# Patient Record
Sex: FEMALE | Race: WHITE | NOT HISPANIC OR LATINO | Employment: OTHER | URBAN - NONMETROPOLITAN AREA
[De-identification: names, ages, dates, MRNs, and addresses within clinical notes are randomized per-mention and may not be internally consistent; named-entity substitution may affect disease eponyms.]

---

## 2017-06-06 ENCOUNTER — HISTORY (OUTPATIENT)
Dept: FAMILY MEDICINE | Facility: OTHER | Age: 82
End: 2017-06-06

## 2017-06-06 ENCOUNTER — OFFICE VISIT - GICH (OUTPATIENT)
Dept: FAMILY MEDICINE | Facility: OTHER | Age: 82
End: 2017-06-06

## 2017-06-06 DIAGNOSIS — G56.03 BILATERAL CARPAL TUNNEL SYNDROME: ICD-10-CM

## 2017-06-26 ENCOUNTER — OFFICE VISIT - GICH (OUTPATIENT)
Dept: FAMILY MEDICINE | Facility: OTHER | Age: 82
End: 2017-06-26

## 2017-06-26 ENCOUNTER — HISTORY (OUTPATIENT)
Dept: FAMILY MEDICINE | Facility: OTHER | Age: 82
End: 2017-06-26

## 2017-06-26 DIAGNOSIS — G56.03 BILATERAL CARPAL TUNNEL SYNDROME: ICD-10-CM

## 2017-07-24 ENCOUNTER — AMBULATORY - GICH (OUTPATIENT)
Dept: FAMILY MEDICINE | Facility: OTHER | Age: 82
End: 2017-07-24

## 2017-07-24 DIAGNOSIS — G56.03 BILATERAL CARPAL TUNNEL SYNDROME: ICD-10-CM

## 2017-08-29 ENCOUNTER — HISTORY (OUTPATIENT)
Dept: FAMILY MEDICINE | Facility: OTHER | Age: 82
End: 2017-08-29

## 2017-08-29 ENCOUNTER — OFFICE VISIT - GICH (OUTPATIENT)
Dept: FAMILY MEDICINE | Facility: OTHER | Age: 82
End: 2017-08-29

## 2017-08-29 DIAGNOSIS — G56.03 BILATERAL CARPAL TUNNEL SYNDROME: ICD-10-CM

## 2017-08-30 ENCOUNTER — AMBULATORY - GICH (OUTPATIENT)
Dept: ORTHOPEDICS | Facility: OTHER | Age: 82
End: 2017-08-30

## 2017-08-30 DIAGNOSIS — M25.532 PAIN IN LEFT WRIST: ICD-10-CM

## 2017-08-30 DIAGNOSIS — M25.531 PAIN IN RIGHT WRIST: ICD-10-CM

## 2017-08-31 ENCOUNTER — AMBULATORY - GICH (OUTPATIENT)
Dept: ORTHOPEDICS | Facility: OTHER | Age: 82
End: 2017-08-31

## 2017-08-31 ENCOUNTER — HISTORY (OUTPATIENT)
Dept: ORTHOPEDICS | Facility: OTHER | Age: 82
End: 2017-08-31

## 2017-08-31 ENCOUNTER — OFFICE VISIT - GICH (OUTPATIENT)
Dept: ORTHOPEDICS | Facility: OTHER | Age: 82
End: 2017-08-31

## 2017-08-31 ENCOUNTER — HOSPITAL ENCOUNTER (OUTPATIENT)
Dept: RADIOLOGY | Facility: OTHER | Age: 82
End: 2017-08-31
Attending: ORTHOPAEDIC SURGERY

## 2017-08-31 DIAGNOSIS — M25.531 PAIN IN RIGHT WRIST: ICD-10-CM

## 2017-08-31 DIAGNOSIS — M25.532 PAIN IN LEFT WRIST: ICD-10-CM

## 2017-08-31 DIAGNOSIS — G56.03 BILATERAL CARPAL TUNNEL SYNDROME: ICD-10-CM

## 2017-09-21 ENCOUNTER — OFFICE VISIT - GICH (OUTPATIENT)
Dept: FAMILY MEDICINE | Facility: OTHER | Age: 82
End: 2017-09-21

## 2017-09-21 ENCOUNTER — HISTORY (OUTPATIENT)
Dept: FAMILY MEDICINE | Facility: OTHER | Age: 82
End: 2017-09-21

## 2017-09-21 DIAGNOSIS — Z23 ENCOUNTER FOR IMMUNIZATION: ICD-10-CM

## 2017-09-21 DIAGNOSIS — Z01.818 ENCOUNTER FOR OTHER PREPROCEDURAL EXAMINATION: ICD-10-CM

## 2017-09-21 LAB
ANION GAP - HISTORICAL: 12 (ref 5–18)
BUN SERPL-MCNC: 20 MG/DL (ref 7–25)
BUN/CREAT RATIO - HISTORICAL: 34
CALCIUM SERPL-MCNC: 9.4 MG/DL (ref 8.6–10.3)
CHLORIDE SERPLBLD-SCNC: 104 MMOL/L (ref 98–107)
CO2 SERPL-SCNC: 26 MMOL/L (ref 21–31)
CREAT SERPL-MCNC: 0.58 MG/DL (ref 0.7–1.3)
GFR IF NOT AFRICAN AMERICAN - HISTORICAL: >60 ML/MIN/1.73M2
GLUCOSE SERPL-MCNC: 103 MG/DL (ref 70–105)
HEMOGLOBIN: 12.7 G/DL (ref 12–16)
MCV RBC AUTO: 93 FL (ref 80–100)
POTASSIUM SERPL-SCNC: 3.9 MMOL/L (ref 3.5–5.1)
SODIUM SERPL-SCNC: 142 MMOL/L (ref 133–143)

## 2017-10-04 ENCOUNTER — HISTORY (OUTPATIENT)
Dept: SURGERY | Facility: OTHER | Age: 82
End: 2017-10-04

## 2017-10-04 ENCOUNTER — HOSPITAL ENCOUNTER (OUTPATIENT)
Dept: SURGERY | Facility: OTHER | Age: 82
Discharge: HOME | End: 2017-10-04
Attending: ORTHOPAEDIC SURGERY | Admitting: ORTHOPAEDIC SURGERY

## 2017-10-04 ENCOUNTER — SURGERY (OUTPATIENT)
Dept: SURGERY | Facility: OTHER | Age: 82
End: 2017-10-04

## 2017-10-04 DIAGNOSIS — Z98.890 OTHER SPECIFIED POSTPROCEDURAL STATES: ICD-10-CM

## 2017-10-16 ENCOUNTER — OFFICE VISIT - GICH (OUTPATIENT)
Dept: ORTHOPEDICS | Facility: OTHER | Age: 82
End: 2017-10-16

## 2017-10-16 ENCOUNTER — HISTORY (OUTPATIENT)
Dept: ORTHOPEDICS | Facility: OTHER | Age: 82
End: 2017-10-16

## 2017-10-16 DIAGNOSIS — Z98.890 OTHER SPECIFIED POSTPROCEDURAL STATES: ICD-10-CM

## 2017-10-16 DIAGNOSIS — G56.03 BILATERAL CARPAL TUNNEL SYNDROME: ICD-10-CM

## 2017-12-27 NOTE — PROGRESS NOTES
Patient Information     Patient Name MRN Sex Merlyn Florez [id number] Female 1933      Progress Notes by Edison Kelley DO at 2017  7:45 AM     Author:  Edison Kelley DO Service:  (none) Author Type:  Physician     Filed:  2017  8:22 AM Encounter Date:  2017 Status:  Signed     :  Edison Kelley DO (Physician)            Merlyn Escamilla was seen in consultation for Sharath Dinero MD for a chief complaint of numbness and tingling into both hands.      CHIEF COMPLAINT: Merlyn Escamilla is a 84 y.o.  female  Chief Complaint     Patient presents with       Consult      bilateral cts       HISTORY OF PRESENTING INJURY   History of presenting injury, patient comes in today with an ongoing complaint of numbness and tingling into both her hands. She states this started roughly around  doesn't recall any trauma but was doing more to care for her  who recently passed. She does gardening and utilizes her wrists and hands there. Numbness is into the median nerve distribution. Right seems to be worse than left for pain but the tingling is worse on the left.  Description of pain:  moderate aching, discomfort and numbness   Radiation of pain: yes  Pain course: gradually worsening  Worse with: bending or flexing affected area and extending affected area  Improved by: rest  History of injection: No  Any PT: No    PAST MEDICAL HISTORY:  Past Medical History:     Diagnosis  Date     DVT complicating pregnancy (HC)      Osteoarthritis     Knee        PAST SURGICAL HISTORY:  Past Surgical History:      Procedure  Laterality Date     APPENDECTOMY       COLONOSCOPY  2014    Arteriovenous malformations of right, transverse and splenic flexure       HYSTERECTOMY  1987     with squamous metaplasia and leiomoma       VEIN STRIPPING         ORTHOPEDIC FRACTURES AND BROKEN BONES:  As above.    ALLERGIES:  Allergies     Allergen  Reactions     Butazolidin  "[Phenylbutazone] *Unknown - Pt Doesn't Remember     Morphine Muscle Weakness       CURRENT MEDICATIONS:  Current Outpatient Prescriptions       Medication  Sig Dispense Refill     aspirin (ECOTRIN) 81 mg enteric coated tablet Take 1 tablet by mouth once daily with a meal.  0     multivitamin (MVI) tablet Take 1 tablet by mouth once daily.  0     naproxen (ALEVE) 220 mg tablet Take 220 mg by mouth once daily as needed.       No current facility-administered medications for this visit.      Medications have been reviewed by me and are current to the best of my knowledge and ability.      SOCIAL HISTORY:  Marital Status:   Children: Yes  Occupation: Homemaker.  Alcohol use:  Yes  Tobacco use: Smoker: no  Are you or have you used illicit drugs:  no    FAMILY HISTORY:  Family History       Problem   Relation Age of Onset     Heart Disease  Mother      Heart Disease  Father      Genetic  Other      Parents  of heart disease, father at age 63, mother 73~Sister- brain tumor       Cancer  Sister      brain tumor         REVIEW OF SYSTEMS:  The review of systems as documented in the HPI and on the intake questionnaire, completed by the patient on 2017 have been reviewed by myself and the pertinent positives and negatives addressed.  The remainder of the complete review of systems was non-contributory.    PHYSICAL EXAM:   /72  Pulse 76  Ht 1.575 m (5' 2\")  Wt 59.9 kg (132 lb)  BMI 24.14 kg/m2 Body mass index is 24.14 kg/(m^2).    General Appearance: Pleasant female in good appearance, mood and affect.  Alert and orientated times three ( time, date and location).    Skin:Normal    Sensation is Abnormal in the median nerve distribution bilaterally.    Shoulder:  Motion: full    Elbow:  Flexion: Normal  Extension: Normal    Wrist:  Flexion: Normal  Extension: Normal  Radial/ulnar motion: Normal  Tenderness at the scaphoid: negative    Hand:  Thenar wasting: no  Hypothenar wasting: no  Sensation: " Abnormal  Radial and ulnar blood flow:  Normal  Tinel's test positive  Phalen's test positive  Compression test positive.  Patient does have obvious deformity about the first CMC joints. There is no tenderness with palpation but there is a positive grind test bilaterally.    Eyes: Pupils are round and she wears glasses.    Ears: Hearing: Impaired    Heart: Good cap refill into her hands, radial pulses regular.    Lungs: Distant breath sounds.     Radiographic images from 8/31 where independently reviewed and discussed with the patient.      Xray:     X-rays do demonstrate significant first CMC arthritis. No fractures noted.    EMG:     EMG results reviewed with the patient independently she does have severe carpal tunnel syndrome. It is nonreactive on the right left is 3.0 ms and motor delay is 5.7 ms on the right 5.1 ms on the left.    IMPRESSION:  Significant bilateral carpal tunnel syndrome right greater than left.  Significant CMC arthritis bilaterally.    PLAN:  Risks, benefits, conservative, surgical and alternatives to treatment where discussed and the patient would like to proceed with operative intervention.  She will continue with her wrist splints.  She is not having pain about the first CMC joints so we'll continue to monitor.  I utilized a handout and poster board to help her understand carpal tunnel syndrome and its options for treatment she verbalized understanding.  She will follow-up after surgery.  Questions and concerns answered.    I have discussed options with Merlyn Escamilla the treatment for carpal tunnel, which have included observation, physical therapy, corticosteroid injection versus surgical release. I discussed pros and cons of each approach, and at this point, Merlyn Escamilla would like to proceed with carpal tunnel surgery. We discussed that surgery would be an outpatient surgery, you would be able to go home following the surgery. We will plan on open release of the transverse carpal  ligament with anything else that needs to be done.  Surgical anesthesia would be general anesthesia versus block and anesthesia will discuss options.  I discussed following surgery Merlyn Escamilla would be in a splint until seen in the office and they can work on gentle motion of the elbow and fingers after surgery.  At four weeks following surgery occupational therapy may be needed.   Full recovery from carpal tunnel surgery may take a year. The goal will be pain relief. Complications were discussed including continued pain, stiffness in the wrist, rare chance of neurovascular damage, potential chance of infection. If deep infection were to occur, further surgery may be needed with repeat washout in the operating room with possible need for treatment with antibiotics.    Risks, benefits, conservative, surgical, and alternatives of treatment were thoroughly outlined. No guarantees were given. Risks which do include, but are not limited to:  Scar, infection, decreased motion, damage to blood vessels, nerves and tendons, failure or need for further treatment, reaction to medications and anesthesia, blood clots, and the possibility of death where discussed.  She did verbalize an understanding. All questions and concerns were addressed.    Patient is set up for open right carpal tunnel surgery    Merlyn Escamilla will need pre op clearance for management of pulmonary function for surgery and use of etoh daily.    Follow up after surgery will be 12-14 days    All questions where answered to the patients satisfaction.    Edison Kelley D.O.  Orthopaedic Surgeon    [address] [address]  Phone [phone] (KNEE)  Fax [phone]    This document was created using computer generated templates and voice activated software.    8:17 AM 8/31/2017

## 2017-12-27 NOTE — PROGRESS NOTES
Patient Information     Patient Name MRN Sex Merlyn Rodriguez [id number] Female 1933      Progress Notes by Sharath Dinero MD at 2017  2:00 PM     Author:  Sharath Dinero MD Service:  (none) Author Type:  Physician     Filed:  2017  2:44 PM Encounter Date:  2017 Status:  Signed     :  Sharath Dinero MD (Physician)            SUBJECTIVE:  Merlyn Escamilla is a 83 y.o. female here for follow-up. Patient was diagnosed with bilateral carpal tunnel syndrome previously. Since her last visit she has been using wrist braces and anti-inflammatories. She states overall her symptoms are significantly improved. She continues however have symptoms, right worse than left. She is worried about taking care of her  who will be discharged back to home with the next couple of days.    ROS:    As above otherwise ROS is unremarkable.    OBJECTIVE:  /60  Pulse 72  Wt 62.1 kg (137 lb)  Breastfeeding? No  BMI 25.06 kg/m2    EXAM:  General Appearance: Pleasant, alert, appropriate appearance for age. No acute distress  Musculoskeletal: Bilateral wrist show normal range of motion. She has no palpable tenderness to palpation.  Neurologic Exam: Positive Phalen and Tinel's on the right, negative on the left.    ASSESSEMENT AND PLAN:    Merlyn was seen today for follow up.    Diagnoses and all orders for this visit:    Bilateral carpal tunnel syndrome  -     EMG; Future    We discussed her options and she is inquiring about possibility of surgery. We discussed therapy, continued immobilization, anti-inflammatories and EMG to rule out ultimately she wanted to get an EMG to evaluate the severity of her carpal tunnel. In the meantime will continue with bracing and anti-inflammatories. I'll contact her with results afterwards.      Cliff Dinero MD    This document was prepared using voice generated software.  While every attempt was made for accuracy, grammatical errors may exist.

## 2017-12-28 NOTE — H&P
Patient Information     Patient Name MRMerlyn Chen [id number] Female 1933      H&P (View-Only) by Sharath Dinero MD at 2017  9:30 AM     Author:  Sharath Dinero MD Service:  (none) Author Type:  Physician     Filed:  2017 12:35 PM Date of Service:  2017  9:30 AM Status:  Signed     :  Sharath Dinero MD (Physician)            ----------------- PREOPERATIVE EXAM ------------------  2017    SUBJECTIVE:  Merlyn Escamilla is a 84 y.o. female here for preoperative optimization.    I was asked to see Merlyn Escamilla by Dr. Kelley for a preoperative optimization.    Date of Surgery:   Type of Surgery: Right carpal tunnel release  Surgeon: Dr. Kelley  Hospital:  [facility]    HPI:  Patient has a history of bilateral carpal tunnel syndrome. She is planning on having her right side released on . She has had previous surgeries in the past without any bleeding or anesthesia problems. She did have a blood clot, DVT, approximate 50 years ago with the birth of her children but no blood clots since. There is no family history of clotting disorders.  She normally uses an aspirin a day but she has not taken anything over-the-counter or prescribed since .      Fever/Chills or other infectious symptoms in past month:  (NO)   >10lb weight loss in past two months:  (NO)     Preoperative Evaluation: Obstructive Sleep Apnea screening    S: Snore -  Do you snore loudly? (louder than talking or loud enough to be heard through closed doors)(NO)  T: Tired - Do you often feel tired, fatigued, or sleepy during the daytime?(NO)  O: Observed - Has anyone ever observed you stop breathing during your sleep?(NO)  P: Pressure - Do you have or are you being treated for high blood pressure?(NO)  B: BMI - BMI greater than 35kg/m2?(NO)  A: Age - Age over 50 years old?(YES)  N: Neck - Neck circumference greater than 40 cm?(NO)  G: Gender - Gender: Male?(NO)    Total number  "of \"YES\" responses:  1    Scoring: Low risk of ARTEMIO 0-2  At Risk of ARTEMIO: >3 High Risk of ARTEMIO: 5-8        Patient Active Problem List       Diagnosis  Date Noted     S/P total knee arthroplasty  10/03/2014     AVM (arteriovenous malformation) of colon  10/01/2014     Osteoarthritis       Knee        HEMORRHOIDS       COLONIC POLYPS, HYPERPLASTIC, HX OF         Past Medical History:     Diagnosis  Date     DVT complicating pregnancy (HC)      Osteoarthritis     Knee        Past Surgical History:      Procedure  Laterality Date     APPENDECTOMY       COLONOSCOPY  2014    Arteriovenous malformations of right, transverse and splenic flexure       HYSTERECTOMY  1987     with squamous metaplasia and leiomoma       right knee replacement Right      VEIN STRIPPING         Current Outpatient Prescriptions       Medication  Sig Dispense Refill     aspirin (ECOTRIN) 81 mg enteric coated tablet Take 1 tablet by mouth once daily with a meal.  0     multivitamin (MVI) tablet Take 1 tablet by mouth once daily.  0     naproxen (ALEVE) 220 mg tablet Take 220 mg by mouth once daily as needed.       No current facility-administered medications for this visit.      Medications have been reviewed by me and are current to the best of my knowledge and ability.    Recent use of: no recent use of aspirin (ASA), NSAIDS or steroids    Allergies:  Allergies     Allergen  Reactions     Butazolidin [Phenylbutazone] *Unknown - Pt Doesn't Remember     Morphine Muscle Weakness   Latex allergy  no    Family History       Problem   Relation Age of Onset     Heart Disease  Mother      Heart Disease  Father      Genetic  Other      Parents  of heart disease, father at age 63, mother 73~Sister- brain tumor       Cancer  Sister      brain tumor         Denies family hx of bleeding tendencies, anesthesia complications, or other problems with surgery.    Social History      Substance Use Topics        Smoking status:  Never Smoker     Smokeless " "tobacco:  Never Used     Alcohol use  3.0 oz/week     6 Standard drinks or equivalent per week        ROS:    Surgical:  patient denies previous complications from prior surgeries including but not limited to prolonged bleeding, anesthesia complications, dysrhythmias, surgical wound infections, or prolonged hospital stay.       -------------------------------------------------------------    PHYSICAL EXAM:  /83  Pulse 89  Temp 97.1  F (36.2  C) (Tympanic)  Ht 1.575 m (5' 2\")  Wt 59.4 kg (131 lb)  BMI 23.96 kg/m2    EXAM:  General Appearance: Pleasant, alert, appropriate appearance for age. No acute distress  Head Exam: Normal. Normocephalic, atraumatic.  Eyes: PERRL, EOMI  Ears: Normal TM's bilaterally. Normal auditory canals and external ears.   OroPharynx: Normal buccal mucosa. Normal pharynx.  Neck: Supple, no masses or nodes, no lymphadenopathy.  No thyromegaly.  Lungs: Normal chest wall and respirations. Clear to auscultation, no wheezes or crackles.  Cardiovascular: Regular rate and rhythm. S1, S2, no murmurs.  Gastrointestinal: Soft, nontender, no abnormal masses or organomegaly. BS normal   Musculoskeletal: No edema.  Skin: no concerning or new rashes.  Neurologic Exam: CN 2-12 grossly intact.  Normal gait.  Symmetric DTRs, normal gross motor movement, tone, and coordination. No tremor.  Psychiatric Exam: Alert and oriented, appropriate affect.      EKG:  appears normal, NSR, Normal sinus rhythm and normal axis, normal intervals, no acute ST/T changes c/w ischemia  ---------------------------------------------------------------  Results for orders placed or performed in visit on 09/21/17      HEMOGLOBIN      Result  Value Ref Range    HEMOGLOBIN                12.7 12.0 - 16.0 g/dL    MCV                       93 80 - 100 fL   BASIC METABOLIC PANEL      Result  Value Ref Range    SODIUM 142 133 - 143 mmol/L    POTASSIUM 3.9 3.5 - 5.1 mmol/L    CHLORIDE 104 98 - 107 mmol/L    CO2,TOTAL 26 21 - 31 " mmol/L    ANION GAP 12 5 - 18                    GLUCOSE 103 70 - 105 mg/dL    CALCIUM 9.4 8.6 - 10.3 mg/dL    BUN 20 7 - 25 mg/dL    CREATININE 0.58 (L) 0.70 - 1.30 mg/dL    BUN/CREAT RATIO           34                    GFR if African American >60 >60 ml/min/1.73m2    GFR if not African American >60 >60 ml/min/1.73m2         ASSESSEMENT AND PLAN:    Merlyn was seen today for pre-op exam.    Diagnoses and all orders for this visit:    Preop examination  -     HEMOGLOBIN; Future  -     BASIC METABOLIC PANEL; Future  -     EKG 12 LEAD UNIT PERFORMED (PERFORMED TODAY)     patient is currently optimized for her surgery. Labs can be seen above. She will be nothing by mouth after midnight. She will restart her aspirin. Feel free to contact me if there are any concerns.    PRE OP RECOMMENDATIONS:  Patient is on chronic pain medications (NO);   Patient is on antiplatlet/anticoagulation (NO)  Other medications that need adjustment perioperatively (NO)    Other:  Patient was advised to call our office and the surgical services with any change in condition or new symptoms if they were to develop between today and their surgical date.  Especially any cardiopulmonary symptoms or symptoms concerning for an infection.    Cliff Dinero MD    This document was prepared using voice generated softwear.  While every attempt was made for accuracy, grammatical errors may exist.

## 2017-12-28 NOTE — OR ANESTHESIA
Patient Information     Patient Name MRN Sex     Merlyn Escamilla [id number] Female 1933      OR Anesthesia by Clint Doll DO at 10/4/2017 10:55 AM     Author:  Clint Doll DO Service:  (none) Author Type:  PHYS- Anesthesiologist     Filed:  10/4/2017 10:55 AM Date of Service:  10/4/2017 10:55 AM Status:  Signed     :  Clint Doll DO (PHYS- Anesthesiologist)            Anesthesia Post Operative Care Note    Name: Merlyn Escamilla  MRN:   [id number]  :    1933       Procedure Done:  See Surgeon Note        Anesthesia Technique    Anesthetic Type:  General     Airway Management:  LMA     Oral Trauma:  No    Intraoperative Course   Hemodynamics:  Stable    Ventilation Normal:  Yes Lung Sounds:  Normal      PACU Course    Airway Status:  Extubated     Nondepolarizer Used:       Reversed: N/A   Hemodynamics:  Stable      Hydration: Euvolemic   Temperature:  36.1 - 38.3      Mental Status:  Awake, alert, follows commands   Pain Management:  Adequate   Regional Block:  No   Anesthesia Complications:  None      Vital Signs:  Temp: 97.9  F (36.6  C)  Pulse: 69  BP: 117/59  Resp: 18  SpO2: 95 %    O2 Device: Room Air         Level of Nausea: None        Active Lines:  Patient Lines/Drains/Airways Status    Active Line     Name: Placement date: Placement time: Site: Days:    PERIPHERAL VAD Left Hand 20 10/04/17   0810   Hand   less than 1                Intake & Output:       Labs:  No results for input(s): EZ4UVNQRVGE, NNV6KOVCPPZD, PHARTERIAL, [id number], T6OQXQXUCLRI in the last 24 hours.    No results for input(s): MAGNESIUM in the last 24 hours.    No results for input(s): GLUCOSEMETER in the last 720 hours.        Clint Doll DO ....................  10/4/2017   10:55 AM

## 2017-12-28 NOTE — OR POSTOP
Patient Information     Patient Name MRN Sex     Merlyn Escamilla [id number] Female 1933      OR PostOp by Valerie Stiles RN at 10/4/2017 11:27 AM     Author:  Valerie Stiles RN Service:  (none) Author Type:  NURS- Registered Nurse     Filed:  10/4/2017 11:27 AM Date of Service:  10/4/2017 11:27 AM Status:  Signed     :  Valerie Stiles RN (NURS- Registered Nurse)            Discharge Note    Data:  Merlyn Escamilla has been discharged home at 1100 via ambulatory accompanied by Registered Nurse and Family.      Action:  Written discharge/follow-up instructions were provided to patient and Daughter(s). Prescriptions were written and sent with patient.  Belongings sent with patient. Medications from home sent with patient/family: Not Applicable  Equipment none .     Response:  Patient and Daughter(s) verbalized understanding of discharge instructions, reason for discharge, and necessary follow-up appointments.

## 2017-12-28 NOTE — OR POSTOP
Patient Information     Patient Name MRN Sex     Merlyn Escamilla [id number] Female 1933      OR PostOp by Leyla Post RN at 10/4/2017 10:08 AM     Author:  Leyla Post RN Service:  (none) Author Type:  NURS- Registered Nurse     Filed:  10/4/2017 10:09 AM Date of Service:  10/4/2017 10:08 AM Status:  Signed     :  Leyla Post RN (NURS- Registered Nurse)            PACU Respiratory Event Documentation     1) Episodes of Apnea greater than or equal to 10 seconds: no    2) Bradypnea - less than 8 breaths per minute: 16    3) Pain score on 0 to 10 scale: 0    4) Pain-sedation mismatch (yes or no): no    5) Repeated 02 desaturation less than 90% (yes or no): no    Anesthesia notified? (yes or no): yes    Any of the above events occuring repeatedly in separate 30 minute intervals may be considered recurrent PACU respiratory events.    PACU Transfer Note    Merlyn Escamilla transferred to DSU via cart.  Equipment used for transport:  None.  Accompanied by:  Registered Nurse    Patient stable and meets phase 1 discharge criteria for transport from PACU.  LEYLA POST RN ....................  10/4/2017   10:09 AM

## 2017-12-28 NOTE — PROCEDURES
Patient Information     Patient Name MRN Sex Merlyn Florez [id number] Female 1933      Procedures signed by Edison Kelley DO at 10/4/2017 11:06 AM      Author:  Edison Kelley DO Service:  (none) Author Type:  Physician     Filed:  10/4/2017 11:06 AM Creation Time:  10/4/2017 10:07 AM Status:  Signed     :  Edison Kelley DO (Physician)            DATE OF SERVICE:  10/04/2017    SURGEON:  Edison Kelley DO.    ASSISTANT:  None.    PREOPERATIVE DIAGNOSIS:  Bilateral carpal tunnel, right greater than left.    POSTOPERATIVE DIAGNOSIS:  Bilateral carpal tunnel, right greater than left.    PROCEDURES:  1.  Release of the right transverse carpal ligament (DOS 10/04/2017).  2.  Left carpal tunnel syndrome.    IMPLANTS:  None.    ANESTHESIA:  General via LMA.    ESTIMATED BLOOD LOSS:  Less than 5.    FLUIDS:  See chart.    DRAINS:  None.    COMPLICATIONS:  None.    DISPOSITION:  Stable to postop.    INDICATIONS FOR PROCEDURE:  The patient is an 84-year-old female who comes in today with ongoing complaints of numbness and tingling into both hands.  She had been seen in my office and noted to have significant carpal tunnel syndrome bilaterally as noted on EMG.  She had tried conservative measures with very limited relief.  She elected to go ahead with operative intervention.    PROCEDURE NOTE:  After informed consent was received from the patient, having listed all the risks and benefits as noted on the consent form, but not limited to the consent form, and having discussed all conservative surgical and alternatives to treatment, the patient signed the consent form with a witness present.  The patient understood there were numerous risks, all of them are not written down but were discussed at length.  No guarantees were given.  All questions were answered prior to signing consent form.    The patient was given antibiotics one half hour prior to skin incision.  She was taken  back to the operating room supine on a gurney and transferred to the operating room table, secured, and all bony prominences were padded.  She was then given general anesthesia via LMA.  Once proper anesthesia was obtained, tourniquet was placed but not inflated, and the patient's right upper extremity was sterilely prepped and draped.    A timeout was performed according to institutional guidelines.  With this done, I then marked my incision site, elevated the arm and inflated the tourniquet.  Skin incision only was made with a #15 scalpel.  Blunt dissection was performed until I identified the transverse carpal ligament.  I made a new small nick incision in the transverse carpal ligament, passed the [place] proximally and distally, and released the proximal and distal components of the transverse carpal ligament under direct visualization.  The nerve itself when visualized was noted to have an hourglass deformity.  When I released the tourniquet, there was significant hyperemic effect into the nerve.  I irrigated the area copiously and maintained hemostasis, irrigated again.    I closed the superficial subQ tissue with #4-0 Stratafix and the skin was closed with #4-0 nylon in horizontal mattress fashion.  The area was infiltrated with local.  Sterile dressings included Xeroform, 4 x 4, ABD, and a well-padded cock-up wrist splint were applied.  The patient was extubated, transferred back to the gurney, and taken to the recovery room in satisfactory condition.  She will be discharged home per protocol.  She was given prescription for Norco 5/325, #5, no refills, and she will follow up in my office as already arranged.      DO Orville MCELROY  D:10/04/2017 09:45:44  T:10/04/2017 10:02:16  JUN: [id number]  TJID: [id number]    cc:

## 2017-12-28 NOTE — PROGRESS NOTES
"Patient Information     Patient Name MRN Sex Merlyn Rodriguez [id number] Female 1933      Progress Notes by Edison Kelley DO at 10/16/2017  9:30 AM     Author:  Edison Kelley DO Service:  (none) Author Type:  Physician     Filed:  10/16/2017  9:54 AM Encounter Date:  10/16/2017 Status:  Signed     :  Edison Kelley DO (Physician)            PROGRESS NOTE    SUBJECTIVE:  Merlyn Escamilla is here for evaluation in regards to right carpal tunnel release and also her left carpal tunnel syndrome. Patient states that there is been some improvement on her right side. She still does feel some numbness especially into the thumb but it has improved in the other digits. The left hand now has flared up and it seems to be getting worse. This is probably related to her having to use it more since her right carpal tunnel release. She is using her splint on the left side. I previous note does discuss her bilateral carpal tunnel syndrome.     OBJECTIVE:  /60  Pulse 68  Ht 1.575 m (5' 2\")  Wt 59.4 kg (131 lb)  BMI 23.96 kg/m2 Body mass index is 23.96 kg/(m^2).    General Appearance: Pleasant female in good appearance, mood and affect.  Alert and orientated times three ( time, date and location).    Incision:  Clean and dry, closed, no infection    Sensation is Abnormal in the median nerve distribution bilaterally, slight improvement on the right side.    Shoulder:  Motion: full    Elbow:  Flexion: Normal  Extension: Normal    Wrist:  Flexion: Normal  Extension: Normal  Radial/ulnar motion: Normal  Tenderness at the scaphoid: negative    Hand:  Thenar wasting: no  Hypothenar wasting: no  Sensation: Abnormal  Radial and ulnar blood flow:  Normal  Tinel's test positive  Phalen's test positive  Compression test positive.  Patient does have obvious deformity about the first CMC joints. There is no tenderness with palpation but there is a positive grind test bilaterally.    Eyes: Pupils " are round and she wears glasses.    Ears: Hearing: Impaired    Heart: Good cap refill into her hands, radial pulses regular.    Lungs: Distant breath sounds.     Radiographic images from 8/31 where independently reviewed and discussed with the patient.      Xray:     X-rays do demonstrate significant first CMC arthritis. No fractures noted.    PROCEDURE: XR WRIST W NAVICULAR MINIMUM 3 VIEWS LEFT  HISTORY: Bilateral wrist pain.  COMPARISON: None.  TECHNIQUE: 5 views of the left wrist were obtained.  FINDINGS: There are advanced degenerative changes of the first carpometacarpal joint. There are also moderate degenerative changes in the remainder of the wrist. There is diffuse osteopenia. No acute fracture or dislocation.  IMPRESSION: No acute fracture. Advanced degenerative disease.  Electronically Signed By: Saumya Rodriguez M.D. on 8/31/2017 8:54 AM    EMG:     EMG results reviewed with the patient independently she does have severe carpal tunnel syndrome. It is nonreactive on the right left is 3.0 ms and motor delay is 5.7 ms on the right 5.1 ms on the left.    IMPRESSION:     POSTOPERATIVE DIAGNOSIS:  Bilateral carpal tunnel, right greater than left.     PROCEDURES:  1.  Release of the right transverse carpal ligament (DOS 10/04/2017).  2.  Left carpal tunnel syndrome.    Significant CMC arthritis bilaterally.    PLAN:  Risks, benefits, conservative, surgical and alternatives to treatment where discussed and the patient would like to proceed with operative intervention for the left wrist sometime after Muna.  She will have her sutures removed for the right wrist and utilize her splint for the next couple weeks she has a 2 pound weight restriction. She was instructed on wound care and scar remodeling.  She will continue with her wrist splints.  I utilized a handout and poster board to help her understand carpal tunnel syndrome and its options for treatment she verbalized understanding.  She will follow-up  after surgery.  Questions and concerns answered.    I have discussed options with Merlyn Escamilla the treatment for carpal tunnel, which have included observation, physical therapy, corticosteroid injection versus surgical release. I discussed pros and cons of each approach, and at this point, Merlyn Escamilla would like to proceed with carpal tunnel surgery. We discussed that surgery would be an outpatient surgery, you would be able to go home following the surgery. We will plan on open release of the transverse carpal ligament with anything else that needs to be done.  Surgical anesthesia would be general anesthesia versus block and anesthesia will discuss options.  I discussed following surgery Merlyn Escamilla would be in a splint until seen in the office and they can work on gentle motion of the elbow and fingers after surgery.  At four weeks following surgery occupational therapy may be needed.   Full recovery from carpal tunnel surgery may take a year. The goal will be pain relief. Complications were discussed including continued pain, stiffness in the wrist, rare chance of neurovascular damage, potential chance of infection. If deep infection were to occur, further surgery may be needed with repeat washout in the operating room with possible need for treatment with antibiotics.    Risks, benefits, conservative, surgical, and alternatives of treatment were thoroughly outlined. No guarantees were given. Risks which do include, but are not limited to:  Scar, infection, decreased motion, damage to blood vessels, nerves and tendons, failure or need for further treatment, reaction to medications and anesthesia, blood clots, and the possibility of death where discussed.  She did verbalize an understanding. All questions and concerns were addressed.    Patient is set up for open left carpal tunnel surgery    Merlyn Escamilla will need pre op clearance for management of medical clearance and pulmonary function as well as  cardiac function for surgery.    Follow up after surgery will be 12-14 days    All questions where answered to the patients satisfaction.    Edison Kelley D.O.  Orthopaedic Surgeon    [address] [address]  Phone [phone] (KNEE)  Fax [phone]    This document was created using computer generated templates and voice activated software.    9:49 AM 10/16/2017

## 2017-12-28 NOTE — INTERVAL H&P NOTE
Patient Information     Patient Name MRMerlyn Chen [id number] Female 1933      Interval H&P Note by Edison Kelley DO at 10/4/2017  8:10 AM     Author:  Edison Kelley DO Service:  (none) Author Type:  Physician     Filed:  10/4/2017  8:10 AM Date of Service:  10/4/2017  8:10 AM Status:  Signed     :  Edison Kelley DO (Physician)            History and Physical Update    The history and physical has been reviewed and the patient's right wrist has been examined.  There are no interim changes to the patient's history or physical condition.  She is ready to proceed with planned procedure.  She understands the risks and benefits and once again these where outlined.    Edison Kelley DO  Orthopedic Surgeon        Source Note     Author:  Sharath Dinero MD Service:  (none) Author Type:  Physician    Filed:  2017 12:35 PM Date of Service:  2017  9:30 AM Status:  Signed    :  Sharath Dinero MD (Physician)              ----------------- PREOPERATIVE EXAM ------------------  2017    SUBJECTIVE:  Merlyn Escamilla is a 84 y.o. female here for preoperative optimization.    I was asked to see Merlyn Escamilla by Dr. Kelley for a preoperative optimization.    Date of Surgery:   Type of Surgery: Right carpal tunnel release  Surgeon: Dr. Kelley  Hospital:  [facility]    HPI:  Patient has a history of bilateral carpal tunnel syndrome. She is planning on having her right side released on . She has had previous surgeries in the past without any bleeding or anesthesia problems. She did have a blood clot, DVT, approximate 50 years ago with the birth of her children but no blood clots since. There is no family history of clotting disorders.  She normally uses an aspirin a day but she has not taken anything over-the-counter or prescribed since .      Fever/Chills or other infectious symptoms in past month:  (NO)   >10lb weight  "loss in past two months:  (NO)     Preoperative Evaluation: Obstructive Sleep Apnea screening    S: Snore -  Do you snore loudly? (louder than talking or loud enough to be heard through closed doors)(NO)  T: Tired - Do you often feel tired, fatigued, or sleepy during the daytime?(NO)  O: Observed - Has anyone ever observed you stop breathing during your sleep?(NO)  P: Pressure - Do you have or are you being treated for high blood pressure?(NO)  B: BMI - BMI greater than 35kg/m2?(NO)  A: Age - Age over 50 years old?(YES)  N: Neck - Neck circumference greater than 40 cm?(NO)  G: Gender - Gender: Male?(NO)    Total number of \"YES\" responses:  1    Scoring: Low risk of ARTEMIO 0-2  At Risk of ARTEMIO: >3 High Risk of ARTEMIO: 5-8        Patient Active Problem List       Diagnosis  Date Noted     S/P total knee arthroplasty  10/03/2014     AVM (arteriovenous malformation) of colon  10/01/2014     Osteoarthritis       Knee        HEMORRHOIDS       COLONIC POLYPS, HYPERPLASTIC, HX OF         Past Medical History:     Diagnosis  Date     DVT complicating pregnancy (HC)      Osteoarthritis     Knee        Past Surgical History:      Procedure  Laterality Date     APPENDECTOMY       COLONOSCOPY  9/2014    Arteriovenous malformations of right, transverse and splenic flexure       HYSTERECTOMY  06/1987     with squamous metaplasia and leiomoma       right knee replacement Right      VEIN STRIPPING         Current Outpatient Prescriptions       Medication  Sig Dispense Refill     aspirin (ECOTRIN) 81 mg enteric coated tablet Take 1 tablet by mouth once daily with a meal.  0     multivitamin (MVI) tablet Take 1 tablet by mouth once daily.  0     naproxen (ALEVE) 220 mg tablet Take 220 mg by mouth once daily as needed.       No current facility-administered medications for this visit.      Medications have been reviewed by me and are current to the best of my knowledge and ability.    Recent use of: no recent use of aspirin (ASA), NSAIDS or " "steroids    Allergies:  Allergies     Allergen  Reactions     Butazolidin [Phenylbutazone] *Unknown - Pt Doesn't Remember     Morphine Muscle Weakness   Latex allergy  no    Family History       Problem   Relation Age of Onset     Heart Disease  Mother      Heart Disease  Father      Genetic  Other      Parents  of heart disease, father at age 63, mother 73~Sister- brain tumor       Cancer  Sister      brain tumor         Denies family hx of bleeding tendencies, anesthesia complications, or other problems with surgery.    Social History      Substance Use Topics        Smoking status:  Never Smoker     Smokeless tobacco:  Never Used     Alcohol use  3.0 oz/week     6 Standard drinks or equivalent per week        ROS:    Surgical:  patient denies previous complications from prior surgeries including but not limited to prolonged bleeding, anesthesia complications, dysrhythmias, surgical wound infections, or prolonged hospital stay.       -------------------------------------------------------------    PHYSICAL EXAM:  /83  Pulse 89  Temp 97.1  F (36.2  C) (Tympanic)  Ht 1.575 m (5' 2\")  Wt 59.4 kg (131 lb)  BMI 23.96 kg/m2    EXAM:  General Appearance: Pleasant, alert, appropriate appearance for age. No acute distress  Head Exam: Normal. Normocephalic, atraumatic.  Eyes: PERRL, EOMI  Ears: Normal TM's bilaterally. Normal auditory canals and external ears.   OroPharynx: Normal buccal mucosa. Normal pharynx.  Neck: Supple, no masses or nodes, no lymphadenopathy.  No thyromegaly.  Lungs: Normal chest wall and respirations. Clear to auscultation, no wheezes or crackles.  Cardiovascular: Regular rate and rhythm. S1, S2, no murmurs.  Gastrointestinal: Soft, nontender, no abnormal masses or organomegaly. BS normal   Musculoskeletal: No edema.  Skin: no concerning or new rashes.  Neurologic Exam: CN 2-12 grossly intact.  Normal gait.  Symmetric DTRs, normal gross motor movement, tone, and coordination. No " tremor.  Psychiatric Exam: Alert and oriented, appropriate affect.      EKG:  appears normal, NSR, Normal sinus rhythm and normal axis, normal intervals, no acute ST/T changes c/w ischemia  ---------------------------------------------------------------  Results for orders placed or performed in visit on 09/21/17      HEMOGLOBIN      Result  Value Ref Range    HEMOGLOBIN                12.7 12.0 - 16.0 g/dL    MCV                       93 80 - 100 fL   BASIC METABOLIC PANEL      Result  Value Ref Range    SODIUM 142 133 - 143 mmol/L    POTASSIUM 3.9 3.5 - 5.1 mmol/L    CHLORIDE 104 98 - 107 mmol/L    CO2,TOTAL 26 21 - 31 mmol/L    ANION GAP 12 5 - 18                    GLUCOSE 103 70 - 105 mg/dL    CALCIUM 9.4 8.6 - 10.3 mg/dL    BUN 20 7 - 25 mg/dL    CREATININE 0.58 (L) 0.70 - 1.30 mg/dL    BUN/CREAT RATIO           34                    GFR if African American >60 >60 ml/min/1.73m2    GFR if not African American >60 >60 ml/min/1.73m2         ASSESSEMENT AND PLAN:    Merlyn was seen today for pre-op exam.    Diagnoses and all orders for this visit:    Preop examination  -     HEMOGLOBIN; Future  -     BASIC METABOLIC PANEL; Future  -     EKG 12 LEAD UNIT PERFORMED (PERFORMED TODAY)     patient is currently optimized for her surgery. Labs can be seen above. She will be nothing by mouth after midnight. She will restart her aspirin. Feel free to contact me if there are any concerns.    PRE OP RECOMMENDATIONS:  Patient is on chronic pain medications (NO);   Patient is on antiplatlet/anticoagulation (NO)  Other medications that need adjustment perioperatively (NO)    Other:  Patient was advised to call our office and the surgical services with any change in condition or new symptoms if they were to develop between today and their surgical date.  Especially any cardiopulmonary symptoms or symptoms concerning for an infection.    Cliff Dinero MD    This document was prepared using voice generated softwear.  While every  attempt was made for accuracy, grammatical errors may exist.

## 2017-12-28 NOTE — PROGRESS NOTES
"Patient Information     Patient Name MRN Sex Merlyn Florez [id number] Female 1933      Progress Notes by Sharath Dinero MD at 2017  8:30 AM     Author:  Sharath Dinero MD Service:  (none) Author Type:  Physician     Filed:  2017  8:39 AM Encounter Date:  2017 Status:  Signed     :  Sharath Dinero MD (Physician)            SUBJECTIVE:  Merlyn Escamilla is a 83 y.o. female here for bilateral hand pain. Patient has had numbness and some mild pain in her hands bilaterally for last 2 weeks. She thinks that this started after she had a prolonged trip while she was driving. Her right hand seems be slightly worse than her left. She is right-hand dominant. She has had no previous symptoms like this. It seems to be worse at night when she is sleeping. She has not done anything for this at home. No other repetitive activities recently.    ROS:    As above otherwise ROS is unremarkable.    OBJECTIVE:  /68  Pulse 78  Ht 1.575 m (5' 2\")  Wt 62.7 kg (138 lb 3.2 oz)  BMI 25.28 kg/m2    EXAM:  General Appearance: Pleasant, alert, appropriate appearance for age. No acute distress  Musculoskeletal: Both wrists show normal range of motion without any swelling. No palpable tenderness along her carpals or scaphoid.  Skin: No erythema or rash.  Neurologic Exam: Subjective symptoms in her median nerve distribution. Negative Phalen and Tinel's bilaterally.    ASSESSEMENT AND PLAN:    Merlyn was seen today for hand pain/problem.    Diagnoses and all orders for this visit:    Carpal tunnel syndrome on both sides  -     WRIST BRACE    Subjectively she describes carpal tunnel syndrome, her exam however is unremarkable. We discussed various options and she will try Aleve twice daily with meals for the next couple of weeks as well as bilateral wrist braces as often as possible. If she's having no improvement we could consider EMG or therapy. She'll follow-up if her symptoms are changing in any " way.      Cliff Dinero MD    This document was prepared using voice generated software.  While every attempt was made for accuracy, grammatical errors may exist.

## 2017-12-28 NOTE — PROGRESS NOTES
Patient Information     Patient Name MRN Sex Merlyn Florez [id number] Female 1933      Progress Notes by Sharath Dinero MD at 2017  9:30 AM     Author:  Sharath Dinero MD Service:  (none) Author Type:  Physician     Filed:  2017 12:35 PM Encounter Date:  2017 Status:  Signed     :  Sharath Dinero MD (Physician)            See H&P

## 2017-12-28 NOTE — PROGRESS NOTES
Patient Information     Patient Name MRN Sex     Merlyn Escamilla [id number] Female 1933      Progress Notes by Sharath Dinero MD at 2017  1:30 PM     Author:  Sharath Dinero MD Service:  (none) Author Type:  Physician     Filed:  2017  2:02 PM Encounter Date:  2017 Status:  Signed     :  Sharath Dinero MD (Physician)            SUBJECTIVE:  Merlyn Escamilla is a 84 y.o. female here for follow-up. Patient has known bilateral carpal tunnel syndrome. She had an EMG performed previously which showed severe findings bilaterally. She wanted to postpone any additional treatment as her  recently passed away. She's not a point time where she would like to consider surgical treatment options. She continues to wear braces especially at night with some relief.    ROS:    As above otherwise ROS is unremarkable.    OBJECTIVE:  /64  Pulse 68  Wt 60 kg (132 lb 3.2 oz)  BMI 24.18 kg/m2    EXAM:  General Appearance: Pleasant, alert, appropriate appearance for age. No acute distress    exam was not repeated today.    ASSESSEMENT AND PLAN:    Merlyn was seen today for follow up.    Diagnoses and all orders for this visit:    Bilateral carpal tunnel syndrome  -     AMB CONSULT TO ORTHOPEDICS - AFFILIATE ONLY; Future    We once again reviewed her EMG findings and given the severe nature of her neuropathy will refer her to orthopedics to discuss carpal tunnel release. The meantime she'll continue with bracing at night as that does provide some relief.      Cliff Dinero MD    This document was prepared using voice generated software.  While every attempt was made for accuracy, grammatical errors may exist.

## 2017-12-28 NOTE — OR ANESTHESIA
"Patient Information     Patient Name MRN Sex Merlyn Florez [id number] Female 1933      OR Anesthesia by Clint Doll DO at 10/4/2017  8:34 AM     Author:  Clint Doll DO Service:  (none) Author Type:  PHYS- Anesthesiologist     Filed:  10/4/2017  8:34 AM Date of Service:  10/4/2017  8:34 AM Status:  Signed     :  Clint Doll DO (PHYS- Anesthesiologist)            ANESTHESIAPREOP    PREANESTHETIC EXAM    Merlyn Escamilla is a 84 y.o. female    /75  Pulse 62  Temp 97.2  F (36.2  C)  Resp 20  Ht 1.575 m (5' 2\")  Wt 59.4 kg (131 lb)  SpO2 97%  BMI 23.96 kg/m2  Body mass index is 23.96 kg/(m^2).    ALLERGIES    Butazolidin [phenylbutazone] and Morphine    PAST MEDICAL HISTORY    Past Medical History:     Diagnosis  Date     DVT complicating pregnancy (HC)      Left carpal tunnel syndrome 10/4/2017     Osteoarthritis     Knee      Right carpal tunnel syndrome 10/4/2017     S/P right carpal tunnel release 10/4/2017       Patient Active Problem List     Diagnosis  Code     HEMORRHOIDS K64.9     COLONIC POLYPS, HYPERPLASTIC, HX OF Z86.010     AVM (arteriovenous malformation) of colon Q27.33     Osteoarthritis M19.90     S/P total knee arthroplasty Z96.659     Right carpal tunnel syndrome G56.01     S/P right carpal tunnel release Z98.890     Left carpal tunnel syndrome G56.02       Family History       Problem   Relation Age of Onset     Heart Disease  Mother      Heart Disease  Father      Genetic  Other      Parents  of heart disease, father at age 63, mother 73~Sister- brain tumor       Cancer  Sister      brain tumor         Past Surgical History:      Procedure  Laterality Date     APPENDECTOMY       COLONOSCOPY  2014    Arteriovenous malformations of right, transverse and splenic flexure       HYSTERECTOMY  1987     with squamous metaplasia and leiomoma       right knee replacement Right      S/P RIGHT CARPAL TUNNEL RELEASE Right 10/04/2017     VEIN STRIPPING   "       Major Anesthetic Reactions: none    PMH/PSH Reviewed    History    Smoking Status      Never Smoker   Smokeless Tobacco      Never Used     History    Alcohol Use      3.0 oz/week     6 Standard drinks or equivalent per week       Medications have been reviewed in coordination with proposed intra-procedure medications.    Prescriptions Prior to Admission       Medication  Sig Dispense Refill     aspirin (ECOTRIN) 81 mg enteric coated tablet Take 1 tablet by mouth once daily with a meal.  0     multivitamin (MVI) tablet Take 1 tablet by mouth once daily.  0     naproxen (ALEVE) 220 mg tablet Take 220 mg by mouth once daily as needed.         Recent Labs  No results found for this visit on 10/04/17.    NPO Status Noted:  Yes    Airway Class:  2    ASA Physical Status: 2    Anesthetic Plan: GA/ LMA    The risks, benefits, and alternatives of the procedure were discussed.    PHYSICIAN ELECTRONIC SIGNATURE  Arnie Doll DO

## 2017-12-28 NOTE — PROCEDURES
Patient Information     Patient Name MRN Sex Merlyn Florez [id number] Female 1933      Procedures by Edison Kelley DO at 10/4/2017  9:42 AM     Author:  Edison Kelley DO Service:  (none) Author Type:  Physician     Filed:  10/4/2017  9:42 AM Date of Service:  10/4/2017  9:42 AM Status:  Signed     :  Edison Kelley DO (Physician)            POSTOPERATIVE / POSTPROCEDURE NOTE - IMMEDIATE :    Surgeon(s)/Proceduralist(s) and Assistants (if any):  Surgeon(s):  Edison Kelley DO    Procedure(s):  Right CTR    Procedure(s) findings:   See op note    Specimen(s) removed: no    (EBL) Estimated blood loss (ml): 5    Postoperative/Postprocedure Diagnosis:   See op note    Edison Kelley D.O.  Orthopaedic Surgeon    [facility]  [address] [place] [address]  Phone [phone] (KNEE)  Fax [phone]    9:42 AM 10/4/2017

## 2017-12-29 NOTE — H&P
"Patient Information     Patient Name MRMerlyn Chen [id number] Female 1933      H&P by Sharath Dinero MD at 2017  9:30 AM     Author:  Sharath Dinero MD Service:  (none) Author Type:  Physician     Filed:  2017 12:35 PM Encounter Date:  2017 Status:  Signed     :  Sharath Dinero MD (Physician)            ----------------- PREOPERATIVE EXAM ------------------  2017    SUBJECTIVE:  Merlyn Escamilla is a 84 y.o. female here for preoperative optimization.    I was asked to see Merlyn Escamilla by Dr. Kelley for a preoperative optimization.    Date of Surgery:   Type of Surgery: Right carpal tunnel release  Surgeon: Dr. Kelley  Hospital:  [facility]    HPI:  Patient has a history of bilateral carpal tunnel syndrome. She is planning on having her right side released on . She has had previous surgeries in the past without any bleeding or anesthesia problems. She did have a blood clot, DVT, approximate 50 years ago with the birth of her children but no blood clots since. There is no family history of clotting disorders.  She normally uses an aspirin a day but she has not taken anything over-the-counter or prescribed since .      Fever/Chills or other infectious symptoms in past month:  (NO)   >10lb weight loss in past two months:  (NO)     Preoperative Evaluation: Obstructive Sleep Apnea screening    S: Snore -  Do you snore loudly? (louder than talking or loud enough to be heard through closed doors)(NO)  T: Tired - Do you often feel tired, fatigued, or sleepy during the daytime?(NO)  O: Observed - Has anyone ever observed you stop breathing during your sleep?(NO)  P: Pressure - Do you have or are you being treated for high blood pressure?(NO)  B: BMI - BMI greater than 35kg/m2?(NO)  A: Age - Age over 50 years old?(YES)  N: Neck - Neck circumference greater than 40 cm?(NO)  G: Gender - Gender: Male?(NO)    Total number of \"YES\" responses:  " 1    Scoring: Low risk of ARTEMIO 0-2  At Risk of ARTEMIO: >3 High Risk of ARTEMIO: 5-8        Patient Active Problem List       Diagnosis  Date Noted     S/P total knee arthroplasty  10/03/2014     AVM (arteriovenous malformation) of colon  10/01/2014     Osteoarthritis       Knee        HEMORRHOIDS       COLONIC POLYPS, HYPERPLASTIC, HX OF         Past Medical History:     Diagnosis  Date     DVT complicating pregnancy (HC)      Osteoarthritis     Knee        Past Surgical History:      Procedure  Laterality Date     APPENDECTOMY       COLONOSCOPY  2014    Arteriovenous malformations of right, transverse and splenic flexure       HYSTERECTOMY  1987     with squamous metaplasia and leiomoma       right knee replacement Right      VEIN STRIPPING         Current Outpatient Prescriptions       Medication  Sig Dispense Refill     aspirin (ECOTRIN) 81 mg enteric coated tablet Take 1 tablet by mouth once daily with a meal.  0     multivitamin (MVI) tablet Take 1 tablet by mouth once daily.  0     naproxen (ALEVE) 220 mg tablet Take 220 mg by mouth once daily as needed.       No current facility-administered medications for this visit.      Medications have been reviewed by me and are current to the best of my knowledge and ability.    Recent use of: no recent use of aspirin (ASA), NSAIDS or steroids    Allergies:  Allergies     Allergen  Reactions     Butazolidin [Phenylbutazone] *Unknown - Pt Doesn't Remember     Morphine Muscle Weakness   Latex allergy  no    Family History       Problem   Relation Age of Onset     Heart Disease  Mother      Heart Disease  Father      Genetic  Other      Parents  of heart disease, father at age 63, mother 73~Sister- brain tumor       Cancer  Sister      brain tumor         Denies family hx of bleeding tendencies, anesthesia complications, or other problems with surgery.    Social History      Substance Use Topics        Smoking status:  Never Smoker     Smokeless tobacco:  Never Used      "Alcohol use  3.0 oz/week     6 Standard drinks or equivalent per week        ROS:    Surgical:  patient denies previous complications from prior surgeries including but not limited to prolonged bleeding, anesthesia complications, dysrhythmias, surgical wound infections, or prolonged hospital stay.       -------------------------------------------------------------    PHYSICAL EXAM:  /83  Pulse 89  Temp 97.1  F (36.2  C) (Tympanic)  Ht 1.575 m (5' 2\")  Wt 59.4 kg (131 lb)  BMI 23.96 kg/m2    EXAM:  General Appearance: Pleasant, alert, appropriate appearance for age. No acute distress  Head Exam: Normal. Normocephalic, atraumatic.  Eyes: PERRL, EOMI  Ears: Normal TM's bilaterally. Normal auditory canals and external ears.   OroPharynx: Normal buccal mucosa. Normal pharynx.  Neck: Supple, no masses or nodes, no lymphadenopathy.  No thyromegaly.  Lungs: Normal chest wall and respirations. Clear to auscultation, no wheezes or crackles.  Cardiovascular: Regular rate and rhythm. S1, S2, no murmurs.  Gastrointestinal: Soft, nontender, no abnormal masses or organomegaly. BS normal   Musculoskeletal: No edema.  Skin: no concerning or new rashes.  Neurologic Exam: CN 2-12 grossly intact.  Normal gait.  Symmetric DTRs, normal gross motor movement, tone, and coordination. No tremor.  Psychiatric Exam: Alert and oriented, appropriate affect.      EKG:  appears normal, NSR, Normal sinus rhythm and normal axis, normal intervals, no acute ST/T changes c/w ischemia  ---------------------------------------------------------------  Results for orders placed or performed in visit on 09/21/17      HEMOGLOBIN      Result  Value Ref Range    HEMOGLOBIN                12.7 12.0 - 16.0 g/dL    MCV                       93 80 - 100 fL   BASIC METABOLIC PANEL      Result  Value Ref Range    SODIUM 142 133 - 143 mmol/L    POTASSIUM 3.9 3.5 - 5.1 mmol/L    CHLORIDE 104 98 - 107 mmol/L    CO2,TOTAL 26 21 - 31 mmol/L    ANION GAP 12 5 - " 18                    GLUCOSE 103 70 - 105 mg/dL    CALCIUM 9.4 8.6 - 10.3 mg/dL    BUN 20 7 - 25 mg/dL    CREATININE 0.58 (L) 0.70 - 1.30 mg/dL    BUN/CREAT RATIO           34                    GFR if African American >60 >60 ml/min/1.73m2    GFR if not African American >60 >60 ml/min/1.73m2         ASSESSEMENT AND PLAN:    Merlyn was seen today for pre-op exam.    Diagnoses and all orders for this visit:    Preop examination  -     HEMOGLOBIN; Future  -     BASIC METABOLIC PANEL; Future  -     EKG 12 LEAD UNIT PERFORMED (PERFORMED TODAY)     patient is currently optimized for her surgery. Labs can be seen above. She will be nothing by mouth after midnight. She will restart her aspirin. Feel free to contact me if there are any concerns.    PRE OP RECOMMENDATIONS:  Patient is on chronic pain medications (NO);   Patient is on antiplatlet/anticoagulation (NO)  Other medications that need adjustment perioperatively (NO)    Other:  Patient was advised to call our office and the surgical services with any change in condition or new symptoms if they were to develop between today and their surgical date.  Especially any cardiopulmonary symptoms or symptoms concerning for an infection.    Cliff Dinero MD    This document was prepared using voice generated softwear.  While every attempt was made for accuracy, grammatical errors may exist.

## 2017-12-30 NOTE — NURSING NOTE
Patient Information     Patient Name MRMerlyn Chen [id number] Female 1933      Nursing Note by Blanca Aguila at 2017  1:30 PM     Author:  Blanca Aguila Service:  (none) Author Type:  (none)     Filed:  2017  1:58 PM Encounter Date:  2017 Status:  Signed     :  Blanca Aguila            Patient presents today to follow up on her bilateral wrist carpal tunnel. Patient states that the aleve and braces helped, but she is still experiencing tingling in her fingers bilaterally.  Blanca Aguila LPN .............2017  1:30 PM

## 2017-12-30 NOTE — NURSING NOTE
Patient Information     Patient Name Merlyn Swann [id number] Female 1933      Nursing Note by Blanca Aguila at 2017  8:30 AM     Author:  Blanca Aguila Service:  (none) Author Type:  (none)     Filed:  2017  8:29 AM Encounter Date:  2017 Status:  Signed     :  Blanca Aguila            Patient presents today with complaints of bilateral hand numbness that occurs at random times during the day for the past 2 weeks.  Blanca Aguila LPN .............2017  8:22 AM

## 2017-12-30 NOTE — NURSING NOTE
Patient Information     Patient Name MRN Sex Merlyn Florez [id number] Female 1933      Nursing Note by Trina Delcid at 2017  7:45 AM     Author:  Trina Delcid Service:  (none) Author Type:  (none)     Filed:  2017  7:41 AM Encounter Date:  2017 Status:  Signed     :  Trina Delcid            Patient was referred by Dr. Dinero for her bilateral carpal tunnel syndrome.  Trina Delcid LPN .......2017 7:40 AM

## 2017-12-30 NOTE — NURSING NOTE
Patient Information     Patient Name MRN Merlyn Gallegos [id number] Female 1933      Nursing Note by Gosselin, Norma J at 10/16/2017  9:30 AM     Author:  Gosselin, Norma J Service:  (none) Author Type:  (none)     Filed:  10/16/2017  9:14 AM Encounter Date:  10/16/2017 Status:  Signed     :  Gosselin, Norma J            Post-op Right CTR. DOS:10/4/17  Norma J Gosselin LPN....................  10/16/2017   9:13 AM

## 2017-12-30 NOTE — NURSING NOTE
"Patient Information     Patient Name Merlyn Swann [id number] Female 1933      Nursing Note by Blanca Aguila at 2017  9:30 AM     Author:  Blanca Aguila Service:  (none) Author Type:  (none)     Filed:  2017 10:21 AM Encounter Date:  2017 Status:  Signed     :  Blanca Aguila            Date of Surgery: 10/4/17  Type of Surgery: Right Carpal Tunnel Release  [facility]  Fax:     Fever/Chills or other infectious symptoms in past month: No  >10lb weight loss in past two months: No  O2 SAT: 98    Health Care Directive/Code status:  Full  Hx of blood transfusions:   (NO)   Td up to date:  No  History of VRE/MRSA:  (NO) Date:    Preoperative Evaluation: Obstructive Sleep Apnea screening    S: Snore -  Do you snore loudly? (louder than talking or loud enough to be heard through closed doors)(NO)  T: Tired - Do you often feel tired, fatigued, or sleepy during the daytime?(NO)  O: Observed - Has anyone ever observed you stop breathing during your sleep?(NO)  P: Pressure - Do you have or are you being treated for high blood pressure?(NO)  B: BMI - BMI greater than 35kg/m2?(NO)  A: Age - Age over 50 years old?(YES)  N: Neck - Neck circumference greater than 40 cm?(NO)  G: Gender - Gender: Male?(NO)    Total number of \"YES\" responses:  0    Scoring: Low risk of ARTEMIO 0-2  At Risk of ARTEMIO: >3 High Risk of ARTEMIO: 5-8    Blanca Aguila LPN...................  2017   9:36 AM            "

## 2017-12-30 NOTE — NURSING NOTE
Patient Information     Patient Name MRN Merlyn Gallegos [id number] Female 1933      Nursing Note by Tere Bartlett at 2017  2:00 PM     Author:  Tere Bartlett Service:  (none) Author Type:  (none)     Filed:  2017  2:25 PM Encounter Date:  2017 Status:  Signed     :  Tere Bartlett            Merlyn Escamilla is a 83 y.o. female presenting for follow up on her carpal tunnel.  Tere Bartlett LPN 2017 2:04 PM

## 2018-01-15 ENCOUNTER — HISTORY (OUTPATIENT)
Dept: ORTHOPEDICS | Facility: OTHER | Age: 83
End: 2018-01-15

## 2018-01-15 ENCOUNTER — OFFICE VISIT - GICH (OUTPATIENT)
Dept: ORTHOPEDICS | Facility: OTHER | Age: 83
End: 2018-01-15

## 2018-01-15 DIAGNOSIS — G56.02 CARPAL TUNNEL SYNDROME OF LEFT WRIST: ICD-10-CM

## 2018-01-25 ENCOUNTER — DOCUMENTATION ONLY (OUTPATIENT)
Dept: FAMILY MEDICINE | Facility: OTHER | Age: 83
End: 2018-01-25

## 2018-01-25 PROBLEM — K64.9 HEMORRHOIDS: Status: ACTIVE | Noted: 2018-01-25

## 2018-01-25 PROBLEM — G56.02 LEFT CARPAL TUNNEL SYNDROME: Status: ACTIVE | Noted: 2017-10-04

## 2018-01-25 PROBLEM — G56.01 RIGHT CARPAL TUNNEL SYNDROME: Status: ACTIVE | Noted: 2017-10-04

## 2018-01-25 PROBLEM — Z98.890 HISTORY OF CARPAL TUNNEL SURGERY OF RIGHT WRIST: Status: ACTIVE | Noted: 2017-10-04

## 2018-01-25 PROBLEM — M19.90 OSTEOARTHROSIS: Status: ACTIVE | Noted: 2018-01-25

## 2018-01-25 PROBLEM — Z86.0100 HISTORY OF COLONIC POLYPS: Status: ACTIVE | Noted: 2018-01-25

## 2018-01-25 RX ORDER — ASPIRIN 81 MG/1
81 TABLET ORAL
COMMUNITY
Start: 2014-07-28 | End: 2019-05-29 | Stop reason: ALTCHOICE

## 2018-01-25 RX ORDER — DIPHENOXYLATE HYDROCHLORIDE AND ATROPINE SULFATE 2.5; .025 MG/1; MG/1
1 TABLET ORAL DAILY
COMMUNITY
Start: 2014-07-28 | End: 2023-12-06

## 2018-01-25 RX ORDER — NAPROXEN SODIUM 220 MG
220 TABLET ORAL DAILY PRN
Status: ON HOLD | COMMUNITY
End: 2018-07-30

## 2018-01-25 RX ORDER — HYDROCODONE BITARTRATE AND ACETAMINOPHEN 5; 325 MG/1; MG/1
TABLET ORAL
COMMUNITY
Start: 2017-10-04 | End: 2018-05-22

## 2018-01-26 ENCOUNTER — HISTORY (OUTPATIENT)
Dept: FAMILY MEDICINE | Facility: OTHER | Age: 83
End: 2018-01-26

## 2018-01-26 ENCOUNTER — OFFICE VISIT - GICH (OUTPATIENT)
Dept: FAMILY MEDICINE | Facility: OTHER | Age: 83
End: 2018-01-26

## 2018-01-26 DIAGNOSIS — G56.02 CARPAL TUNNEL SYNDROME OF LEFT WRIST: ICD-10-CM

## 2018-01-26 DIAGNOSIS — Z01.818 ENCOUNTER FOR OTHER PREPROCEDURAL EXAMINATION: ICD-10-CM

## 2018-01-26 LAB
ANION GAP - HISTORICAL: 10 (ref 5–18)
BUN SERPL-MCNC: 17 MG/DL (ref 7–25)
BUN/CREAT RATIO - HISTORICAL: 29
CALCIUM SERPL-MCNC: 9.1 MG/DL (ref 8.6–10.3)
CHLORIDE SERPLBLD-SCNC: 105 MMOL/L (ref 98–107)
CO2 SERPL-SCNC: 25 MMOL/L (ref 21–31)
CREAT SERPL-MCNC: 0.59 MG/DL (ref 0.7–1.3)
GFR IF NOT AFRICAN AMERICAN - HISTORICAL: >60 ML/MIN/1.73M2
GLUCOSE SERPL-MCNC: 106 MG/DL (ref 70–105)
HEMOGLOBIN: 12.9 G/DL (ref 12–16)
MCV RBC AUTO: 91 FL (ref 80–100)
POTASSIUM SERPL-SCNC: 4.4 MMOL/L (ref 3.5–5.1)
SODIUM SERPL-SCNC: 140 MMOL/L (ref 133–143)

## 2018-01-27 VITALS
DIASTOLIC BLOOD PRESSURE: 68 MMHG | BODY MASS INDEX: 24.11 KG/M2 | SYSTOLIC BLOOD PRESSURE: 102 MMHG | HEIGHT: 62 IN | SYSTOLIC BLOOD PRESSURE: 118 MMHG | HEART RATE: 89 BPM | SYSTOLIC BLOOD PRESSURE: 126 MMHG | HEIGHT: 62 IN | HEART RATE: 68 BPM | HEIGHT: 62 IN | DIASTOLIC BLOOD PRESSURE: 60 MMHG | WEIGHT: 138.2 LBS | DIASTOLIC BLOOD PRESSURE: 64 MMHG | WEIGHT: 131 LBS | DIASTOLIC BLOOD PRESSURE: 83 MMHG | TEMPERATURE: 97.1 F | BODY MASS INDEX: 24.11 KG/M2 | SYSTOLIC BLOOD PRESSURE: 144 MMHG | HEART RATE: 68 BPM | WEIGHT: 131 LBS | HEART RATE: 78 BPM | WEIGHT: 132.2 LBS | BODY MASS INDEX: 25.43 KG/M2

## 2018-01-27 VITALS
HEIGHT: 62 IN | WEIGHT: 132 LBS | SYSTOLIC BLOOD PRESSURE: 128 MMHG | DIASTOLIC BLOOD PRESSURE: 72 MMHG | HEART RATE: 76 BPM | BODY MASS INDEX: 24.29 KG/M2

## 2018-01-27 VITALS — DIASTOLIC BLOOD PRESSURE: 60 MMHG | WEIGHT: 137 LBS | HEART RATE: 72 BPM | SYSTOLIC BLOOD PRESSURE: 120 MMHG

## 2018-01-31 ENCOUNTER — HOSPITAL ENCOUNTER (OUTPATIENT)
Dept: SURGERY | Facility: OTHER | Age: 83
Discharge: HOME | End: 2018-01-31
Attending: ORTHOPAEDIC SURGERY | Admitting: ORTHOPAEDIC SURGERY

## 2018-01-31 ENCOUNTER — HISTORY (OUTPATIENT)
Dept: SURGERY | Facility: OTHER | Age: 83
End: 2018-01-31

## 2018-01-31 ENCOUNTER — SURGERY (OUTPATIENT)
Dept: SURGERY | Facility: OTHER | Age: 83
End: 2018-01-31

## 2018-01-31 DIAGNOSIS — Z98.890 OTHER SPECIFIED POSTPROCEDURAL STATES: ICD-10-CM

## 2018-02-02 ENCOUNTER — COMMUNICATION - GICH (OUTPATIENT)
Dept: ORTHOPEDICS | Facility: OTHER | Age: 83
End: 2018-02-02

## 2018-02-02 ENCOUNTER — OFFICE VISIT - GICH (OUTPATIENT)
Dept: ORTHOPEDICS | Facility: OTHER | Age: 83
End: 2018-02-02

## 2018-02-02 DIAGNOSIS — Z98.890 OTHER SPECIFIED POSTPROCEDURAL STATES: ICD-10-CM

## 2018-02-07 ENCOUNTER — DOCUMENTATION ONLY (OUTPATIENT)
Dept: FAMILY MEDICINE | Facility: OTHER | Age: 83
End: 2018-02-07

## 2018-02-09 VITALS
BODY MASS INDEX: 24.11 KG/M2 | WEIGHT: 131 LBS | HEART RATE: 76 BPM | SYSTOLIC BLOOD PRESSURE: 128 MMHG | TEMPERATURE: 97.9 F | HEIGHT: 62 IN | DIASTOLIC BLOOD PRESSURE: 70 MMHG

## 2018-02-09 VITALS
HEIGHT: 61 IN | WEIGHT: 132 LBS | HEART RATE: 74 BPM | DIASTOLIC BLOOD PRESSURE: 68 MMHG | BODY MASS INDEX: 24.92 KG/M2 | SYSTOLIC BLOOD PRESSURE: 140 MMHG

## 2018-02-12 ENCOUNTER — OFFICE VISIT (OUTPATIENT)
Dept: ORTHOPEDICS | Facility: OTHER | Age: 83
End: 2018-02-12
Attending: ORTHOPAEDIC SURGERY
Payer: MEDICARE

## 2018-02-12 VITALS
DIASTOLIC BLOOD PRESSURE: 70 MMHG | HEART RATE: 90 BPM | WEIGHT: 130 LBS | SYSTOLIC BLOOD PRESSURE: 130 MMHG | BODY MASS INDEX: 24.77 KG/M2

## 2018-02-12 DIAGNOSIS — Z98.890 S/P BILATERAL CARPAL TUNNEL RELEASE: Primary | ICD-10-CM

## 2018-02-12 PROCEDURE — G0463 HOSPITAL OUTPT CLINIC VISIT: HCPCS

## 2018-02-12 PROCEDURE — 99024 POSTOP FOLLOW-UP VISIT: CPT | Performed by: ORTHOPAEDIC SURGERY

## 2018-02-12 RX ORDER — NEOMYCIN SULFATE, POLYMYXIN B SULFATE AND DEXAMETHASONE 3.5; 10000; 1 MG/ML; [USP'U]/ML; MG/ML
SUSPENSION/ DROPS OPHTHALMIC
Refills: 1 | Status: ON HOLD | COMMUNITY
Start: 2018-01-09 | End: 2018-07-30

## 2018-02-12 NOTE — MR AVS SNAPSHOT
"              After Visit Summary   2018    Merlyn Escamilla    MRN: [id number]           Patient Information     Date Of Birth          1933        Visit Information        Provider Department      2018 9:15 AM Edison Kelley MD [facility]        Today's Diagnoses     S/p bilateral carpal tunnel release    -  1       Follow-ups after your visit        Follow-up notes from your care team     Return if symptoms worsen or fail to improve.      Who to contact     If you have questions or need follow up information about today's clinic visit or your schedule please contact [facility] directly at [phone].  Normal or non-critical lab and imaging results will be communicated to you by [facility], letter or phone within 4 business days after the clinic has received the results. If you do not hear from us within 7 days, please contact the clinic through [facility] or phone. If you have a critical or abnormal lab result, we will notify you by phone as soon as possible.  Submit refill requests through [facility] or call your pharmacy and they will forward the refill request to us. Please allow 3 business days for your refill to be completed.          Additional Information About Your Visit        MyChart Information     [facility] lets you send messages to your doctor, view your test results, renew your prescriptions, schedule appointments and more. To sign up, go to [email or web address] . Click on \"Log in\" on the left side of the screen, which will take you to the Welcome page. Then click on \"Sign up Now\" on the right side of the page.     You will be asked to enter the access code listed below, as well as some personal information. Please follow the directions to create your username and password.     Your access code is: [email or web address]  Expires: 2018 12:16 PM     Your access code will  in 90 days. If you need help or a new code, please call your [place] [facility] or [phone].        Care EveryWhere ID     This is your Care EveryWhere ID. This could be used by other organizations to access your [place] medical records  [id number]        Your Vitals Were     Pulse BMI (Body Mass Index)                90 24.77 kg/m2           Blood Pressure from Last 3 Encounters:   02/12/18 130/70   01/26/18 140/68   01/15/18 128/70    Weight from Last 3 Encounters:   02/12/18 59 kg (130 lb)   01/26/18 59.9 kg (132 lb)   01/15/18 59.4 kg (131 lb)              Today, you had the following     No orders found for display       Primary Care Provider Office Phone # Fax #    Sharath Dinero [phone] [address] [facility] [address]        Equal Access to Services     [facility] : Hadii boris krishnao Sodanii, waaxda luqadaha, qaybta kaalmada leila, suki salmeron . So [facility] [phone].    ATENCIÓN: Si habla español, tiene a harp disposición servicios gratuitos de asistencia lingüística. Rosalva al [phone].    We comply with applicable federal civil rights laws and [place] laws. We do not discriminate on the basis of race, color, national origin, age, disability, sex, sexual orientation, or gender identity.            Thank you!     Thank you for choosing [facility]  for your care. Our goal is always to provide you with excellent care. Hearing back from our patients is one way we can continue to improve our services. Please take a few minutes to complete the written survey that you may receive in the mail after your visit with us. Thank you!             Your Updated Medication List - Protect others around you: Learn how to safely use, store and throw away your medicines at www.disposemymeds.org.          This list is accurate as of 2/12/18 12:16 PM.  Always use your most recent med list.                   Brand Name Dispense Instructions for use Diagnosis    aspirin EC 81 MG EC tablet       Take 81 mg by mouth daily with food        HYDROcodone-acetaminophen 5-325 MG per tablet    NORCO     Take 1 tablet by mouth every 4 hours if needed  for Pain Earliest Fill Date: 10/4/17 Max acetaminophen dose: 4000 mg in 24 hrs.        MULTI-VITAMINS Tabs      Take 1 tablet by mouth daily        naproxen sodium 220 MG tablet    ANAPROX     Take 220 mg by mouth daily as needed        neomycin-polymyxin-dexamethasone [id number] Susp ophthalmic susp    MAXITROL     1 drop in left eye four times a day.

## 2018-02-12 NOTE — NURSING NOTE
Patient Information     Patient Name MRN Sex Merlyn Florez [id number] Female 1933      Nursing Note by Gosselin, Norma J at 1/15/2018 10:30 AM     Author:  Gosselin, Norma J  Service:  (none) Author Type:  (none)     Filed:  1/15/2018 10:26 AM  Encounter Date:  1/15/2018 Status:  Addendum     :  Gosselin, Norma J        Related Notes: Original Note by Gosselin, Norma J filed at 1/15/2018 10:21 AM            Follow up Left CTS.  Wants to schedule surgery.  Norma J Gosselin LPN....................  1/15/2018   10:20 AM

## 2018-02-12 NOTE — MR AVS SNAPSHOT
"              After Visit Summary   2018    Merlyn Escamilla    MRN: [id number]           Patient Information     Date Of Birth          1933        Visit Information        Provider Department      2018 9:15 AM Edison Kelley MD [facility]        Today's Diagnoses     S/p bilateral carpal tunnel release    -  1       Follow-ups after your visit        Follow-up notes from your care team     Return if symptoms worsen or fail to improve.      Who to contact     If you have questions or need follow up information about today's clinic visit or your schedule please contact [facility] directly at [phone].  Normal or non-critical lab and imaging results will be communicated to you by PolicyBazaarhart, letter or phone within 4 business days after the clinic has received the results. If you do not hear from us within 7 days, please contact the clinic through PolicyBazaarhart or phone. If you have a critical or abnormal lab result, we will notify you by phone as soon as possible.  Submit refill requests through inEarth or call your pharmacy and they will forward the refill request to us. Please allow 3 business days for your refill to be completed.          Additional Information About Your Visit        MyChart Information     inEarth lets you send messages to your doctor, view your test results, renew your prescriptions, schedule appointments and more. To sign up, go to [email or web address] . Click on \"Log in\" on the left side of the screen, which will take you to the Welcome page. Then click on \"Sign up Now\" on the right side of the page.     You will be asked to enter the access code listed below, as well as some personal information. Please follow the directions to create your username and password.     Your access code is: [email or web address]  Expires: 2018 12:16 PM     Your access code will  in 90 days. If you need help or a new code, please call your [place] [facility] or [phone].        Care EveryWhere ID     This is your Care EveryWhere ID. This could be used by other organizations to access your [place] medical records  [id number]        Your Vitals Were     Pulse BMI (Body Mass Index)                90 24.77 kg/m2           Blood Pressure from Last 3 Encounters:   02/12/18 130/70   01/26/18 140/68   01/15/18 128/70    Weight from Last 3 Encounters:   02/12/18 59 kg (130 lb)   01/26/18 59.9 kg (132 lb)   01/15/18 59.4 kg (131 lb)              Today, you had the following     No orders found for display       Primary Care Provider Office Phone # Fax #    Sharath Dinero [phone] [address] [facility] [address]        Equal Access to Services     [facility] : Hadii boris krishnao Sodanii, waaxda luqadaha, qaybta kaalmada leila, suki salmeron . So [facility] [phone].    ATENCIÓN: Si habla español, tiene a harp disposición servicios gratuitos de asistencia lingüística. Rosalva al [phone].    We comply with applicable federal civil rights laws and [place] laws. We do not discriminate on the basis of race, color, national origin, age, disability, sex, sexual orientation, or gender identity.            Thank you!     Thank you for choosing [facility]  for your care. Our goal is always to provide you with excellent care. Hearing back from our patients is one way we can continue to improve our services. Please take a few minutes to complete the written survey that you may receive in the mail after your visit with us. Thank you!             Your Updated Medication List - Protect others around you: Learn how to safely use, store and throw away your medicines at www.disposemymeds.org.          This list is accurate as of 2/12/18 12:16 PM.  Always use your most recent med list.                   Brand Name Dispense Instructions for use Diagnosis    aspirin EC 81 MG EC tablet       Take 81 mg by mouth daily with food        HYDROcodone-acetaminophen 5-325 MG per tablet    NORCO     Take 1 tablet by mouth every 4 hours if needed  for Pain Earliest Fill Date: 10/4/17 Max acetaminophen dose: 4000 mg in 24 hrs.        MULTI-VITAMINS Tabs      Take 1 tablet by mouth daily        naproxen sodium 220 MG tablet    ANAPROX     Take 220 mg by mouth daily as needed        neomycin-polymyxin-dexamethasone 3.5-92054-7.1 Susp ophthalmic susp    MAXITROL     1 drop in left eye four times a day.

## 2018-02-12 NOTE — PROGRESS NOTES
"Patient Information     Patient Name MRN Sex Merlyn Rodriguez [id number] Female 1933      Progress Notes by Edison Kelley DO at 1/15/2018 10:30 AM     Author:  Edison Kelley DO Service:  (none) Author Type:  Physician     Filed:  1/15/2018 10:43 AM Encounter Date:  1/15/2018 Status:  Signed     :  Edison Kelley DO (Physician)            PROGRESS NOTE    SUBJECTIVE:  Merlyn Escamilla is here for evaluation in regards to left carpal tunnel symptoms. She previously had her right carpal tunnel release on 10/4. She has felt like that has improved significantly only a little bit and numbness into the thumb. The left side though seems to be worsening. She comes in today for repeat exam and possibly to set up surgery. She notices more numbness at night and she does in the daytime but even in the daytime if she is driving it does bother her more. It goes numb.    OBJECTIVE:  /70 (Cuff Site: Right Arm, Position: Sitting, Cuff Size: Adult Regular)  Pulse 76  Temp 97.9  F (36.6  C) (Tympanic)   Ht 1.575 m (5' 2\")  Wt 59.4 kg (131 lb)  BMI 23.96 kg/m2 Body mass index is 23.96 kg/(m^2).    General Appearance: Pleasant female in good appearance, mood and affect.  Alert and orientated times three ( time, date and location).    Skin: Well-healed surgical incision site or the right wrist    Sensation is Abnormal in the median nerve distribution bilaterally, improvement on the right side.    Shoulder:  Motion: full    Elbow:  Flexion: Normal  Extension: Normal    Wrist:  Flexion: Normal  Extension: Normal  Radial/ulnar motion: Normal  Tenderness at the scaphoid: negative    Hand:  Thenar wasting: no  Hypothenar wasting: no  Sensation: Abnormal  Radial and ulnar blood flow:  Normal  Tinel's test positive  Phalen's test positive  Compression test positive.  Patient does have obvious deformity about the first CMC joints. There is no tenderness with palpation but there is a positive " grind test bilaterally.    Eyes: Pupils are round and she wears glasses.    Ears: Hearing: Impaired    Heart: Good cap refill into her hands, radial pulses regular.    Lungs: Distant breath sounds.     Radiographic images from 8/31 where independently reviewed and discussed with the patient.      Xray:     X-rays do demonstrate significant first CMC arthritis. No fractures noted.    PROCEDURE: XR WRIST W NAVICULAR MINIMUM 3 VIEWS LEFT  HISTORY: Bilateral wrist pain.  COMPARISON: None.  TECHNIQUE: 5 views of the left wrist were obtained.  FINDINGS: There are advanced degenerative changes of the first carpometacarpal joint. There are also moderate degenerative changes in the remainder of the wrist. There is diffuse osteopenia. No acute fracture or dislocation.  IMPRESSION: No acute fracture. Advanced degenerative disease.  Electronically Signed By: Saumya Rodriguez M.D. on 8/31/2017 8:54 AM    EMG:     EMG results reviewed with the patient independently she does have severe carpal tunnel syndrome. It is nonreactive on the right left is 3.0 ms and motor delay is 5.7 ms on the right 5.1 ms on the left.    IMPRESSION:     Bilateral CMC arthritis.    POSTOPERATIVE DIAGNOSIS:  Bilateral carpal tunnel, right greater than left.     PROCEDURES:  1.  Release of the right transverse carpal ligament (DOS 10/04/2017).  2.  Left carpal tunnel syndrome.    PLAN:  Risks, benefits, conservative, surgical and alternatives to treatment where discussed and the patient would like to proceed with operative intervention for the left wrist sometime after [place].  She will have her sutures removed for the right wrist and utilize her splint for the next couple weeks she has a 2 pound weight restriction. She was instructed on wound care and scar remodeling.  She will continue with her wrist splints.  I utilized a handout and poster board to help her understand carpal tunnel syndrome and its options for treatment she verbalized  understanding.  She will follow-up after surgery.  Questions and concerns answered.    I have discussed options with Merlyn Escamilla the treatment for carpal tunnel, which have included observation, physical therapy, corticosteroid injection versus surgical release. I discussed pros and cons of each approach, and at this point, Merlyn Escamilla would like to proceed with carpal tunnel surgery. We discussed that surgery would be an outpatient surgery, you would be able to go home following the surgery. We will plan on open release of the transverse carpal ligament with anything else that needs to be done.  Surgical anesthesia would be general anesthesia versus block and anesthesia will discuss options.  I discussed following surgery Merlyn Escamilla would be in a splint until seen in the office and they can work on gentle motion of the elbow and fingers after surgery.  At four weeks following surgery occupational therapy may be needed.   Full recovery from carpal tunnel surgery may take a year. The goal will be pain relief. Complications were discussed including continued pain, stiffness in the wrist, rare chance of neurovascular damage, potential chance of infection. If deep infection were to occur, further surgery may be needed with repeat washout in the operating room with possible need for treatment with antibiotics.    Risks, benefits, conservative, surgical, and alternatives of treatment were thoroughly outlined. No guarantees were given. Risks which do include, but are not limited to:  Scar, infection, decreased motion, damage to blood vessels, nerves and tendons, failure or need for further treatment, reaction to medications and anesthesia, blood clots, and the possibility of death where discussed.  She did verbalize an understanding. All questions and concerns were addressed.    Patient is set up for open left carpal tunnel surgery    Merlyn Escamilla will need pre op clearance for management of medical clearance  and pulmonary function as well as cardiac function for surgery.    Follow up after surgery will be 12-14 days    All questions where answered to the patients satisfaction.    Edison Kelley D.O.  Orthopaedic Surgeon    [address] [address]  Phone [phone] (KNEE)  Fax [phone]    This document was created using computer generated templates and voice activated software.    10:41 AM 1/15/2018

## 2018-02-12 NOTE — PROGRESS NOTES
PROGRESS NOTE    SUBJECTIVE:  Merlyn Escamilla is here for evaluation in regards to her left carpal tunnel release.  She is doing well with improved sensation.    OBJECTIVE:  /70 (BP Location: Right arm, Patient Position: Sitting, Cuff Size: Adult Regular)  Pulse 90  Wt 59 kg (130 lb)  BMI 24.77 kg/m2 Body mass index is 24.77 kg/(m^2).    General Appearance: Pleasant 84 year old female in good appearance, mood and affect.  Alert and orientated times three ( time, date and location).    Incision:  Clean, dry, and intact.    Wrist:  Motion: Good motion.    Hand:  Thenar wasting: None.  Hypothenar wasting: None.  Sensation: Improvement noted in the median nerve distribution both hands still abnormal but improvement noted.  Radial and ulnar blood flow: Good capillary refill all digits.    Elbow:  Motion: Full motion.    Shoulder:  Motion: Full motion.    Eyes: Pupils are round.    Ears: Hearing: Impaired.    Heart: Good capillary refill and her hands pulses are regular.    Lungs: Coarse breath sounds.    IMPRESSION:    POSTOPERATIVE DIAGNOSES:  1. Left carpal tunnel syndrome.  2. Status post right carpal tunnel release (DOS: 10/04/2017).     PROCEDURE:  1. Open release of the left transverse carpal ligament (DOS: 01/31/2018).   2. Status post right carpal tunnel release (DOS: 10/04/2017).    PLAN:    Suture removal and steri strip.  Will get a wrist splint to wear for next 2 weeks and can remove to shower, then ok to leave off for normal activities.  Patient was instructed on scar remodeling.  If she has any other problems she will let me know and follow-up then.    Edison Kelley D.O.  Orthopaedic Surgeon    [facility] [place] [address] [place] [address]  Phone [phone] (KNEE)  Fax [phone]    Disclaimer:  This note consists of words and symbols derived from keyboarding, dictation, or using voice recognition software. As a result, there may be errors in the script  that have gone undetected. Please consider this when interpreting information found in this note.    [unfilled] @[facility]@

## 2018-02-12 NOTE — NURSING NOTE
Pt is here today for post op follow up  Left carpal tunnel release   DOS 1/31/18  Darby Martinez

## 2018-02-13 NOTE — TELEPHONE ENCOUNTER
Patient Information     Patient Name MRN Merlyn Gallegos [id number] Female 1933      Telephone Encounter by Esme Tellez at 2018  7:57 AM     Author:  Esme Tellez Service:  (none) Author Type:  (none)     Filed:  2018  7:58 AM Encounter Date:  2018 Status:  Signed     :  Esme Tellez            Pt had carpal tunnel surgery by TDE on 2018 -  bandage is coming loose.  Patient would like a phone call to see if she can get in to have someone check it.

## 2018-02-13 NOTE — PROCEDURES
Patient Information     Patient Name MRN Merlyn Gallegos [id number] Female 1933      Procedures signed by Edison Kelley DO at 2018 12:37 PM      Author:  Edison Kleley DO Service:  (none) Author Type:  Physician     Filed:  2018 12:37 PM Creation Time:  2018 10:44 AM Status:  Signed     :  Edison Kelley DO (Physician)            DATE OF SERVICE:  2018    SURGEON:  Edison Kelley DO.    PREOPERATIVE DIAGNOSES:  1.  Left carpal tunnel syndrome.  2.  Status post right carpal tunnel release.    POSTOPERATIVE DIAGNOSES:  1.  Left carpal tunnel syndrome.  2.  Status post right carpal tunnel release (DOS: 10/04/2017).     PROCEDURE:  1.  Open release of the left transverse carpal ligament (DOS: 2018).   2.  Status post right carpal tunnel release (DOS: 10/04/2017).    IMPLANTS:  None.    ANESTHESIA:  General via LMA.    ESTIMATED BLOOD LOSS:  Less than 5.    FLUIDS:  See chart.    DRAINS:  None.    COMPLICATIONS:  None.    DISPOSITION:  Stable to postop.    INDICATIONS FOR PROCEDURE:   The patient is an 84-year-old female who had a diagnosis of bilateral carpal tunnel syndrome.  She had had her right carpal tunnel release in 10/2017.  She had underwent EMG findings which had shown the carpal tunnel release.  After discussion, she had tried conservative measures. The left was still bothering her.  She elected for surgical intervention.    PROCEDURE NOTE:  After informed consent was received from the patient, having listed all the risks and benefits as noted on the consent form,  but not limited to consent form, and having discussed all conservative surgical and alternatives to treatment,  the patient signed a consent form with a witness present.  The patient understood there were numerous risks. All of them were not written down, but were discussed at length.  No guarantees were given.  All questions were answered prior to signing the consent  form.  The patient was given antibiotics one hour prior to skin incision.  She was taken back to the operating room, supine on a gurney, and transferred to the operating room table, secured, and all bony prominences were padded.  She was then given general anesthesia via LMA.  Once proper anesthesia was obtained, the tourniquet was placed, but not inflated.  The patient's left upper extremity was sterilely prepped and draped.    Timeout was performed per institutional guidelines.  With this done, I then marked my incision site, elevated the arm and inflated the tourniquet.  A skin incision only was made with a #15 scalpel.  Blunt dissection was performed, which showed the transverse carpal ligament was identified. I made a small nick incision in the transverse carpal ligament, passed superior proximally and distally and released the proximal and distal components of the transverse carpal ligament  under direct visualization.  The nerve itself on exam, it was noted to be very wide.  She had an hourglass deformity.  When I released the tourniquet, she had a hemorrhagic effect into the nerve itself, showing the compression.  I irrigated the area copiously, maintained hemostasis, and then irrigated again.  I closed the superficial tissues with 3-0 Monocryl interrupted, and the skin was closed with 3-0 nylon in horizontal mattress fashion, and the area was infiltrated with local.    Sterile dressings of Xeroform, 4 x 4's, ABDs, and a well-padded cock-up wrist splint was applied.  The patient was extubated and transferred back to the gurAddison and taken to the recovery room in satisfactory condition.  She will be discharged home per protocol.  She was given a prescription for Norco 5/325, #5, no refills, and she will follow up in my office, and this is already arranged.      DO Dick CARABALLO  D:01/31/2018 10:10:09  T:01/31/2018 10:43:45  VJID: [id number]  TJID: [id number]    cc:

## 2018-02-13 NOTE — OR ANESTHESIA
Patient Information     Patient Name MRN Sex     Merlyn Escamilla [id number] Female 1933      OR Anesthesia by Viki Delatorre MD at 2018  1:15 PM     Author:  Viki Delatorre MD Service:  (none) Author Type:  PHYS- Anesthesiologist     Filed:  2018  1:15 PM Date of Service:  2018  1:15 PM Status:  Signed     :  Viki Delatorre MD (PHYS- Anesthesiologist)            Anesthesia Post Operative Care Note    Name: Merlyn Escamilla  MRN:   [id number]  :    1933       Procedure Done:  See Surgeon Note   Case Cancelled for Anesthetic Reason:  No      Anesthesia Technique    Anesthetic Type:  General     Airway Management:  LMA     Oral Trauma:  No    Intraoperative Course   Hemodynamics:  Stable    Ventilation Normal:  Yes Lung Sounds:  Normal      PACU Course      Nondepolarizer Used: No    Reintubation:  No   Hemodynamics:  Stable      Hydration: Euvolemic   Temperature:  36.1 - 38.3      Mental Status:  Awake, alert, follows commands   Pain Management:  Adequate   Regional Block:  No   Anesthesia Complications:  None      Vital Signs:  Temp: 98.3  F (36.8  C)  Pulse: 76  BP: 125/61  Resp: 14  SpO2: 95 %    O2 Device: Room Air         Level of Nausea: None        Active Lines:  Patient Lines/Drains/Airways Status    Active Line     None                Intake & Output:  Date  18 07 - 18 0659(Not Admitted)    18 07 - 18 0659      Shift  0733-8099 4145-4035 1425-7309 24 Hour Total 0263-6019 3701-8152 9804-0793 24 Hour Total   I  N  T  A  K  E   Intravenous     700   700       +I/O+  Maint IV (lactated Ringers infusion)     700   700    Shift Total     700   700   O  U  T  P  U  T   Shift Total           NET      700   700   Weight (kg)                  Labs:  No results for input(s): JI6WBGECQRE, LAO6QTHUXDAJ, PHARTERIAL, AIX3RVNRKJJC, K8TYNNSAONOB in the last 24 hours.    No results for input(s): MAGNESIUM in the last 24 hours.    No results for  input(s): GLUCOSEMETER in the last 720 hours.        ANTIONETTE HERNANDEZ MD ....................  1/31/2018   1:15 PM

## 2018-02-13 NOTE — H&P
Patient Information     Patient Name MRN Sex Merlyn Florez [id number] Female 1933      H&P (View-Only) by Sharath Dinero MD at 2018  1:00 PM     Author:  Sharath Dinero MD Service:  (none) Author Type:  Physician     Filed:  2018  1:59 PM Date of Service:  2018  1:00 PM Status:  Signed     :  Sharath Dinero MD (Physician)            ----------------- PREOPERATIVE EXAM ------------------  2018    SUBJECTIVE:  Merlyn Escamilla is a 84 y.o. female here for preoperative optimization.    I was asked to see Merlyn Escamilla by Dr. Kelley for a preoperative optimization.    HPI:  She is scheduled undergo left carpal tunnel release on . She had her right side done several months ago with good results. She has been doing well recently and has had no chest pain, short of breath or upper respiratory infections. She has no new concerns today.        Patient Active Problem List       Diagnosis  Date Noted     S/P right carpal tunnel release  10/04/2017     Left carpal tunnel syndrome  10/04/2017     AVM (arteriovenous malformation) of colon  10/01/2014     Osteoarthritis       Knee        HEMORRHOIDS       COLONIC POLYPS, HYPERPLASTIC, HX OF         Past Medical History:     Diagnosis  Date     DVT complicating pregnancy (HC)      Left carpal tunnel syndrome 10/4/2017     Osteoarthritis     Knee      Right carpal tunnel syndrome 10/4/2017     S/P right carpal tunnel release 10/4/2017     S/P total knee arthroplasty 10/3/2014       Past Surgical History:      Procedure  Laterality Date     APPENDECTOMY       COLONOSCOPY  2014    Arteriovenous malformations of right, transverse and splenic flexure       HYSTERECTOMY  1987     with squamous metaplasia and leiomoma       right knee replacement Right      S/P RIGHT CARPAL TUNNEL RELEASE Right 10/04/2017     VEIN STRIPPING         Current Outpatient Prescriptions       Medication  Sig Dispense Refill     aspirin (ECOTRIN) 81 mg  "enteric coated tablet Take 1 tablet by mouth once daily with a meal.  0     multivitamin (MVI) tablet Take 1 tablet by mouth once daily.  0     naproxen (ALEVE) 220 mg tablet Take 220 mg by mouth once daily as needed.       neomycin-polymyxin-dexamethasone (MAXITROL) 3.5mg/mL-10,000 unit/mL-0.1 % ophthalmic suspension Place 1 Drop into left eye 4 times daily.       No current facility-administered medications for this visit.      Medications have been reviewed by me and are current to the best of my knowledge and ability.    Recent use of: no recent use of aspirin (ASA), NSAIDS or steroids    Allergies:  Allergies     Allergen  Reactions     Butazolidin [Phenylbutazone] *Unknown - Pt Doesn't Remember     Morphine Muscle Weakness   Latex allergy  no    Family History       Problem   Relation Age of Onset     Heart Disease  Mother      Heart Disease  Father      Genetic  Other      Parents  of heart disease, father at age 63, mother 73~Sister- brain tumor       Cancer  Sister      brain tumor         Denies family hx of bleeding tendencies, anesthesia complications, or other problems with surgery.    Social History      Substance Use Topics        Smoking status:  Never Smoker     Smokeless tobacco:  Never Used     Alcohol use  3.0 oz/week     6 Standard drinks or equivalent per week        ROS:    Surgical:  patient denies previous complications from prior surgeries including but not limited to prolonged bleeding, anesthesia complications, dysrhythmias, surgical wound infections, or prolonged hospital stay.       -------------------------------------------------------------    PHYSICAL EXAM:  /68 (Cuff Site: Right Arm, Position: Sitting, Cuff Size: Adult Regular)  Pulse 74  Ht 1.543 m (5' 0.75\")  Wt 59.9 kg (132 lb)  BMI 25.15 kg/m2    EXAM:  General Appearance: Pleasant, alert, appropriate appearance for age. No acute distress  Head Exam: Normal. Normocephalic, atraumatic.  Eyes: PERRL, EOMI  Ears: " Normal TM's bilaterally. Normal auditory canals and external ears.   OroPharynx: Normal buccal mucosa. Normal pharynx.  Neck: Supple, no masses or nodes, no lymphadenopathy.  No thyromegaly.  Lungs: Normal chest wall and respirations. Clear to auscultation, no wheezes or crackles.  Cardiovascular: Regular rate and rhythm. S1, S2, no murmurs.  Gastrointestinal: Soft, nontender, no abnormal masses or organomegaly. BS normal   Musculoskeletal: No edema.  Skin: no concerning or new rashes.  Neurologic Exam: CN 2-12 grossly intact.  Normal gait.  Symmetric DTRs, normal gross motor movement, tone, and coordination. No tremor.  Psychiatric Exam: Alert and oriented, appropriate affect.      EKG:  From September 2017 was reviewed and shows sinus rhythm with possible new nonspecific inferior T-wave abnormalities.  ---------------------------------------------------------------    ASSESSEMENT AND PLAN:    Merlyn was seen today for pre-op exam.    Diagnoses and all orders for this visit:    Preop examination  -     BASIC METABOLIC PANEL; Future  -     HEMOGLOBIN; Future    Left carpal tunnel syndrome     she is optimized for her upcoming procedure. Labs are pending at this time. EKG from September was reviewed and is not need to be repeated. She'll be nothing by mouth after midnight. She is not using any anti-inflammatories at this time. She does not take any other medications.    PRE OP RECOMMENDATIONS:  Patient is on chronic pain medications (NO);   Patient is on antiplatlet/anticoagulation (NO)  Other medications that need adjustment perioperatively (NO)    Other:  Patient was advised to call our office and the surgical services with any change in condition or new symptoms if they were to develop between today and their surgical date.  Especially any cardiopulmonary symptoms or symptoms concerning for an infection.    Cliff Dinero MD    This document was prepared using voice generated softwear.  While every attempt was made  for accuracy, grammatical errors may exist.

## 2018-02-13 NOTE — TELEPHONE ENCOUNTER
Patient Information     Patient Name MRMerlyn Chen [id number] Female 1933      Telephone Encounter by Trina Delcid at 2018 11:16 AM     Author:  Trina Delcid Service:  (none) Author Type:  (none)     Filed:  2018 11:19 AM Encounter Date:  2018 Status:  Signed     :  Trina Delcid            Called Flor back and let her know that she could bring Merlyn in today to get the dressings fixed.  They stated that they would be right in.  Will set up nurse only appt. For her.   Trina Delcid LPN .......2018 11:17 AM

## 2018-02-13 NOTE — OR POSTOP
Patient Information     Patient Name MRN Sex     Merlyn Escamilla [id number] Female 1933      OR PostOp by Yarelis Mendes RN at 2018 10:35 AM     Author:  Yarelis Mendes RN Service:  (none) Author Type:  NURS- Registered Nurse     Filed:  2018 10:44 AM Date of Service:  2018 10:35 AM Status:  Signed     :  Yarelis Mendes RN (NURS- Registered Nurse)            PACU Respiratory Event Documentation     1) Episodes of Apnea greater than or equal to 10 seconds: no    2) Bradypnea - less than 8 breaths per minute: no    3) Pain score on 0 to 10 scale: denies    4) Pain-sedation mismatch (yes or no): no    5) Repeated 02 desaturation less than 90% (yes or no): no    Anesthesia notified? (yes or no): no    Any of the above events occuring repeatedly in separate 30 minute intervals may be considered recurrent PACU respiratory events.    PACU Transfer Note    Merlyn Escamilla transferred to U 733 via cart.  Equipment used for transport:  None.  Accompanied by:  Registered Nurse    Patient stable and meets phase 1 discharge criteria for transport from PACU.    Handoff report given to Kim GAXIOLA.    Yarelis Mendes RN

## 2018-02-13 NOTE — H&P
Patient Information     Patient Name MRN Sex Merlyn Florez [id number] Female 1933      H&P by Sharath Dinero MD at 2018  1:00 PM     Author:  Sharath Dinero MD Service:  (none) Author Type:  Physician     Filed:  2018  1:59 PM Encounter Date:  2018 Status:  Signed     :  Sharath Dinero MD (Physician)            ----------------- PREOPERATIVE EXAM ------------------  2018    SUBJECTIVE:  Merlyn Escamilla is a 84 y.o. female here for preoperative optimization.    I was asked to see Merlyn Escamilla by Dr. Kelley for a preoperative optimization.    HPI:  She is scheduled undergo left carpal tunnel release on . She had her right side done several months ago with good results. She has been doing well recently and has had no chest pain, short of breath or upper respiratory infections. She has no new concerns today.        Patient Active Problem List       Diagnosis  Date Noted     S/P right carpal tunnel release  10/04/2017     Left carpal tunnel syndrome  10/04/2017     AVM (arteriovenous malformation) of colon  10/01/2014     Osteoarthritis       Knee        HEMORRHOIDS       COLONIC POLYPS, HYPERPLASTIC, HX OF         Past Medical History:     Diagnosis  Date     DVT complicating pregnancy (HC)      Left carpal tunnel syndrome 10/4/2017     Osteoarthritis     Knee      Right carpal tunnel syndrome 10/4/2017     S/P right carpal tunnel release 10/4/2017     S/P total knee arthroplasty 10/3/2014       Past Surgical History:      Procedure  Laterality Date     APPENDECTOMY       COLONOSCOPY  2014    Arteriovenous malformations of right, transverse and splenic flexure       HYSTERECTOMY  1987     with squamous metaplasia and leiomoma       right knee replacement Right      S/P RIGHT CARPAL TUNNEL RELEASE Right 10/04/2017     VEIN STRIPPING         Current Outpatient Prescriptions       Medication  Sig Dispense Refill     aspirin (ECOTRIN) 81 mg enteric coated tablet  "Take 1 tablet by mouth once daily with a meal.  0     multivitamin (MVI) tablet Take 1 tablet by mouth once daily.  0     naproxen (ALEVE) 220 mg tablet Take 220 mg by mouth once daily as needed.       neomycin-polymyxin-dexamethasone (MAXITROL) 3.5mg/mL-10,000 unit/mL-0.1 % ophthalmic suspension Place 1 Drop into left eye 4 times daily.       No current facility-administered medications for this visit.      Medications have been reviewed by me and are current to the best of my knowledge and ability.    Recent use of: no recent use of aspirin (ASA), NSAIDS or steroids    Allergies:  Allergies     Allergen  Reactions     Butazolidin [Phenylbutazone] *Unknown - Pt Doesn't Remember     Morphine Muscle Weakness   Latex allergy  no    Family History       Problem   Relation Age of Onset     Heart Disease  Mother      Heart Disease  Father      Genetic  Other      Parents  of heart disease, father at age 63, mother 73~Sister- brain tumor       Cancer  Sister      brain tumor         Denies family hx of bleeding tendencies, anesthesia complications, or other problems with surgery.    Social History      Substance Use Topics        Smoking status:  Never Smoker     Smokeless tobacco:  Never Used     Alcohol use  3.0 oz/week     6 Standard drinks or equivalent per week        ROS:    Surgical:  patient denies previous complications from prior surgeries including but not limited to prolonged bleeding, anesthesia complications, dysrhythmias, surgical wound infections, or prolonged hospital stay.       -------------------------------------------------------------    PHYSICAL EXAM:  /68 (Cuff Site: Right Arm, Position: Sitting, Cuff Size: Adult Regular)  Pulse 74  Ht 1.543 m (5' 0.75\")  Wt 59.9 kg (132 lb)  BMI 25.15 kg/m2    EXAM:  General Appearance: Pleasant, alert, appropriate appearance for age. No acute distress  Head Exam: Normal. Normocephalic, atraumatic.  Eyes: PERRL, EOMI  Ears: Normal TM's bilaterally. " Normal auditory canals and external ears.   OroPharynx: Normal buccal mucosa. Normal pharynx.  Neck: Supple, no masses or nodes, no lymphadenopathy.  No thyromegaly.  Lungs: Normal chest wall and respirations. Clear to auscultation, no wheezes or crackles.  Cardiovascular: Regular rate and rhythm. S1, S2, no murmurs.  Gastrointestinal: Soft, nontender, no abnormal masses or organomegaly. BS normal   Musculoskeletal: No edema.  Skin: no concerning or new rashes.  Neurologic Exam: CN 2-12 grossly intact.  Normal gait.  Symmetric DTRs, normal gross motor movement, tone, and coordination. No tremor.  Psychiatric Exam: Alert and oriented, appropriate affect.      EKG:  From September 2017 was reviewed and shows sinus rhythm with possible new nonspecific inferior T-wave abnormalities.  ---------------------------------------------------------------    ASSESSEMENT AND PLAN:    Merlyn was seen today for pre-op exam.    Diagnoses and all orders for this visit:    Preop examination  -     BASIC METABOLIC PANEL; Future  -     HEMOGLOBIN; Future    Left carpal tunnel syndrome     she is optimized for her upcoming procedure. Labs are pending at this time. EKG from September was reviewed and is not need to be repeated. She'll be nothing by mouth after midnight. She is not using any anti-inflammatories at this time. She does not take any other medications.    PRE OP RECOMMENDATIONS:  Patient is on chronic pain medications (NO);   Patient is on antiplatlet/anticoagulation (NO)  Other medications that need adjustment perioperatively (NO)    Other:  Patient was advised to call our office and the surgical services with any change in condition or new symptoms if they were to develop between today and their surgical date.  Especially any cardiopulmonary symptoms or symptoms concerning for an infection.    Cliff Dinero MD    This document was prepared using voice generated softwear.  While every attempt was made for accuracy, grammatical  errors may exist.

## 2018-02-13 NOTE — OR ANESTHESIA
Patient Information     Patient Name MRN Sex     Merlyn Escamilla [id number] Female 1933      OR Anesthesia by Viki Delatorre MD at 2018  8:57 AM     Author:  Viki Delatorre MD  Service:  (none) Author Type:  PHYS- Anesthesiologist     Filed:  2018  9:07 AM  Date of Service:  2018  8:57 AM Status:  Addendum     :  Viki Delatorre MD (PHYS- Anesthesiologist)        Related Notes: Original Note by Viki Delatorre MD (PHYS- Anesthesiologist) filed at 2018  8:57 AM                                                           ANESTHESIA ASSESSMENT    Date: 18 Time: 8:57 AM      Patient:  Merlyn Escamilla    Procedure(s) (LRB):  RELEASE CARPAL TUNNEL (Left)    Past Medical History:     Diagnosis  Date     DVT complicating pregnancy (HC)      Left carpal tunnel syndrome 10/4/2017     Osteoarthritis     Knee      Right carpal tunnel syndrome 10/4/2017     S/P left carpal tunnel release 2018     S/P right carpal tunnel release 10/4/2017     S/P total knee arthroplasty 10/3/2014       Past Surgical History:      Procedure  Laterality Date     APPENDECTOMY       COLONOSCOPY  2014    Arteriovenous malformations of right, transverse and splenic flexure       HYSTERECTOMY  1987     with squamous metaplasia and leiomoma       right knee replacement Right      S/P LEFT CARPAL TUNNEL RELEASE Left 2018     S/P RIGHT CARPAL TUNNEL RELEASE Right 10/04/2017     VEIN STRIPPING         Family History       Problem   Relation Age of Onset     Heart Disease  Mother      Heart Disease  Father      Genetic  Other      Parents  of heart disease, father at age 63, mother 73~Sister- brain tumor       Cancer  Sister      brain tumor         Patient Active Problem List     Diagnosis  Code     HEMORRHOIDS K64.9     COLONIC POLYPS, HYPERPLASTIC, HX OF Z86.010     AVM (arteriovenous malformation) of colon Q27.33     Osteoarthritis M19.90     S/P right carpal tunnel release Z98.890      Left carpal tunnel syndrome G56.02     S/P left carpal tunnel release Z98.890       Prescriptions Prior to Admission       Medication  Sig Dispense Refill     aspirin (ECOTRIN) 81 mg enteric coated tablet Take 1 tablet by mouth once daily with a meal.  0     multivitamin (MVI) tablet Take 1 tablet by mouth once daily.  0     naproxen (ALEVE) 220 mg tablet Take 220 mg by mouth once daily as needed.       neomycin-polymyxin-dexamethasone (MAXITROL) 3.5mg/mL-10,000 unit/mL-0.1 % ophthalmic suspension Place 1 Drop into left eye 4 times daily.         Allergies:  Allergies     Allergen  Reactions     Butazolidin [Phenylbutazone] *Unknown - Pt Doesn't Remember     Morphine Muscle Weakness       Review of Systems:  GERD: No  Chest pain: No (Denies chest pains, SOB or palpitations; minimal non-specific inferior T wave changes and poor R wave progression new since 8/22/14.)  Shortness of breath: No  Recent fever: No  Poor exercise tolerance: No  Bleeding tendency: No  Pregnant: No  Anesthesia Complications: None      History    Smoking Status      Never Smoker   Smokeless Tobacco      Never Used     Social History     Social History        Marital status:       Spouse name: N/A     Number of children:  N/A     Years of education:  N/A     Social History Main Topics        Smoking status:  Never Smoker     Smokeless tobacco:  Never Used     Alcohol use  3.0 oz/week     6 Standard drinks or equivalent per week      Drug use:  No     Sexual activity:  Not on file     Other Topics  Concern      Service No     Blood Transfusions No     Caffeine Concern No     Occupational Exposure No     Hobby Hazards No     Sleep Concern No     Stress Concern No     Weight Concern No     Special Diet No     Back Care No     Exercise Yes     Seat Belt Yes     Self-Exams No     Social History Narrative     Lives at home.     Daughter lives in [place].       Physical Examination:  /73 (Cuff Size: Adult Regular)  Pulse 65   Temp 97.5  F (36.4  C)  Resp 18  SpO2 96% There is no height or weight on file to calculate BMI. There is no height or weight on file to calculate BSA.  Dental Condition: Fair (Worn & discolored; vertical cracks in central incisors.)     Mallampati Score (Airway): II  Cardiovascular: Normal  Pulmonary: Normal  Other: Abnormal  (Healed scar on right wrist from CTR 10/04/17.)    [facility]    No results for input(s): SODIUM, POTASSIUM, CHLORIDE, BT6OVFRM, ANIONGAP, BUN, CREATININE, BUNCREARATIO, CALCIUM, GLUCOSE, GLUCOSEMETER, KETONES, MAGNESIUM, WBC, HGB, HCT, PLT, ABORH, RHTYPE, PREGURINE, BHCGQL, HCGBETAQUANT, INR in the last 72 hours.          Assessment/Plan:  ASA Class: III  Risk of dental injury discussed: Yes  NPO status confirmed: Yes  Anesthetic Plan:  General (LMA okay.)  Risk/Benefit/Alt discussed: Yes  Questions answered: Yes  Emergency Case?: No  Labs/ECG/Radiology Reviewed?: Yes      H&P Reviewed.  Patient Examined.      Provider Electronic Signature:  ANTIONETTE HERNANDEZ MD

## 2018-02-13 NOTE — PROCEDURES
Patient Information     Patient Name MRN Sex Merlyn Florez [id number] Female 1933      Procedures by Edison Kelley DO at 2018 10:05 AM     Author:  Edison Kelley DO Service:  (none) Author Type:  Physician     Filed:  2018 10:06 AM Date of Service:  2018 10:05 AM Status:  Signed     :  Edison Kelley DO (Physician)            POSTOPERATIVE / POSTPROCEDURE NOTE - IMMEDIATE :    Surgeon(s)/Proceduralist(s) and Assistants (if any):  Surgeon(s):  Edison Kelley DO    Procedure(s):  Left CTR    Procedure(s) findings:   See op note    Specimen(s) removed: no    (EBL) Estimated blood loss (ml): 5    Postoperative/Postprocedure Diagnosis:   See op note    Edison Kelley D.O.  Orthopaedic Surgeon    [facility]  [address] [place] [address]  Phone [phone] (KNEE)  Fax [phone]    10:05 AM 2018

## 2018-02-13 NOTE — PROGRESS NOTES
Patient Information     Patient Name MRN Sex     Merlyn Escamilla [id number] Female 1933      Progress Notes by Sharath Dinero MD at 2018  1:00 PM     Author:  Sharath Dinero MD Service:  (none) Author Type:  Physician     Filed:  2018  1:10 PM Encounter Date:  2018 Status:  Signed     :  Sharath Dinero MD (Physician)            ----------------- PREOPERATIVE EXAM ------------------  2018    SUBJECTIVE:  Merlyn Escamilla is a 84 y.o. female here for preoperative optimization.    I was asked to see Merlyn Escamilla by Dr. Kelley for a preoperative optimization.    HPI:  She is scheduled undergo left carpal tunnel release on . She had her right side done several months ago with good results. She has been doing well recently and has had no chest pain, short of breath or upper respiratory infections. She has no new concerns today.        Patient Active Problem List       Diagnosis  Date Noted     S/P right carpal tunnel release  10/04/2017     Left carpal tunnel syndrome  10/04/2017     AVM (arteriovenous malformation) of colon  10/01/2014     Osteoarthritis       Knee        HEMORRHOIDS       COLONIC POLYPS, HYPERPLASTIC, HX OF         Past Medical History:     Diagnosis  Date     DVT complicating pregnancy (HC)      Left carpal tunnel syndrome 10/4/2017     Osteoarthritis     Knee      Right carpal tunnel syndrome 10/4/2017     S/P right carpal tunnel release 10/4/2017     S/P total knee arthroplasty 10/3/2014       Past Surgical History:      Procedure  Laterality Date     APPENDECTOMY       COLONOSCOPY  2014    Arteriovenous malformations of right, transverse and splenic flexure       HYSTERECTOMY  1987     with squamous metaplasia and leiomoma       right knee replacement Right      S/P RIGHT CARPAL TUNNEL RELEASE Right 10/04/2017     VEIN STRIPPING         Current Outpatient Prescriptions       Medication  Sig Dispense Refill     aspirin (ECOTRIN) 81 mg enteric  "coated tablet Take 1 tablet by mouth once daily with a meal.  0     multivitamin (MVI) tablet Take 1 tablet by mouth once daily.  0     naproxen (ALEVE) 220 mg tablet Take 220 mg by mouth once daily as needed.       neomycin-polymyxin-dexamethasone (MAXITROL) 3.5mg/mL-10,000 unit/mL-0.1 % ophthalmic suspension Place 1 Drop into left eye 4 times daily.       No current facility-administered medications for this visit.      Medications have been reviewed by me and are current to the best of my knowledge and ability.    Recent use of: no recent use of aspirin (ASA), NSAIDS or steroids    Allergies:  Allergies     Allergen  Reactions     Butazolidin [Phenylbutazone] *Unknown - Pt Doesn't Remember     Morphine Muscle Weakness   Latex allergy  no    Family History       Problem   Relation Age of Onset     Heart Disease  Mother      Heart Disease  Father      Genetic  Other      Parents  of heart disease, father at age 63, mother 73~Sister- brain tumor       Cancer  Sister      brain tumor         Denies family hx of bleeding tendencies, anesthesia complications, or other problems with surgery.    Social History      Substance Use Topics        Smoking status:  Never Smoker     Smokeless tobacco:  Never Used     Alcohol use  3.0 oz/week     6 Standard drinks or equivalent per week        ROS:    Surgical:  patient denies previous complications from prior surgeries including but not limited to prolonged bleeding, anesthesia complications, dysrhythmias, surgical wound infections, or prolonged hospital stay.       -------------------------------------------------------------    PHYSICAL EXAM:  /68 (Cuff Site: Right Arm, Position: Sitting, Cuff Size: Adult Regular)  Pulse 74  Ht 1.543 m (5' 0.75\")  Wt 59.9 kg (132 lb)  BMI 25.15 kg/m2    EXAM:  General Appearance: Pleasant, alert, appropriate appearance for age. No acute distress  Head Exam: Normal. Normocephalic, atraumatic.  Eyes: PERRL, EOMI  Ears: Normal TM's " bilaterally. Normal auditory canals and external ears.   OroPharynx: Normal buccal mucosa. Normal pharynx.  Neck: Supple, no masses or nodes, no lymphadenopathy.  No thyromegaly.  Lungs: Normal chest wall and respirations. Clear to auscultation, no wheezes or crackles.  Cardiovascular: Regular rate and rhythm. S1, S2, no murmurs.  Gastrointestinal: Soft, nontender, no abnormal masses or organomegaly. BS normal   Musculoskeletal: No edema.  Skin: no concerning or new rashes.  Neurologic Exam: CN 2-12 grossly intact.  Normal gait.  Symmetric DTRs, normal gross motor movement, tone, and coordination. No tremor.  Psychiatric Exam: Alert and oriented, appropriate affect.      EKG:  From September 2017 was reviewed and shows sinus rhythm with possible new nonspecific inferior T-wave abnormalities.  ---------------------------------------------------------------    ASSESSEMENT AND PLAN:    Merlyn was seen today for pre-op exam.    Diagnoses and all orders for this visit:    Preop examination  -     BASIC METABOLIC PANEL; Future  -     HEMOGLOBIN; Future    Left carpal tunnel syndrome     she is optimized for her upcoming procedure. Labs are pending at this time. EKG from September was reviewed and is not need to be repeated. She'll be nothing by mouth after midnight. She is not using any anti-inflammatories at this time. She does not take any other medications.    PRE OP RECOMMENDATIONS:  Patient is on chronic pain medications (NO);   Patient is on antiplatlet/anticoagulation (NO)  Other medications that need adjustment perioperatively (NO)    Other:  Patient was advised to call our office and the surgical services with any change in condition or new symptoms if they were to develop between today and their surgical date.  Especially any cardiopulmonary symptoms or symptoms concerning for an infection.    Cliff Dinero MD    This document was prepared using voice generated softwear.  While every attempt was made for accuracy,  grammatical errors may exist.

## 2018-02-13 NOTE — INTERVAL H&P NOTE
Patient Information     Patient Name MRN Merlyn Gallegos [id number] Female 1933      Interval H&P Note by Edison Kelley DO at 2018  8:35 AM     Author:  Edison Kelley DO Service:  (none) Author Type:  Physician     Filed:  2018  8:36 AM Date of Service:  2018  8:35 AM Status:  Signed     :  Edison Kelley DO (Physician)            History and Physical Update    The history and physical has been reviewed and the patient's left wrist has been examined.  There are no interim changes to the patient's history or physical condition.  She is ready to proceed with planned procedure.  She understands the risks and benefits and once again these where outlined.    Edison Kelley DO  Orthopedic Surgeon        Source Note     Author:  Sharath Dinero MD Service:  (none) Author Type:  Physician    Filed:  2018  1:59 PM Date of Service:  2018  1:00 PM Status:  Signed    :  Sharath Dinero MD (Physician)              ----------------- PREOPERATIVE EXAM ------------------  2018    SUBJECTIVE:  Merlyn Escamilla is a 84 y.o. female here for preoperative optimization.    I was asked to see eMrlyn Escamilla by Dr. Kelley for a preoperative optimization.    HPI:  She is scheduled undergo left carpal tunnel release on . She had her right side done several months ago with good results. She has been doing well recently and has had no chest pain, short of breath or upper respiratory infections. She has no new concerns today.        Patient Active Problem List       Diagnosis  Date Noted     S/P right carpal tunnel release  10/04/2017     Left carpal tunnel syndrome  10/04/2017     AVM (arteriovenous malformation) of colon  10/01/2014     Osteoarthritis       Knee        HEMORRHOIDS       COLONIC POLYPS, HYPERPLASTIC, HX OF         Past Medical History:     Diagnosis  Date     DVT complicating pregnancy (HC)      Left carpal tunnel syndrome  10/4/2017     Osteoarthritis     Knee      Right carpal tunnel syndrome 10/4/2017     S/P right carpal tunnel release 10/4/2017     S/P total knee arthroplasty 10/3/2014       Past Surgical History:      Procedure  Laterality Date     APPENDECTOMY       COLONOSCOPY  2014    Arteriovenous malformations of right, transverse and splenic flexure       HYSTERECTOMY  1987     with squamous metaplasia and leiomoma       right knee replacement Right      S/P RIGHT CARPAL TUNNEL RELEASE Right 10/04/2017     VEIN STRIPPING         Current Outpatient Prescriptions       Medication  Sig Dispense Refill     aspirin (ECOTRIN) 81 mg enteric coated tablet Take 1 tablet by mouth once daily with a meal.  0     multivitamin (MVI) tablet Take 1 tablet by mouth once daily.  0     naproxen (ALEVE) 220 mg tablet Take 220 mg by mouth once daily as needed.       neomycin-polymyxin-dexamethasone (MAXITROL) 3.5mg/mL-10,000 unit/mL-0.1 % ophthalmic suspension Place 1 Drop into left eye 4 times daily.       No current facility-administered medications for this visit.      Medications have been reviewed by me and are current to the best of my knowledge and ability.    Recent use of: no recent use of aspirin (ASA), NSAIDS or steroids    Allergies:  Allergies     Allergen  Reactions     Butazolidin [Phenylbutazone] *Unknown - Pt Doesn't Remember     Morphine Muscle Weakness   Latex allergy  no    Family History       Problem   Relation Age of Onset     Heart Disease  Mother      Heart Disease  Father      Genetic  Other      Parents  of heart disease, father at age 63, mother 73~Sister- brain tumor       Cancer  Sister      brain tumor         Denies family hx of bleeding tendencies, anesthesia complications, or other problems with surgery.    Social History      Substance Use Topics        Smoking status:  Never Smoker     Smokeless tobacco:  Never Used     Alcohol use  3.0 oz/week     6 Standard drinks or equivalent per week        ROS:  "   Surgical:  patient denies previous complications from prior surgeries including but not limited to prolonged bleeding, anesthesia complications, dysrhythmias, surgical wound infections, or prolonged hospital stay.       -------------------------------------------------------------    PHYSICAL EXAM:  /68 (Cuff Site: Right Arm, Position: Sitting, Cuff Size: Adult Regular)  Pulse 74  Ht 1.543 m (5' 0.75\")  Wt 59.9 kg (132 lb)  BMI 25.15 kg/m2    EXAM:  General Appearance: Pleasant, alert, appropriate appearance for age. No acute distress  Head Exam: Normal. Normocephalic, atraumatic.  Eyes: PERRL, EOMI  Ears: Normal TM's bilaterally. Normal auditory canals and external ears.   OroPharynx: Normal buccal mucosa. Normal pharynx.  Neck: Supple, no masses or nodes, no lymphadenopathy.  No thyromegaly.  Lungs: Normal chest wall and respirations. Clear to auscultation, no wheezes or crackles.  Cardiovascular: Regular rate and rhythm. S1, S2, no murmurs.  Gastrointestinal: Soft, nontender, no abnormal masses or organomegaly. BS normal   Musculoskeletal: No edema.  Skin: no concerning or new rashes.  Neurologic Exam: CN 2-12 grossly intact.  Normal gait.  Symmetric DTRs, normal gross motor movement, tone, and coordination. No tremor.  Psychiatric Exam: Alert and oriented, appropriate affect.      EKG:  From September 2017 was reviewed and shows sinus rhythm with possible new nonspecific inferior T-wave abnormalities.  ---------------------------------------------------------------    ASSESSEMENT AND PLAN:    Merlyn was seen today for pre-op exam.    Diagnoses and all orders for this visit:    Preop examination  -     BASIC METABOLIC PANEL; Future  -     HEMOGLOBIN; Future    Left carpal tunnel syndrome     she is optimized for her upcoming procedure. Labs are pending at this time. EKG from September was reviewed and is not need to be repeated. She'll be nothing by mouth after midnight. She is not using any " anti-inflammatories at this time. She does not take any other medications.    PRE OP RECOMMENDATIONS:  Patient is on chronic pain medications (NO);   Patient is on antiplatlet/anticoagulation (NO)  Other medications that need adjustment perioperatively (NO)    Other:  Patient was advised to call our office and the surgical services with any change in condition or new symptoms if they were to develop between today and their surgical date.  Especially any cardiopulmonary symptoms or symptoms concerning for an infection.    Cliff Dinero MD    This document was prepared using voice generated softwear.  While every attempt was made for accuracy, grammatical errors may exist.

## 2018-02-13 NOTE — PROGRESS NOTES
Patient Information     Patient Name MRN Sex Merlyn Florez [id number] Female 1933      Progress Notes by Trina Delcid at 2018 11:45 AM     Author:  Trina Delcid Service:  (none) Author Type:  (none)     Filed:  2018 12:21 PM Encounter Date:  2018 Status:  Signed     :  Trina Delcid            Patient came in to have her splint re wrapped.  The bandage was very tight around her thumb.  The splint was also poking into her thumb.  Patient had filed the sharp ends a little, but it was still bothersome.  Splint and dressings removed.  Surgery site clean, dry, and intact.  New dressings placed with new splint.  Patient stated that she was much more comfortable with this.  She will follow up as scheduled.  Trina Delcid LPN .......2018 12:20 PM

## 2018-02-13 NOTE — OR POSTOP
Patient Information     Patient Name MRN Sex     Merlyn Escamilla [id number] Female 1933      OR PostOp by Concetta Duenas RN at 2018 12:01 PM     Author:  Concetta Duenas RN Service:  (none) Author Type:  NURS- Registered Nurse     Filed:  2018 12:02 PM Date of Service:  2018 12:01 PM Status:  Signed     :  Concetta Duenas RN (NURS- Registered Nurse)            Discharge Note    Data:  Merlyn Escamilla has been discharged home at 1200 via wheelchair accompanied by Registered Nurse.      Action:  Written discharge/follow-up instructions were provided to patient and Daughter(s). Prescriptions were filled and sent with patient.  Belongings sent with patient and Daughter(s). Medications from home sent with patient/family: Not Applicable  Equipment none .     Response:  Patient and Daughter(s) verbalized understanding of discharge instructions, reason for discharge, and necessary follow-up appointments.   No further questions or concerns   CONCETTA DUENAS RN ....................  2018   12:02 PM

## 2018-02-13 NOTE — NURSING NOTE
"Patient Information     Patient Name MRMerlyn Chen [id number] Female 1933      Nursing Note by Daria Espino at 2018  1:00 PM     Author:  Daria Espino Service:  (none) Author Type:  (none)     Filed:  2018 12:50 PM Encounter Date:  2018 Status:  Signed     :  Daria Espino            Date of Surgery: 18  Type of Surgery: left carpal tunnel  Surgeon: Dr Kelley  [facility]ospital[facility]  [facility]      Fever/Chills or other infectious symptoms in past month: no  >10lb weight loss in past two months: no    Health Care Directive/Code status:  Full code  Hx of blood transfusions:   (YES)   Td up to date:  Last Td   History of VRE/MRSA:  (NO) Date: n/a    Preoperative Evaluation: Obstructive Sleep Apnea screening    S: Snore -  Do you snore loudly? (louder than talking or loud enough to be heard through closed doors)(NO)  T: Tired - Do you often feel tired, fatigued, or sleepy during the daytime?(NO)  O: Observed - Has anyone ever observed you stop breathing during your sleep?(NO)  P: Pressure - Do you have or are you being treated for high blood pressure?(NO)  B: BMI - BMI greater than 35kg/m2?(NO)  A: Age - Age over 50 years old?(YES)  N: Neck - Neck circumference greater than 40 cm?(NO)  G: Gender - Gender: Male?(NO)    Total number of \"YES\" responses:  1    Scoring: Low risk of ARTEMIO 0-2  At Risk of ARTEMIO: >3 High Risk of ARTEMIO: 5-8    Daria Espino CMA(AAMA)....................  2018   12:39 PM            "

## 2018-05-21 DIAGNOSIS — M79.641 PAIN OF RIGHT HAND: Primary | ICD-10-CM

## 2018-05-22 ENCOUNTER — HOSPITAL ENCOUNTER (OUTPATIENT)
Dept: GENERAL RADIOLOGY | Facility: OTHER | Age: 83
Discharge: HOME | End: 2018-05-22
Attending: ORTHOPAEDIC SURGERY | Admitting: ORTHOPAEDIC SURGERY
Payer: MEDICARE

## 2018-05-22 ENCOUNTER — OFFICE VISIT (OUTPATIENT)
Dept: ORTHOPEDICS | Facility: OTHER | Age: 83
End: 2018-05-22
Attending: ORTHOPAEDIC SURGERY
Payer: MEDICARE

## 2018-05-22 VITALS
HEART RATE: 80 BPM | DIASTOLIC BLOOD PRESSURE: 70 MMHG | BODY MASS INDEX: 24.55 KG/M2 | HEIGHT: 61 IN | SYSTOLIC BLOOD PRESSURE: 128 MMHG | WEIGHT: 130 LBS

## 2018-05-22 DIAGNOSIS — M18.0 PRIMARY OSTEOARTHRITIS OF BOTH FIRST CARPOMETACARPAL JOINTS: ICD-10-CM

## 2018-05-22 DIAGNOSIS — M79.641 PAIN OF RIGHT HAND: ICD-10-CM

## 2018-05-22 DIAGNOSIS — M19.042 PRIMARY OSTEOARTHRITIS OF BOTH HANDS: Primary | ICD-10-CM

## 2018-05-22 DIAGNOSIS — M19.041 PRIMARY OSTEOARTHRITIS OF BOTH HANDS: Primary | ICD-10-CM

## 2018-05-22 PROCEDURE — G0463 HOSPITAL OUTPT CLINIC VISIT: HCPCS | Mod: 25

## 2018-05-22 PROCEDURE — 99213 OFFICE O/P EST LOW 20 MIN: CPT | Performed by: ORTHOPAEDIC SURGERY

## 2018-05-22 PROCEDURE — G0463 HOSPITAL OUTPT CLINIC VISIT: HCPCS

## 2018-05-22 PROCEDURE — 73130 X-RAY EXAM OF HAND: CPT | Mod: RT

## 2018-05-22 ASSESSMENT — PAIN SCALES - GENERAL: PAINLEVEL: MILD PAIN (2)

## 2018-05-22 NOTE — MR AVS SNAPSHOT
After Visit Summary   5/22/2018    Merlyn Escamilla    MRN: [id number]           Patient Information     Date Of Birth          8/22/1933        Visit Information        Provider Department      5/22/2018 11:15 AM Edison Kelley DO [facility]        Today's Diagnoses     Primary osteoarthritis of both hands    -  1    Primary osteoarthritis of both first carpometacarpal joints           Follow-ups after your visit        Follow-up notes from your care team     Return if symptoms worsen or fail to improve.      Your next 10 appointments already scheduled     Jul 02, 2018 10:00 AM CDT   Pre-Op physical with Tootie Garcia MD   [facility] ([facility])    [address]  [place] [address] [phone]              Future tests that were ordered for you today     Open Future Orders        Priority Expected Expires Ordered    XR Hand Right G/E 3 Views Routine 5/22/2018 5/21/2019 5/21/2018            Who to contact     If you have questions or need follow up information about today's clinic visit or your schedule please contact [facility] directly at [phone].  Normal or non-critical lab and imaging results will be communicated to you by [employer], letter or phone within 4 business days after the clinic has received the results. If you do not hear from us within 7 days, please contact the clinic through [employer] or phone. If you have a critical or abnormal lab result, we will notify you by phone as soon as possible.  Submit refill requests through [employer] or call your pharmacy and they will forward the refill request to us. Please allow 3 business days for your refill to be completed.          Additional Information About Your Visit        MyChart Information     [employer] lets you send messages to your doctor, view your test results, renew your prescriptions, schedule appointments and more. To sign up,  "go to [email or web address] . Click on \"Log in\" on the left side of the screen, which will take you to the Welcome page. Then click on \"Sign up Now\" on the right side of the page.     You will be asked to enter the access code listed below, as well as some personal information. Please follow the directions to create your username and password.     Your access code is: [email or web address]  Expires: 2018 11:50 AM     Your access code will  in 90 days. If you need help or a new code, please call your [facility] clinic or [phone].        Care EveryWhere ID     This is your Care EveryWhere ID. This could be used by other organizations to access your [facility] medical records  [id number]        Your Vitals Were     Pulse Height BMI (Body Mass Index)             80 1.549 m (5' 1\") 24.56 kg/m2          Blood Pressure from Last 3 Encounters:   18 128/70   18 130/70   18 140/68    Weight from Last 3 Encounters:   18 59 kg (130 lb)   18 59 kg (130 lb)   18 59.9 kg (132 lb)              Today, you had the following     No orders found for display       Primary Care Provider Office Phone # Fax #    Sharath Dinero [phone]       [address] [facility] [address]        Equal Access to Services     [facility]: Hadii boris krishnao Sodanii, waaxda luqadaha, qaybta kaalmada leila, suki salmeron . So [facility] [phone].    ATENCIÓN: Si saturninola osman, tiene a harp disposición servicios gratuitos de asistencia lingüística. Llame al [phone].    We comply with applicable federal civil rights laws and [place] laws. We do not discriminate on the basis of race, color, national origin, age, disability, sex, sexual orientation, or gender identity.            Thank you!     Thank you for choosing [facility] AND [facility]  for your care. Our goal is always to provide you with excellent care. Hearing back from our patients " is one way we can continue to improve our services. Please take a few minutes to complete the written survey that you may receive in the mail after your visit with us. Thank you!             Your Updated Medication List - Protect others around you: Learn how to safely use, store and throw away your medicines at www.disposemymeds.org.          This list is accurate as of 5/22/18 11:51 AM.  Always use your most recent med list.                   Brand Name Dispense Instructions for use Diagnosis    aspirin 81 MG EC tablet      Take 81 mg by mouth daily with food        MULTI-VITAMINS Tabs      Take 1 tablet by mouth daily        naproxen sodium 220 MG tablet    ANAPROX     Take 220 mg by mouth daily as needed        neomycin-polymyxin-dexamethasone [id number] Susp ophthalmic susp    MAXITROL     1 drop in left eye four times a day.

## 2018-05-22 NOTE — NURSING NOTE
Patient is here for a follow up on her right hand.  She is having right hand swelling.  DOS: 10/4/17 - CTR  Trina Delcid LPN .......5/22/2018 11:01 AM

## 2018-05-22 NOTE — PROGRESS NOTES
"PROGRESS NOTE    SUBJECTIVE:  Merlyn Escamilla is here for evaluation in regards to pain into her right hand.  Patient had had carpal tunnel surgery both wrists last year.  She comes in today because she is having some increased swelling and pain about the PIP joint of her right middle finger.  He did not strike it on anything just noticed some increased swelling and more discomfort and pain with increased use.  She has been utilizing Aleve which does help.  Otherwise no other treatment    OBJECTIVE:  /70 (BP Location: Right arm, Patient Position: Sitting, Cuff Size: Adult Regular)  Pulse 80  Ht 1.549 m (5' 1\")  Wt 59 kg (130 lb)  BMI 24.56 kg/m2 Body mass index is 24.56 kg/(m^2).    General Appearance: Pleasant 84 year old female in good appearance, mood and affect.  Alert and orientated times three ( time, date and location).    Skin: Intact.    Wrist:  Motion: Intact.    Hand:  Sensation: Intact with improvement since her carpal tunnel releases.  Radial and ulnar blood flow: Normal.  She has obvious deformities about the DIP and PIP joints of both hands as well as arthritic changes at the first CMC joints both thumbs with subluxation of the first CMC joint.  There is tenderness with palpation about the PIP joint right middle finger.    Elbow:  Motion: Full motion.    Shoulder:  Motion: Full motion.    Eyes: Pupils are round and she wears glasses.    Ears: Hearing: Impaired.    Heart: Good capillary refill and her hands pulses are regular.    Lungs: Coarse breath sounds.    Radiographic images from 5/22/2018 where independently reviewed and discussed with the patient.      Xray:    X-rays demonstrate significant first CMC arthritis with subluxation of the joint there is also degenerative changes osteoarthritic in nature of the DIP and PIP joints of the fingers.    PROCEDURE: XR HAND RT G/E 3 VW 5/22/2018 11:07 AM    HISTORY: ; Pain of right hand    COMPARISONS: None.    TECHNIQUE: 3 views.    FINDINGS: " There is generalized osteopenia. There are severe  degenerative changes in the first carpal metacarpal joint with mild to  moderate degenerative change in multiple interphalangeal joints. There  is degenerative change in the STT joint as well.    No acute fracture or dislocation is seen.    IMPRESSION: Scattered degenerative change, most severe in the first  carpal metacarpal joint.    JOLIE GAY MD    IMPRESSION:    Osteoarthritis of the small joints of her right hand as well as the left hand with symptomatic pain into the right middle finger PIP joint.  Bilateral first CMC arthritis with subluxations.    PLAN:  Risks, benefits, conservative, surgical and alternatives to treatment where discussed and the patient would like to proceed with conservative measures.  We did talk at length about osteoarthritis reviewed the x-rays with the patient so she can better understand how the joint is wearing out.  She can utilize over-the-counter medications namely anti-inflammatories if tolerated.  I encouraged her to soak her hands in warm water and do exercises for approximately 5 minutes in the morning and then at night to ice some.  Her questions and concerns were answered.  Follow-up as needed.    Edison Kelley D.O.  Orthopaedic Surgeon    [facility] and [address] [place] [address]  Phone [phone] (KNEE)  Fax [phone]    Disclaimer:  This note consists of words and symbols derived from keyboarding, dictation, or using voice recognition software. As a result, there may be errors in the script that have gone undetected. Please consider this when interpreting information found in this note.    11:46 AM 5/22/2018

## 2018-07-02 ENCOUNTER — OFFICE VISIT (OUTPATIENT)
Dept: FAMILY MEDICINE | Facility: OTHER | Age: 83
End: 2018-07-02
Attending: FAMILY MEDICINE
Payer: COMMERCIAL

## 2018-07-02 VITALS
BODY MASS INDEX: 24.96 KG/M2 | TEMPERATURE: 97.6 F | WEIGHT: 132.2 LBS | DIASTOLIC BLOOD PRESSURE: 62 MMHG | SYSTOLIC BLOOD PRESSURE: 142 MMHG | HEIGHT: 61 IN | HEART RATE: 62 BPM

## 2018-07-02 DIAGNOSIS — Z01.818 PREOP GENERAL PHYSICAL EXAM: Primary | ICD-10-CM

## 2018-07-02 DIAGNOSIS — H25.9 AGE-RELATED CATARACT OF BOTH EYES, UNSPECIFIED AGE-RELATED CATARACT TYPE: ICD-10-CM

## 2018-07-02 PROBLEM — G56.01 RIGHT CARPAL TUNNEL SYNDROME: Status: RESOLVED | Noted: 2017-10-04 | Resolved: 2018-07-02

## 2018-07-02 PROBLEM — K64.9 HEMORRHOIDS: Status: RESOLVED | Noted: 2018-01-25 | Resolved: 2018-07-02

## 2018-07-02 PROBLEM — G56.02 LEFT CARPAL TUNNEL SYNDROME: Status: RESOLVED | Noted: 2017-10-04 | Resolved: 2018-07-02

## 2018-07-02 PROCEDURE — 99214 OFFICE O/P EST MOD 30 MIN: CPT | Performed by: FAMILY MEDICINE

## 2018-07-02 PROCEDURE — G0463 HOSPITAL OUTPT CLINIC VISIT: HCPCS

## 2018-07-02 NOTE — PROGRESS NOTES
[facility]  [address]  [place] [address]  [phone]    PRE-OP EVALUATION:  Today's date: 2018    Merlyn Escamilla (: 1933) presents for pre-operative evaluation assessment as requested by Dr. Solano.  She requires evaluation and anesthesia risk assessment prior to undergoing surgery/procedure for treatment of-no health risks identified.     Nursing Notes:   Leeann Ivey LPN  2018  9:56 AM  Addendum  Patient presents to clinic for pre op.  Date of Surgery: Left eye on 2018 & Right Eye on 2018   Type of Surgery: Cataract   Surgeon: Dr. Solano  Hospital:  [facility]    Leeann Richard ....................  2018   9:50 AM        HPI:     HPI related to upcoming procedure: Progressively symptomatic cataracts and is proceeding to elective surgery.      See problem list for active medical problems.  Problems all longstanding and stable, except as noted/documented.  See ROS for pertinent symptoms related to these conditions.                                                                                                                                                          .    MEDICAL HISTORY:     Patient Active Problem List    Diagnosis Date Noted     Primary osteoarthritis of both first carpometacarpal joints 2018     Priority: Medium     Primary osteoarthritis of both hands 2018     Priority: Medium     History of colonic polyps 2018     Priority: Medium     Osteoarthrosis 2018     Priority: Medium     Overview:   Knee       History of carpal tunnel surgery of left wrist 10/04/2017     Priority: Medium     S/P total knee arthroplasty 10/03/2014     Priority: Medium     AVM (arteriovenous malformation) of colon 10/01/2014     Priority: Medium      Past Medical History:   Diagnosis Date     Carpal tunnel syndrome of left wrist     10/4/2017     Carpal tunnel syndrome of right wrist     10/4/2017      Deep phlebothrombosis during pregnancy     No Comments Provided     Osteoarthritis     Knee     Other specified postprocedural states     10/4/2017     Other specified postprocedural states     1/31/2018     Presence of artificial knee joint     10/3/2014     Past Surgical History:   Procedure Laterality Date     APPENDECTOMY OPEN      No Comments Provided     C TOTAL KNEE ARTHROPLASTY Right 10/01/2014    Dr Domingo     CARPAL TUNNEL RELEASE RT/LT Left 01/31/2018 01/31/2018,[id number],OTHER,Left     CARPAL TUNNEL RELEASE RT/LT Right 10/04/2017     COLONOSCOPY  09/2014    Arteriovenous malformations of right, transverse and splenic flexure     ENDOSCOPIC STRIPPING VEIN(S)      No Comments Provided     HYSTERECTOMY TOTAL ABDOMINAL  06/1987    with squamous metaplasia and leiomoma     Current Outpatient Prescriptions   Medication Sig Dispense Refill     aspirin EC 81 MG EC tablet Take 81 mg by mouth daily with food       Multiple Vitamin (MULTI-VITAMINS) TABS Take 1 tablet by mouth daily       naproxen sodium (ANAPROX) 220 MG tablet Take 220 mg by mouth daily as needed       neomycin-polymyxin-dexamethasone (MAXITROL) 3.5-21676-3.1 SUSP ophthalmic susp 1 drop in left eye four times a day.  1     OTC products: None, except as noted above    Allergies   Allergen Reactions     Morphine      Other reaction(s): Muscle Weakness     Phenylbutazone Unknown      Latex Allergy: NO    Social History   Substance Use Topics     Smoking status: Never Smoker     Smokeless tobacco: Never Used     Alcohol use 3.0 oz/week     History   Drug Use Not on file     Comment: Drug use: No     Social History     Social History     Marital status:      Spouse name: N/A     Number of children: N/A     Years of education: N/A     Occupational History     Not on file.     Social History Main Topics     Smoking status: Never Smoker     Smokeless tobacco: Never Used     Alcohol use 3.0 oz/week     Drug use: Not on file      Comment: Drug  "use: No     Sexual activity: Not on file     Other Topics Concern     Not on file     Social History Narrative    Lives alone.      2017.     3 daughters, 1 in [facility], 1 in [place] and 1 near [place]. Lost 1 daughter at age 2 years.    Has 1 living sister, fraternal twin. All other siblings are .        REVIEW OF SYSTEMS:   CONSTITUTIONAL: NEGATIVE for fever, chills, change in weight  ENT/MOUTH: NEGATIVE for ear, mouth and throat problems  RESP: NEGATIVE for significant cough or SOB  CV: NEGATIVE for chest pain, palpitations or peripheral edema    EXAM:   /62  Pulse 62  Temp 97.6  F (36.4  C)  Ht 5' 1\" (1.549 m)  Wt 132 lb 3.2 oz (60 kg)  BMI 24.98 kg/m2  GENERAL APPEARANCE: healthy, alert and no distress  HENT: ear canals and TM's normal and nose and mouth without ulcers or lesions  RESP: lungs clear to auscultation - no rales, rhonchi or wheezes  CV: regular rate and rhythm, normal S1 S2, no S3 or S4 and no murmur, click or rub   ABDOMEN: soft, nontender, no HSM or masses and bowel sounds normal  NEURO: Normal strength and tone, sensory exam grossly normal, mentation intact and speech normal    DIAGNOSTICS:   No labs or EKG required for low risk surgery (cataract, skin procedure, breast biopsy, etc)  EKG 2017 normal.    Recent Labs   Lab Test  18   1647  18   1642  17   1210  17   1208  10/04/14   0554  10/03/14   0630   HGB   --   12.9   --   12.7  8.2*  8.6*   PLT   --    --    --    --   210  202   NA  140   --   142   --    --    --    POTASSIUM  4.4   --   3.9   --    --    --    CR  0.59*   --   0.58*   --    --    --         IMPRESSION:         ICD-10-CM    1. Preop general physical exam Z01.818    2. Age-related cataract of both eyes, unspecified age-related cataract type H25.9        RECOMMENDATIONS:     APPROVAL GIVEN to proceed with proposed procedure, without further diagnostic evaluation       Signed Electronically by: Tootie Garcia, " MD    Copy of this evaluation report is provided to requesting physician.    Portions of this dictation were created using the Dragon Nuance voice recognition system. Proofreading was completed but there may be errors in text.

## 2018-07-02 NOTE — NURSING NOTE
Patient presents to clinic for pre op.  Date of Surgery: Left eye on 7/11/2018 & Right Eye on 8/1/2018   Type of Surgery: Cataract   Surgeon: Dr. Solano  [facility]:  [facility]    Leeann Richard ....................  7/2/2018   9:50 AM

## 2018-07-02 NOTE — PATIENT INSTRUCTIONS
Cataract Treatment: Removing the Cloudy Lens  A cataract is a clouding of your eye's lens. Surgery can be done to remove the cataract and replace the lens. This can help you see better. The two most common ways to remove a cataract are phacoemulsification and extracapsular extraction.     Phacoemulsification       Extracapsular extraction      Phacoemulsification  A small cut (incision) is made in the cornea. This is the clear cover on the front of your eye. Or a cut may be made in the white part of your eye (sclera). Then a hole is made in the anterior capsule. This is the space between your cornea and the colored part of your eye (iris). A tiny ultrasound probe is inserted into the lens. Sound vibrations from the probe break the cloudy lens into tiny pieces. The pieces are then suctioned out. Stitches may be needed after this is done.  Extracapsular extraction  This method makes a larger cut in the cornea. The entire lens can then be removed at once through the cut. Stitches are used to close the cut after the procedure.  Implanting an IOL  After the cloudy lens is removed, it is replaced with a plastic lens called an IOL (intraocular lens).    Date Last Reviewed: 11/1/2017 2000-2017 [employer]. [address] [place] [address], [place] [address]. All rights reserved. This information is not intended as a substitute for professional medical care. Always follow your healthcare professional's instructions.

## 2018-07-02 NOTE — MR AVS SNAPSHOT
After Visit Summary   7/2/2018    Merlyn Escamilla    MRN: [id number]           Patient Information     Date Of Birth          8/22/1933        Visit Information        Provider Department      7/2/2018 10:00 AM Tootie Garcia MD [facility]        Today's Diagnoses     Preop general physical exam    -  1    Age-related cataract of both eyes, unspecified age-related cataract type          Care Instructions      Cataract Treatment: Removing the Cloudy Lens  A cataract is a clouding of your eye's lens. Surgery can be done to remove the cataract and replace the lens. This can help you see better. The two most common ways to remove a cataract are phacoemulsification and extracapsular extraction.     Phacoemulsification       Extracapsular extraction      Phacoemulsification  A small cut (incision) is made in the cornea. This is the clear cover on the front of your eye. Or a cut may be made in the white part of your eye (sclera). Then a hole is made in the anterior capsule. This is the space between your cornea and the colored part of your eye (iris). A tiny ultrasound probe is inserted into the lens. Sound vibrations from the probe break the cloudy lens into tiny pieces. The pieces are then suctioned out. Stitches may be needed after this is done.  Extracapsular extraction  This method makes a larger cut in the cornea. The entire lens can then be removed at once through the cut. Stitches are used to close the cut after the procedure.  Implanting an IOL  After the cloudy lens is removed, it is replaced with a plastic lens called an IOL (intraocular lens).    Date Last Reviewed: 11/1/2017 2000-2017 [employer]. [address] [place] [address]. All rights reserved. This information is not intended as a substitute for professional medical care. Always follow your healthcare professional's instructions.                Follow-ups after your visit        Who to  "contact     If you have questions or need follow up information about today's clinic visit or your schedule please contact [facility] directly at [phone].  Normal or non-critical lab and imaging results will be communicated to you by MyChart, letter or phone within 4 business days after the clinic has received the results. If you do not hear from us within 7 days, please contact the clinic through [employer] or phone. If you have a critical or abnormal lab result, we will notify you by phone as soon as possible.  Submit refill requests through [employer] or call your pharmacy and they will forward the refill request to us. Please allow 3 business days for your refill to be completed.          Additional Information About Your Visit        [employer] Information     [employer] lets you send messages to your doctor, view your test results, renew your prescriptions, schedule appointments and more. To sign up, go to [email or web address] . Click on \"Log in\" on the left side of the screen, which will take you to the Welcome page. Then click on \"Sign up Now\" on the right side of the page.     You will be asked to enter the access code listed below, as well as some personal information. Please follow the directions to create your username and password.     Your access code is: [email or web address]  Expires: 2018 11:50 AM     Your access code will  in 90 days. If you need help or a new code, please call your [place] clinic or [phone].        Care EveryWhere ID     This is your Care EveryWhere ID. This could be used by other organizations to access your [place] medical records  [id number]        Your Vitals Were     Pulse Temperature Height BMI (Body Mass Index)          62 97.6  F (36.4  C) 5' 1\" (1.549 m) 24.98 kg/m2         Blood Pressure from Last 3 Encounters:   18 142/62   18 128/70   18 130/70    Weight from Last 3 Encounters:   18 132 lb 3.2 oz (60 kg)   18 " 130 lb (59 kg)   02/12/18 130 lb (59 kg)              Today, you had the following     No orders found for display       Primary Care Provider Office Phone # Fax #    Sharath Dinero [phone] [address]  [place] [address]        Equal Access to Services     [facility] : Hadii aad ku hadasho Soomaali, waaxda luqadaha, qaybta kaalmada adeegyada, waxzoe hanson hayaan adenaina johnstonteresashima salmeron . So [facility] [phone].    ATENCIÓN: Si habla español, tiene a harp disposición servicios gratuitos de asistencia lingüística. Llame al [phone].    We comply with applicable federal civil rights laws and [place] laws. We do not discriminate on the basis of race, color, national origin, age, disability, sex, sexual orientation, or gender identity.            Thank you!     Thank you for choosing [facility]  for your care. Our goal is always to provide you with excellent care. Hearing back from our patients is one way we can continue to improve our services. Please take a few minutes to complete the written survey that you may receive in the mail after your visit with us. Thank you!             Your Updated Medication List - Protect others around you: Learn how to safely use, store and throw away your medicines at www.disposemymeds.org.          This list is accurate as of 7/2/18 10:24 AM.  Always use your most recent med list.                   Brand Name Dispense Instructions for use Diagnosis    aspirin 81 MG EC tablet      Take 81 mg by mouth daily with food        MULTI-VITAMINS Tabs      Take 1 tablet by mouth daily        naproxen sodium 220 MG tablet    ANAPROX     Take 220 mg by mouth daily as needed        neomycin-polymyxin-dexamethasone 3.5-43592-6.1 Susp ophthalmic susp    MAXITROL     1 drop in left eye four times a day.

## 2018-07-14 ENCOUNTER — OFFICE VISIT (OUTPATIENT)
Dept: FAMILY MEDICINE | Facility: OTHER | Age: 83
End: 2018-07-14
Attending: NURSE PRACTITIONER
Payer: MEDICARE

## 2018-07-14 VITALS
WEIGHT: 131.2 LBS | SYSTOLIC BLOOD PRESSURE: 128 MMHG | HEART RATE: 80 BPM | DIASTOLIC BLOOD PRESSURE: 68 MMHG | TEMPERATURE: 98.5 F | BODY MASS INDEX: 24.79 KG/M2

## 2018-07-14 DIAGNOSIS — R82.71 BACTERIA IN URINE: ICD-10-CM

## 2018-07-14 DIAGNOSIS — R61 NIGHT SWEATS: Primary | ICD-10-CM

## 2018-07-14 DIAGNOSIS — R53.83 FATIGUE, UNSPECIFIED TYPE: ICD-10-CM

## 2018-07-14 LAB
ALBUMIN UR-MCNC: NEGATIVE MG/DL
APPEARANCE UR: CLEAR
BACTERIA #/AREA URNS HPF: ABNORMAL /HPF
BASOPHILS # BLD AUTO: 0 10E9/L (ref 0–0.2)
BASOPHILS NFR BLD AUTO: 0.8 %
BILIRUB UR QL STRIP: NEGATIVE
COLOR UR AUTO: YELLOW
DIFFERENTIAL METHOD BLD: ABNORMAL
EOSINOPHIL # BLD AUTO: 0 10E9/L (ref 0–0.7)
EOSINOPHIL NFR BLD AUTO: 0.4 %
ERYTHROCYTE [DISTWIDTH] IN BLOOD BY AUTOMATED COUNT: 13.6 % (ref 10–15)
GLUCOSE UR STRIP-MCNC: NEGATIVE MG/DL
HCT VFR BLD AUTO: 40.8 % (ref 35–47)
HGB BLD-MCNC: 13 G/DL (ref 11.7–15.7)
HGB UR QL STRIP: ABNORMAL
IMM GRANULOCYTES # BLD: 0 10E9/L (ref 0–0.4)
IMM GRANULOCYTES NFR BLD: 0.2 %
KETONES UR STRIP-MCNC: NEGATIVE MG/DL
LEUKOCYTE ESTERASE UR QL STRIP: ABNORMAL
LYMPHOCYTES # BLD AUTO: 0.7 10E9/L (ref 0.8–5.3)
LYMPHOCYTES NFR BLD AUTO: 13.5 %
MCH RBC QN AUTO: 29.3 PG (ref 26.5–33)
MCHC RBC AUTO-ENTMCNC: 31.9 G/DL (ref 31.5–36.5)
MCV RBC AUTO: 92 FL (ref 78–100)
MONOCYTES # BLD AUTO: 0.4 10E9/L (ref 0–1.3)
MONOCYTES NFR BLD AUTO: 8 %
NEUTROPHILS # BLD AUTO: 3.8 10E9/L (ref 1.6–8.3)
NEUTROPHILS NFR BLD AUTO: 77.1 %
NITRATE UR QL: NEGATIVE
NON-SQ EPI CELLS #/AREA URNS LPF: ABNORMAL /LPF
PH UR STRIP: 6 PH (ref 5–9)
PLATELET # BLD AUTO: 162 10E9/L (ref 150–450)
RBC # BLD AUTO: 4.44 10E12/L (ref 3.8–5.2)
RBC #/AREA URNS AUTO: ABNORMAL /HPF
SOURCE: ABNORMAL
SP GR UR STRIP: <1.005 (ref 1–1.03)
UROBILINOGEN UR STRIP-ACNC: 0.2 EU/DL (ref 0.2–1)
WBC # BLD AUTO: 5 10E9/L (ref 4–11)
WBC #/AREA URNS AUTO: ABNORMAL /HPF

## 2018-07-14 PROCEDURE — G0463 HOSPITAL OUTPT CLINIC VISIT: HCPCS

## 2018-07-14 PROCEDURE — 85025 COMPLETE CBC W/AUTO DIFF WBC: CPT | Performed by: NURSE PRACTITIONER

## 2018-07-14 PROCEDURE — 87086 URINE CULTURE/COLONY COUNT: CPT | Performed by: NURSE PRACTITIONER

## 2018-07-14 PROCEDURE — 86618 LYME DISEASE ANTIBODY: CPT | Performed by: NURSE PRACTITIONER

## 2018-07-14 PROCEDURE — 99213 OFFICE O/P EST LOW 20 MIN: CPT | Performed by: NURSE PRACTITIONER

## 2018-07-14 PROCEDURE — 36415 COLL VENOUS BLD VENIPUNCTURE: CPT | Performed by: NURSE PRACTITIONER

## 2018-07-14 PROCEDURE — 81001 URINALYSIS AUTO W/SCOPE: CPT | Performed by: NURSE PRACTITIONER

## 2018-07-14 ASSESSMENT — PAIN SCALES - GENERAL: PAINLEVEL: NO PAIN (0)

## 2018-07-14 NOTE — NURSING NOTE
Merlyn presents to the clinic today for concerns of excessive sweating at night and tiredness. These symptoms have been going on for 3 days. States that she had a cataract removed from her left eye on July 11th, 2018 and ever since she has had these symptoms, otherwise states that she is feeling fine.         Otis Hager LPN 07/14/18 10:35 AM

## 2018-07-14 NOTE — PROGRESS NOTES
HPI:    Merlyn Escamilla is a 84 year old female  who presents to clinic today for sweats and fatigue.    States night sweats and fatigue for the past 3 days.  States she had left cataract surgery on 7/11/18 and symptoms started then.  States the house is hot but states she has never had sweats like this in the past.  Full body night sweats requiring her to have to change her night gown during the night.  Feeling generally tired, states she felt fine prior.    No fevers.  Chills initially followed by the sweats which continue.  No cough or shortness of breath.  No chest pain or palpitations.  No lower extremity edema.  No numbness or tingling in extremities.  No pain in calves or pain with walking. No dysuria or change in frequency.  No nausea, vomiting, or diarrhea.  No change in appetite, drinking fluids well.  No abdominal pain.  No known tick bites.  No rashes.  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  No new medications other than eye drops and just takes low dose aspirin and multivitamin.        Past Medical History:   Diagnosis Date     Carpal tunnel syndrome of left wrist     10/4/2017     Carpal tunnel syndrome of right wrist     10/4/2017     Deep phlebothrombosis during pregnancy     No Comments Provided     Osteoarthritis     Knee     Other specified postprocedural states     10/4/2017     Other specified postprocedural states     1/31/2018     Presence of artificial knee joint     10/3/2014     Past Surgical History:   Procedure Laterality Date     APPENDECTOMY OPEN      No Comments Provided     C TOTAL KNEE ARTHROPLASTY Right 10/01/2014    Dr Domingo     CARPAL TUNNEL RELEASE RT/LT Left 01/31/2018 01/31/2018,[id number],OTHER,Left     CARPAL TUNNEL RELEASE RT/LT Right 10/04/2017     COLONOSCOPY  09/2014    Arteriovenous malformations of right, transverse and splenic flexure     ENDOSCOPIC STRIPPING VEIN(S)      No Comments Provided     HYSTERECTOMY TOTAL ABDOMINAL  06/1987    with squamous  metaplasia and leiomoma     Social History   Substance Use Topics     Smoking status: Never Smoker     Smokeless tobacco: Never Used     Alcohol use 3.0 oz/week     Current Outpatient Prescriptions   Medication Sig Dispense Refill     aspirin EC 81 MG EC tablet Take 81 mg by mouth daily with food       Multiple Vitamin (MULTI-VITAMINS) TABS Take 1 tablet by mouth daily       naproxen sodium (ANAPROX) 220 MG tablet Take 220 mg by mouth daily as needed       neomycin-polymyxin-dexamethasone (MAXITROL) 3.5-32944-3.1 SUSP ophthalmic susp 1 drop in left eye four times a day.  1     Allergies   Allergen Reactions     Morphine      Other reaction(s): Muscle Weakness     Phenylbutazone Unknown         Past medical history, past surgical history, current medications and allergies reviewed and accurate to the best of my knowledge.        ROS:  Refer to HPI    /68 (BP Location: Left arm, Patient Position: Sitting, Cuff Size: Adult Regular)  Pulse 80  Temp 98.5  F (36.9  C) (Tympanic)  Wt 131 lb 3.2 oz (59.5 kg)  Breastfeeding? No  BMI 24.79 kg/m2    EXAM:  General Appearance: Well appearing elderly female, non ill appearance, appropriate appearance for age. No acute distress  Eyes: conjunctivae normal without erythema or irritation, no drainage or crusting, no eyelid swelling, pupils equal   Neck: supple without adenopathy  Respiratory: normal chest wall and respirations.  Normal effort.  Clear to auscultation bilaterally, no wheezing, crackles or rhonchi.  No increased work of breathing.  No cough appreciated.   Cardiac: RRR with no murmurs.  No lower extremity edema.  Musculoskeletal:  Normal gait.  Equal movement of bilateral upper extremities.  Equal movement of bilateral lower extremities.    Dermatological: no rashes noted of exposed skin  Psychological: normal affect, alert and pleasant      Labs:  Results for orders placed or performed in visit on 07/14/18   CBC and Differential   Result Value Ref Range     WBC 5.0 4.0 - 11.0 10e9/L    RBC Count 4.44 3.8 - 5.2 10e12/L    Hemoglobin 13.0 11.7 - 15.7 g/dL    Hematocrit 40.8 35.0 - 47.0 %    MCV 92 78 - 100 fl    MCH 29.3 26.5 - 33.0 pg    MCHC 31.9 31.5 - 36.5 g/dL    RDW 13.6 10.0 - 15.0 %    Platelet Count 162 150 - 450 10e9/L    Diff Method Automated Method     % Neutrophils 77.1 %    % Lymphocytes 13.5 %    % Monocytes 8.0 %    % Eosinophils 0.4 %    % Basophils 0.8 %    % Immature Granulocytes 0.2 %    Absolute Neutrophil 3.8 1.6 - 8.3 10e9/L    Absolute Lymphocytes 0.7 (L) 0.8 - 5.3 10e9/L    Absolute Monocytes 0.4 0.0 - 1.3 10e9/L    Absolute Eosinophils 0.0 0.0 - 0.7 10e9/L    Absolute Basophils 0.0 0.0 - 0.2 10e9/L    Abs Immature Granulocytes 0.0 0 - 0.4 10e9/L   *UA reflex to Microscopic   Result Value Ref Range    Color Urine Yellow     Appearance Urine Clear     Glucose Urine Negative NEG^Negative mg/dL    Bilirubin Urine Negative NEG^Negative    Ketones Urine Negative NEG^Negative mg/dL    Specific Gravity Urine <1.005 1.000 - 1.030    Blood Urine Trace (A) NEG^Negative    pH Urine 6.0 5.0 - 9.0 pH    Protein Albumin Urine Negative NEG^Negative mg/dL    Urobilinogen Urine 0.2 0.2 - 1.0 EU/dL    Nitrite Urine Negative NEG^Negative    Leukocyte Esterase Urine Small (A) NEG^Negative    Source Midstream Urine    Urine Microscopic   Result Value Ref Range    WBC Urine 0 - 5 OTO5^0 - 5 /HPF    RBC Urine O - 2 OTO2^O - 2 /HPF    Squamous Epithelial /LPF Urine Few FEW^Few /LPF    Bacteria Urine Few (A) NEG^Negative /HPF             ASSESSMENT/PLAN:    ICD-10-CM    1. Night sweats R61 CBC and Differential     *UA reflex to Microscopic     Lyme Disease Judy with reflex to WB Serum     Urine Microscopic   2. Fatigue, unspecified type R53.83 CBC and Differential     *UA reflex to Microscopic     Lyme Disease Judy with reflex to WB Serum   3. Bacteria in urine R82.71 Urine Culture Aerobic Bacterial         Discussed with patient that symptoms of night sweats and fatigue  is a broad complaint and evaluation is better done by PCP or family practice clinic.  I did discuss that we could check some basic labs today in [facility] but further evaluation and management would need to be completed in f/u with PCP.    Reviewed UpToDate - Approach to patient with night sweats.  Patient unlikely to have any of the listed causes.    Urinalysis - trace blood, small leukocyte, few bacteria   No urinary symptoms so will await urine culture results, no antibiotics started at this time.    Urine culture pending    CBC - normal     Lyme test - pending     Encouraged fluids    Discussed warning signs/symptoms indicative of need to f/u    Follow up with PCP if symptoms persist or worsen or concerns

## 2018-07-14 NOTE — MR AVS SNAPSHOT
"              After Visit Summary   7/14/2018    Merlyn Escamilla    MRN: [id number]           Patient Information     Date Of Birth          8/22/1933        Visit Information        Provider Department      7/14/2018 10:45 AM Jessica Hdz NP [facility]        Today's Diagnoses     Night sweats    -  1    Fatigue, unspecified type        Bacteria in urine          Care Instructions    Urine sample did show very minimal bacteria so will culture to see if it is a UTI.  If culture grows out infection then will start you on antibiotics.    Blood counts were normal today - no indication of infection, no anemia    Lyme testing - will call when results available    Follow up with your primary provider for further evaluation           Follow-ups after your visit        Who to contact     If you have questions or need follow up information about today's clinic visit or your schedule please contact [facility] directly at [phone].  Normal or non-critical lab and imaging results will be communicated to you by [employer], letter or phone within 4 business days after the clinic has received the results. If you do not hear from us within 7 days, please contact the clinic through [employer] or phone. If you have a critical or abnormal lab result, we will notify you by phone as soon as possible.  Submit refill requests through [employer]) or call your pharmacy and they will forward the refill request to us. Please allow 3 business days for your refill to be completed.          Additional Information About Your Visit        MyChart Information     [employer]) lets you send messages to your doctor, view your test results, renew your prescriptions, schedule appointments and more. To sign up, go to [email or web address] ([employer]) . Click on \"Log in\" on the left side of the screen, which will take you to the Welcome page. Then click on \"Sign up Now\" on the right side of the page.     You will be asked to " enter the access code listed below, as well as some personal information. Please follow the directions to create your username and password.     Your access code is: [email or web address]  Expires: 2018 11:50 AM     Your access code will  in 90 days. If you need help or a new code, please call your [facility] clinic or [phone].        Care EveryWhere ID     This is your Care EveryWhere ID. This could be used by other organizations to access your [facility] medical records  [id number]        Your Vitals Were     Pulse Temperature Breastfeeding? BMI (Body Mass Index)          80 98.5  F (36.9  C) (Tympanic) No 24.79 kg/m2         Blood Pressure from Last 3 Encounters:   18 128/68   18 142/62   18 128/70    Weight from Last 3 Encounters:   18 131 lb 3.2 oz (59.5 kg)   18 132 lb 3.2 oz (60 kg)   18 130 lb (59 kg)              We Performed the Following     *UA reflex to Microscopic     CBC and Differential     Lyme Disease Judy with reflex to WB Serum     Urine Culture Aerobic Bacterial     Urine Microscopic        Primary Care Provider Office Phone # Fax #    Sharath Dinero [phone] [address] [facility] [address]        Equal Access to Services     [facility]: Hadii aad ku hadasho Soomaali, waaxda luqadaha, qaybta kaalmada adeegyada, suki salmeron . So [facility] [phone].    ATENCIÓN: Si habla español, tiene a harp disposición servicios gratuitos de asistencia lingüística. Llame al [phone].    We comply with applicable federal civil rights laws and [place] laws. We do not discriminate on the basis of race, color, national origin, age, disability, sex, sexual orientation, or gender identity.            Thank you!     Thank you for choosing [facility]  for your care. Our goal is always to provide you with excellent care. Hearing back from our patients is one way we can continue to improve  our services. Please take a few minutes to complete the written survey that you may receive in the mail after your visit with us. Thank you!             Your Updated Medication List - Protect others around you: Learn how to safely use, store and throw away your medicines at www.disposemymeds.org.          This list is accurate as of 7/14/18 11:30 AM.  Always use your most recent med list.                   Brand Name Dispense Instructions for use Diagnosis    aspirin 81 MG EC tablet      Take 81 mg by mouth daily with food        MULTI-VITAMINS Tabs      Take 1 tablet by mouth daily        naproxen sodium 220 MG tablet    ANAPROX     Take 220 mg by mouth daily as needed        neomycin-polymyxin-dexamethasone 3.5-51425-6.1 Susp ophthalmic susp    MAXITROL     1 drop in left eye four times a day.

## 2018-07-16 LAB
BACTERIA SPEC CULT: NORMAL
SPECIMEN SOURCE: NORMAL

## 2018-07-17 LAB — B BURGDOR IGG+IGM SER QL: 0.13 (ref 0–0.89)

## 2018-07-23 NOTE — PROGRESS NOTES
Patient Information     Patient Name  Merlyn Escamilla MRN  [id number] Sex  Female   1933      Letter by Sharath Dinero MD at      Author:  Sharath Dinero MD Service:  (none) Author Type:  (none)    Filed:   Encounter Date:  2018 Status:  (Other)           Merlyn Escamilla  [address] [address] [address]          2018    Dear Ms. Escamilla:    Your recent lab values can be seen below.     Your recent blood tests including electrolytes, kidney function and hemoglobin all came back normal. This is all reassuring. Good luck with your upcoming procedure.    If you have any questions, do not hesitate to contact me.    Results for orders placed or performed in visit on 18      BASIC METABOLIC PANEL      Result  Value Ref Range    SODIUM 140 133 - 143 mmol/L    POTASSIUM 4.4 3.5 - 5.1 mmol/L    CHLORIDE 105 98 - 107 mmol/L    CO2,TOTAL 25 21 - 31 mmol/L    ANION GAP 10 5 - 18                    GLUCOSE 106 (H) 70 - 105 mg/dL    CALCIUM 9.1 8.6 - 10.3 mg/dL    BUN 17 7 - 25 mg/dL    CREATININE 0.59 (L) 0.70 - 1.30 mg/dL    BUN/CREAT RATIO           29                    GFR if African American >60 >60 ml/min/1.73m2    GFR if not African American >60 >60 ml/min/1.73m2   HEMOGLOBIN      Result  Value Ref Range    HEMOGLOBIN                12.9 12.0 - 16.0 g/dL    MCV                       91 80 - 100 fL                 Sincerely,        Cliff Dinero MD  Family Medicine

## 2018-07-29 ENCOUNTER — HOSPITAL ENCOUNTER (OUTPATIENT)
Dept: GENERAL RADIOLOGY | Facility: OTHER | Age: 83
Discharge: HOME | DRG: 310 | End: 2018-07-29
Attending: NURSE PRACTITIONER | Admitting: NURSE PRACTITIONER
Payer: MEDICARE

## 2018-07-29 ENCOUNTER — APPOINTMENT (OUTPATIENT)
Dept: CT IMAGING | Facility: OTHER | Age: 83
DRG: 310 | End: 2018-07-29
Attending: FAMILY MEDICINE
Payer: MEDICARE

## 2018-07-29 ENCOUNTER — HOSPITAL ENCOUNTER (INPATIENT)
Facility: OTHER | Age: 83
LOS: 4 days | Discharge: HOME | DRG: 310 | End: 2018-08-03
Attending: FAMILY MEDICINE | Admitting: FAMILY MEDICINE
Payer: MEDICARE

## 2018-07-29 ENCOUNTER — OFFICE VISIT (OUTPATIENT)
Dept: FAMILY MEDICINE | Facility: OTHER | Age: 83
DRG: 310 | End: 2018-07-29
Attending: NURSE PRACTITIONER
Payer: MEDICARE

## 2018-07-29 VITALS
OXYGEN SATURATION: 98 % | HEART RATE: 70 BPM | SYSTOLIC BLOOD PRESSURE: 98 MMHG | BODY MASS INDEX: 26.87 KG/M2 | DIASTOLIC BLOOD PRESSURE: 62 MMHG | WEIGHT: 142.2 LBS

## 2018-07-29 DIAGNOSIS — I48.91 ATRIAL FIBRILLATION, UNSPECIFIED TYPE (H): Primary | ICD-10-CM

## 2018-07-29 DIAGNOSIS — R07.81 RIB PAIN ON RIGHT SIDE: Primary | ICD-10-CM

## 2018-07-29 DIAGNOSIS — I48.91 ATRIAL FIBRILLATION (H): ICD-10-CM

## 2018-07-29 DIAGNOSIS — R07.81 RIB PAIN ON RIGHT SIDE: ICD-10-CM

## 2018-07-29 LAB
ALBUMIN SERPL-MCNC: 2.9 G/DL (ref 3.5–5.7)
ALBUMIN UR-MCNC: ABNORMAL MG/DL
ALBUMIN UR-MCNC: NEGATIVE MG/DL
ALP SERPL-CCNC: 107 U/L (ref 34–104)
ALT SERPL W P-5'-P-CCNC: 34 U/L (ref 7–52)
ANION GAP SERPL CALCULATED.3IONS-SCNC: 9 MMOL/L (ref 3–14)
APPEARANCE UR: ABNORMAL
APPEARANCE UR: CLEAR
AST SERPL W P-5'-P-CCNC: 29 U/L (ref 13–39)
BACTERIA #/AREA URNS HPF: ABNORMAL /HPF
BASOPHILS # BLD AUTO: 0.1 10E9/L (ref 0–0.2)
BASOPHILS NFR BLD AUTO: 0.8 %
BILIRUB SERPL-MCNC: 0.4 MG/DL (ref 0.3–1)
BILIRUB UR QL STRIP: ABNORMAL
BILIRUB UR QL STRIP: NEGATIVE
BUN SERPL-MCNC: 16 MG/DL (ref 7–25)
CALCIUM SERPL-MCNC: 8.7 MG/DL (ref 8.6–10.3)
CHLORIDE SERPL-SCNC: 104 MMOL/L (ref 98–107)
CO2 SERPL-SCNC: 24 MMOL/L (ref 21–31)
COLOR UR AUTO: ABNORMAL
COLOR UR AUTO: YELLOW
CREAT SERPL-MCNC: 0.62 MG/DL (ref 0.6–1.2)
DIFFERENTIAL METHOD BLD: ABNORMAL
EOSINOPHIL # BLD AUTO: 0.1 10E9/L (ref 0–0.7)
EOSINOPHIL NFR BLD AUTO: 1 %
ERYTHROCYTE [DISTWIDTH] IN BLOOD BY AUTOMATED COUNT: 14.5 % (ref 10–15)
GFR SERPL CREATININE-BSD FRML MDRD: >90 ML/MIN/1.7M2
GLUCOSE SERPL-MCNC: 136 MG/DL (ref 70–105)
GLUCOSE UR STRIP-MCNC: ABNORMAL MG/DL
GLUCOSE UR STRIP-MCNC: NEGATIVE MG/DL
HCT VFR BLD AUTO: 30.2 % (ref 35–47)
HGB BLD-MCNC: 9.7 G/DL (ref 11.7–15.7)
HGB UR QL STRIP: ABNORMAL
HGB UR QL STRIP: NEGATIVE
IMM GRANULOCYTES # BLD: 0 10E9/L (ref 0–0.4)
IMM GRANULOCYTES NFR BLD: 0.5 %
KETONES UR STRIP-MCNC: ABNORMAL MG/DL
KETONES UR STRIP-MCNC: NEGATIVE MG/DL
LEUKOCYTE ESTERASE UR QL STRIP: ABNORMAL
LEUKOCYTE ESTERASE UR QL STRIP: ABNORMAL
LIPASE SERPL-CCNC: 22 U/L (ref 11–82)
LYMPHOCYTES # BLD AUTO: 0.9 10E9/L (ref 0.8–5.3)
LYMPHOCYTES NFR BLD AUTO: 10.6 %
MCH RBC QN AUTO: 29 PG (ref 26.5–33)
MCHC RBC AUTO-ENTMCNC: 32.1 G/DL (ref 31.5–36.5)
MCV RBC AUTO: 90 FL (ref 78–100)
MONOCYTES # BLD AUTO: 0.3 10E9/L (ref 0–1.3)
MONOCYTES NFR BLD AUTO: 3.9 %
NEUTROPHILS # BLD AUTO: 7.2 10E9/L (ref 1.6–8.3)
NEUTROPHILS NFR BLD AUTO: 83.2 %
NITRATE UR QL: ABNORMAL
NITRATE UR QL: NEGATIVE
NON-SQ EPI CELLS #/AREA URNS LPF: ABNORMAL /LPF
PH UR STRIP: 5.5 PH (ref 5–9)
PH UR STRIP: ABNORMAL PH (ref 5–7)
PLATELET # BLD AUTO: 249 10E9/L (ref 150–450)
POTASSIUM SERPL-SCNC: 3.9 MMOL/L (ref 3.5–5.1)
PROT SERPL-MCNC: 5.7 G/DL (ref 6.4–8.9)
RBC # BLD AUTO: 3.34 10E12/L (ref 3.8–5.2)
RBC #/AREA URNS AUTO: ABNORMAL /HPF
SODIUM SERPL-SCNC: 137 MMOL/L (ref 134–144)
SOURCE: ABNORMAL
SOURCE: ABNORMAL
SP GR UR STRIP: 1.02 (ref 1–1.03)
SP GR UR STRIP: ABNORMAL (ref 1–1.03)
UROBILINOGEN UR STRIP-ACNC: 0.2 EU/DL (ref 0.2–1)
UROBILINOGEN UR STRIP-ACNC: ABNORMAL EU/DL (ref 0.2–1)
WBC # BLD AUTO: 8.7 10E9/L (ref 4–11)
WBC #/AREA URNS AUTO: ABNORMAL /HPF

## 2018-07-29 PROCEDURE — 25000125 ZZHC RX 250: Performed by: FAMILY MEDICINE

## 2018-07-29 PROCEDURE — 99291 CRITICAL CARE FIRST HOUR: CPT | Mod: Z6 | Performed by: FAMILY MEDICINE

## 2018-07-29 PROCEDURE — 80053 COMPREHEN METABOLIC PANEL: CPT | Performed by: FAMILY MEDICINE

## 2018-07-29 PROCEDURE — 96374 THER/PROPH/DIAG INJ IV PUSH: CPT | Performed by: FAMILY MEDICINE

## 2018-07-29 PROCEDURE — 36415 COLL VENOUS BLD VENIPUNCTURE: CPT | Performed by: FAMILY MEDICINE

## 2018-07-29 PROCEDURE — G0463 HOSPITAL OUTPT CLINIC VISIT: HCPCS

## 2018-07-29 PROCEDURE — 93005 ELECTROCARDIOGRAM TRACING: CPT | Performed by: FAMILY MEDICINE

## 2018-07-29 PROCEDURE — 25000128 H RX IP 250 OP 636: Performed by: FAMILY MEDICINE

## 2018-07-29 PROCEDURE — 71101 X-RAY EXAM UNILAT RIBS/CHEST: CPT | Mod: RT

## 2018-07-29 PROCEDURE — 96372 THER/PROPH/DIAG INJ SC/IM: CPT | Performed by: NURSE PRACTITIONER

## 2018-07-29 PROCEDURE — 25000128 H RX IP 250 OP 636: Performed by: NURSE PRACTITIONER

## 2018-07-29 PROCEDURE — 83690 ASSAY OF LIPASE: CPT | Performed by: FAMILY MEDICINE

## 2018-07-29 PROCEDURE — 99291 CRITICAL CARE FIRST HOUR: CPT | Mod: 25 | Performed by: FAMILY MEDICINE

## 2018-07-29 PROCEDURE — 99213 OFFICE O/P EST LOW 20 MIN: CPT | Performed by: NURSE PRACTITIONER

## 2018-07-29 PROCEDURE — 93010 ELECTROCARDIOGRAM REPORT: CPT | Performed by: INTERNAL MEDICINE

## 2018-07-29 PROCEDURE — 71275 CT ANGIOGRAPHY CHEST: CPT | Mod: TC

## 2018-07-29 PROCEDURE — 96376 TX/PRO/DX INJ SAME DRUG ADON: CPT | Performed by: FAMILY MEDICINE

## 2018-07-29 PROCEDURE — 81003 URINALYSIS AUTO W/O SCOPE: CPT | Performed by: FAMILY MEDICINE

## 2018-07-29 PROCEDURE — G0463 HOSPITAL OUTPT CLINIC VISIT: HCPCS | Mod: 25

## 2018-07-29 PROCEDURE — 96375 TX/PRO/DX INJ NEW DRUG ADDON: CPT | Performed by: FAMILY MEDICINE

## 2018-07-29 PROCEDURE — 81001 URINALYSIS AUTO W/SCOPE: CPT | Performed by: FAMILY MEDICINE

## 2018-07-29 PROCEDURE — 96361 HYDRATE IV INFUSION ADD-ON: CPT | Performed by: FAMILY MEDICINE

## 2018-07-29 PROCEDURE — 85025 COMPLETE CBC W/AUTO DIFF WBC: CPT | Performed by: FAMILY MEDICINE

## 2018-07-29 RX ORDER — KETOROLAC TROMETHAMINE 30 MG/ML
15 INJECTION, SOLUTION INTRAMUSCULAR; INTRAVENOUS ONCE
Status: DISCONTINUED | OUTPATIENT
Start: 2018-07-29 | End: 2018-07-29

## 2018-07-29 RX ORDER — KETOROLAC TROMETHAMINE 10 MG/1
10 TABLET, FILM COATED ORAL EVERY 6 HOURS PRN
Qty: 20 TABLET | Refills: 0 | Status: ON HOLD | OUTPATIENT
Start: 2018-07-29 | End: 2018-07-30

## 2018-07-29 RX ORDER — SODIUM CHLORIDE 9 MG/ML
1000 INJECTION, SOLUTION INTRAVENOUS CONTINUOUS
Status: DISCONTINUED | OUTPATIENT
Start: 2018-07-29 | End: 2018-07-30

## 2018-07-29 RX ORDER — FENTANYL CITRATE 50 UG/ML
25 INJECTION, SOLUTION INTRAMUSCULAR; INTRAVENOUS ONCE
Status: COMPLETED | OUTPATIENT
Start: 2018-07-29 | End: 2018-07-29

## 2018-07-29 RX ADMIN — KETOROLAC TROMETHAMINE 15 MG: 30 INJECTION, SOLUTION INTRAMUSCULAR; INTRAVENOUS at 11:02

## 2018-07-29 RX ADMIN — FENTANYL CITRATE 25 MCG: 50 INJECTION INTRAMUSCULAR; INTRAVENOUS at 22:01

## 2018-07-29 RX ADMIN — FENTANYL CITRATE 25 MCG: 50 INJECTION INTRAMUSCULAR; INTRAVENOUS at 23:04

## 2018-07-29 RX ADMIN — IOHEXOL 100 ML: 350 INJECTION, SOLUTION INTRAVENOUS at 22:49

## 2018-07-29 RX ADMIN — SODIUM CHLORIDE 1000 ML: 900 INJECTION, SOLUTION INTRAVENOUS at 23:31

## 2018-07-29 RX ADMIN — SODIUM CHLORIDE 1000 ML: 900 INJECTION, SOLUTION INTRAVENOUS at 22:02

## 2018-07-29 NOTE — NURSING NOTE
Patient presents to clinic today for rib pain. Right lower ribs. Patient has not been able to sleep for 3 nights. No Injury and no illness. No heavy lifting.   Elaine Torres CMA..............7/29/2018........10:06 AM

## 2018-07-29 NOTE — MR AVS SNAPSHOT
"              After Visit Summary   7/29/2018    Merlyn Escamilla    MRN: [id number]           Patient Information     Date Of Birth          8/22/1933        Visit Information        Provider Department      7/29/2018 10:00 AM Julia Zaldivar APRN CNP [facility]        Today's Diagnoses     Rib pain on right side    -  1      Care Instructions    Toradol 10 mgs every 6 hours as needed  Heat to ribs as needed  Worsening or not improving follow up with Dr. Dinero          Follow-ups after your visit        Future tests that were ordered for you today     Open Future Orders        Priority Expected Expires Ordered    XR Ribs & Chest Rt 3vw Routine 7/29/2018 7/29/2019 7/29/2018            Who to contact     If you have questions or need follow up information about today's clinic visit or your schedule please contact [facility] directly at [phone].  Normal or non-critical lab and imaging results will be communicated to you by [employer], letter or phone within 4 business days after the clinic has received the results. If you do not hear from us within 7 days, please contact the clinic through [employer] or phone. If you have a critical or abnormal lab result, we will notify you by phone as soon as possible.  Submit refill requests through [employer] or call your pharmacy and they will forward the refill request to us. Please allow 3 business days for your refill to be completed.          Additional Information About Your Visit        MyChart Information     [employer] lets you send messages to your doctor, view your test results, renew your prescriptions, schedule appointments and more. To sign up, go to [email or web address] . Click on \"Log in\" on the left side of the screen, which will take you to the Welcome page. Then click on \"Sign up Now\" on the right side of the page.     You will be asked to enter the access code listed below, as well as some personal information. " Please follow the directions to create your username and password.     Your access code is: [email or web address]  Expires: 2018 11:50 AM     Your access code will  in 90 days. If you need help or a new code, please call your [facility] clinic or [phone].        Care EveryWhere ID     This is your Care EveryWhere ID. This could be used by other organizations to access your [facility] medical records  [id number]        Your Vitals Were     Pulse Pulse Oximetry Breastfeeding? BMI (Body Mass Index)          70 98% No 26.87 kg/m2         Blood Pressure from Last 3 Encounters:   18 98/62   18 128/68   18 142/62    Weight from Last 3 Encounters:   18 142 lb 3.2 oz (64.5 kg)   18 131 lb 3.2 oz (59.5 kg)   18 132 lb 3.2 oz (60 kg)                 Today's Medication Changes          These changes are accurate as of 18 11:06 AM.  If you have any questions, ask your nurse or doctor.               Start taking these medicines.        Dose/Directions    ketorolac 10 MG tablet   Commonly known as:  TORADOL   Used for:  Rib pain on right side   Started by:  Julia Zaldivar APRN CNP        Dose:  10 mg   Take 1 tablet (10 mg) by mouth every 6 hours as needed for moderate pain   Quantity:  20 tablet   Refills:  0            Where to get your medicines      These medications were sent to [id number] - [place], [place] - [address], [facility] [address]     Phone:  [phone]     ketorolac 10 MG tablet                Primary Care Provider Office Phone # Fax #    Sharath Dinero [phone] [address] [facility] [address]        Equal Access to Services     [facility]: Pamela Mendoza, waestrellita luqadaha, qaybta kaalmasuki norris. So [facility] [phone].    ATENCIÓN: Si habla osman, tiene a harp disposición servicios gratuitos  de asistencia lingüística. Rosalva owen [phone].    We comply with applicable federal civil rights laws and [place] laws. We do not discriminate on the basis of race, color, national origin, age, disability, sex, sexual orientation, or gender identity.            Thank you!     Thank you for choosing [facility]  for your care. Our goal is always to provide you with excellent care. Hearing back from our patients is one way we can continue to improve our services. Please take a few minutes to complete the written survey that you may receive in the mail after your visit with us. Thank you!             Your Updated Medication List - Protect others around you: Learn how to safely use, store and throw away your medicines at www.disposemymeds.org.          This list is accurate as of 7/29/18 11:06 AM.  Always use your most recent med list.                   Brand Name Dispense Instructions for use Diagnosis    aspirin 81 MG EC tablet      Take 81 mg by mouth daily with food        ketorolac 10 MG tablet    TORADOL    20 tablet    Take 1 tablet (10 mg) by mouth every 6 hours as needed for moderate pain    Rib pain on right side       MULTI-VITAMINS Tabs      Take 1 tablet by mouth daily        naproxen sodium 220 MG tablet    ANAPROX     Take 220 mg by mouth daily as needed        neomycin-polymyxin-dexamethasone 3.5-80972-9.1 Susp ophthalmic susp    MAXITROL     1 drop in left eye four times a day.

## 2018-07-29 NOTE — PATIENT INSTRUCTIONS
Toradol 10 mgs every 6 hours as needed  Heat to ribs as needed  Worsening or not improving follow up with Dr. Dinero

## 2018-07-29 NOTE — PROGRESS NOTES
HPI Comments: Nursing Notes:   Elaine Torres CMA  7/29/2018 10:07 AM  Unsigned  Patient presents to clinic today for rib pain. Right lower ribs. Patient   has not been able to sleep for 3 nights. No Injury and no illness. No   heavy lifting.   Elaine Torres [facility]..............7/29/2018........10:06 AM      Pain in lower right ribs that started about a week ago and has worsened over the last three days. Nagging pain, that is worse at night. No cough congestion or fevers. No injury to this area. No heavy lifting or moving. No bruising, swelling to ribs. Treating pain with Aleve, Tylenol, Eliazar Roa, cold, nothing is improving the pain. Difficulty sleeping due to pain-has tried multiple beds and sleeping in a chair-nothing seems to make the pain better.         Review of Systems   Musculoskeletal:        Rib pain         Physical Exam   Constitutional: She is well-developed, well-nourished, and in no distress.   Musculoskeletal: She exhibits tenderness. She exhibits no deformity.   Tenderness to lower ribs on right side from mid axillary line to anterior chest   Neurological: She is alert.   Skin: Skin is warm and dry.   No bruising or swelling in lower right rib area   Psychiatric: Affect normal.       Assessment: well appearing female with tenderness to lower right ribs on right side from mid axillary line to anterior chest    Xray of ribs and chest -normal per radiologist    Treat with Toradol 15 mgs Im once in [facility]  Toradol 10 mgs PO every 6 hours prn pain  No Aleve or Ibuprofen while taking Toradol  Tylenol prn   Follow up with PCP if not improving in one week

## 2018-07-29 NOTE — IP AVS SNAPSHOT
[facility]    [address]    [place] [address]    Phone:  [phone]    Fax:  [phone]                                       After Visit Summary   7/29/2018    Merlyn Escamilla    MRN: [id number]           After Visit Summary Signature Page     I have received my discharge instructions, and my questions have been answered. I have discussed any challenges I see with this plan with the nurse or doctor.    ..........................................................................................................................................  Patient/Patient Representative Signature      ..........................................................................................................................................  Patient Representative Print Name and Relationship to Patient    ..................................................               ................................................  Date                                            Time    ..........................................................................................................................................  Reviewed by Signature/Title    ...................................................              ..............................................  Date                                                            Time

## 2018-07-29 NOTE — IP AVS SNAPSHOT
MRN:[id number]                      After Visit Summary   7/29/2018    Merlyn Escamilla    MRN: [id number]           Thank you!     Thank you for choosing [facility] for your care. Our goal is always to provide you with excellent care. Hearing back from our patients is one way we can continue to improve our services. Please take a few minutes to complete the written survey that you may receive in the mail after you visit with us. Thank you!        Patient Information     Date Of Birth          8/22/1933        Designated Caregiver       Most Recent Value    Caregiver    Will someone help with your care after discharge? yes    Name of designated caregiver Stephany/friend- hazel phipps-dtr    Phone number of caregiver see demographics    Caregiver address see demographics      About your hospital stay     You were admitted on:  July 30, 2018 You last received care in the:  [facility]    You were discharged on:  August 3, 2018        Reason for your hospital stay       Atrial fibrillation.                  Who to Call     For medical emergencies, please call 911.  For non-urgent questions about your medical care, please call your primary care provider or clinic, [phone]          Attending Provider     Provider Specialty    Silverio Carlos MD [facility]    Juanjo Andujar MD HOSPITALIST    Sharath Dinero MD Family Practice       Primary Care Provider Office Phone # Fax #    Sharath Dinero [phone]      After Care Instructions     Diet       Follow this diet upon discharge: Orders Placed This Encounter      Regular Diet Adult                  Follow-up Appointments     Follow-up and recommended labs and tests        Follow up with primary care provider, Sharath Dinero, within 7 days for hospital follow- up.  No follow up labs or test are needed.                  Your next 10 appointments already scheduled     Aug 09, 2018  3:30 PM LAIT   FREDERIC with Tootie Jesus  MD Jose   [facility] ([facility])    [address]  [place] [address] [phone]              Additional Services     Home care nursing referral       RN skilled nursing visit. RN to assess vital signs and weight and respiratory and cardiac status.    Your provider has ordered home care nursing services. If you have not been contacted within 2 days of your discharge please call the inpatient department phone number at [phone] .                  Pending Results     Date and Time Order Name Status Description    7/29/2018 2144 EKG 12-lead, tracing only In process             Statement of Approval     Ordered          08/03/18 0749  I have reviewed and agree with all the recommendations and orders detailed in this document.  EFFECTIVE NOW     Approved and electronically signed by:  Sharath Dinero MD             Admission Information     Date & Time Provider Department Dept. Phone    7/29/2018 Sharath Dinero MD [facility] [phone]      Your Vitals Were     Blood Pressure Pulse Temperature Respirations Weight Pulse Oximetry    130/74 (BP Location: Right arm) 82 97.7  F (36.5  C) (Tympanic) 16 67.6 kg (149 lb 0.5 oz) 96%    BMI (Body Mass Index)                   28.16 kg/m2           Care EveryWhere ID     This is your Care EveryWhere ID. This could be used by other organizations to access your [facility] medical records  [id number]        Equal Access to Services     STEVEN DUARTE : Hadii boris ku hadasho Soomaali, waaxda luqadaha, qaybta kaalmada adeegyada, suki garcia. So [facility] [phone].    ATENCIÓN: Si habla español, tiene a harp disposición servicios gratuitos de asistencia lingüística. Llame al [phone].    We comply with applicable federal civil rights laws and [place] laws. We do not discriminate on the basis of race, color, national origin, age, disability, sex, sexual  orientation, or gender identity.               Review of your medicines      START taking        Dose / Directions    diltiazem 240 MG 24 hr capsule   Used for:  Atrial fibrillation, unspecified type (H)        Dose:  240 mg   Take 1 capsule (240 mg) by mouth daily   Quantity:  30 capsule   Refills:  3       metoprolol tartrate 25 MG tablet   Commonly known as:  LOPRESSOR   Used for:  Atrial fibrillation, unspecified type (H)        Dose:  25 mg   Take 1 tablet (25 mg) by mouth 2 times daily   Quantity:  60 tablet   Refills:  3       rivaroxaban ANTICOAGULANT 20 MG Tabs tablet   Commonly known as:  XARELTO   Used for:  Atrial fibrillation, unspecified type (H)        Dose:  20 mg   Take 1 tablet (20 mg) by mouth daily (with dinner)   Quantity:  60 tablet   Refills:  3         CONTINUE these medicines which have NOT CHANGED        Dose / Directions    ALEVE 220 MG tablet   Generic drug:  naproxen sodium        Dose:  220 mg   Take 220 mg by mouth 2 times daily as needed (PAIN) TAKE WITH FOOD   Refills:  0       aspirin 81 MG EC tablet        Dose:  81 mg   Take 81 mg by mouth daily with food   Refills:  0       ketorolac 0.5 % ophthalmic solution   Commonly known as:  ACULAR   Indication:  Inflammation of the Eye Following Surgery        Dose:  1 drop   Place 1 drop Into the left eye 3 times daily POST-CATARACT SURGERY UNTIL 8/22/18 FOR LEFT EYE   Refills:  0       moxifloxacin 0.5 % ophthalmic solution   Commonly known as:  VIGAMOX        Dose:  1 drop   Place 1 drop into the right eye 4 times daily BEGIN THREE DAYS PRIOR TO SURGERY AND CONTINUE FOUR TIMES DAILY FOR 1 WEEK AFTER SURGERY   Refills:  0       MULTI-VITAMINS Tabs        Dose:  1 tablet   Take 1 tablet by mouth daily   Refills:  0       prednisoLONE acetate 1 % ophthalmic susp   Commonly known as:  PRED FORTE        Dose:  1 drop   Place 1 drop Into the left eye 3 times daily POST-CATARACT SURGERY UNTIL 8/22/18 FOR LEFT EYE   Refills:  0            Where  to get your medicines      These medications were sent to [facility], - [place], [place] - [address]  [address], [place] [address]     Phone:  [phone]     diltiazem 240 MG 24 hr capsule    metoprolol tartrate 25 MG tablet    rivaroxaban ANTICOAGULANT 20 MG Tabs tablet                Protect others around you: Learn how to safely use, store and throw away your medicines at www.disposemymeds.org.             Medication List: This is a list of all your medications and when to take them. Check marks below indicate your daily home schedule. Keep this list as a reference.      Medications           Morning Afternoon Evening Bedtime As Needed    ALEVE 220 MG tablet   Take 220 mg by mouth 2 times daily as needed (PAIN) TAKE WITH FOOD   Generic drug:  naproxen sodium                                aspirin 81 MG EC tablet   Take 81 mg by mouth daily with food   Last time this was given:  81 mg on 8/3/2018  9:16 AM                                diltiazem 240 MG 24 hr capsule   Take 1 capsule (240 mg) by mouth daily   Last time this was given:  240 mg on 8/3/2018  9:16 AM                                ketorolac 0.5 % ophthalmic solution   Commonly known as:  ACULAR   Place 1 drop Into the left eye 3 times daily POST-CATARACT SURGERY UNTIL 8/22/18 FOR LEFT EYE   Last time this was given:  1 drop on 8/3/2018  5:51 AM                                metoprolol tartrate 25 MG tablet   Commonly known as:  LOPRESSOR   Take 1 tablet (25 mg) by mouth 2 times daily   Last time this was given:  25 mg on 8/3/2018  9:16 AM                                moxifloxacin 0.5 % ophthalmic solution   Commonly known as:  VIGAMOX   Place 1 drop into the right eye 4 times daily BEGIN THREE DAYS PRIOR TO SURGERY AND CONTINUE FOUR TIMES DAILY FOR 1 WEEK AFTER SURGERY                                MULTI-VITAMINS Tabs   Take 1 tablet by mouth daily                                prednisoLONE acetate  1 % ophthalmic susp   Commonly known as:  PRED FORTE   Place 1 drop Into the left eye 3 times daily POST-CATARACT SURGERY UNTIL 8/22/18 FOR LEFT EYE   Last time this was given:  1 drop on 8/3/2018  5:56 AM                                rivaroxaban ANTICOAGULANT 20 MG Tabs tablet   Commonly known as:  XARELTO   Take 1 tablet (20 mg) by mouth daily (with dinner)

## 2018-07-30 PROBLEM — I48.91 ATRIAL FIBRILLATION (H): Status: ACTIVE | Noted: 2018-07-30

## 2018-07-30 PROCEDURE — 25000128 H RX IP 250 OP 636: Performed by: FAMILY MEDICINE

## 2018-07-30 PROCEDURE — A9270 NON-COVERED ITEM OR SERVICE: HCPCS | Mod: GY | Performed by: FAMILY MEDICINE

## 2018-07-30 PROCEDURE — 25000125 ZZHC RX 250: Performed by: FAMILY MEDICINE

## 2018-07-30 PROCEDURE — 12000000 ZZH R&B MED SURG/OB

## 2018-07-30 PROCEDURE — 25000132 ZZH RX MED GY IP 250 OP 250 PS 637: Mod: GY | Performed by: FAMILY MEDICINE

## 2018-07-30 PROCEDURE — 99223 1ST HOSP IP/OBS HIGH 75: CPT | Performed by: FAMILY MEDICINE

## 2018-07-30 RX ORDER — PREDNISOLONE ACETATE 10 MG/ML
1 SUSPENSION/ DROPS OPHTHALMIC ONCE
Status: DISCONTINUED | OUTPATIENT
Start: 2018-07-30 | End: 2018-07-30

## 2018-07-30 RX ORDER — DOCUSATE SODIUM 100 MG/1
100 CAPSULE, LIQUID FILLED ORAL 2 TIMES DAILY
Status: DISCONTINUED | OUTPATIENT
Start: 2018-07-30 | End: 2018-08-03 | Stop reason: HOSPADM

## 2018-07-30 RX ORDER — ACETAMINOPHEN 325 MG/1
650 TABLET ORAL EVERY 4 HOURS PRN
Status: DISCONTINUED | OUTPATIENT
Start: 2018-07-30 | End: 2018-08-03 | Stop reason: HOSPADM

## 2018-07-30 RX ORDER — DILTIAZEM HYDROCHLORIDE 100 MG/1
5-15 INJECTION, POWDER, LYOPHILIZED, FOR SOLUTION INTRAVENOUS CONTINUOUS
Status: DISCONTINUED | OUTPATIENT
Start: 2018-07-30 | End: 2018-07-30

## 2018-07-30 RX ORDER — NAPROXEN SODIUM 220 MG
220 TABLET ORAL 2 TIMES DAILY PRN
COMMUNITY
End: 2018-08-29

## 2018-07-30 RX ORDER — PREDNISOLONE ACETATE 10 MG/ML
1 SUSPENSION/ DROPS OPHTHALMIC 3 TIMES DAILY
COMMUNITY
Start: 2018-07-18 | End: 2018-08-22

## 2018-07-30 RX ORDER — KETOROLAC TROMETHAMINE 5 MG/ML
1 SOLUTION OPHTHALMIC 3 TIMES DAILY
Status: DISCONTINUED | OUTPATIENT
Start: 2018-07-30 | End: 2018-07-30

## 2018-07-30 RX ORDER — MOXIFLOXACIN 5 MG/ML
1 SOLUTION/ DROPS OPHTHALMIC 4 TIMES DAILY
COMMUNITY
End: 2018-08-29

## 2018-07-30 RX ORDER — NALOXONE HYDROCHLORIDE 0.4 MG/ML
.1-.4 INJECTION, SOLUTION INTRAMUSCULAR; INTRAVENOUS; SUBCUTANEOUS
Status: DISCONTINUED | OUTPATIENT
Start: 2018-07-30 | End: 2018-08-03 | Stop reason: HOSPADM

## 2018-07-30 RX ORDER — FENTANYL CITRATE 50 UG/ML
25 INJECTION, SOLUTION INTRAMUSCULAR; INTRAVENOUS
Status: DISCONTINUED | OUTPATIENT
Start: 2018-07-30 | End: 2018-07-31

## 2018-07-30 RX ORDER — LIDOCAINE 40 MG/G
CREAM TOPICAL
Status: DISCONTINUED | OUTPATIENT
Start: 2018-07-30 | End: 2018-07-30

## 2018-07-30 RX ORDER — DILTIAZEM HYDROCHLORIDE 120 MG/1
120 CAPSULE, COATED, EXTENDED RELEASE ORAL DAILY
Status: DISCONTINUED | OUTPATIENT
Start: 2018-07-30 | End: 2018-07-31

## 2018-07-30 RX ORDER — MOXIFLOXACIN 5 MG/ML
1 SOLUTION/ DROPS OPHTHALMIC 4 TIMES DAILY
Status: DISCONTINUED | OUTPATIENT
Start: 2018-07-30 | End: 2018-07-30 | Stop reason: CLARIF

## 2018-07-30 RX ORDER — SODIUM CHLORIDE 9 MG/ML
INJECTION, SOLUTION INTRAVENOUS CONTINUOUS
Status: DISCONTINUED | OUTPATIENT
Start: 2018-07-30 | End: 2018-07-30

## 2018-07-30 RX ORDER — PROCHLORPERAZINE 25 MG
12.5 SUPPOSITORY, RECTAL RECTAL EVERY 12 HOURS PRN
Status: DISCONTINUED | OUTPATIENT
Start: 2018-07-30 | End: 2018-08-03 | Stop reason: HOSPADM

## 2018-07-30 RX ORDER — DILTIAZEM HYDROCHLORIDE 5 MG/ML
10 INJECTION INTRAVENOUS ONCE
Status: COMPLETED | OUTPATIENT
Start: 2018-07-30 | End: 2018-07-30

## 2018-07-30 RX ORDER — ONDANSETRON 4 MG/1
4 TABLET, ORALLY DISINTEGRATING ORAL EVERY 6 HOURS PRN
Status: DISCONTINUED | OUTPATIENT
Start: 2018-07-30 | End: 2018-08-03 | Stop reason: HOSPADM

## 2018-07-30 RX ORDER — FENTANYL CITRATE 50 UG/ML
50 INJECTION, SOLUTION INTRAMUSCULAR; INTRAVENOUS ONCE
Status: COMPLETED | OUTPATIENT
Start: 2018-07-30 | End: 2018-07-30

## 2018-07-30 RX ORDER — PROCHLORPERAZINE MALEATE 5 MG
5 TABLET ORAL EVERY 6 HOURS PRN
Status: DISCONTINUED | OUTPATIENT
Start: 2018-07-30 | End: 2018-08-03 | Stop reason: HOSPADM

## 2018-07-30 RX ORDER — KETOROLAC TROMETHAMINE 5 MG/ML
1 SOLUTION OPHTHALMIC 3 TIMES DAILY
COMMUNITY
Start: 2018-07-18 | End: 2018-08-22

## 2018-07-30 RX ORDER — KETOROLAC TROMETHAMINE 5 MG/ML
1 SOLUTION OPHTHALMIC 3 TIMES DAILY
Status: DISCONTINUED | OUTPATIENT
Start: 2018-07-30 | End: 2018-08-03 | Stop reason: HOSPADM

## 2018-07-30 RX ORDER — PREDNISOLONE ACETATE 10 MG/ML
1 SUSPENSION/ DROPS OPHTHALMIC 3 TIMES DAILY
Status: DISCONTINUED | OUTPATIENT
Start: 2018-07-30 | End: 2018-08-03 | Stop reason: HOSPADM

## 2018-07-30 RX ORDER — ASPIRIN 81 MG/1
81 TABLET ORAL DAILY
Status: DISCONTINUED | OUTPATIENT
Start: 2018-07-30 | End: 2018-08-03 | Stop reason: HOSPADM

## 2018-07-30 RX ORDER — NEOMYCIN SULFATE, POLYMYXIN B SULFATE AND DEXAMETHASONE 3.5; 10000; 1 MG/ML; [USP'U]/ML; MG/ML
SUSPENSION/ DROPS OPHTHALMIC EVERY 8 HOURS SCHEDULED
Status: DISCONTINUED | OUTPATIENT
Start: 2018-07-30 | End: 2018-07-30

## 2018-07-30 RX ORDER — PREDNISOLONE ACETATE 10 MG/ML
1 SUSPENSION/ DROPS OPHTHALMIC 3 TIMES DAILY
Status: DISCONTINUED | OUTPATIENT
Start: 2018-07-30 | End: 2018-07-30

## 2018-07-30 RX ORDER — KETOROLAC TROMETHAMINE 5 MG/ML
1 SOLUTION OPHTHALMIC ONCE
Status: DISCONTINUED | OUTPATIENT
Start: 2018-07-30 | End: 2018-07-30

## 2018-07-30 RX ORDER — ONDANSETRON 2 MG/ML
4 INJECTION INTRAMUSCULAR; INTRAVENOUS EVERY 6 HOURS PRN
Status: DISCONTINUED | OUTPATIENT
Start: 2018-07-30 | End: 2018-08-03 | Stop reason: HOSPADM

## 2018-07-30 RX ORDER — OXYCODONE HYDROCHLORIDE 5 MG/1
5 TABLET ORAL EVERY 6 HOURS PRN
Status: DISCONTINUED | OUTPATIENT
Start: 2018-07-30 | End: 2018-08-01

## 2018-07-30 RX ADMIN — PREDNISOLONE ACETATE 1 DROP: 10 SUSPENSION/ DROPS OPHTHALMIC at 18:39

## 2018-07-30 RX ADMIN — DILTIAZEM HYDROCHLORIDE 120 MG: 120 CAPSULE, COATED, EXTENDED RELEASE ORAL at 09:06

## 2018-07-30 RX ADMIN — FENTANYL CITRATE 25 MCG: 50 INJECTION, SOLUTION INTRAMUSCULAR; INTRAVENOUS at 02:49

## 2018-07-30 RX ADMIN — DOCUSATE SODIUM 100 MG: 100 CAPSULE, LIQUID FILLED ORAL at 10:18

## 2018-07-30 RX ADMIN — PREDNISOLONE ACETATE 1 DROP: 10 SUSPENSION/ DROPS OPHTHALMIC at 13:42

## 2018-07-30 RX ADMIN — DILTIAZEM HYDROCHLORIDE 10 MG: 5 INJECTION INTRAVENOUS at 01:12

## 2018-07-30 RX ADMIN — KETOROLAC TROMETHAMINE 1 DROP: 5 SOLUTION OPHTHALMIC at 21:58

## 2018-07-30 RX ADMIN — KETOROLAC TROMETHAMINE 1 DROP: 5 SOLUTION OPHTHALMIC at 13:43

## 2018-07-30 RX ADMIN — FENTANYL CITRATE 25 MCG: 50 INJECTION, SOLUTION INTRAMUSCULAR; INTRAVENOUS at 05:05

## 2018-07-30 RX ADMIN — PREDNISOLONE ACETATE 1 DROP: 10 SUSPENSION/ DROPS OPHTHALMIC at 21:55

## 2018-07-30 RX ADMIN — OXYCODONE HYDROCHLORIDE 5 MG: 5 TABLET ORAL at 10:18

## 2018-07-30 RX ADMIN — FENTANYL CITRATE 50 MCG: 50 INJECTION INTRAMUSCULAR; INTRAVENOUS at 00:06

## 2018-07-30 RX ADMIN — ACETAMINOPHEN 650 MG: 325 TABLET, FILM COATED ORAL at 13:46

## 2018-07-30 RX ADMIN — DILTIAZEM HYDROCHLORIDE 5 MG/HR: 100 INJECTION, POWDER, LYOPHILIZED, FOR SOLUTION INTRAVENOUS at 02:42

## 2018-07-30 RX ADMIN — ASPIRIN 81 MG: 81 TABLET ORAL at 09:06

## 2018-07-30 RX ADMIN — SODIUM CHLORIDE: 900 INJECTION, SOLUTION INTRAVENOUS at 09:18

## 2018-07-30 RX ADMIN — ACETAMINOPHEN 650 MG: 325 TABLET, FILM COATED ORAL at 08:49

## 2018-07-30 RX ADMIN — DOCUSATE SODIUM 100 MG: 100 CAPSULE, LIQUID FILLED ORAL at 21:55

## 2018-07-30 RX ADMIN — KETOROLAC TROMETHAMINE 1 DROP: 5 SOLUTION OPHTHALMIC at 18:33

## 2018-07-30 ASSESSMENT — ACTIVITIES OF DAILY LIVING (ADL)
FALL_HISTORY_WITHIN_LAST_SIX_MONTHS: NO
ADLS_ACUITY_SCORE: 9
TOILETING: 0 - INDEPENDENT
BATHING: 0 - INDEPENDENT
RETIRED_COMMUNICATION: 0-->UNDERSTANDS/COMMUNICATES WITHOUT DIFFICULTY
CHANGE_IN_FUNCTIONAL_STATUS_SINCE_ONSET_OF_CURRENT_ILLNESS/INJURY: NO
ADLS_ACUITY_SCORE: 9
TRANSFERRING: 0-->INDEPENDENT
SWALLOWING: 0-->SWALLOWS FOODS/LIQUIDS WITHOUT DIFFICULTY
AMBULATION: 0 - INDEPENDENT
DRESS: 0-->INDEPENDENT
COGNITION: 0 - NO COGNITION ISSUES REPORTED
RETIRED_EATING: 0-->INDEPENDENT
BATHING: 0-->INDEPENDENT
COMMUNICATION: 0 - UNDERSTANDS/COMMUNICATES WITHOUT DIFFICULTY
EATING: 0 - INDEPENDENT
TOILETING: 0-->INDEPENDENT
TRANSFERRING: 0 - INDEPENDENT
ADLS_ACUITY_SCORE: 9
SWALLOWING: 0 - SWALLOWS FOODS/LIQUIDS WITHOUT DIFFICULTY
AMBULATION: 0-->INDEPENDENT
DRESS: 0 - INDEPENDENT

## 2018-07-30 ASSESSMENT — ENCOUNTER SYMPTOMS
DIFFICULTY URINATING: 0
SHORTNESS OF BREATH: 0
POLYDIPSIA: 0
COUGH: 0
CHEST TIGHTNESS: 0
LIGHT-HEADEDNESS: 0
FEVER: 0
PHOTOPHOBIA: 0
DYSURIA: 0
HEADACHES: 0
DIAPHORESIS: 0
CHILLS: 0
POLYPHAGIA: 0
FATIGUE: 0
ADENOPATHY: 0

## 2018-07-30 ASSESSMENT — PAIN DESCRIPTION - DESCRIPTORS: DESCRIPTORS: ACHING;CONSTANT

## 2018-07-30 NOTE — PHARMACY-ADMISSION MEDICATION HISTORY
Pharmacy -- Admission Medication Reconciliation    Prior to admission (PTA) medications were reviewed and the patient's PTA medication list was updated.    Sources Consulted: patient, [facility], ROSALINO Yu , sure scripts    The reliability of this Medication Reconciliation is: Reliability: Reliable    The following significant changes were made:  Maxitrol eye drops REMOVED  Prednisolone eye drops ADDED - patient taking these until 8/22/18 post-cataract surgery of LEFT eye  Ketorolac eye drops ADDED - patient taking these until 8/22/18 post-cataract surgery of LEFT eye  Moxifloxacin eye drops ADDED - patient will need to start these 3 days prior to cataract surgery of RIGHT eye once rescheduled  Naproxen CHANGED to BID PRN - patient states she has been taking this BID for the past week due to her RUQ pain    Note: Patient has not taken her aspirin since 7/4/18 (1 week prior to LEFT cataract surgery)  Note: Patient states she used only 1 ketorolac 10 mg tab last evening prior to admission - this medication was removed from her home medication list by MD  Note: Patient has tried multiple OTC pain medications/patches/ointments/roll-ons without adequate pain relief    In addition, the patient's allergies were reviewed with the patient and updated as follows:   Allergies: Morphine and Phenylbutazone    The pharmacist has reviewed with the patient that all personal medications should be removed from the building or locked in the belongings safe.  Patient shall only take medications ordered by the physician and administered by the nursing staff.       Medication barriers identified: none   Medication adherence concerns: has not taken aspirin for almost 4 weeks due to cataract surgery   Understanding of emergency medications: n/a    Daysi Mejia RPH, 7/30/2018,  10:10 AM

## 2018-07-30 NOTE — PROGRESS NOTES
Patient arrived in ICU at 0230.  Diltiazem drip initiated, fentanyl 25mcg given for right sided abdominal ache rated 6/10.  Patient grimacing and attempting to get comfortable in bed.  She states the pain is worse when laying down, which has deprived her of sleep x3 days.  Walking around during the day gives relief.  No nausea, regular bowel movements, no SOB, trace of edema in ankles, fine crackles in right lower lobe, otherwise clear lung sounds.      Patient orientated to room, dietary and encouraged to use call light d/t medication drip, IV pole, etc.  She verbalizes understanding.  Patient now resting comfortably in room.  Blood pressure stable, heart rates  bpm, afib/aflutter.

## 2018-07-30 NOTE — PLAN OF CARE
Problem: Arrhythmia/Dysrhythmia (Symptomatic) (Adult)  Goal: Signs and Symptoms of Listed Potential Problems Will be Absent, Minimized or Managed (Arrhythmia/Dysrhythmia)  Signs and symptoms of listed potential problems will be absent, minimized or managed by discharge/transition of care (reference Arrhythmia/Dysrhythmia (Symptomatic) (Adult) CPG).   Outcome: Improving  Pt has been weaned off the Cardizem gtt and was given a dose of PO Cardizem at 0900;  Continues to be in afib, HR 's; BP's stable.  Pain tolerable with oxycodone, tylenol and heat packs.  Pt rating pain 2-3/10.  Plan is to transfer to [facility] this afternoon.  Pt, Daughter updated on plan and agree.

## 2018-07-30 NOTE — H&P
[facility]    History and Physical  Hospitalist       Date of Admission:  7/29/2018    Assessment & Plan   Merlyn Escamilla is a 84 year old female who presents with right sided abd pain.    Active Problems:    Atrial fibrillation (H)    Assessment: rate controlled    Plan: Will stop diltiazem drip and switch to diltiazem.  Will art smaller dose of diltiazem to start    Right sided abdominal pain.  Will discuss with urology.  Pain control with fentanyl.  Monitor for constipation.    # Pain Assessment:  Current Pain Score 7/30/2018   Patient currently in pain? yes   Pain score (0-10) 2   Pain location Abdomen   Pain descriptors -   - Merlyn is experiencing pain due to abdominal pain. Pain management was discussed and the plan was created in a collaborative fashion.  Merlyn's response to the current recommendations: engaged  - Please see the plan for pain management as documented above        DVT Prophylaxis: Ambulate every shift  Code Status: No Order    Sharath Dinero    Primary Care Physician   Sharath Dinero    Chief Complaint   Abdominal pain    History of Present Illness   Merlyn Escamilla is a 84 year old female who presents with abdominal pain and leg swelling.    Started to develop right sided abdominal pain over the past week.  No nausea or vomiting.  Normal BMs.  Got bad enough where she was seen in the rapid clinic and ultimately in the ED.  CT scan showed 9-10cm right renal simple cyst and constipation.  Admitted for pain control.    She was also found to be in atrial fibrillation/flutter.  She was started on diltiazem drip and her HR has now improved to the 80s with flutter.    Finally, she has had a small amount of swelling in her legs over the past week as well.    History is obtained from the patient and chart review.    Past Medical History    I have reviewed this patient's medical history and updated it with pertinent information if needed.   Past Medical History:   Diagnosis Date      Carpal tunnel syndrome of left wrist     10/4/2017     Carpal tunnel syndrome of right wrist     10/4/2017     Deep phlebothrombosis during pregnancy     No Comments Provided     Osteoarthritis     Knee     Other specified postprocedural states     10/4/2017     Other specified postprocedural states     2018     Presence of artificial knee joint     10/3/2014       Past Surgical History   I have reviewed this patient's surgical history and updated it with pertinent information if needed.  Past Surgical History:   Procedure Laterality Date     APPENDECTOMY OPEN      No Comments Provided     C TOTAL KNEE ARTHROPLASTY Right 10/01/2014    Dr Domingo     CARPAL TUNNEL RELEASE RT/LT Left 2018,[id number],OTHER,Left     CARPAL TUNNEL RELEASE RT/LT Right 10/04/2017     COLONOSCOPY  2014    Arteriovenous malformations of right, transverse and splenic flexure     ENDOSCOPIC STRIPPING VEIN(S)      No Comments Provided     HYSTERECTOMY TOTAL ABDOMINAL  1987    with squamous metaplasia and leiomoma       Prior to Admission Medications   Prior to Admission Medications   Prescriptions Last Dose Informant Patient Reported? Taking?   Multiple Vitamin (MULTI-VITAMINS) TABS Past Week at Unknown time  Yes Yes   Sig: Take 1 tablet by mouth daily   aspirin EC 81 MG EC tablet Past Week at Unknown time  Yes Yes   Sig: Take 81 mg by mouth daily with food   naproxen sodium (ANAPROX) 220 MG tablet 2018 at Unknown time  Yes No   Sig: Take 220 mg by mouth daily as needed   neomycin-polymyxin-dexamethasone (MAXITROL) 3.5-53554-5.1 SUSP ophthalmic susp 2018 at 1600  Yes Yes   Si drop in left eye three times a day.      Facility-Administered Medications Last Administration Doses Remaining   ketorolac (TORADOL) injection 15 mg 2018 11:02 AM 0        Allergies   Allergies   Allergen Reactions     Morphine      Other reaction(s): Muscle Weakness     Phenylbutazone Unknown       Social History   I have  reviewed this patient's social history and updated it with pertinent information if needed. Merlyn Escamilla  reports that she has never smoked. She has never used smokeless tobacco. She reports that she drinks about 3.0 oz of alcohol per week  She reports that she does not use illicit drugs.    Family History   I have reviewed this patient's family history and updated it with pertinent information if needed.   Family History   Problem Relation Age of Onset     HEART DISEASE Mother      Heart Disease     HEART DISEASE Father      Heart Disease     HEART DISEASE Brother      HEART DISEASE Brother      HEART DISEASE Brother      HEART DISEASE Brother      Brain Cancer Sister      HEART DISEASE Sister        Review of Systems   The 10 point Review of Systems is negative other than noted in the HPI or here.     Physical Exam   Temp: 97.5  F (36.4  C) Temp src: Temporal BP: 113/67 Pulse: 110 Heart Rate: 95 Resp: 18 SpO2: 98 % O2 Device: None (Room air)    Vital Signs with Ranges  Temp:  [96.7  F (35.9  C)-98.7  F (37.1  C)] 97.5  F (36.4  C)  Pulse:  [] 110  Heart Rate:  [] 95  Resp:  [11-29] 18  BP: ()/(61-93) 113/67  SpO2:  [89 %-98 %] 98 %  145 lbs 1 oz    Constitutional: appears comfortable in bed  Eyes: PERRL, EOMI  HEENT: MMM  Respiratory: clear without wheezing  Cardiovascular: irregularly irregular  GI: soft, right upper quadrant tenderness.  No rebound or guarding.  Lymph/Hematologic: no bruising  Skin: no rash  Musculoskeletal: trace pedal edema to mid shins bilaterally  Neurologic: no focal motor or sensory deficits.    Data   Data reviewed today:  I personally reviewed the abdominal CT image(s) showing see below..    Recent Labs  Lab 07/29/18  2155   WBC 8.7   HGB 9.7*   MCV 90         POTASSIUM 3.9   CHLORIDE 104   CO2 24   BUN 16   CR 0.62   ANIONGAP 9   ALEXSANDER 8.7   *   ALBUMIN 2.9*   PROTTOTAL 5.7*   BILITOTAL 0.4   ALKPHOS 107*   ALT 34   AST 29   LIPASE 22       Recent  Results (from the past 24 hour(s))   XR Ribs & Chest Rt 3vw    Narrative    XR RIBS & CHEST RT 3VW, 7/29/2018 10:36 AM    History: Female, age 84 years; ; Rib pain on right side    Comparison: Chest x-ray 7/28/2014    Technique: Single view the chest and three views of the right ribs  were obtained.    FINDINGS: The cardiac silhouette is normal. The pulmonary vasculature  is normal. Evaluation of the ribs demonstrates no evidence of an acute  fracture. No pneumothorax.      Impression    IMPRESSION:   1.  No evidence of acute rib fracture nor evidence of pneumothorax.    ALBERT FAITH MD   CTA Chest Abdomen Pelvis w Contrast    Narrative    EXAM:    CT Angiography Abdomen and Pelvis With Intravenous Contrast    CLINICAL HISTORY:    84 years old, female; Pain; Abdominal pain; Localized; Right upper quadrant   (ruq); Additional info: Rt flank pain    TECHNIQUE:    Axial computed tomographic angiography images of the abdomen and pelvis with   intravenous contrast.  All CT scans at this facility use at least one of these   dose optimization techniques: automated exposure control; mA and/or kV   adjustment per patient size (includes targeted exams where dose is matched to   clinical indication); or iterative reconstruction.    MIP reconstructed images were created and reviewed.    Coronal and sagittal reformatted images were created and reviewed.    CONTRAST:    100 mL of omni 350 administered intravenously.      COMPARISON:    No relevant prior studies available.    FINDINGS:     VASCULATURE:    Aorta:  No acute findings.  No dissection.    Celiac trunk and mesenteric arteries:  No acute findings.  No occlusion or   significant stenosis.    Renal arteries:  No acute findings.    Iliac arteries:  No acute findings.      Lung bases:  Unremarkable.  No mass.  No consolidation.     ABDOMEN:    Liver:  Unremarkable.  No mass.    Gallbladder and bile ducts:  Unremarkable.  No calcified stones.  No ductal   dilation.     Pancreas:  Unremarkable.    Spleen:  Unremarkable.    Adrenals:  Unremarkable.    Kidneys and ureters:  Very large simple right renal cyst, measuring 9.5 cm in   maximal diameter.    Stomach and bowel:   Large volume of fecal material throughout the colon..     PELVIS:    Appendix:  No abnormal appendix.    Bladder:  Unremarkable.    Reproductive:  Unremarkable as visualized.     ABDOMEN and PELVIS:    Intraperitoneal space:  Unremarkable.  No significant fluid collection.  No   free air.    Bones/joints:  No acute fracture.  No dislocation.    Soft tissues:  Unremarkable as visualized.    Lymph nodes:  Unremarkable.      Impression    IMPRESSION:         Very large simple right renal cyst, measuring 9.5 cm in maximal diameter.    Otherwise, no dissection or other acute intra-abdominal pathology.    _______________________________________________    EXAM:    CT Angiography Chest With Intravenous Contrast    CLINICAL HISTORY:    84 years old, female; Pain; Abdominal pain; Localized; Right upper quadrant   (ruq); Additional info: Rt flank pain    TECHNIQUE:    Axial computed tomographic angiography images of the chest with intravenous   contrast using pulmonary embolism protocol.    MIP reconstructed images were created and reviewed.    CONTRAST:    100 mL of omni 350 administered intravenously.  100 mL of omni 350   administered intravenously.      COMPARISON:    No relevant prior studies available.    FINDINGS:    Pulmonary arteries:  Unremarkable.  No acute pulmonary embolism.    Aorta:  Aneurysmal dilatation of the ascending thoracic aorta, measuring 4.9   cm in maximal diameter.  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  Heterogeneity of the contrast   and blood within the thoracic aorta is probably due to slow flow and turbulence   due to ectasia of the lumen.  This finding does not represent a dissection.    Lungs:  Unremarkable.  No focal  consolidation.    Pleural space:  Unremarkable.  No significant effusion.  No pneumothorax.    Heart:  Unremarkable.    Bones/joints:  No acute fracture.  No dislocation.    Soft tissues:  Unremarkable as visualized.    Lymph nodes:  Unremarkable.    IMPRESSION:         Aneurysmal dilatation of the ascending thoracic aorta, measuring 4.9 cm in   maximal diameter.  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    Otherwise, no dissection, PE, or other acute intrathoracic pathology.    THIS DOCUMENT HAS BEEN ELECTRONICALLY SIGNED BY ALEXANDRE GARRETT MD

## 2018-07-30 NOTE — ED TRIAGE NOTES
Patient presents with c/o R sided abdominal pain that started about a week ago, pain worsens close to bedtime and patient is unable to sleep. Patient states she has also gained about 10 pounds in the last couple of weeks and swelling in her feet. Patient was in rapid clinic today and all tests came back negative. Patient was given prn toradol to take for pain, took it last at 2000. Denies nausea. Has been eating and drinking normally. Normal BM's.Christina Lei RN on 7/29/2018 at 9:20 PM

## 2018-07-30 NOTE — ED TRIAGE NOTES
[place]-SUICIDE SEVERITY RATING SCALE   Screen with Triage Points for Emergency Department      Ask questions that are bolded and underlined.   Past  month   Ask Questions 1 and 2 YES NO   1)  Have you wished you were dead or wished you could go to sleep and not wake up?   X   2)  Have you actually had any thoughts of killing yourself?   X   If YES to 2, ask questions 3, 4, 5, and 6.  If NO to 2, go directly to question 6.   3)  Have you been thinking about how you might do this?   E.g.  I thought about taking an overdose but I never made a specific plan as to when where or how I would actually do it .and I would never go through with it.       4)  Have you had these thoughts and had some intention of acting on them?   As opposed to  I have the thoughts but I definitely will not do anything about them.       5)  Have you started to work out or worked out the details of how to kill yourself? Do you intend to carry out this plan?      6)  Have you ever done anything, started to do anything, or prepared to do anything to end your life?  Examples: Collected pills, obtained a gun, gave away valuables, wrote a will or suicide note, took out pills but didn t swallow any, held a gun but changed your mind or it was grabbed from your hand, went to the roof but didn t jump; or actually took pills, tried to shoot yourself, cut yourself, tried to hang yourself, etc.    If YES, ask: Was this within the past three months?  Lifetime     X    Past 3 Months        Item 1:  Behavioral Health Referral at Discharge  Item 2:  Behavioral Health Referral at Discharge   Item 3:  Behavioral Health Consult (Psychiatric Nurse/) and consider Patient Safety Precautions  Item 4:  Immediate Notification of Physician and/or Behavioral Health and Patient Safety Precautions   Item 5:  Immediate Notification of Physician and/or Behavioral Health and Patient Safety Precautions  Item 6:  Over 3 months ago: Behavioral Health Consult  (Psychiatric Nurse/) and consider Patient Safety Precautions  OR  Item 6:  3 months ago or less: Immediate Notification of Physician and/or Behavioral Health and Patient Safety Precautions     '

## 2018-07-30 NOTE — PROGRESS NOTES
Nurse to nurse report given to MINOR Camacho.  Pt transferred to [facility] room 331, accompanied by RN.  Belongings sent with patient.

## 2018-07-30 NOTE — ED PROVIDER NOTES
History     Chief Complaint   Patient presents with     Abdominal Pain     HPI  Merlyn Escamilla is a 84 year old female who presents the emergency department with right-sided pain.  She was in rapid clinic earlier today and had some x-rays done which apparently were unremarkable.  The pain is been going on at least the last 7 days may be more.  Steadily getting worse.  Interfering with her sleep.  No fevers or chills, no nausea vomiting or diarrhea.  Patient points to an area just under her right ribs as the source of tenderness, occasionally wraps around to the back.  She has no history of A. fib.  No chest pain or shortness of breath today.  No urinary symptoms.  Patient still has her gallbladder but she is status post appendectomy.  She has also had a approximately 10 pound weight gain and increased swelling in the last couple of weeks.  Patient was prescribed Toradol for pain from the rapid clinic.  She did take a Toradol earlier for pain it did not really seem to help.  View nurse's notes below, similar history was related to me.      Patient presents with c/o R sided abdominal pain that started about a week ago, pain worsens close to bedtime and patient is unable to sleep. Patient states she has also gained about 10 pounds in the last couple of weeks and swelling in her feet. Patient was in rapid clinic today and all tests came back negative. Patient was given prn toradol to take for pain, took it last at 2000. Denies nausea. Has been eating and drinking normally. Normal BM's.Christina Lei RN on 7/29/2018 at 9:20 PM          Problem List:    Patient Active Problem List    Diagnosis Date Noted               Primary osteoarthritis of both first carpometacarpal joints 05/22/2018     Priority: Medium     Primary osteoarthritis of both hands 05/22/2018     Priority: Medium     History of colonic polyps 01/25/2018     Priority: Medium     Osteoarthrosis 01/25/2018     Priority: Medium     Overview:   Knee        History of carpal tunnel surgery of left wrist 10/04/2017     Priority: Medium     S/P total knee arthroplasty 10/03/2014     Priority: Medium     AVM (arteriovenous malformation) of colon 10/01/2014     Priority: Medium        Past Medical History:    Past Medical History:   Diagnosis Date     Carpal tunnel syndrome of left wrist      Carpal tunnel syndrome of right wrist      Deep phlebothrombosis during pregnancy      Osteoarthritis      Other specified postprocedural states      Other specified postprocedural states      Presence of artificial knee joint        Past Surgical History:    Past Surgical History:   Procedure Laterality Date     APPENDECTOMY OPEN      No Comments Provided     C TOTAL KNEE ARTHROPLASTY Right 10/01/2014    Dr Domingo     CARPAL TUNNEL RELEASE RT/LT Left 01/31/2018 01/31/2018,[id number],OTHER,Left     CARPAL TUNNEL RELEASE RT/LT Right 10/04/2017     COLONOSCOPY  09/2014    Arteriovenous malformations of right, transverse and splenic flexure     ENDOSCOPIC STRIPPING VEIN(S)      No Comments Provided     HYSTERECTOMY TOTAL ABDOMINAL  06/1987    with squamous metaplasia and leiomoma       Family History:    Family History   Problem Relation Age of Onset     HEART DISEASE Mother      Heart Disease     HEART DISEASE Father      Heart Disease     HEART DISEASE Brother      HEART DISEASE Brother      HEART DISEASE Brother      HEART DISEASE Brother      Brain Cancer Sister      HEART DISEASE Sister        Social History:  Marital Status:   [5]  Social History   Substance Use Topics     Smoking status: Never Smoker     Smokeless tobacco: Never Used     Alcohol use 3.0 oz/week        Medications:      No current outpatient prescriptions on file.      Review of Systems   Constitutional: Negative for chills, diaphoresis, fatigue and fever.   HENT: Negative for congestion.    Eyes: Negative for photophobia.   Respiratory: Negative for cough, chest tightness and shortness of breath.     Cardiovascular: Negative for chest pain.   Endocrine: Negative for polydipsia, polyphagia and polyuria.   Genitourinary: Negative for difficulty urinating, dysuria and pelvic pain.   Neurological: Negative for light-headedness and headaches.   Hematological: Negative for adenopathy.       Physical Exam   BP: 111/82  Pulse: 110  Heart Rate: 137  Temp: 98.7  F (37.1  C)  Resp: 16  Weight: 64.4 kg (142 lb)  SpO2: 98 %      Physical Exam   Constitutional: She is oriented to person, place, and time.   HENT:   Mouth/Throat: No oropharyngeal exudate.   Eyes: Conjunctivae and EOM are normal. Pupils are equal, round, and reactive to light.   Cardiovascular: Normal rate.    No murmur heard.  Pulmonary/Chest: Effort normal and breath sounds normal. No respiratory distress. She has no wheezes. She has no rales.   Abdominal: Soft. She exhibits no distension. There is no tenderness. There is CVA tenderness. There is no rigidity, no guarding, no tenderness at McBurney's point and negative Dixon's sign.   Musculoskeletal: She exhibits no edema.   Neurological: She is alert and oriented to person, place, and time. She has normal reflexes.   Skin: Skin is warm.   Psychiatric: She has a normal mood and affect.   Nursing note and vitals reviewed.      ED Course     ED Course     Procedures  EKG shows A. fib with rapid ventricular response, rate 135.         Critical Care time:  was 60 minutes for this patient excluding procedures.    Results for orders placed or performed during the hospital encounter of 07/29/18 (from the past 24 hour(s))   CBC with platelets differential   Result Value Ref Range    WBC 8.7 4.0 - 11.0 10e9/L    RBC Count 3.34 (L) 3.8 - 5.2 10e12/L    Hemoglobin 9.7 (L) 11.7 - 15.7 g/dL    Hematocrit 30.2 (L) 35.0 - 47.0 %    MCV 90 78 - 100 fl    MCH 29.0 26.5 - 33.0 pg    MCHC 32.1 31.5 - 36.5 g/dL    RDW 14.5 10.0 - 15.0 %    Platelet Count 249 150 - 450 10e9/L    Diff Method Automated Method     % Neutrophils  83.2 %    % Lymphocytes 10.6 %    % Monocytes 3.9 %    % Eosinophils 1.0 %    % Basophils 0.8 %    % Immature Granulocytes 0.5 %    Absolute Neutrophil 7.2 1.6 - 8.3 10e9/L    Absolute Lymphocytes 0.9 0.8 - 5.3 10e9/L    Absolute Monocytes 0.3 0.0 - 1.3 10e9/L    Absolute Eosinophils 0.1 0.0 - 0.7 10e9/L    Absolute Basophils 0.1 0.0 - 0.2 10e9/L    Abs Immature Granulocytes 0.0 0 - 0.4 10e9/L   Comprehensive metabolic panel   Result Value Ref Range    Sodium 137 134 - 144 mmol/L    Potassium 3.9 3.5 - 5.1 mmol/L    Chloride 104 98 - 107 mmol/L    Carbon Dioxide 24 21 - 31 mmol/L    Anion Gap 9 3 - 14 mmol/L    Glucose 136 (H) 70 - 105 mg/dL    Urea Nitrogen 16 7 - 25 mg/dL    Creatinine 0.62 0.60 - 1.20 mg/dL    GFR Estimate >90 >60 mL/min/1.7m2    GFR Estimate If Black >90 >60 mL/min/1.7m2    Calcium 8.7 8.6 - 10.3 mg/dL    Bilirubin Total 0.4 0.3 - 1.0 mg/dL    Albumin 2.9 (L) 3.5 - 5.7 g/dL    Protein Total 5.7 (L) 6.4 - 8.9 g/dL    Alkaline Phosphatase 107 (H) 34 - 104 U/L    ALT 34 7 - 52 U/L    AST 29 13 - 39 U/L   Lipase   Result Value Ref Range    Lipase 22 11 - 82 U/L   *UA reflex to Microscopic   Result Value Ref Range    Color Urine Canceled, Test credited     Appearance Urine Canceled, Test credited     Glucose Urine Canceled, Test credited (A) NEG^Negative mg/dL    Bilirubin Urine Canceled, Test credited (A) NEG^Negative    Ketones Urine Canceled, Test credited (A) NEG^Negative mg/dL    Specific Gravity Urine Canceled, Test credited 1.003 - 1.035    Blood Urine Canceled, Test credited (A) NEG^Negative    pH Urine Canceled, Test credited 5.0 - 7.0 pH    Protein Albumin Urine Canceled, Test credited (A) NEG^Negative mg/dL    Urobilinogen Urine Canceled, Test credited 0.2 - 1.0 EU/dL    Nitrite Urine Canceled, Test credited (A) NEG^Negative    Leukocyte Esterase Urine Canceled, Test credited (A) NEG^Negative    Source Canceled, Test credited    UA reflex to Microscopic and Culture   Result Value Ref Range     Color Urine Yellow     Appearance Urine Clear     Glucose Urine Negative NEG^Negative mg/dL    Bilirubin Urine Negative NEG^Negative    Ketones Urine Negative NEG^Negative mg/dL    Specific Gravity Urine 1.025 1.000 - 1.030    Blood Urine Negative NEG^Negative    pH Urine 5.5 5.0 - 9.0 pH    Protein Albumin Urine Negative NEG^Negative mg/dL    Urobilinogen Urine 0.2 0.2 - 1.0 EU/dL    Nitrite Urine Negative NEG^Negative    Leukocyte Esterase Urine Trace (A) NEG^Negative    Source Midstream Urine    Urine Microscopic   Result Value Ref Range    WBC Urine 0 - 5 OTO5^0 - 5 /HPF    RBC Urine O - 2 OTO2^O - 2 /HPF    Squamous Epithelial /LPF Urine Few FEW^Few /LPF    Bacteria Urine Few (A) NEG^Negative /HPF   CTA Chest Abdomen Pelvis w Contrast    Narrative    EXAM:    CT Angiography Abdomen and Pelvis With Intravenous Contrast    CLINICAL HISTORY:    84 years old, female; Pain; Abdominal pain; Localized; Right upper quadrant   (ruq); Additional info: Rt flank pain    TECHNIQUE:    Axial computed tomographic angiography images of the abdomen and pelvis with   intravenous contrast.  All CT scans at this facility use at least one of these   dose optimization techniques: automated exposure control; mA and/or kV   adjustment per patient size (includes targeted exams where dose is matched to   clinical indication); or iterative reconstruction.    MIP reconstructed images were created and reviewed.    Coronal and sagittal reformatted images were created and reviewed.    CONTRAST:    100 mL of omni 350 administered intravenously.      COMPARISON:    No relevant prior studies available.    FINDINGS:     VASCULATURE:    Aorta:  No acute findings.  No dissection.    Celiac trunk and mesenteric arteries:  No acute findings.  No occlusion or   significant stenosis.    Renal arteries:  No acute findings.    Iliac arteries:  No acute findings.      Lung bases:  Unremarkable.  No mass.  No consolidation.     ABDOMEN:    Liver:   Unremarkable.  No mass.    Gallbladder and bile ducts:  Unremarkable.  No calcified stones.  No ductal   dilation.    Pancreas:  Unremarkable.    Spleen:  Unremarkable.    Adrenals:  Unremarkable.    Kidneys and ureters:  Very large simple right renal cyst, measuring 9.5 cm in   maximal diameter.    Stomach and bowel:   Large volume of fecal material throughout the colon..     PELVIS:    Appendix:  No abnormal appendix.    Bladder:  Unremarkable.    Reproductive:  Unremarkable as visualized.     ABDOMEN and PELVIS:    Intraperitoneal space:  Unremarkable.  No significant fluid collection.  No   free air.    Bones/joints:  No acute fracture.  No dislocation.    Soft tissues:  Unremarkable as visualized.    Lymph nodes:  Unremarkable.      Impression    IMPRESSION:         Very large simple right renal cyst, measuring 9.5 cm in maximal diameter.    Otherwise, no dissection or other acute intra-abdominal pathology.    _______________________________________________    EXAM:    CT Angiography Chest With Intravenous Contrast    CLINICAL HISTORY:    84 years old, female; Pain; Abdominal pain; Localized; Right upper quadrant   (ruq); Additional info: Rt flank pain    TECHNIQUE:    Axial computed tomographic angiography images of the chest with intravenous   contrast using pulmonary embolism protocol.    MIP reconstructed images were created and reviewed.    CONTRAST:    100 mL of omni 350 administered intravenously.  100 mL of omni 350   administered intravenously.      COMPARISON:    No relevant prior studies available.    FINDINGS:    Pulmonary arteries:  Unremarkable.  No acute pulmonary embolism.    Aorta:  Aneurysmal dilatation of the ascending thoracic aorta, measuring 4.9   cm in maximal diameter.  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  Heterogeneity of the contrast   and blood within the thoracic aorta is probably due to slow flow and turbulence   due to  ectasia of the lumen.  This finding does not represent a dissection.    Lungs:  Unremarkable.  No focal consolidation.    Pleural space:  Unremarkable.  No significant effusion.  No pneumothorax.    Heart:  Unremarkable.    Bones/joints:  No acute fracture.  No dislocation.    Soft tissues:  Unremarkable as visualized.    Lymph nodes:  Unremarkable.    IMPRESSION:         Aneurysmal dilatation of the ascending thoracic aorta, measuring 4.9 cm in   maximal diameter.  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    Otherwise, no dissection, PE, or other acute intrathoracic pathology.    THIS DOCUMENT HAS BEEN ELECTRONICALLY SIGNED BY ALEXANDRE GARRETT MD       Medications   0.9% sodium chloride BOLUS (0 mLs Intravenous Stopped 7/29/18 2330)     Followed by   sodium chloride 0.9% infusion (1,000 mLs Intravenous New Bag 7/29/18 2331)   lidocaine 1 % 1 mL (not administered)   lidocaine (LMX4) cream (not administered)   sodium chloride (PF) 0.9% PF flush 3 mL (3 mLs Intracatheter Given 7/30/18 0506)   sodium chloride (PF) 0.9% PF flush 3 mL (3 mLs Intracatheter Given 7/30/18 0250)   sodium chloride 0.9% infusion ( Intravenous ED Infusing on Admission/transfer 7/30/18 0219)   naloxone (NARCAN) injection 0.1-0.4 mg (not administered)   acetaminophen (TYLENOL) tablet 650 mg (not administered)   ondansetron (ZOFRAN-ODT) ODT tab 4 mg (not administered)     Or   ondansetron (ZOFRAN) injection 4 mg (not administered)   prochlorperazine (COMPAZINE) injection 5 mg (not administered)     Or   prochlorperazine (COMPAZINE) tablet 5 mg (not administered)     Or   prochlorperazine (COMPAZINE) Suppository 12.5 mg (not administered)   fentaNYL (PF) (SUBLIMAZE) injection 25 mcg (25 mcg Intravenous Given 7/30/18 0505)   diltiazem (CARDIZEM) 100 MG attached to 100 mL 5% dextrose infusion (5 mg/hr Intravenous New Bag 7/30/18 0242)   fentaNYL (PF) (SUBLIMAZE) injection 25 mcg (25 mcg  Intravenous Given 7/29/18 2201)   iohexol (OMNIPAQUE) 350 mg/mL solution 100 mL (100 mLs Intravenous Given 7/29/18 2249)   fentaNYL (PF) (SUBLIMAZE) injection 25 mcg (25 mcg Intravenous Given 7/29/18 2304)   fentaNYL (PF) (SUBLIMAZE) injection 50 mcg (50 mcg Intravenous Given 7/30/18 0006)   diltiazem (CARDIZEM) injection 10 mg (10 mg Intravenous Given 7/30/18 0112)       Assessments & Plan (with Medical Decision Making)     Current Discharge Medication List        Should feeling much better over the course of her ER stay, the fentanyl took away most of her pain.  Clinically initially I thought she looked mostly like a kidney stone possibly gallbladder pathology.  CT scan shows only this 10 cm simple renal cyst.  Since it is so large I do feel this could be contributing to her pain.  She does have a slightly elevated alk phos as well, obtaining right upper quadrant ultrasound later during hospital stay may be reasonable as well.  In addition to the abdominal CT I did proceed with a CT angiogram chest because of the new A. fib and her history of DVT, I wanted to make sure she did not have PE.  No PE was present.  I thought initially that her rate would improve with pain control given her chief complaint of flank pain, however it did not really seem to respond the pain control very well as after fentanyl her pain was 0 and she was still in rapid A. fib.  At that point I gave her 10 mg of IV Cardizem, and control her rate very well brought her down to 70-80s.  Her blood pressure did dip to low 90s systolic from the mid 100s after that bolus Cardizem.  Thus a gentle Cardizem infusion was written for with strict parameters for the ICU.  I did discuss with Dr. Andujar and he is in agreement.  Patient verbalized understanding of plan as well.  She left the floor in stable condition.  Final diagnoses:   Atrial fibrillation (H)       7/29/2018   [facility]  07/30/18 2042

## 2018-07-30 NOTE — PROGRESS NOTES
Patient's supply of (3) eye gtts and Toradol prescription sent with patient's daughter Flor. Martnia Resendiz RN on 7/30/2018 at 2:01 PM

## 2018-07-30 NOTE — PROGRESS NOTES
Patient transferred from ICU to [facility] room 331. Patient alert and oriented. VSS.   Janice Childers RN on 7/30/2018 at 1530 PM

## 2018-07-30 NOTE — PHARMACY
Pharmacy - Transfer Medication Reconciliation     The patient's transfer medication orders have been compared to the medication administration record and to the Prior to Admissions Medications list - any noted discrepancies were resolved with the MD.     Thank you. Pharmacy will continue to monitor.     Daysi Mejia [facility] ....................  7/30/2018   5:00 PM

## 2018-07-30 NOTE — PROGRESS NOTES
1.  Has the patient had a previous reaction to IV contrast? n    2.  Does the patient have kidney disease? n    3.  Is the patient on dialysis? n    If YES to any of these questions, exam will be reviewed with a Radiologist before administering contrast.

## 2018-07-30 NOTE — ED NOTES
Pt having increased pain in RUQ and into back.  Waiting for creat before CT is done.  MD aware and will give pain med if ordered.

## 2018-07-31 ENCOUNTER — APPOINTMENT (OUTPATIENT)
Dept: CARDIOLOGY | Facility: OTHER | Age: 83
DRG: 310 | End: 2018-07-31
Attending: FAMILY MEDICINE
Payer: MEDICARE

## 2018-07-31 PROBLEM — R10.11 ABDOMINAL PAIN, RIGHT UPPER QUADRANT: Status: ACTIVE | Noted: 2018-07-31

## 2018-07-31 PROBLEM — K59.01 SLOW TRANSIT CONSTIPATION: Status: ACTIVE | Noted: 2018-07-31

## 2018-07-31 PROBLEM — N28.1 RENAL CYST, RIGHT: Status: ACTIVE | Noted: 2018-07-31

## 2018-07-31 LAB
ALBUMIN SERPL-MCNC: 2.9 G/DL (ref 3.5–5.7)
ALP SERPL-CCNC: 100 U/L (ref 34–104)
ALT SERPL W P-5'-P-CCNC: 28 U/L (ref 7–52)
ANION GAP SERPL CALCULATED.3IONS-SCNC: 6 MMOL/L (ref 3–14)
AST SERPL W P-5'-P-CCNC: 22 U/L (ref 13–39)
BILIRUB SERPL-MCNC: 0.3 MG/DL (ref 0.3–1)
BUN SERPL-MCNC: 7 MG/DL (ref 7–25)
CALCIUM SERPL-MCNC: 8.7 MG/DL (ref 8.6–10.3)
CHLORIDE SERPL-SCNC: 105 MMOL/L (ref 98–107)
CO2 SERPL-SCNC: 26 MMOL/L (ref 21–31)
CREAT SERPL-MCNC: 0.59 MG/DL (ref 0.6–1.2)
GFR SERPL CREATININE-BSD FRML MDRD: >90 ML/MIN/1.7M2
GLUCOSE SERPL-MCNC: 112 MG/DL (ref 70–105)
POTASSIUM SERPL-SCNC: 4.1 MMOL/L (ref 3.5–5.1)
PROT SERPL-MCNC: 5.6 G/DL (ref 6.4–8.9)
SODIUM SERPL-SCNC: 137 MMOL/L (ref 134–144)

## 2018-07-31 PROCEDURE — 93306 TTE W/DOPPLER COMPLETE: CPT | Mod: 26 | Performed by: INTERNAL MEDICINE

## 2018-07-31 PROCEDURE — 25000128 H RX IP 250 OP 636: Performed by: FAMILY MEDICINE

## 2018-07-31 PROCEDURE — 25000132 ZZH RX MED GY IP 250 OP 250 PS 637: Mod: GY | Performed by: FAMILY MEDICINE

## 2018-07-31 PROCEDURE — A9270 NON-COVERED ITEM OR SERVICE: HCPCS | Mod: GY | Performed by: FAMILY MEDICINE

## 2018-07-31 PROCEDURE — 12000000 ZZH R&B MED SURG/OB

## 2018-07-31 PROCEDURE — 36415 COLL VENOUS BLD VENIPUNCTURE: CPT | Performed by: FAMILY MEDICINE

## 2018-07-31 PROCEDURE — 93306 TTE W/DOPPLER COMPLETE: CPT

## 2018-07-31 PROCEDURE — 80053 COMPREHEN METABOLIC PANEL: CPT | Performed by: FAMILY MEDICINE

## 2018-07-31 PROCEDURE — 99232 SBSQ HOSP IP/OBS MODERATE 35: CPT | Performed by: FAMILY MEDICINE

## 2018-07-31 RX ORDER — POLYETHYLENE GLYCOL 3350 17 G/17G
17 POWDER, FOR SOLUTION ORAL 2 TIMES DAILY PRN
Status: DISCONTINUED | OUTPATIENT
Start: 2018-07-31 | End: 2018-08-03 | Stop reason: HOSPADM

## 2018-07-31 RX ORDER — DILTIAZEM HYDROCHLORIDE 180 MG/1
180 CAPSULE, COATED, EXTENDED RELEASE ORAL DAILY
Status: DISCONTINUED | OUTPATIENT
Start: 2018-07-31 | End: 2018-08-01

## 2018-07-31 RX ORDER — DILTIAZEM HYDROCHLORIDE 30 MG/1
30 TABLET, FILM COATED ORAL EVERY 6 HOURS PRN
Status: DISCONTINUED | OUTPATIENT
Start: 2018-07-31 | End: 2018-08-03 | Stop reason: HOSPADM

## 2018-07-31 RX ADMIN — ACETAMINOPHEN 650 MG: 325 TABLET, FILM COATED ORAL at 10:21

## 2018-07-31 RX ADMIN — KETOROLAC TROMETHAMINE 1 DROP: 5 SOLUTION OPHTHALMIC at 21:52

## 2018-07-31 RX ADMIN — OXYCODONE HYDROCHLORIDE 5 MG: 5 TABLET ORAL at 21:58

## 2018-07-31 RX ADMIN — OXYCODONE HYDROCHLORIDE 5 MG: 5 TABLET ORAL at 00:31

## 2018-07-31 RX ADMIN — PREDNISOLONE ACETATE 1 DROP: 10 SUSPENSION/ DROPS OPHTHALMIC at 21:53

## 2018-07-31 RX ADMIN — ENOXAPARIN SODIUM 40 MG: 100 INJECTION SUBCUTANEOUS at 10:21

## 2018-07-31 RX ADMIN — ACETAMINOPHEN 650 MG: 325 TABLET, FILM COATED ORAL at 04:52

## 2018-07-31 RX ADMIN — PREDNISOLONE ACETATE 1 DROP: 10 SUSPENSION/ DROPS OPHTHALMIC at 13:50

## 2018-07-31 RX ADMIN — KETOROLAC TROMETHAMINE 1 DROP: 5 SOLUTION OPHTHALMIC at 05:50

## 2018-07-31 RX ADMIN — POLYETHYLENE GLYCOL (3350) 17 G: 17 POWDER, FOR SOLUTION ORAL at 10:21

## 2018-07-31 RX ADMIN — ASPIRIN 81 MG: 81 TABLET ORAL at 10:21

## 2018-07-31 RX ADMIN — DOCUSATE SODIUM 100 MG: 100 CAPSULE, LIQUID FILLED ORAL at 21:50

## 2018-07-31 RX ADMIN — OXYCODONE HYDROCHLORIDE 5 MG: 5 TABLET ORAL at 06:49

## 2018-07-31 RX ADMIN — KETOROLAC TROMETHAMINE 1 DROP: 5 SOLUTION OPHTHALMIC at 13:46

## 2018-07-31 RX ADMIN — DILTIAZEM HYDROCHLORIDE 180 MG: 180 CAPSULE, COATED, EXTENDED RELEASE ORAL at 10:21

## 2018-07-31 RX ADMIN — PREDNISOLONE ACETATE 1 DROP: 10 SUSPENSION/ DROPS OPHTHALMIC at 05:51

## 2018-07-31 RX ADMIN — ACETAMINOPHEN 650 MG: 325 TABLET, FILM COATED ORAL at 17:30

## 2018-07-31 RX ADMIN — DOCUSATE SODIUM 100 MG: 100 CAPSULE, LIQUID FILLED ORAL at 10:21

## 2018-07-31 ASSESSMENT — ACTIVITIES OF DAILY LIVING (ADL)
ADLS_ACUITY_SCORE: 9

## 2018-07-31 NOTE — PLAN OF CARE
Problem: Arrhythmia/Dysrhythmia (Symptomatic) (Adult)  Goal: Signs and Symptoms of Listed Potential Problems Will be Absent, Minimized or Managed (Arrhythmia/Dysrhythmia)  Signs and symptoms of listed potential problems will be absent, minimized or managed by discharge/transition of care (reference Arrhythmia/Dysrhythmia (Symptomatic) (Adult) CPG).   Outcome: No Change  Patient denies chest pain. Heart rate at rest is 70-90's, while ambulating in hallway heart rate jumped to 140's.

## 2018-07-31 NOTE — PLAN OF CARE
Problem: Patient Care Overview  Goal: Plan of Care/Patient Progress Review  Outcome: No Change  VSS. Pt pain remains 1/10 on the abdomen on the right side. Was given Tylenol PRN, see MAR. Has not had BM yet and was given Miralax PRN, see MAR.

## 2018-07-31 NOTE — PROGRESS NOTES
VSS. Lungs remain clear and heart WDL. Pt pain remains from no pain to 1/10 on the right abdominal area. Was given PRN Tylenol, see MAR. Pt was unable to have a BM and was given prune juice. Was alyson to have medium BM soon after drinking the juice. Requested to have another glass of prune juice to help have another BM. Pt has had company in the room and remains in high spirits.

## 2018-07-31 NOTE — PROGRESS NOTES
"[facility]    Hospitalist Progress Note      Assessment & Plan   Merlyn Escamilla is a 84 year old female who was admitted on 7/29/2018.     Principal Problem:    Atrial fibrillation (H)    Assessment: new    Plan: continue tele, rate control.  Will increase diltiazem    Active Problems:    Abdominal pain, right upper quadrant    Assessment: improving, likely related to constipation.    Plan: continue stool softeners.  She is requesting miralax.      Slow transit constipation    Assessment: see above    Plan: see above    Large right renal cyst, simple.  Reviewed with urology yesterday.  He does not feel that this is concerning to her pain.  He recommends monitoring and no further workup.    # Pain Assessment:  Current Pain Score 7/31/2018   Patient currently in pain? yes   Pain score (0-10) 5   Pain location Abdomen   Pain descriptors Aching   Merlyn cerna pain level was assessed and she currently denies pain.      DVT Prophylaxis: Enoxaparin (Lovenox) SQ  Code Status: No Order    Sharath Dinero    Interval History   Did well most of the day yesterday.  Heart rate increased up in the low 100s with ambulation.  She had a little bit of pain overnight and is requesting to be \"blasted out.\"  She has had no chest pain, shortness of breath or urinary changes.    -Data reviewed today: I reviewed all new labs and imaging results over the last 24 hours. I personally reviewed no images or EKG's today.    Physical Exam   Temp: 97.6  F (36.4  C) Temp src: Tympanic BP: 140/62   Heart Rate: 131 Resp: 20 SpO2: 99 % O2 Device: None (Room air)    Vitals:    07/30/18 0234 07/30/18 1545 07/31/18 0600   Weight: 65.8 kg (145 lb 1 oz) 68.3 kg (150 lb 9.2 oz) 67.5 kg (148 lb 13 oz)     Vital Signs with Ranges  Temp:  [97.1  F (36.2  C)-98.6  F (37  C)] 97.6  F (36.4  C)  Heart Rate:  [] 131  Resp:  [13-30] 20  BP: ()/(56-79) 140/62  SpO2:  [92 %-99 %] 99 %  I/O last 3 completed shifts:  In: 540 [P.O.:540]  Out: " 1625 [Urine:1625]    Constitutional: Comfortable in bed.  Nontoxic.  Respiratory: Clear without wheezes.  No accessory muscle use.    Cardiovascular: Irregularly irregular without murmurs.  GI: Soft, nontender, nondistended.  Positive bowel sounds.  No CVA tenderness.  Skin/Integumen: No rash or swelling.    Medications       aspirin  81 mg Oral Daily     diltiazem  180 mg Oral Daily     docusate sodium  100 mg Oral BID     enoxaparin  40 mg Subcutaneous Q24H     ketorolac  1 drop Left Eye TID     prednisoLONE acetate  1 drop Left Eye TID       Data     Recent Labs  Lab 07/31/18  0615 07/29/18  2155   WBC  --  8.7   HGB  --  9.7*   MCV  --  90   PLT  --  249    137   POTASSIUM 4.1 3.9   CHLORIDE 105 104   CO2 26 24   BUN 7 16   CR 0.59* 0.62   ANIONGAP 6 9   ALEXSANDER 8.7 8.7   * 136*   ALBUMIN 2.9* 2.9*   PROTTOTAL 5.6* 5.7*   BILITOTAL 0.3 0.4   ALKPHOS 100 107*   ALT 28 34   AST 22 29   LIPASE  --  22       No results found for this or any previous visit (from the past 24 hour(s)).

## 2018-08-01 PROCEDURE — 25000132 ZZH RX MED GY IP 250 OP 250 PS 637: Mod: GY | Performed by: FAMILY MEDICINE

## 2018-08-01 PROCEDURE — 25000128 H RX IP 250 OP 636: Performed by: FAMILY MEDICINE

## 2018-08-01 PROCEDURE — 99232 SBSQ HOSP IP/OBS MODERATE 35: CPT | Performed by: FAMILY MEDICINE

## 2018-08-01 PROCEDURE — A9270 NON-COVERED ITEM OR SERVICE: HCPCS | Mod: GY | Performed by: FAMILY MEDICINE

## 2018-08-01 PROCEDURE — 12000000 ZZH R&B MED SURG/OB

## 2018-08-01 RX ORDER — DILTIAZEM HYDROCHLORIDE 120 MG/1
240 CAPSULE, COATED, EXTENDED RELEASE ORAL DAILY
Status: DISCONTINUED | OUTPATIENT
Start: 2018-08-02 | End: 2018-08-03 | Stop reason: HOSPADM

## 2018-08-01 RX ORDER — ZOLPIDEM TARTRATE 5 MG/1
5 TABLET ORAL
Status: DISCONTINUED | OUTPATIENT
Start: 2018-08-01 | End: 2018-08-03 | Stop reason: HOSPADM

## 2018-08-01 RX ADMIN — APIXABAN 5 MG: 5 TABLET, FILM COATED ORAL at 21:08

## 2018-08-01 RX ADMIN — DOCUSATE SODIUM 100 MG: 100 CAPSULE, LIQUID FILLED ORAL at 21:08

## 2018-08-01 RX ADMIN — KETOROLAC TROMETHAMINE 1 DROP: 5 SOLUTION OPHTHALMIC at 13:15

## 2018-08-01 RX ADMIN — APIXABAN 5 MG: 5 TABLET, FILM COATED ORAL at 10:14

## 2018-08-01 RX ADMIN — ZOLPIDEM TARTRATE 5 MG: 5 TABLET, FILM COATED ORAL at 22:34

## 2018-08-01 RX ADMIN — ACETAMINOPHEN 650 MG: 325 TABLET, FILM COATED ORAL at 12:44

## 2018-08-01 RX ADMIN — KETOROLAC TROMETHAMINE 1 DROP: 5 SOLUTION OPHTHALMIC at 21:32

## 2018-08-01 RX ADMIN — ENOXAPARIN SODIUM 40 MG: 100 INJECTION SUBCUTANEOUS at 09:15

## 2018-08-01 RX ADMIN — PREDNISOLONE ACETATE 1 DROP: 10 SUSPENSION/ DROPS OPHTHALMIC at 05:51

## 2018-08-01 RX ADMIN — ASPIRIN 81 MG: 81 TABLET ORAL at 09:14

## 2018-08-01 RX ADMIN — PREDNISOLONE ACETATE 1 DROP: 10 SUSPENSION/ DROPS OPHTHALMIC at 21:11

## 2018-08-01 RX ADMIN — ACETAMINOPHEN 650 MG: 325 TABLET, FILM COATED ORAL at 02:32

## 2018-08-01 RX ADMIN — KETOROLAC TROMETHAMINE 1 DROP: 5 SOLUTION OPHTHALMIC at 05:50

## 2018-08-01 RX ADMIN — DOCUSATE SODIUM 100 MG: 100 CAPSULE, LIQUID FILLED ORAL at 09:14

## 2018-08-01 RX ADMIN — ACETAMINOPHEN 650 MG: 325 TABLET, FILM COATED ORAL at 21:08

## 2018-08-01 RX ADMIN — OXYCODONE HYDROCHLORIDE 5 MG: 5 TABLET ORAL at 06:01

## 2018-08-01 RX ADMIN — POLYETHYLENE GLYCOL (3350) 17 G: 17 POWDER, FOR SOLUTION ORAL at 21:06

## 2018-08-01 RX ADMIN — PREDNISOLONE ACETATE 1 DROP: 10 SUSPENSION/ DROPS OPHTHALMIC at 13:23

## 2018-08-01 RX ADMIN — DILTIAZEM HYDROCHLORIDE 180 MG: 180 CAPSULE, COATED, EXTENDED RELEASE ORAL at 09:14

## 2018-08-01 ASSESSMENT — ACTIVITIES OF DAILY LIVING (ADL)
ADLS_ACUITY_SCORE: 9

## 2018-08-01 NOTE — PROGRESS NOTES
VSS. Alert and orientated X4. Pt lungs remain clear with no SOB. +1 trace edema in the legs with +2 pitting edema in the ankles and feet. Pt had slight discomfort in the right lower abdominal area. Given PRN Tylenol, see MAR. Was also ordered PRN Ambien since pt was unable to sleep last night. Was told to request tonight if needed.

## 2018-08-01 NOTE — PROGRESS NOTES
[facility]    Hospitalist Progress Note      Assessment & Plan   Mrelyn Escamilla is a 84 year old female who was admitted on 7/29/2018.     Principal Problem:    Atrial fibrillation (H)    Assessment: Continuous, heart rate ranging from 100-120.  No chest pain    Plan: We will increase diltiazem to 240 mg daily and continue monitoring.  We discussed that since she continues in this arrhythmia I would recommend that we start anticoagulation.  We will stop Lovenox and start Eliquis 5 mg twice daily.    Active Problems:    Abdominal pain, right upper quadrant    Assessment: Slow improvement    Plan: Suspect constipation is playing a role in this.      Renal cyst, right    Assessment: New    Plan: No further workup per urology.    Insomnia.  Chronic but worsened.  Will trial ambien    # Pain Assessment:  Current Pain Score 8/1/2018   Patient currently in pain? yes   Pain score (0-10) 1   Pain location Abdomen   Pain descriptors Dull   Merlyn cerna pain level was assessed and she currently denies pain.      DVT Prophylaxis: switch to eliquis  Code Status: No Order    Sharath Dinero    Interval History   Abdominal pain is improving overnight.  Had 2 bowel movements overnight.  She is having no chest pain or shortness of breath.  She did have difficulty sleeping last night.  She felt quite restless.  Overall she continues to show slow improvement.    -Data reviewed today: I reviewed all new labs and imaging results over the last 24 hours. I personally reviewed no images or EKG's today.    Physical Exam   Temp: 98  F (36.7  C) Temp src: Tympanic BP: 117/66   Heart Rate: 111 Resp: 15 SpO2: 97 % O2 Device: None (Room air)    Vitals:    07/30/18 1545 07/31/18 0600 08/01/18 0636   Weight: 68.3 kg (150 lb 9.2 oz) 67.5 kg (148 lb 13 oz) 68.7 kg (151 lb 7.3 oz)     Vital Signs with Ranges  Temp:  [97.5  F (36.4  C)-99.5  F (37.5  C)] 98  F (36.7  C)  Heart Rate:  [] 111  Resp:  [14-19] 15  BP:  (100-124)/(58-83) 117/66  SpO2:  [95 %-97 %] 97 %  I/O last 3 completed shifts:  In: 982 [P.O.:50; I.V.:932]  Out: 800 [Urine:800]    Constitutional: Comfortable sitting up in bed  Respiratory: Clear without wheezing  Cardiovascular: tachy, irregularly irregular.  No murmur but difficult due to rate.  GI: Soft, nontender, nondistended.  No masses.  Skin/Integumen: Trace pedal edema in her legs bilaterally up to mid shins.    Medications       apixaban ANTICOAGULANT  5 mg Oral BID     aspirin  81 mg Oral Daily     [START ON 8/2/2018] diltiazem  240 mg Oral Daily     docusate sodium  100 mg Oral BID     ketorolac  1 drop Left Eye TID     prednisoLONE acetate  1 drop Left Eye TID       Data     Recent Labs  Lab 07/31/18  0615 07/29/18  2155   WBC  --  8.7   HGB  --  9.7*   MCV  --  90   PLT  --  249    137   POTASSIUM 4.1 3.9   CHLORIDE 105 104   CO2 26 24   BUN 7 16   CR 0.59* 0.62   ANIONGAP 6 9   ALEXSANDER 8.7 8.7   * 136*   ALBUMIN 2.9* 2.9*   PROTTOTAL 5.6* 5.7*   BILITOTAL 0.3 0.4   ALKPHOS 100 107*   ALT 28 34   AST 22 29   LIPASE  --  22       No results found for this or any previous visit (from the past 24 hour(s)).

## 2018-08-01 NOTE — PLAN OF CARE
Problem: Patient Care Overview  Goal: Plan of Care/Patient Progress Review  Outcome: No Change  Client having difficultly staying asleep. Warm pack applied to rt abdomin and warm mild offered, but declined. No PRN orders available for sleep aid.

## 2018-08-01 NOTE — PLAN OF CARE
Problem: Patient Care Overview  Goal: Plan of Care/Patient Progress Review  Outcome: Therapy, progress toward functional goals is gradual  VSS. Lungs are clear with +2 pitting edema in the ankles and feet and +1 edema in the legs. Alert and orientated X4. Had a hard time sleeping last night due to sore back and restlessness. Pt hoping to talk to doctor about getting something to help her sleep. Complained of pain 1/10 on the right lower abdominal area. Denied pain medications. Hot pack placed.

## 2018-08-02 PROCEDURE — 25000132 ZZH RX MED GY IP 250 OP 250 PS 637: Mod: GY | Performed by: FAMILY MEDICINE

## 2018-08-02 PROCEDURE — 99232 SBSQ HOSP IP/OBS MODERATE 35: CPT | Performed by: FAMILY MEDICINE

## 2018-08-02 PROCEDURE — A9270 NON-COVERED ITEM OR SERVICE: HCPCS | Mod: GY | Performed by: FAMILY MEDICINE

## 2018-08-02 PROCEDURE — 12000000 ZZH R&B MED SURG/OB

## 2018-08-02 RX ORDER — METOPROLOL TARTRATE 25 MG/1
25 TABLET, FILM COATED ORAL 2 TIMES DAILY
Status: DISCONTINUED | OUTPATIENT
Start: 2018-08-02 | End: 2018-08-03 | Stop reason: HOSPADM

## 2018-08-02 RX ADMIN — PREDNISOLONE ACETATE 1 DROP: 10 SUSPENSION/ DROPS OPHTHALMIC at 14:15

## 2018-08-02 RX ADMIN — DOCUSATE SODIUM 100 MG: 100 CAPSULE, LIQUID FILLED ORAL at 21:07

## 2018-08-02 RX ADMIN — DOCUSATE SODIUM 100 MG: 100 CAPSULE, LIQUID FILLED ORAL at 09:43

## 2018-08-02 RX ADMIN — PREDNISOLONE ACETATE 1 DROP: 10 SUSPENSION/ DROPS OPHTHALMIC at 21:12

## 2018-08-02 RX ADMIN — APIXABAN 5 MG: 5 TABLET, FILM COATED ORAL at 21:07

## 2018-08-02 RX ADMIN — PREDNISOLONE ACETATE 1 DROP: 10 SUSPENSION/ DROPS OPHTHALMIC at 07:00

## 2018-08-02 RX ADMIN — KETOROLAC TROMETHAMINE 1 DROP: 5 SOLUTION OPHTHALMIC at 06:51

## 2018-08-02 RX ADMIN — ASPIRIN 81 MG: 81 TABLET ORAL at 09:42

## 2018-08-02 RX ADMIN — DILTIAZEM HYDROCHLORIDE 240 MG: 120 CAPSULE, COATED, EXTENDED RELEASE ORAL at 09:43

## 2018-08-02 RX ADMIN — ACETAMINOPHEN 650 MG: 325 TABLET, FILM COATED ORAL at 16:31

## 2018-08-02 RX ADMIN — METOPROLOL TARTRATE 25 MG: 25 TABLET ORAL at 21:08

## 2018-08-02 RX ADMIN — APIXABAN 5 MG: 5 TABLET, FILM COATED ORAL at 09:43

## 2018-08-02 RX ADMIN — KETOROLAC TROMETHAMINE 1 DROP: 5 SOLUTION OPHTHALMIC at 14:10

## 2018-08-02 RX ADMIN — KETOROLAC TROMETHAMINE 1 DROP: 5 SOLUTION OPHTHALMIC at 21:07

## 2018-08-02 RX ADMIN — ACETAMINOPHEN 650 MG: 325 TABLET, FILM COATED ORAL at 07:02

## 2018-08-02 RX ADMIN — ACETAMINOPHEN 650 MG: 325 TABLET, FILM COATED ORAL at 12:14

## 2018-08-02 RX ADMIN — METOPROLOL TARTRATE 25 MG: 25 TABLET ORAL at 09:43

## 2018-08-02 ASSESSMENT — ACTIVITIES OF DAILY LIVING (ADL)
ADLS_ACUITY_SCORE: 9

## 2018-08-02 NOTE — PROGRESS NOTES
Discussed with daughters.  Reviewed plan which they are comfortable with.  Also discussed CODE STATUS which they will be discussing.  Will have her full code for now.

## 2018-08-02 NOTE — PLAN OF CARE
Problem: Arrhythmia/Dysrhythmia (Symptomatic) (Adult)  Goal: Signs and Symptoms of Listed Potential Problems Will be Absent, Minimized or Managed (Arrhythmia/Dysrhythmia)  Signs and symptoms of listed potential problems will be absent, minimized or managed by discharge/transition of care (reference Arrhythmia/Dysrhythmia (Symptomatic) (Adult) CPG).   Heart rhythm and rate remain irregular. Refer to flowsheets and telemetry for values. Asymptomatic. Denies pain/discomfort. Gait steady.

## 2018-08-02 NOTE — PROGRESS NOTES
Pt VSS. Lungs remain clear and heart rhythm irregular. HR and BP both decreased to 81 and 97/51 after admin of medications. Continues to have pain 1/10 in her mid back. Is being given PRN Tylenol as needed and use of hot packs. Was able to ambulate in the halls with standby assist with no issues.

## 2018-08-02 NOTE — PROGRESS NOTES
[facility]    Hospitalist Progress Note      Assessment & Plan   Merlyn Escamilla is a 84 year old female who was admitted on 7/29/2018.     Principal Problem:    Atrial fibrillation (H)    Assessment: Continuous, heart rate ranging from .  No chest pain    Plan: She continues to have tachycardia with any type of ambulation.  Will add metoprolol 25 mg twice daily today.  Continue telemetry.  Encourage ambulation.    Active Problems:    Abdominal pain, right upper quadrant    Assessment: essentially resolved    Plan: Likely constipation related.      Renal cyst, right    Assessment: New    Plan: No further workup per urology.    Insomnia.  Chronic but worsened.  Will trial ambien    # Pain Assessment:  Current Pain Score 8/2/2018   Patient currently in pain? yes   Pain score (0-10) 5   Pain location Back   Pain descriptors Dull;Aching   Merlyn cerna pain level was assessed and she currently denies pain.      DVT Prophylaxis: switch to eliquis  Code Status: No Order    Sharath Dinero    Interval History   Her abdominal pain has essentially resolved.  She continues to have tachycardia with ambulation but her resting heart rate has improved down to the  range.  She has had no chest pain shortness of breath.  She is continuing to have difficulty sleeping due to her bed being uncomfortable despite using Ambien last night for the first time.  Will discuss further with her daughter later today.    -Data reviewed today: I reviewed all new labs and imaging results over the last 24 hours. I personally reviewed no images or EKG's today.    Physical Exam   Temp: 97.8  F (36.6  C) Temp src: Tympanic BP: 115/72 Pulse: 117 Heart Rate: 120 Resp: 16 SpO2: 95 % O2 Device: None (Room air)    Vitals:    07/31/18 0600 08/01/18 0636 08/02/18 0500   Weight: 67.5 kg (148 lb 13 oz) 68.7 kg (151 lb 7.3 oz) 67.6 kg (149 lb 0.5 oz)     Vital Signs with Ranges  Temp:  [96.9  F (36.1  C)-100  F (37.8  C)] 97.8  F (36.6   C)  Pulse:  [102-117] 117  Heart Rate:  [] 120  Resp:  [15-20] 16  BP: (111-121)/(60-72) 115/72  SpO2:  [94 %-98 %] 95 %  I/O last 3 completed shifts:  In: -   Out: 800 [Urine:800]    Constitutional: Comfortable sitting up in bed  Respiratory: Clear without wheezing  Cardiovascular: tachy, irregularly irregular.  No murmur but difficult due to rate.  GI: Soft, nontender, nondistended.  No masses.  Skin/Integumen: Pedal edema has essentially resolved.    Medications       apixaban ANTICOAGULANT  5 mg Oral BID     aspirin  81 mg Oral Daily     diltiazem  240 mg Oral Daily     docusate sodium  100 mg Oral BID     ketorolac  1 drop Left Eye TID     metoprolol tartrate  25 mg Oral BID     prednisoLONE acetate  1 drop Left Eye TID       Data     Recent Labs  Lab 07/31/18  0615 07/29/18  2155   WBC  --  8.7   HGB  --  9.7*   MCV  --  90   PLT  --  249    137   POTASSIUM 4.1 3.9   CHLORIDE 105 104   CO2 26 24   BUN 7 16   CR 0.59* 0.62   ANIONGAP 6 9   ALEXSANDER 8.7 8.7   * 136*   ALBUMIN 2.9* 2.9*   PROTTOTAL 5.6* 5.7*   BILITOTAL 0.3 0.4   ALKPHOS 100 107*   ALT 28 34   AST 22 29   LIPASE  --  22       No results found for this or any previous visit (from the past 24 hour(s)).

## 2018-08-02 NOTE — PLAN OF CARE
Problem: Patient Care Overview  Goal: Plan of Care/Patient Progress Review  Outcome: No Change  VSS. HR remains elevated and heart rhythm irregular. Alert and orientated X4. Lungs are clear and slight trace edema in the legs, ankles, and feet. Complains of pain as 5/10 in the middle of the back. Was given hot pack. Pt was unable to sleep last night even with use of Ambien due to sore back and restlessness. Pt is now sleeping. Being given Tylenol, as needed.

## 2018-08-03 VITALS
HEART RATE: 82 BPM | OXYGEN SATURATION: 96 % | DIASTOLIC BLOOD PRESSURE: 74 MMHG | BODY MASS INDEX: 28.16 KG/M2 | WEIGHT: 149.03 LBS | TEMPERATURE: 97.7 F | RESPIRATION RATE: 16 BRPM | SYSTOLIC BLOOD PRESSURE: 130 MMHG

## 2018-08-03 PROCEDURE — A9270 NON-COVERED ITEM OR SERVICE: HCPCS | Mod: GY | Performed by: FAMILY MEDICINE

## 2018-08-03 PROCEDURE — 25000132 ZZH RX MED GY IP 250 OP 250 PS 637: Mod: GY | Performed by: FAMILY MEDICINE

## 2018-08-03 PROCEDURE — 99239 HOSP IP/OBS DSCHRG MGMT >30: CPT | Performed by: FAMILY MEDICINE

## 2018-08-03 RX ORDER — METOPROLOL TARTRATE 25 MG/1
25 TABLET, FILM COATED ORAL 2 TIMES DAILY
Qty: 60 TABLET | Refills: 3 | Status: SHIPPED | OUTPATIENT
Start: 2018-08-03 | End: 2018-08-24 | Stop reason: ALTCHOICE

## 2018-08-03 RX ORDER — DILTIAZEM HYDROCHLORIDE 240 MG/1
240 CAPSULE, COATED, EXTENDED RELEASE ORAL DAILY
Qty: 30 CAPSULE | Refills: 3 | Status: SHIPPED | OUTPATIENT
Start: 2018-08-03 | End: 2018-08-24 | Stop reason: ALTCHOICE

## 2018-08-03 RX ADMIN — DOCUSATE SODIUM 100 MG: 100 CAPSULE, LIQUID FILLED ORAL at 09:16

## 2018-08-03 RX ADMIN — METOPROLOL TARTRATE 25 MG: 25 TABLET ORAL at 09:16

## 2018-08-03 RX ADMIN — APIXABAN 5 MG: 5 TABLET, FILM COATED ORAL at 09:16

## 2018-08-03 RX ADMIN — ASPIRIN 81 MG: 81 TABLET ORAL at 09:16

## 2018-08-03 RX ADMIN — DILTIAZEM HYDROCHLORIDE 240 MG: 120 CAPSULE, COATED, EXTENDED RELEASE ORAL at 09:16

## 2018-08-03 RX ADMIN — ACETAMINOPHEN 650 MG: 325 TABLET, FILM COATED ORAL at 04:07

## 2018-08-03 RX ADMIN — PREDNISOLONE ACETATE 1 DROP: 10 SUSPENSION/ DROPS OPHTHALMIC at 05:56

## 2018-08-03 RX ADMIN — KETOROLAC TROMETHAMINE 1 DROP: 5 SOLUTION OPHTHALMIC at 05:51

## 2018-08-03 ASSESSMENT — ACTIVITIES OF DAILY LIVING (ADL)
ADLS_ACUITY_SCORE: 9

## 2018-08-03 NOTE — PROGRESS NOTES
[facility] DISCHARGE NOTE    Patient discharged to home at 9:40 AM via wheel chair. Accompanied by other:family friend and staff. Discharge instructions reviewed with patient, opportunity offered to ask questions. Prescriptions sent to patients preferred pharmacy and picked up before leaving. All belongings sent with patient.    Saumya Esparza

## 2018-08-03 NOTE — PLAN OF CARE
Problem: Cardiac Disease Comorbidity  Goal: Cardiac Disease  Patient comorbidity will be monitored for signs and symptoms of Cardiac Disease.  Problems will be absent, minimized or managed by discharge/transition of care.   Vital signs stable. Independent in room. Gait steady. Pain controlled with tylenol. Rate and rhythm improved. No edema this am.

## 2018-08-03 NOTE — DISCHARGE SUMMARY
[facility]    Discharge Summary  Hospitalist    Date of Admission:  7/29/2018  Date of Discharge:  8/3/2018  Discharging Provider: Sharath Dinero  Date of Service (when I saw the patient): 08/03/18    Discharge Diagnoses   Principal Problem:    Atrial fibrillation (H) (7/30/2018)  Active Problems:    Abdominal pain, right upper quadrant (7/31/2018)    Slow transit constipation (7/31/2018)    Renal cyst, right (7/31/2018)      History of Present Illness   Merlyn Escamilla is an 84 year old female who presented with atrial fibrillation.    Hospital Course   Merlyn Escamilla was admitted on 7/29/2018.  The following problems were addressed during her hospitalization:    Atrial fibrillation.  This was new.  She had an echocardiogram which will be discussed below.  She initially was on diltiazem drip and switch to oral diltiazem.  We increased her diltiazem and added metoprolol.  With the addition of metoprolol she is able to maintain normal heart rate with activity and while at rest.  She tolerated this well.  We discussed with her family the pros and cons of anticoagulation and her options and we elected to start Eliquis.  She will be discharged home today with home care to monitor her blood pressure, pulse.  She will follow-up next week for reassessment.    Right renal cyst.  She also initially had abdominal pain and had a CT scan performed which showed a large right renal cyst.  This was discussed with urology who recommended no further workup for this.  Her abdominal pain is likely due to constipation and this resolved throughout her stay with laxatives.    The remainder of her chronic medical problems are stable and no other changes were made to her medication regimen.    # Discharge Pain Plan:   - Patient currently has NO PAIN and is not being prescribed pain medications on discharge.    Sharath Dinero MD    Significant Results and Procedures   Echo  Interpretation Summary  Technically difficult  study.     Left ventricular size is normal. Left ventricular wall thickness is normal.  The Ejection Fraction is estimated at 35-40%. There is diffuse hypokinesis but  the septum appears comparatively more hypokinetic.  Right ventricular function, chamber size, wall motion, and thickness are  normal.  Mild to moderate mitral insufficiency is present. Moderate to severe tricuspid  insufficiency is present.  The inferior vena cava was dilated at 2.8 cm without respiratory variability,  consistent with increased right atrial pressure. Ascending aorta severely  dilated at 4.8 cm. No pericardial effusion is present.     Previous study not available for comparison.    Pending Results   none  Unresulted Labs Ordered in the Past 30 Days of this Admission     No orders found from 5/30/2018 to 7/30/2018.          Code Status   Full Code       Primary Care Physician   Sharath Dinero    Physical Exam   Temp: 98.2  F (36.8  C) Temp src: Tympanic BP: 123/74 Pulse: 82 Heart Rate: 113 Resp: 16 SpO2: 92 % O2 Device: None (Room air)    Vitals:    07/31/18 0600 08/01/18 0636 08/02/18 0500   Weight: 67.5 kg (148 lb 13 oz) 68.7 kg (151 lb 7.3 oz) 67.6 kg (149 lb 0.5 oz)     Vital Signs with Ranges  Temp:  [96.3  F (35.7  C)-98.8  F (37.1  C)] 98.2  F (36.8  C)  Pulse:  [82] 82  Heart Rate:  [] 113  Resp:  [16-18] 16  BP: ()/(51-74) 123/74  SpO2:  [92 %-97 %] 92 %  I/O last 3 completed shifts:  In: 1080 [P.O.:1080]  Out: 1325 [Urine:1325]    Constitutional: Comfortable, sitting up in bed.  Eyes: PERRL, EOMI  ENT: MMM  Respiratory: clear without wheezing.  Cardiovascular: regular without murmurs  GI: soft, NT, ND.  +BS.    Discharge Disposition   Discharged to home  Condition at discharge: Stable                Documentation of a Face-to-Face Physician Encounter August 3, 2018    Merlyn DIAZ Andi  8/22/1933  [id number]    I certify that this patient is under my care and that I, or a nurse practitioner or physician's assistant  working with me, had a face-to-face encounter that meets the physician face-to-face encounter requirements with this patient on: 8/3/2018.    The encounter with the patient was in whole, or in part, for the following medical condition, which is the primary reason for home health care:  Patient Active Problem List   Diagnosis     AVM (arteriovenous malformation) of colon     History of colonic polyps     Osteoarthrosis     History of carpal tunnel surgery of left wrist     S/P total knee arthroplasty     Primary osteoarthritis of both first carpometacarpal joints     Primary osteoarthritis of both hands     Atrial fibrillation (H)     Abdominal pain, right upper quadrant     Slow transit constipation     Renal cyst, right       I certify that, based on my findings, the following services are medically necessary home health services: Nursing    My clinical findings support the need for the above services because: new onset atrial fibrillation.  Need for vitals monitoring and monitoring for side effects from new anticoagulant.    Further, I certify that my clinical findings support that this patient is homebound (i.e. absences from home require considerable and taxing effort and are for medical reasons or [employer] services or infrequently or of short duration when for other reasons) because: unable to drive.      Physician signature ___________________________________   August 3, 2018  Physician name: Sharath Dinero MD    Fax ([phone]) or scan/email ([email or web address]) this completed document to [facility] within 24 hours of the referral date.  Questions: [phone].      Consultations This Hospital Stay   None    Time Spent on this Encounter   I, Sharath Dinero, personally saw the patient today and spent greater than 30 minutes discharging this patient.    Discharge Orders     Home care nursing referral     Reason for your hospital stay   Atrial fibrillation.     Follow-up and recommended labs and  tests    Follow up with primary care provider, Sharath Dinero, within 7 days for hospital follow- up.  No follow up labs or test are needed.     Diet   Follow this diet upon discharge: Orders Placed This Encounter     Regular Diet Adult       Discharge Medications   Current Discharge Medication List      START taking these medications    Details   diltiazem 240 MG 24 hr capsule Take 1 capsule (240 mg) by mouth daily  Qty: 30 capsule, Refills: 3    Associated Diagnoses: Atrial fibrillation, unspecified type (H)      metoprolol tartrate (LOPRESSOR) 25 MG tablet Take 1 tablet (25 mg) by mouth 2 times daily  Qty: 60 tablet, Refills: 3    Associated Diagnoses: Atrial fibrillation, unspecified type (H)      rivaroxaban ANTICOAGULANT (XARELTO) 20 MG TABS tablet Take 1 tablet (20 mg) by mouth daily (with dinner)  Qty: 60 tablet, Refills: 3    Associated Diagnoses: Atrial fibrillation, unspecified type (H)         CONTINUE these medications which have NOT CHANGED    Details   aspirin EC 81 MG EC tablet Take 81 mg by mouth daily with food      ketorolac (ACULAR) 0.5 % ophthalmic solution Place 1 drop Into the left eye 3 times daily POST-CATARACT SURGERY UNTIL 8/22/18 FOR LEFT EYE      Multiple Vitamin (MULTI-VITAMINS) TABS Take 1 tablet by mouth daily      naproxen sodium (ALEVE) 220 MG tablet Take 220 mg by mouth 2 times daily as needed (PAIN) TAKE WITH FOOD      prednisoLONE acetate (PRED FORTE) 1 % ophthalmic susp Place 1 drop Into the left eye 3 times daily POST-CATARACT SURGERY UNTIL 8/22/18 FOR LEFT EYE      moxifloxacin (VIGAMOX) 0.5 % ophthalmic solution Place 1 drop into the right eye 4 times daily BEGIN THREE DAYS PRIOR TO SURGERY AND CONTINUE FOUR TIMES DAILY FOR 1 WEEK AFTER SURGERY           Allergies   Allergies   Allergen Reactions     Morphine      Other reaction(s): Muscle Weakness     Phenylbutazone Unknown     Data   Most Recent 3 CBC's:  Recent Labs   Lab Test  07/29/18   2155  07/14/18   1108  01/26/18    1642   10/04/14   0554   WBC  8.7  5.0   --    --    --    HGB  9.7*  13.0  12.9   < >  8.2*   MCV  90  92  91   < >  85   PLT  249  162   --    --   210    < > = values in this interval not displayed.      Most Recent 3 BMP's:  Recent Labs   Lab Test  07/31/18   0615  07/29/18 2155 01/26/18   1647   NA  137  137  140   POTASSIUM  4.1  3.9  4.4   CHLORIDE  105  104  105   CO2  26  24  25   BUN  7  16  17   CR  0.59*  0.62  0.59*   ANIONGAP  6  9   --    ALEXSANDER  8.7  8.7  9.1   GLC  112*  136*  106*     Most Recent 2 LFT's:  Recent Labs   Lab Test  07/31/18 0615  07/29/18 2155   AST  22  29   ALT  28  34   ALKPHOS  100  107*   BILITOTAL  0.3  0.4     Most Recent INR's and Anticoagulation Dosing History:  Anticoagulation Dose History     There is no flowsheet data to display.        Most Recent 3 Troponin's:No lab results found.  Most Recent Cholesterol Panel:No lab results found.  Most Recent 6 Bacteria Isolates From Any Culture (See EPIC Reports for Culture Details):  Recent Labs   Lab Test  07/14/18   1103   CULT  10,000 to 50,000 colonies/mL  mixed urogenital lizz       Most Recent TSH, T4 and A1c Labs:No lab results found.  Results for orders placed or performed during the hospital encounter of 07/29/18   CTA Chest Abdomen Pelvis w Contrast    Narrative    EXAM:    CT Angiography Abdomen and Pelvis With Intravenous Contrast    CLINICAL HISTORY:    84 years old, female; Pain; Abdominal pain; Localized; Right upper quadrant   (ruq); Additional info: Rt flank pain    TECHNIQUE:    Axial computed tomographic angiography images of the abdomen and pelvis with   intravenous contrast.  All CT scans at this facility use at least one of these   dose optimization techniques: automated exposure control; mA and/or kV   adjustment per patient size (includes targeted exams where dose is matched to   clinical indication); or iterative reconstruction.    MIP reconstructed images were created and reviewed.    Coronal and  sagittal reformatted images were created and reviewed.    CONTRAST:    100 mL of omni 350 administered intravenously.      COMPARISON:    No relevant prior studies available.    FINDINGS:     VASCULATURE:    Aorta:  No acute findings.  No dissection.    Celiac trunk and mesenteric arteries:  No acute findings.  No occlusion or   significant stenosis.    Renal arteries:  No acute findings.    Iliac arteries:  No acute findings.      Lung bases:  Unremarkable.  No mass.  No consolidation.     ABDOMEN:    Liver:  Unremarkable.  No mass.    Gallbladder and bile ducts:  Unremarkable.  No calcified stones.  No ductal   dilation.    Pancreas:  Unremarkable.    Spleen:  Unremarkable.    Adrenals:  Unremarkable.    Kidneys and ureters:  Very large simple right renal cyst, measuring 9.5 cm in   maximal diameter.    Stomach and bowel:   Large volume of fecal material throughout the colon..     PELVIS:    Appendix:  No abnormal appendix.    Bladder:  Unremarkable.    Reproductive:  Unremarkable as visualized.     ABDOMEN and PELVIS:    Intraperitoneal space:  Unremarkable.  No significant fluid collection.  No   free air.    Bones/joints:  No acute fracture.  No dislocation.    Soft tissues:  Unremarkable as visualized.    Lymph nodes:  Unremarkable.      Impression    IMPRESSION:         Very large simple right renal cyst, measuring 9.5 cm in maximal diameter.    Otherwise, no dissection or other acute intra-abdominal pathology.    _______________________________________________    EXAM:    CT Angiography Chest With Intravenous Contrast    CLINICAL HISTORY:    84 years old, female; Pain; Abdominal pain; Localized; Right upper quadrant   (ruq); Additional info: Rt flank pain    TECHNIQUE:    Axial computed tomographic angiography images of the chest with intravenous   contrast using pulmonary embolism protocol.    MIP reconstructed images were created and reviewed.    CONTRAST:    100 mL of omni 350 administered intravenously.   100 mL of omni 350   administered intravenously.      COMPARISON:    No relevant prior studies available.    FINDINGS:    Pulmonary arteries:  Unremarkable.  No acute pulmonary embolism.    Aorta:  Aneurysmal dilatation of the ascending thoracic aorta, measuring 4.9   cm in maximal diameter.  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  Heterogeneity of the contrast   and blood within the thoracic aorta is probably due to slow flow and turbulence   due to ectasia of the lumen.  This finding does not represent a dissection.    Lungs:  Unremarkable.  No focal consolidation.    Pleural space:  Unremarkable.  No significant effusion.  No pneumothorax.    Heart:  Unremarkable.    Bones/joints:  No acute fracture.  No dislocation.    Soft tissues:  Unremarkable as visualized.    Lymph nodes:  Unremarkable.    IMPRESSION:         Aneurysmal dilatation of the ascending thoracic aorta, measuring 4.9 cm in   maximal diameter.  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    Otherwise, no dissection, PE, or other acute intrathoracic pathology.    THIS DOCUMENT HAS BEEN ELECTRONICALLY SIGNED BY ALEXANDRE GARRETT MD

## 2018-08-03 NOTE — PHARMACY - DISCHARGE MEDICATION RECONCILIATION AND EDUCATION
Pharmacy:  Discharge Counseling and Medication Reconciliation    Merlyn Escamilla  [address] [place] [address]  [phone] (home)   84 year old female  PCP: Sharath Dinero    Allergies: Morphine and Phenylbutazone    Discharge Counseling:    Pharmacist met with patient today to review the medication portion of the After Visit Summary (with an emphasis on NEW medications) and to address patient's questions/concerns.    Summary of Education: Provided verbal and printed information for diltiazem, metoprolol, and rivaroxaban    Materials Provided:  MedCounselor sheets printed from Clinical Pharmacology on: diltiazem, metoprolol and rivaroxaban    Discharge Medication Reconciliation:    Rianna Pastrana [facility] has reviewed the patient's discharge medication orders and has compared them to the inpatient medication administration record and to what the patient was taking prior to admission - any discrepancies have been resolved.    It has been determined that the patient has an adequate supply of medications available or which can be obtained from the patient's preferred pharmacy, which she has confirmed as: [facility]. An updated medication list will be faxed to the patient's pharmacy. Eye medications were relabeled for take home.    Thank you for the consult.    Rianna Pastrana [facility]........August 3, 2018 12:32 PM

## 2018-08-03 NOTE — PHARMACY - DISCHARGE MEDICATION RECONCILIATION
[facility]  Part of [address] [address]    August 3, 2018    Dear Pharmacist,    Your customer, Merlyn Escamilla, born on 8/22/1933, was recently discharged from [facility].  We have updated her medication list and want to alert you to the following:       Review of your medicines      START taking       Dose / Directions    diltiazem 240 MG 24 hr capsule   Used for:  Atrial fibrillation, unspecified type (H)        Dose:  240 mg   Take 1 capsule (240 mg) by mouth daily   Quantity:  30 capsule   Refills:  3       metoprolol tartrate 25 MG tablet   Commonly known as:  LOPRESSOR   Used for:  Atrial fibrillation, unspecified type (H)        Dose:  25 mg   Take 1 tablet (25 mg) by mouth 2 times daily   Quantity:  60 tablet   Refills:  3       rivaroxaban ANTICOAGULANT 20 MG Tabs tablet   Commonly known as:  XARELTO   Used for:  Atrial fibrillation, unspecified type (H)        Dose:  20 mg   Take 1 tablet (20 mg) by mouth daily (with dinner)   Quantity:  60 tablet   Refills:  3         CONTINUE these medicines which have NOT CHANGED       Dose / Directions    ALEVE 220 MG tablet   Generic drug:  naproxen sodium        Dose:  220 mg   Take 220 mg by mouth 2 times daily as needed (PAIN) TAKE WITH FOOD   Refills:  0       aspirin 81 MG EC tablet        Dose:  81 mg   Take 81 mg by mouth daily with food   Refills:  0       ketorolac 0.5 % ophthalmic solution   Commonly known as:  ACULAR   Indication:  Inflammation of the Eye Following Surgery        Dose:  1 drop   Place 1 drop Into the left eye 3 times daily POST-CATARACT SURGERY UNTIL 8/22/18 FOR LEFT EYE   Refills:  0       moxifloxacin 0.5 % ophthalmic solution   Commonly known as:  VIGAMOX        Dose:  1 drop   Place 1 drop into the right eye 4 times daily BEGIN THREE DAYS PRIOR TO SURGERY AND CONTINUE FOUR TIMES DAILY FOR 1 WEEK AFTER SURGERY   Refills:  0       MULTI-VITAMINS Tabs         Dose:  1 tablet   Take 1 tablet by mouth daily   Refills:  0       prednisoLONE acetate 1 % ophthalmic susp   Commonly known as:  PRED FORTE        Dose:  1 drop   Place 1 drop Into the left eye 3 times daily POST-CATARACT SURGERY UNTIL 8/22/18 FOR LEFT EYE   Refills:  0            Where to get your medicines      These medications were sent to [facility], - [place], [place] - [address]  [address], [place] [address]     Phone:  [phone]      diltiazem 240 MG 24 hr capsule     metoprolol tartrate 25 MG tablet     rivaroxaban ANTICOAGULANT 20 MG Tabs tablet             We also reviewed Merlyn Escamilla's allergy list and updated it as needed:  Allergies: Morphine and Phenylbutazone    Thank you for continuing to care for Merlyn Escamilla.  We look forward to working together with you in the future.    Sincerely,  Rianna Pastrana, [facility]

## 2018-08-05 ENCOUNTER — TELEPHONE (OUTPATIENT)
Dept: FAMILY MEDICINE | Facility: OTHER | Age: 83
End: 2018-08-05

## 2018-08-05 DIAGNOSIS — I48.91 ATRIAL FIBRILLATION, UNSPECIFIED TYPE (H): Primary | ICD-10-CM

## 2018-08-05 PROCEDURE — G0180 MD CERTIFICATION HHA PATIENT: HCPCS | Performed by: FAMILY MEDICINE

## 2018-08-05 NOTE — TELEPHONE ENCOUNTER
Request for Home Care Skilled Nursing orders as follows:    SN eval and treat date: 8/5/18    Continuation frequency =  ____3___ X week X _____1__ weeks, 2 x 1 week             Effective date=8/5/18    Interventions include:      Assessment    Medication management  O2 sat monitoring (all disciplines as needed)  Patient/caregiver education  Fall risk: instruct on fall prevention strategies, home safety, assess environment Observe for signs and symptoms of depression  Instruct on pain relief measures and assess pain with each visit, if has pain. Assess effectiveness of medications.  Instruct on pressure relief methods to prevent pressure ulcers    Please call with questions or concerns. [phone]  Kenia Wang RN on 8/5/2018 at 3:11 PM

## 2018-08-05 NOTE — TELEPHONE ENCOUNTER
URGENT: Response needed within 24 hours    This timeframe is established by CMS as  best practice  for the delivery of home health care. The home health clinician may need to contact you again if this timeframe is not met.      S:    Medication reconciliation discrepancies and/or drug interactions/contraindications have been identified.  Home Care s drug regime review has revealed significant medication issues.    B:    You are being contacted for orders related to medication issues.     A:     Medication Interactions   Aspirin/Acular   Aspirin/Aleve   Aspirin/Pred Forte   Aspirin/Xarelto   Cardizem/Metoprolol   Cardizem/Xarelto   Metoprolol/Aleve   Acular/Aleve   Acular/Xarelto   Aleve/Xarelto     Please Clarify Xarelto dosage - her med list says 20mg every day,  The bottle she got from pharmacy is for 15mg BID.          R:    Please evaluate this information and indicate below whether or not changes are required. A copy of the patient's drug interaction/contraindications report is available upon request.     Thank you for your time. Please call with questions or concerns. [phone]  Kenia Wang RN on 8/5/2018 at 3:09 PM

## 2018-08-06 NOTE — TELEPHONE ENCOUNTER
[facility] states they have two prescriptions from you the first was for Xarelto 15mg BID for 3 weeks and switch to the 20mg every day once that is done.  Is this correct?

## 2018-08-09 ENCOUNTER — OFFICE VISIT (OUTPATIENT)
Dept: FAMILY MEDICINE | Facility: OTHER | Age: 83
End: 2018-08-09
Attending: FAMILY MEDICINE
Payer: COMMERCIAL

## 2018-08-09 VITALS
WEIGHT: 141.4 LBS | DIASTOLIC BLOOD PRESSURE: 68 MMHG | TEMPERATURE: 98.6 F | BODY MASS INDEX: 26.72 KG/M2 | SYSTOLIC BLOOD PRESSURE: 126 MMHG | HEART RATE: 64 BPM

## 2018-08-09 DIAGNOSIS — I71.20 THORACIC AORTIC ANEURYSM WITHOUT RUPTURE (H): ICD-10-CM

## 2018-08-09 DIAGNOSIS — R60.9 DEPENDENT EDEMA: ICD-10-CM

## 2018-08-09 DIAGNOSIS — I48.0 PAROXYSMAL ATRIAL FIBRILLATION (H): Primary | ICD-10-CM

## 2018-08-09 PROBLEM — R10.11 ABDOMINAL PAIN, RIGHT UPPER QUADRANT: Status: RESOLVED | Noted: 2018-07-31 | Resolved: 2018-08-09

## 2018-08-09 PROCEDURE — 99214 OFFICE O/P EST MOD 30 MIN: CPT | Performed by: FAMILY MEDICINE

## 2018-08-09 PROCEDURE — G0463 HOSPITAL OUTPT CLINIC VISIT: HCPCS

## 2018-08-09 RX ORDER — FUROSEMIDE 20 MG
20 TABLET ORAL DAILY
Qty: 30 TABLET | Refills: 1 | Status: SHIPPED | OUTPATIENT
Start: 2018-08-09 | End: 2018-08-20

## 2018-08-09 ASSESSMENT — ENCOUNTER SYMPTOMS
GASTROINTESTINAL NEGATIVE: 1
RESPIRATORY NEGATIVE: 1
PALPITATIONS: 0
CONSTITUTIONAL NEGATIVE: 1

## 2018-08-09 NOTE — PATIENT INSTRUCTIONS
Treating a Thoracic Aortic Aneurysm (TAA)  Endovascular Graft  What is an aneurysm?  An aneurysm is a weak spot in the wall of an artery (blood vessel). The wall expands, or balloons out. If not treated, in time it may burst. This can cause serious bleeding and sometimes death.   What is a thoracic aortic aneurysm (TAA)?  A TAA is an aneurysm in the upper part of the aorta, the main artery that runs down the center of your body. You may have no symptoms, or you may feel:    Pain or tenderness in the chest, belly or back.    A deep, steady ache that may get better as you change positions.    Coughing, hoarseness, feeling short of breath.     Thoracic aortic aneurysm (TAA) repair  If you have a TAA, your doctor may place a stent inside the weak part of the aorta. A stent is a fabric tube with a metal frame.  1. We will give you medicine to help you sleep. Then, the doctor will make a small cut in your groin area. In some cases there will be two cuts, one on either side.  2. The doctor then inserts pencil-sized tubes through the cuts. He or she uses the tubes to carry the stent to the aneurysm.  3. The doctor places the stent so that it lines the inside of the artery wall. This supports the wall and prevents blood from flowing into the aneurysm.    .  For informational purposes only. Not to replace the advice of your health care provider. Illustrations Illustration copyright (c) [email or web address]  [employer]. Text copyright   2016 [place] [facility] [facility]. All rights reserved. [employer] [id number] - 1/14/16.

## 2018-08-09 NOTE — PROGRESS NOTES
SUBJECTIVE:   Merlyn Escamilla is a 84 year old female who presents to clinic today for the following health issues:  Here for posthospital check.  She was recently hospitalized with right-sided abdominal pain and on admission to the ED was found to be in atrial fibrillation which was subsequently treated and converted to sinus rhythm.  She had also been experiencing bilateral ankle edema and foot swelling for several days before presentation.  Incidentally she was found to have a large right renal cyst that was simple and after consultation with Dr. Hill it was determined that that was not an issue.  Discussion was carried out about anticoagulation and she elected to use Xarelto which she is now taking.  She presents today with her daughter.      On review of chart her cardiac echo also revealed evidence of a thoracic aortic aneurysm and some decrease in ventricular function with mild hypokinesis present.    HPI    Patient Active Problem List   Diagnosis     AVM (arteriovenous malformation) of colon     History of colonic polyps     Osteoarthrosis     History of carpal tunnel surgery of left wrist     S/P total knee arthroplasty     Primary osteoarthritis of both first carpometacarpal joints     Primary osteoarthritis of both hands     Atrial fibrillation (H)     Slow transit constipation     Renal cyst, right     Thoracic aortic aneurysm without rupture (H)     Past Surgical History:   Procedure Laterality Date     APPENDECTOMY OPEN      No Comments Provided     C TOTAL KNEE ARTHROPLASTY Right 10/01/2014    Dr Domingo     CARPAL TUNNEL RELEASE RT/LT Left 01/31/2018 01/31/2018,[id number],OTHER,Left     CARPAL TUNNEL RELEASE RT/LT Right 10/04/2017     COLONOSCOPY  09/2014    Arteriovenous malformations of right, transverse and splenic flexure     ENDOSCOPIC STRIPPING VEIN(S)      No Comments Provided     HYSTERECTOMY TOTAL ABDOMINAL  06/1987    with squamous metaplasia and leiomoma       Social History   Substance  Use Topics     Smoking status: Never Smoker     Smokeless tobacco: Never Used     Alcohol use 3.0 oz/week     Family History   Problem Relation Age of Onset     HEART DISEASE Mother      Heart Disease     HEART DISEASE Father      Heart Disease     HEART DISEASE Brother      HEART DISEASE Brother      HEART DISEASE Brother      HEART DISEASE Brother      Brain Cancer Sister      HEART DISEASE Sister            Review of Systems   Constitutional: Negative.    Respiratory: Negative.         Denies being short of breath at her daughter notes that she seems more winded with ambulation.   Cardiovascular: Positive for peripheral edema. Negative for chest pain and palpitations.   Gastrointestinal: Negative.         OBJECTIVE:     /68  Pulse 64  Temp 98.6  F (37  C)  Wt 141 lb 6.4 oz (64.1 kg)  Breastfeeding? No  BMI 26.72 kg/m2  Body mass index is 26.72 kg/(m^2).  Physical Exam   Constitutional: She appears well-developed. No distress.   Cardiovascular: Normal rate and regular rhythm.    No murmur heard.  Pulmonary/Chest: Effort normal and breath sounds normal. No respiratory distress. She has no wheezes. She has no rales.   Musculoskeletal: She exhibits edema.   2+ edema of feet and ankles as well as shin area bilaterally and symmetric.   Skin: Skin is warm and dry.   Nursing note and vitals reviewed.      Diagnostic Test Results:        Procedure  Echocardiogram with two-dimensional, color and spectral Doppler performed.  Good quality two-dimensional was performed and interpreted.  _____________________________________________________________________________  __        Interpretation Summary  Technically difficult study.     Left ventricular size is normal. Left ventricular wall thickness is normal.  The Ejection Fraction is estimated at 35-40%. There is diffuse hypokinesis but  the septum appears comparatively more hypokinetic.  Right ventricular function, chamber size, wall motion, and thickness  are  normal.  Mild to moderate mitral insufficiency is present. Moderate to severe tricuspid  insufficiency is present.  The inferior vena cava was dilated at 2.8 cm without respiratory variability,  consistent with increased right atrial pressure. Ascending aorta severely  dilated at 4.8 cm. No pericardial effusion is present.     Previous study not available for comparison.  _____________________________________________________________________________  __        Left Ventricle  Left ventricular wall thickness is normal. Left ventricular size is normal.  The Ejection Fraction is estimated at 35-40%. There is diffuse hypokinesis but  the septum appears comparatively more hypokinetic.     Right Ventricle  Right ventricular function, chamber size, wall motion, and thickness are  normal. No regional wall motion abnormalities are noted.     Atria  The right atria appears normal. Moderate left atrial enlargement is present.     Mitral Valve  The mitral valve is normal. Mild mitral annular calcification is present. Mild  to moderate mitral insufficiency is present.        Aortic Valve  Aortic valve is normal in structure and function. The aortic valve is  tricuspid. Trace aortic insufficiency is present.     Tricuspid Valve  Moderate to severe tricuspid insufficiency is present. Right ventricular  systolic pressure is 35mmHg above the right atrial pressure.     Pulmonic Valve  The pulmonic valve is normal. Trace pulmonic insufficiency is present.     Vessels  Moderate dilatation of the aorta is present. Ascending aorta 4.8 cm. The  inferior vena cava was dilated at 2.8 cm without respiratory variability,  consistent with increased right atrial pressure.     Pericardium  No pericardial effusion is present.     Compared to Previous Study  Previous study not available for comparison.        Attestation  I have personally viewed the imaging and agree with the interpretation and  report as documented by the fellow, Kaelyn  Tereza, and/or edited by me.  _____________________________________________________________________________  __     MMode/2D Measurements & Calculations  IVSd: 0.78 cm  LVIDd: 5.0 cm  LVIDs: 3.7 cm  LVPWd: 0.81 cm  FS: 25.3 %  LV mass(C)d: 134.1 grams  LV mass(C)dI: 80.7 grams/m2  Ao root diam: 3.1 cm  LA dimension: 4.2 cm  LA/Ao: 1.4  LVOT diam: 2.2 cm  LVOT area: 3.8 cm2  LA Volume (BP): 77.4 ml  LA Volume Index (BP): 46.6 ml/m2  RWT: 0.33           Doppler Measurements & Calculations  TR max foster: 295.0 cm/sec  TR max P.8 mmHg           _____________________________________________________________________________  __           Report approved by: SHILPA Skinner 2018 05:05 PM    ASSESSMENT/PLAN:           ICD-10-CM    1. Paroxysmal atrial fibrillation (H) I48.0 CARDIOLOGY EVAL ADULT REFERRAL   2. Thoracic aortic aneurysm without rupture (H) I71.2 CARDIOLOGY EVAL ADULT REFERRAL   3. Dependent edema R60.9 furosemide (LASIX) 20 MG tablet     Plan:  Continue current medications and they reviewed and updated she does have refills.  Attempted to get her into see cardiology which is booked out here until early September but earlier at [place] location and they did not want to go there.  She had her first cataract in her left eye done in July and was scheduled to have the second done during her hospital stay so will need to reschedule her right eye subsequent to these consult and visits.  Continue Xarelto.  Baby aspirin also advised that if she is bruising easily she can hold that.  Add Lasix 20 mg every a.m. and start tomorrow.  Follow-up in 2 weeks with PCP to address response and see how she is doing and consider rescheduling of cataract.    Tootie Garcia MD  [facility]  I spent approximately 25 minutes with the patient (exclusive of separately billed services/procedures), with greater than 50% spent in Counseling,Prognosis, Risks and benefits of management or follow-up, Importance  of compliance with chosen management options, Instructions of management (treatment) and/or follow-up, Risk factor reduction and Patient education andCoordinating Care, Establishing and/or reviewing the patient's medical record.      Portions of this dictation were created using the Dragon Nuance voice recognition system. Proofreading was completed but there may be errors in text.

## 2018-08-09 NOTE — NURSING NOTE
Patient presents to clinic for hospital follow up.  Leeann Richard ....................  8/9/2018   3:11 PM

## 2018-08-09 NOTE — MR AVS SNAPSHOT
After Visit Summary   8/9/2018    Merlyn Escamilla    MRN: [id number]           Patient Information     Date Of Birth          8/22/1933        Visit Information        Provider Department      8/9/2018 3:30 PM Tootie Garcia MD [facility]        Today's Diagnoses     Dependent edema    -  1    Thoracic aortic aneurysm without rupture (H)        Paroxysmal atrial fibrillation (H)          Care Instructions    Treating a Thoracic Aortic Aneurysm (TAA)  Endovascular Graft  What is an aneurysm?  An aneurysm is a weak spot in the wall of an artery (blood vessel). The wall expands, or balloons out. If not treated, in time it may burst. This can cause serious bleeding and sometimes death.   What is a thoracic aortic aneurysm (TAA)?  A TAA is an aneurysm in the upper part of the aorta, the main artery that runs down the center of your body. You may have no symptoms, or you may feel:    Pain or tenderness in the chest, belly or back.    A deep, steady ache that may get better as you change positions.    Coughing, hoarseness, feeling short of breath.     Thoracic aortic aneurysm (TAA) repair  If you have a TAA, your doctor may place a stent inside the weak part of the aorta. A stent is a fabric tube with a metal frame.  1. We will give you medicine to help you sleep. Then, the doctor will make a small cut in your groin area. In some cases there will be two cuts, one on either side.  2. The doctor then inserts pencil-sized tubes through the cuts. He or she uses the tubes to carry the stent to the aneurysm.  3. The doctor places the stent so that it lines the inside of the artery wall. This supports the wall and prevents blood from flowing into the aneurysm.    .  For informational purposes only. Not to replace the advice of your health care provider. Illustrations Illustration copyright (c) [email or web address]  [employer]. Text copyright   2016 [employer]. All rights reserved.  [employer] [id number] - 1/14/16.            Follow-ups after your visit        Additional Services     CARDIOLOGY EVAL ADULT REFERRAL       Preferred location:  [facility]: [facility]  [phone] [email or web address]    Please be aware that coverage of these services is subject to the terms and limitations of your health insurance plan.  Call member services at your health plan with any benefit or coverage questions.      Please bring the following to your appointment:  Any x-rays, CTs or MRIs which have been performed. Contact the facility where they were done to arrange for  prior to your scheduled appointment.    List of current medications  This referral request   Any documents/labs given to you for this referral                  Who to contact     If you have questions or need follow up information about today's clinic visit or your schedule please contact [facility] directly at [phone].  Normal or non-critical lab and imaging results will be communicated to you by MyChart, letter or phone within 4 business days after the clinic has received the results. If you do not hear from us within 7 days, please contact the clinic through MyChart or phone. If you have a critical or abnormal lab result, we will notify you by phone as soon as possible.  Submit refill requests through [employer] or call your pharmacy and they will forward the refill request to us. Please allow 3 business days for your refill to be completed.          Additional Information About Your Visit        Care EveryWhere ID     This is your Care EveryWhere ID. This could be used by other organizations to access your [facility] medical records  [id number]        Your Vitals Were     Pulse Temperature Breastfeeding? BMI (Body Mass Index)          64 98.6  F (37  C) No 26.72 kg/m2         Blood Pressure from Last 3 Encounters:   08/09/18 126/68   08/03/18 130/74   07/29/18 98/62     Weight from Last 3 Encounters:   08/09/18 141 lb 6.4 oz (64.1 kg)   08/02/18 149 lb 0.5 oz (67.6 kg)   07/29/18 142 lb 3.2 oz (64.5 kg)              We Performed the Following     CARDIOLOGY EVAL ADULT REFERRAL          Today's Medication Changes          These changes are accurate as of 8/9/18  3:40 PM.  If you have any questions, ask your nurse or doctor.               Start taking these medicines.        Dose/Directions    furosemide 20 MG tablet   Commonly known as:  LASIX   Used for:  Dependent edema   Started by:  Tootie Garcia MD        Dose:  20 mg   Take 1 tablet (20 mg) by mouth daily   Quantity:  30 tablet   Refills:  1            Where to get your medicines      These medications were sent to [facility], - [place], [place] - [address]  [address] , [place] [address]     Phone:  [phone]     furosemide 20 MG tablet                Primary Care Provider Office Phone # Fax #    Sharath Dniero [phone]       [address] [facility] [address]        Equal Access to Services     [facility]: Hadii boris ang hadrosarioo Sodanii, waaxda luqadaha, qaybta kaalmada leila, suki salmeron . So [facility] [phone].    ATENCIÓN: Si habla español, tiene a harp disposición servicios gratuitos de asistencia lingüística. [facility] [phone].    We comply with applicable federal civil rights laws and [place] laws. We do not discriminate on the basis of race, color, national origin, age, disability, sex, sexual orientation, or gender identity.            Thank you!     Thank you for choosing [facility]  for your care. Our goal is always to provide you with excellent care. Hearing back from our patients is one way we can continue to improve our services. Please take a few minutes to complete the written survey that you may receive in the mail after your visit with us. Thank you!              Your Updated Medication List - Protect others around you: Learn how to safely use, store and throw away your medicines at www.disposemymeds.org.          This list is accurate as of 8/9/18  3:40 PM.  Always use your most recent med list.                   Brand Name Dispense Instructions for use Diagnosis    ALEVE 220 MG tablet   Generic drug:  naproxen sodium      Take 220 mg by mouth 2 times daily as needed (PAIN) TAKE WITH FOOD        aspirin 81 MG EC tablet      Take 81 mg by mouth daily with food        diltiazem 240 MG 24 hr capsule     30 capsule    Take 1 capsule (240 mg) by mouth daily    Atrial fibrillation, unspecified type (H)       furosemide 20 MG tablet    LASIX    30 tablet    Take 1 tablet (20 mg) by mouth daily    Dependent edema       ketorolac 0.5 % ophthalmic solution    ACULAR     Place 1 drop Into the left eye 3 times daily POST-CATARACT SURGERY UNTIL 8/22/18 FOR LEFT EYE        metoprolol tartrate 25 MG tablet    LOPRESSOR    60 tablet    Take 1 tablet (25 mg) by mouth 2 times daily    Atrial fibrillation, unspecified type (H)       moxifloxacin 0.5 % ophthalmic solution    VIGAMOX     Place 1 drop into the right eye 4 times daily BEGIN THREE DAYS PRIOR TO SURGERY AND CONTINUE FOUR TIMES DAILY FOR 1 WEEK AFTER SURGERY        MULTI-VITAMINS Tabs      Take 1 tablet by mouth daily        prednisoLONE acetate 1 % ophthalmic susp    PRED FORTE     Place 1 drop Into the left eye 3 times daily POST-CATARACT SURGERY UNTIL 8/22/18 FOR LEFT EYE        rivaroxaban ANTICOAGULANT 20 MG Tabs tablet    XARELTO    60 tablet    Take 1 tablet (20 mg) by mouth daily (with dinner)    Atrial fibrillation, unspecified type (H)

## 2018-08-20 ENCOUNTER — OFFICE VISIT (OUTPATIENT)
Dept: CARDIOLOGY | Facility: OTHER | Age: 83
End: 2018-08-20
Attending: FAMILY MEDICINE
Payer: COMMERCIAL

## 2018-08-20 VITALS
DIASTOLIC BLOOD PRESSURE: 62 MMHG | HEART RATE: 80 BPM | HEIGHT: 62 IN | WEIGHT: 134.4 LBS | SYSTOLIC BLOOD PRESSURE: 108 MMHG | RESPIRATION RATE: 14 BRPM | BODY MASS INDEX: 24.73 KG/M2

## 2018-08-20 DIAGNOSIS — R07.89 ATYPICAL CHEST PAIN: ICD-10-CM

## 2018-08-20 DIAGNOSIS — R60.9 DEPENDENT EDEMA: ICD-10-CM

## 2018-08-20 DIAGNOSIS — Z82.49 FAMILY HISTORY OF ISCHEMIC HEART DISEASE: ICD-10-CM

## 2018-08-20 DIAGNOSIS — I71.21 ASCENDING AORTIC ANEURYSM (H): Primary | ICD-10-CM

## 2018-08-20 DIAGNOSIS — R06.09 DOE (DYSPNEA ON EXERTION): ICD-10-CM

## 2018-08-20 DIAGNOSIS — R60.0 LOWER EXTREMITY EDEMA: ICD-10-CM

## 2018-08-20 DIAGNOSIS — I50.21 ACUTE SYSTOLIC HEART FAILURE (H): ICD-10-CM

## 2018-08-20 DIAGNOSIS — I48.91 NEW ONSET A-FIB (H): ICD-10-CM

## 2018-08-20 DIAGNOSIS — I71.20 THORACIC AORTIC ANEURYSM WITHOUT RUPTURE (H): ICD-10-CM

## 2018-08-20 PROCEDURE — 99205 OFFICE O/P NEW HI 60 MIN: CPT | Performed by: NURSE PRACTITIONER

## 2018-08-20 PROCEDURE — G0463 HOSPITAL OUTPT CLINIC VISIT: HCPCS

## 2018-08-20 RX ORDER — FUROSEMIDE 20 MG
40 TABLET ORAL EVERY MORNING
Qty: 60 TABLET | Refills: 1 | Status: SHIPPED | OUTPATIENT
Start: 2018-08-20 | End: 2018-10-01

## 2018-08-20 RX ORDER — ROSUVASTATIN CALCIUM 5 MG/1
5 TABLET, COATED ORAL AT BEDTIME
Qty: 60 TABLET | Refills: 3 | Status: SHIPPED | OUTPATIENT
Start: 2018-08-20 | End: 2018-09-05

## 2018-08-20 ASSESSMENT — PAIN SCALES - GENERAL: PAINLEVEL: NO PAIN (0)

## 2018-08-20 NOTE — PROGRESS NOTES
[facility] HEART CARE   CARDIOLOGY CONSULT     Merlyn Escamilla   [address]  [place] [address]      Sharath Dinero     Chief Complaint   Patient presents with     Consult     Aortic aneurysm without rupture        HPI:   Ms. Escamilla is an 85 year old female who present for cardiology evaluation with thoracic aortic aneurysm, newly diagnosed atrial fibrillation and newly identified systolic heart failure.  Patient has a history of arteriovenous malformation of the colon, right renal cyst and osteoarthritis.    Patient presented to the emergency department on 7/29/2018 with rapid heart rate.  She was found to be in atrial fibrillation with RVR.  She was initially treated on a diltiazem drip and admitted to the hospital.  She was started on oral anticoagulation Xarelto with a [id number] score of 3 (age and female).    She had an echocardiogram on 7/29/2018 with normal LV size and thickness, EF was estimated at 35-40%.  There was diffuse hypokinesis but the septum appeared comparatively more hypokinetic on the study.  The RV was normal in size and function.  Moderate left atrial enlargement was present.  Mild MAC with mild to moderate mitral insufficiency.  The aortic valve is normal in structure and function, trace aortic insufficiency was present.  Moderate to severe tricuspid insufficiency, RVSP was 35 mmHg +  RAP.  There was moderate dilation of the aorta present, ascending aorta 4.8 cm the inferior vena cava was dilated at 2.8 cm without respiratory variability which is consistent with increased right atrial pressure.  There is no pericardial effusion present.    Today patient reports fatigue with mild dyspnea and increased WEST.  She denies any history of sleep apnea.  She denies any chest pain or pressure, possibly mild atypical chest discomfort.  She has not felt any palpitations or fluttering in her chest.  She was noted all symptomatic when identified to be in atrial fibrillation.  She has no  lightheadedness or syncope.  Chronic lower extremity edema.    IMAGING RESULTS:   [facility]  [address] [place] [address]     Name: CYNDY BOSE  MRN: [id number]  : 1933  Study Date: 2018 09:40 AM  Age: 84 yrs  Gender: Female  Patient Location: [facility]  Reason For Study: Atrial Flutter  Ordering Physician: SUMMER GREENFIELD  Performed By: Esme George RDCS     BSA: 1.7 m2  Height: 61 in  Weight: 148 lb  BP: 140/62 mmHg  _____________________________________________________________________________  __        Procedure  Echocardiogram with two-dimensional, color and spectral Doppler performed.  Good quality two-dimensional was performed and interpreted.  _____________________________________________________________________________  __        Interpretation Summary  Technically difficult study.     Left ventricular size is normal. Left ventricular wall thickness is normal.  The Ejection Fraction is estimated at 35-40%. There is diffuse hypokinesis but  the septum appears comparatively more hypokinetic.  Right ventricular function, chamber size, wall motion, and thickness are  normal.  Mild to moderate mitral insufficiency is present. Moderate to severe tricuspid  insufficiency is present.  The inferior vena cava was dilated at 2.8 cm without respiratory variability,  consistent with increased right atrial pressure. Ascending aorta severely  dilated at 4.8 cm. No pericardial effusion is present.     Previous study not available for comparison.  _____________________________________________________________________________  __        Left Ventricle  Left ventricular wall thickness is normal. Left ventricular size is normal.  The Ejection Fraction is estimated at 35-40%. There is diffuse hypokinesis but  the septum appears comparatively more hypokinetic.     Right Ventricle  Right ventricular function, chamber size, wall motion, and thickness are  normal. No regional wall  motion abnormalities are noted.     Atria  The right atria appears normal. Moderate left atrial enlargement is present.     Mitral Valve  The mitral valve is normal. Mild mitral annular calcification is present. Mild  to moderate mitral insufficiency is present.        Aortic Valve  Aortic valve is normal in structure and function. The aortic valve is  tricuspid. Trace aortic insufficiency is present.     Tricuspid Valve  Moderate to severe tricuspid insufficiency is present. Right ventricular  systolic pressure is 35mmHg above the right atrial pressure.     Pulmonic Valve  The pulmonic valve is normal. Trace pulmonic insufficiency is present.     Vessels  Moderate dilatation of the aorta is present. Ascending aorta 4.8 cm. The  inferior vena cava was dilated at 2.8 cm without respiratory variability,  consistent with increased right atrial pressure.     Pericardium  No pericardial effusion is present.     Compared to Previous Study  Previous study not available for comparison.        Attestation  I have personally viewed the imaging and agree with the interpretation and  report as documented by the fellow, Kaelyn Starks, and/or edited by me.  _____________________________________________________________________________  __     MMode/2D Measurements & Calculations  IVSd: 0.78 cm  LVIDd: 5.0 cm  LVIDs: 3.7 cm  LVPWd: 0.81 cm  FS: 25.3 %  LV mass(C)d: 134.1 grams  LV mass(C)dI: 80.7 grams/m2  Ao root diam: 3.1 cm  LA dimension: 4.2 cm  LA/Ao: 1.4  LVOT diam: 2.2 cm  LVOT area: 3.8 cm2  LA Volume (BP): 77.4 ml  LA Volume Index (BP): 46.6 ml/m2  RWT: 0.33           Doppler Measurements & Calculations  TR max foster: 295.0 cm/sec  TR max P.8 mmHg           _____________________________________________________________________________  __           Report approved by: SHILPA Skinner 2018 05:05 PM      PAST MEDICAL HISTORY:   Past Medical History:   Diagnosis Date     Carpal tunnel syndrome of left wrist     10/4/2017      Carpal tunnel syndrome of right wrist     10/4/2017     Deep phlebothrombosis during pregnancy     No Comments Provided     Osteoarthritis     Knee     Other specified postprocedural states     10/4/2017     Other specified postprocedural states     2018     Presence of artificial knee joint     10/3/2014          FAMILY HISTORY:   Family History   Problem Relation Age of Onset     HEART DISEASE Mother      Heart Disease     HEART DISEASE Father      Heart Disease     HEART DISEASE Brother      HEART DISEASE Brother      HEART DISEASE Brother      HEART DISEASE Brother      Brain Cancer Sister      HEART DISEASE Sister           PAST SURGICAL HISTORY:   Past Surgical History:   Procedure Laterality Date     APPENDECTOMY OPEN      No Comments Provided     C TOTAL KNEE ARTHROPLASTY Right 10/01/2014    Dr Domingo     CARPAL TUNNEL RELEASE RT/LT Left 2018,[id number],OTHER,Left     CARPAL TUNNEL RELEASE RT/LT Right 10/04/2017     COLONOSCOPY  2014    Arteriovenous malformations of right, transverse and splenic flexure     ENDOSCOPIC STRIPPING VEIN(S)      No Comments Provided     HYSTERECTOMY TOTAL ABDOMINAL  1987    with squamous metaplasia and leiomoma          SOCIAL HISTORY:   Social History     Social History     Marital status:      Spouse name: N/A     Number of children: N/A     Years of education: N/A     Social History Main Topics     Smoking status: Never Smoker     Smokeless tobacco: Never Used     Alcohol use 3.0 oz/week     Drug use: No      Comment: Drug use: No     Sexual activity: Not Asked     Other Topics Concern     None     Social History Narrative    Lives alone.      2017.     3 daughters, 1 in [facility], 1 in [place] and 1 near [place] border. Lost 1 daughter at age 2 years.    Has 1 living sister, fraternal twin. All other siblings are .           CURRENT MEDICATIONS:   Prior to Admission medications    Medication Sig Start Date End Date  Taking? Authorizing Provider   aspirin EC 81 MG EC tablet Take 81 mg by mouth daily with food 7/28/14  Yes Reported, Patient   diltiazem 240 MG 24 hr capsule Take 1 capsule (240 mg) by mouth daily 8/3/18  Yes Sharath Dinero MD   furosemide (LASIX) 20 MG tablet Take 1 tablet (20 mg) by mouth daily 8/9/18  Yes Tootie Garcia MD   ketorolac (ACULAR) 0.5 % ophthalmic solution Place 1 drop Into the left eye 3 times daily POST-CATARACT SURGERY UNTIL 8/22/18 FOR LEFT EYE 7/18/18 8/22/18 Yes Unknown, Entered By History   metoprolol tartrate (LOPRESSOR) 25 MG tablet Take 1 tablet (25 mg) by mouth 2 times daily 8/3/18  Yes Sharath Dinero MD   moxifloxacin (VIGAMOX) 0.5 % ophthalmic solution Place 1 drop into the right eye 4 times daily BEGIN THREE DAYS PRIOR TO SURGERY AND CONTINUE FOUR TIMES DAILY FOR 1 WEEK AFTER SURGERY   Yes Unknown, Entered By History   Multiple Vitamin (MULTI-VITAMINS) TABS Take 1 tablet by mouth daily 7/28/14  Yes Reported, Patient   prednisoLONE acetate (PRED FORTE) 1 % ophthalmic susp Place 1 drop Into the left eye 3 times daily POST-CATARACT SURGERY UNTIL 8/22/18 FOR LEFT EYE 7/18/18 8/22/18 Yes Unknown, Entered By History   rivaroxaban ANTICOAGULANT (XARELTO) 20 MG TABS tablet Take 1 tablet (20 mg) by mouth daily (with dinner) 8/3/18  Yes Sharath Dinero MD   naproxen sodium (ALEVE) 220 MG tablet Take 220 mg by mouth 2 times daily as needed (PAIN) TAKE WITH FOOD    Unknown, Entered By History          ALLERGIES:   Allergies   Allergen Reactions     Morphine      Other reaction(s): Muscle Weakness     Phenylbutazone Unknown          ROS:   CONSTITUTIONAL: No reported fever or chills. No changes in weight.  ENT: No visual disturbance, ear ache, epistaxis or sore throat.   CARDIOVASCULAR: No chest pain, chest pressure or chest discomfort. No palpitations , positive for lower extremity edema.   RESPIRATORY: Positive for shortness of breath and dyspnea upon exertion.  No cough, wheezing or  "hemoptysis.   GI: No abdominal pain. No nausea, vomiting or diarrhea.   : No hematuria or dysuria.  NEUROLOGICAL: No lightheadedness, dizziness, syncope, ataxia, paresthesias or weakness.   HEMATOLOGIC: No history of anemia. No bleeding or excessive bruising. No history of blood clots.   MUSCULOSKELETAL: No joint pain or swelling, no muscle pain.  ENDOCRINOLOGIC: No temperature intolerance. No hair or skin changes.  SKIN: No abnormal rashes or sores, no unusual itching.  PSYCHIATRIC: Positive for history of depression with the recent death in the family.      PHYSICAL EXAM:   /62 (BP Location: Right arm, Patient Position: Sitting, Cuff Size: Adult Regular)  Pulse 80  Resp 14  Ht 1.562 m (5' 1.5\")  Wt 61 kg (134 lb 6.4 oz)  BMI 24.98 kg/m2  GENERAL: The patient is a well-developed, well-nourished, in no apparent distress.  HEENT: Head is normocephalic and atraumatic. Eyes are symmetrical with normal visual tracking. No icterus, no xanthelasmas. Nares appeared normal without nasal drainage. Mucous membranes are moist, no cyanosis.  NECK: Supple. No adenopathy, asymmetry or masses. Carotid upstroke are normal bilaterally, no cervical bruits, JVP not visible.   CHEST/ LUNGS: Lungs clear to auscultation, no rales, rhonchi or wheezes, no use of accessory muscles, no retractions, respirations unlabored and normal respiratory rate.   CARDIO: Irregular rate and rhythm normal with S1 and S2, no murmur, click or rub. Precordium quiet with normal PMI.    ABD: Abdomen is soft and nontender, nondistended. Without hepatosplenomegaly, no palpable masses, aorta not enlarged to palpitation and no abdominal bruits heard.   EXTREMITIES: No clubbing or cyanosis, 1+ edema present.   MUSCULOSKELETAL: No joint swelling.   NEUROLOGIC: Alert and oriented X3. Normal speech, gait and affect. No focal neurologic deficits.   SKIN: No jaundice. No rashes or visible skin lesions present. No ecchymosis.       LAB RESULTS:   Office " Visit on 07/14/2018   Component Date Value Ref Range Status     WBC 07/14/2018 5.0  4.0 - 11.0 10e9/L Final     RBC Count 07/14/2018 4.44  3.8 - 5.2 10e12/L Final     Hemoglobin 07/14/2018 13.0  11.7 - 15.7 g/dL Final     Hematocrit 07/14/2018 40.8  35.0 - 47.0 % Final     MCV 07/14/2018 92  78 - 100 fl Final     MCH 07/14/2018 29.3  26.5 - 33.0 pg Final     MCHC 07/14/2018 31.9  31.5 - 36.5 g/dL Final     RDW 07/14/2018 13.6  10.0 - 15.0 % Final     Platelet Count 07/14/2018 162  150 - 450 10e9/L Final     Diff Method 07/14/2018 Automated Method   Final     % Neutrophils 07/14/2018 77.1  % Final     % Lymphocytes 07/14/2018 13.5  % Final     % Monocytes 07/14/2018 8.0  % Final     % Eosinophils 07/14/2018 0.4  % Final     % Basophils 07/14/2018 0.8  % Final     % Immature Granulocytes 07/14/2018 0.2  % Final     Absolute Neutrophil 07/14/2018 3.8  1.6 - 8.3 10e9/L Final     Absolute Lymphocytes 07/14/2018 0.7* 0.8 - 5.3 10e9/L Final     Absolute Monocytes 07/14/2018 0.4  0.0 - 1.3 10e9/L Final     Absolute Eosinophils 07/14/2018 0.0  0.0 - 0.7 10e9/L Final     Absolute Basophils 07/14/2018 0.0  0.0 - 0.2 10e9/L Final     Abs Immature Granulocytes 07/14/2018 0.0  0 - 0.4 10e9/L Final     Color Urine 07/14/2018 Yellow   Final     Appearance Urine 07/14/2018 Clear   Final     Glucose Urine 07/14/2018 Negative  NEG^Negative mg/dL Final     Bilirubin Urine 07/14/2018 Negative  NEG^Negative Final     Ketones Urine 07/14/2018 Negative  NEG^Negative mg/dL Final     Specific Gravity Urine 07/14/2018 <1.005  1.000 - 1.030 Final     Blood Urine 07/14/2018 Trace* NEG^Negative Final     pH Urine 07/14/2018 6.0  5.0 - 9.0 pH Final     Protein Albumin Urine 07/14/2018 Negative  NEG^Negative mg/dL Final     Urobilinogen Urine 07/14/2018 0.2  0.2 - 1.0 EU/dL Final     Nitrite Urine 07/14/2018 Negative  NEG^Negative Final     Leukocyte Esterase Urine 07/14/2018 Small* NEG^Negative Final     Source 07/14/2018 Midstream Urine   Final      Lyme Disease Antibodies Serum 07/14/2018 0.13  0.00 - 0.89 Final     WBC Urine 07/14/2018 0 - 5  OTO5^0 - 5 /HPF Final     RBC Urine 07/14/2018 O - 2  OTO2^O - 2 /HPF Final     Squamous Epithelial /LPF Urine 07/14/2018 Few  FEW^Few /LPF Final     Bacteria Urine 07/14/2018 Few* NEG^Negative /HPF Final     Specimen Description 07/14/2018 Midstream Urine   Final     Culture Micro 07/14/2018    Final                    Value:10,000 to 50,000 colonies/mL  mixed urogenital lizz            ASSESSMENT:   Merlyn Escamilla presents for cardiology evaluation with thoracic aortic aneurysm, newly diagnosed atrial fibrillation and newly identified systolic heart failure.  Patient has a history of arteriovenous malformation of the colon, right renal cyst and osteoarthritis.    PLAN:   1. Thoracic aortic aneurysm without rupture (H)  Reviewed results of CT chest and echo showing dilated ascending thoracic aorta.  CT from 7/29/2018 measuring approximately 4.9 cm in diameter with no dissection or other acute abnormality identified.  There was also ectasia of the descending thoracic aorta, which measures 4 cm in maximal diameter.  When compared to a CT dated from August 1, 2014 the ascending thoracic aorta measured approximately 3.9 cm in maximal diameter.  Therefore, this does suggest interval development of the aneurysmal dilation of the descending thoracic aorta.  CT surgery referral recommended given interval change and current severe dilation.   Begin rosuvastatin 5 mg at bedtime.    - rosuvastatin (CRESTOR) 5 MG tablet; Take 1 tablet (5 mg) by mouth At Bedtime  Dispense: 60 tablet; Refill: 3    2. New onset a-fib (H)  Newly diagnosed atrial fibrillation.  She describes no symptoms associated to this.  She was recently started on  Diltiazem 240 mg daily in addition to metoprolol tartrate 25 mg twice daily for rate control. HR stable at 80 bpm today and irregular.  She will remain on Xarelto oral anticoagulation appropriately  for a [id number] score greater than 2.  Zio patch cardiac monitor has been recommended to assess AF burden.  It has been recommended that she discontinue the diltiazem with systolic heart failure, LVEF less than 40%.  For rate control she will be switched to metoprolol succinate 100 mg daily.  - Ziopatch Holter Monitor - Adult; Future    4. Acute systolic heart failure (H)  Newly identified systolic heart failure on echocardiogram with LVEF 35-40%. Etiology includes uncontrolled atrial fib vs. ischemic disease.   CT of the coronary arteries has been ordered to evaluate for any ischemic disease.  She will increase her Lasix to 40 mg daily with her current level of edema.  It is recommended that she discontinue the metoprolol tartrate and begin metoprolol succinate 25 mg once daily.  It has been recommended that she begin lisinopril 5 mg daily.   With HFrEF with EF <40% that she stop the Diltiazem 240mg.   She has been instructed on low-sodium diet of no more than 2500 mg of sodium per day.  It has been recommended that she monitor her daily weights and call with a weight fluctuation of 3 pounds and 24 hours or 5 pounds in 1 week.  It is recommended that she keep track of her overall fluid intake, keep less than 2 L per day.    - CT Angiogram coronary artery; Future    5. WEST (dyspnea on exertion)  See above.  - CT Angiogram coronary artery; Future    6. Lower extremity edema  See above.  - CT Angiogram coronary artery; Future    Follow-up with cardiology in 1-2 weeks, certainly sooner if needed.     Thank you for allowing me to participate in the care of your patient. Please do not hesitate to contact me if you have any questions.     Natalia Schultz

## 2018-08-20 NOTE — PATIENT INSTRUCTIONS
Increase Lasix to 40 mg daily in the morning    Crestor 5 mg at bedtime    Zio patch and CTA, they will call you to schedule this    Follow up with PO Lopes CNP in 1-2 weeks

## 2018-08-20 NOTE — MR AVS SNAPSHOT
After Visit Summary   8/20/2018    Merlyn Escamilla    MRN: [id number]           Patient Information     Date Of Birth          8/22/1933        Visit Information        Provider Department      8/20/2018 2:45 PM Natalia Schultz APRN CNP [facility]        Today's Diagnoses     Ascending aortic aneurysm (H)    -  1    Thoracic aortic aneurysm without rupture (H)        New onset a-fib (H)        Acute systolic heart failure (H)        WEST (dyspnea on exertion)        Lower extremity edema          Care Instructions    Increase Lasix to 40 mg daily in the morning    Crestor 5 mg at bedtime    Zio patch and CTA, they will call you to schedule this    In 3 months do a CT of your aorta, they will call to schedule this as well    Follow up with PO Lopes CNP in 1-2 weeks          Follow-ups after your visit        Your next 10 appointments already scheduled     Aug 24, 2018  3:00 PM CDT   [facility]    [address] [address] [phone]              Who to contact     If you have questions or need follow up information about today's clinic visit or your schedule please contact [facility] directly at [phone].  Normal or non-critical lab and imaging results will be communicated to you by MyChart, letter or phone within 4 business days after the clinic has received the results. If you do not hear from us within 7 days, please contact the clinic through MyChart or phone. If you have a critical or abnormal lab result, we will notify you by phone as soon as possible.  Submit refill requests through [employer] or call your pharmacy and they will forward the refill request to us. Please allow 3 business days for your refill to be completed.          Additional Information About Your Visit        Care EveryWhere ID     This is your Care EveryWhere ID. This could be  "used by other organizations to access your [place] medical records  [id number]        Your Vitals Were     Pulse Respirations Height BMI (Body Mass Index)          80 14 1.562 m (5' 1.5\") 24.98 kg/m2         Blood Pressure from Last 3 Encounters:   08/20/18 108/62   08/09/18 126/68   08/03/18 130/74    Weight from Last 3 Encounters:   08/20/18 61 kg (134 lb 6.4 oz)   08/09/18 64.1 kg (141 lb 6.4 oz)   08/02/18 67.6 kg (149 lb 0.5 oz)              Today, you had the following     No orders found for display       Primary Care Provider Office Phone # Fax #    Sharath Dinero [phone] [address] [facility] [address]        Equal Access to Services     [facility]: Hadii aad ku hadasho Sodanii, waaxda luqadaha, qaybta kaalmada leila, suki salmeron . So [facility] [phone].    ATENCIÓN: Si habla español, tiene a harp disposición servicios gratuitos de asistencia lingüística. Llame al [phone].    We comply with applicable federal civil rights laws and [place] laws. We do not discriminate on the basis of race, color, national origin, age, disability, sex, sexual orientation, or gender identity.            Thank you!     Thank you for choosing [facility]  for your care. Our goal is always to provide you with excellent care. Hearing back from our patients is one way we can continue to improve our services. Please take a few minutes to complete the written survey that you may receive in the mail after your visit with us. Thank you!             Your Updated Medication List - Protect others around you: Learn how to safely use, store and throw away your medicines at www.disposemymeds.org.          This list is accurate as of 8/20/18  3:45 PM.  Always use your most recent med list.                   Brand Name Dispense Instructions for use Diagnosis    ALEVE 220 MG tablet   Generic drug:  naproxen sodium      Take 220 mg by mouth 2 " times daily as needed (PAIN) TAKE WITH FOOD        aspirin 81 MG EC tablet      Take 81 mg by mouth daily with food        diltiazem 240 MG 24 hr capsule     30 capsule    Take 1 capsule (240 mg) by mouth daily    Atrial fibrillation, unspecified type (H)       furosemide 20 MG tablet    LASIX    30 tablet    Take 1 tablet (20 mg) by mouth daily    Dependent edema       ketorolac 0.5 % ophthalmic solution    ACULAR     Place 1 drop Into the left eye 3 times daily POST-CATARACT SURGERY UNTIL 8/22/18 FOR LEFT EYE        metoprolol tartrate 25 MG tablet    LOPRESSOR    60 tablet    Take 1 tablet (25 mg) by mouth 2 times daily    Atrial fibrillation, unspecified type (H)       moxifloxacin 0.5 % ophthalmic solution    VIGAMOX     Place 1 drop into the right eye 4 times daily BEGIN THREE DAYS PRIOR TO SURGERY AND CONTINUE FOUR TIMES DAILY FOR 1 WEEK AFTER SURGERY        MULTI-VITAMINS Tabs      Take 1 tablet by mouth daily        prednisoLONE acetate 1 % ophthalmic susp    PRED FORTE     Place 1 drop Into the left eye 3 times daily POST-CATARACT SURGERY UNTIL 8/22/18 FOR LEFT EYE        rivaroxaban ANTICOAGULANT 20 MG Tabs tablet    XARELTO    60 tablet    Take 1 tablet (20 mg) by mouth daily (with dinner)    Atrial fibrillation, unspecified type (H)

## 2018-08-20 NOTE — NURSING NOTE
"Pt presents today for consult r/t aortic aneurysm with out rupture. Denies chest pain/pressure, SOB. Mild WEST with stairs. Has cataract surgery on 09/04/2018 at [facility] and needs cardiac clearance. Needs direction on ASA and Xarelto pre-procedure.  Mirna Molina RN on 8/20/2018 at 2:46 PM  Chief Complaint   Patient presents with     Consult     Aortic aneurysm without rupture       Initial /62 (BP Location: Right arm, Patient Position: Sitting, Cuff Size: Adult Regular)  Pulse 80  Resp 14  Ht 1.562 m (5' 1.5\")  Wt 61 kg (134 lb 6.4 oz)  BMI 24.98 kg/m2 Estimated body mass index is 24.98 kg/(m^2) as calculated from the following:    Height as of this encounter: 1.562 m (5' 1.5\").    Weight as of this encounter: 61 kg (134 lb 6.4 oz).  Medication Reconciliation: complete    Mirna Molina RN    "

## 2018-08-21 ENCOUNTER — TELEPHONE (OUTPATIENT)
Dept: CARDIOLOGY | Facility: OTHER | Age: 83
End: 2018-08-21

## 2018-08-21 NOTE — TELEPHONE ENCOUNTER
Pt is concerned after reading side effects of Crestor that she will have increased aches and pains. Advised that not everybody experiences those side effects and many people do quite well on the medication but if she tries it and has intolerable side effects to call back and inform this writer. Pt is agreeable to trying it and will call back if needed. Mirna Molina RN on 8/21/2018 at 2:00 PM

## 2018-08-21 NOTE — PROGRESS NOTES
Dr. Dinero reviewed and completed the following home care or hospice form(s) for Merlyn DIAZ Andi  on August 21, 2018 .  This covers the certification period effective 08/05/18 to 10/03/18.    [facility]

## 2018-08-24 ENCOUNTER — OFFICE VISIT (OUTPATIENT)
Dept: FAMILY MEDICINE | Facility: OTHER | Age: 83
End: 2018-08-24
Attending: FAMILY MEDICINE
Payer: MEDICARE

## 2018-08-24 ENCOUNTER — TELEPHONE (OUTPATIENT)
Dept: CARDIOLOGY | Facility: OTHER | Age: 83
End: 2018-08-24

## 2018-08-24 ENCOUNTER — APPOINTMENT (OUTPATIENT)
Dept: CT IMAGING | Facility: OTHER | Age: 83
End: 2018-08-24
Attending: FAMILY MEDICINE
Payer: MEDICARE

## 2018-08-24 ENCOUNTER — HOSPITAL ENCOUNTER (EMERGENCY)
Facility: OTHER | Age: 83
Discharge: HOME | End: 2018-08-24
Admitting: FAMILY MEDICINE
Payer: MEDICARE

## 2018-08-24 VITALS
HEART RATE: 74 BPM | OXYGEN SATURATION: 100 % | BODY MASS INDEX: 24.29 KG/M2 | DIASTOLIC BLOOD PRESSURE: 64 MMHG | WEIGHT: 132 LBS | HEIGHT: 62 IN | RESPIRATION RATE: 18 BRPM | TEMPERATURE: 98.2 F | SYSTOLIC BLOOD PRESSURE: 105 MMHG

## 2018-08-24 VITALS
WEIGHT: 135 LBS | HEART RATE: 88 BPM | SYSTOLIC BLOOD PRESSURE: 102 MMHG | DIASTOLIC BLOOD PRESSURE: 58 MMHG | BODY MASS INDEX: 24.69 KG/M2

## 2018-08-24 DIAGNOSIS — I48.91 ATRIAL FIBRILLATION, UNSPECIFIED TYPE (H): ICD-10-CM

## 2018-08-24 DIAGNOSIS — I71.20 THORACIC AORTIC ANEURYSM WITHOUT RUPTURE (H): Primary | ICD-10-CM

## 2018-08-24 DIAGNOSIS — R07.89 CHEST WALL PAIN: ICD-10-CM

## 2018-08-24 PROCEDURE — 99284 EMERGENCY DEPT VISIT MOD MDM: CPT | Mod: 25 | Performed by: FAMILY MEDICINE

## 2018-08-24 PROCEDURE — 99213 OFFICE O/P EST LOW 20 MIN: CPT | Performed by: FAMILY MEDICINE

## 2018-08-24 PROCEDURE — 99283 EMERGENCY DEPT VISIT LOW MDM: CPT | Mod: Z6 | Performed by: FAMILY MEDICINE

## 2018-08-24 PROCEDURE — G0463 HOSPITAL OUTPT CLINIC VISIT: HCPCS

## 2018-08-24 PROCEDURE — 71250 CT THORAX DX C-: CPT | Mod: TC

## 2018-08-24 PROCEDURE — G0463 HOSPITAL OUTPT CLINIC VISIT: HCPCS | Mod: 25,27

## 2018-08-24 PROCEDURE — 25000132 ZZH RX MED GY IP 250 OP 250 PS 637: Mod: GY | Performed by: FAMILY MEDICINE

## 2018-08-24 PROCEDURE — A9270 NON-COVERED ITEM OR SERVICE: HCPCS | Mod: GY | Performed by: FAMILY MEDICINE

## 2018-08-24 RX ORDER — METOPROLOL SUCCINATE 100 MG/1
100 TABLET, EXTENDED RELEASE ORAL DAILY
Qty: 60 TABLET | Refills: 3 | Status: SHIPPED | OUTPATIENT
Start: 2018-08-24 | End: 2018-09-05

## 2018-08-24 RX ORDER — OXYCODONE HYDROCHLORIDE 5 MG/1
5 TABLET ORAL ONCE
Qty: 30 TABLET | Refills: 0 | Status: SHIPPED | OUTPATIENT
Start: 2018-08-24 | End: 2018-08-24

## 2018-08-24 RX ORDER — LISINOPRIL 5 MG/1
5 TABLET ORAL DAILY
Qty: 60 TABLET | Refills: 3 | Status: SHIPPED | OUTPATIENT
Start: 2018-08-24 | End: 2019-05-15

## 2018-08-24 RX ORDER — OXYCODONE HYDROCHLORIDE 5 MG/1
5 TABLET ORAL ONCE
Qty: 6 TABLET | Refills: 0 | Status: SHIPPED | OUTPATIENT
Start: 2018-08-24 | End: 2018-08-24

## 2018-08-24 RX ORDER — OXYCODONE HYDROCHLORIDE 5 MG/1
5 TABLET ORAL ONCE
Status: COMPLETED | OUTPATIENT
Start: 2018-08-24 | End: 2018-08-24

## 2018-08-24 RX ADMIN — OXYCODONE HYDROCHLORIDE 5 MG: 5 TABLET ORAL at 01:46

## 2018-08-24 ASSESSMENT — ANXIETY QUESTIONNAIRES
6. BECOMING EASILY ANNOYED OR IRRITABLE: NOT AT ALL
GAD7 TOTAL SCORE: 7
5. BEING SO RESTLESS THAT IT IS HARD TO SIT STILL: MORE THAN HALF THE DAYS
2. NOT BEING ABLE TO STOP OR CONTROL WORRYING: SEVERAL DAYS
IF YOU CHECKED OFF ANY PROBLEMS ON THIS QUESTIONNAIRE, HOW DIFFICULT HAVE THESE PROBLEMS MADE IT FOR YOU TO DO YOUR WORK, TAKE CARE OF THINGS AT HOME, OR GET ALONG WITH OTHER PEOPLE: SOMEWHAT DIFFICULT
3. WORRYING TOO MUCH ABOUT DIFFERENT THINGS: MORE THAN HALF THE DAYS
7. FEELING AFRAID AS IF SOMETHING AWFUL MIGHT HAPPEN: NOT AT ALL
1. FEELING NERVOUS, ANXIOUS, OR ON EDGE: MORE THAN HALF THE DAYS

## 2018-08-24 ASSESSMENT — PATIENT HEALTH QUESTIONNAIRE - PHQ9: 5. POOR APPETITE OR OVEREATING: NOT AT ALL

## 2018-08-24 ASSESSMENT — PAIN SCALES - GENERAL: PAINLEVEL: NO PAIN (0)

## 2018-08-24 NOTE — MR AVS SNAPSHOT
After Visit Summary   8/24/2018    Merlyn Escamilla    MRN: [id number]           Patient Information     Date Of Birth          8/22/1933        Visit Information        Provider Department      8/24/2018 3:00 PM Sharath Dinero MD [facility]        Today's Diagnoses     Thoracic aortic aneurysm without rupture (H)    -  1    Atrial fibrillation, unspecified type (H)           Follow-ups after your visit        Your next 10 appointments already scheduled     Aug 29, 2018  9:30 AM CDT   CTA ANGIOGRAM CORONARY ARTERY with [facility] Nurse, GHCT1   [facility] ([facility])    [address]  [place] [address]   [phone]           Please allow two hours for this test.  Follow the instructions below:   The day before your exam, drink extra fluids at least six 8-ounce glasses (unless your doctor wants you to restrict your fluids).   No caffeine and no smoking the day of the test.   Do not eat or drink for 2 hours before your exam. You may take your morning medicines with small sips of water.   If you take Viagra, Levitra or Cialis, stop taking it for 48 hours before your test.  For patients with diabetes:   You may need a blood test (creatinine test) within 30 days of your exam; the Cardiologist/Radiologist may require you to get this test prior to your exam   If you are diabetic and take oral hypoglycemics, do not take them on the day of your test.  Bring any scans or X-rays taken at other hospitals, if similar tests were done. Also bring a list of your medicines, including vitamins, minerals and over-the-counter drugs. It is safest to leave personal items at home.  Be sure to tell your doctor:   If you have any allergies, including any reaction to contrast.   If you are breastfeeding or there is a chance you are pregnant.  Please wear loose clothing, such as a sweat suit or jogging clothes. Avoid snaps, zippers and other metal. We may ask  you to undress and put on a hospital gown.  If you have any questions, please call the Imaging Department where you will have your exam.            Aug 30, 2018 10:15 AM CDT   ZIOPATCH MONITOR with  RESPIRATORY THERAPY TECH   [facility] ([facility]    [email or web address]  [place] [address]   [phone]            Sep 05, 2018  3:45 PM CDT   [facility] Visit with PO Cunningham CNP   [facility]    [email or web address]  [place] [address]   [phone]            Sep 25, 2018 12:45 PM CDT   Pre-Op physical with Tootie Garcia MD   [facility]    [email or web address]  [place] [address]   [phone]              Who to contact     If you have questions or need follow up information about today's clinic visit or your schedule please contact [facility] directly at [phone].  Normal or non-critical lab and imaging results will be communicated to you by MyChart, letter or phone within 4 business days after the clinic has received the results. If you do not hear from us within 7 days, please contact the clinic through MyChart or phone. If you have a critical or abnormal lab result, we will notify you by phone as soon as possible.  Submit refill requests through [employer] or call your pharmacy and they will forward the refill request to us. Please allow 3 business days for your refill to be completed.          Additional Information About Your Visit        Care EveryWhere ID     This is your Care EveryWhere ID. This could be used by other organizations to access your [facility] medical records  [id number]        Your Vitals Were     Pulse BMI (Body Mass Index)                88 24.69 kg/m2           Blood Pressure from Last 3 Encounters:   08/24/18 102/58   08/24/18 105/64   08/20/18 108/62    Weight from Last 3  Encounters:   08/24/18 135 lb (61.2 kg)   08/24/18 132 lb (59.9 kg)   08/20/18 134 lb 6.4 oz (61 kg)              Today, you had the following     No orders found for display         Where to get your medicines      These medications were sent to [facility], - [place], [place] - [address]  [address], [place] [address]     Phone:  [phone]     rivaroxaban ANTICOAGULANT 20 MG Tabs tablet         Some of these will need a paper prescription and others can be bought over the counter.  Ask your nurse if you have questions.     Bring a paper prescription for each of these medications     oxyCODONE IR 5 MG tablet         Information about OPIOIDS     PRESCRIPTION OPIOIDS: WHAT YOU NEED TO KNOW   We gave you an opioid (narcotic) pain medicine. It is important to manage your pain, but opioids are not always the best choice. You should first try all the other options your care team gave you. Take this medicine for as short a time (and as few doses) as possible.    Some activities can increase your pain, such as bandage changes or therapy sessions. It may help to take your pain medicine 30 to 60 minutes before these activities. Reduce your stress by getting enough sleep, working on hobbies you enjoy and practicing relaxation or meditation. Talk to your care team about ways to manage your pain beyond prescription opioids.    These medicines have risks:    DO NOT drive when on new or higher doses of pain medicine. These medicines can affect your alertness and reaction times, and you could be arrested for driving under the influence (DUI). If you need to use opioids long-term, talk to your care team about driving.    DO NOT operate heavy machinery    DO NOT do any other dangerous activities while taking these medicines.    DO NOT drink any alcohol while taking these medicines.     If the opioid prescribed includes acetaminophen, DO NOT take with any other medicines that contain  acetaminophen. Read all labels carefully. Look for the word  acetaminophen  or  Tylenol.  Ask your pharmacist if you have questions or are unsure.    You can get addicted to pain medicines, especially if you have a history of addiction (chemical, alcohol or substance dependence). Talk to your care team about ways to reduce this risk.    All opioids tend to cause constipation. Drink plenty of water and eat foods that have a lot of fiber, such as fruits, vegetables, prune juice, apple juice and high-fiber cereal. Take a laxative (Miralax, milk of magnesia, Colace, Senna) if you don t move your bowels at least every other day. Other side effects include upset stomach, sleepiness, dizziness, throwing up, tolerance (needing more of the medicine to have the same effect), physical dependence and slowed breathing.    Store your pills in a secure place, locked if possible. We will not replace any lost or stolen medicine. If you don t finish your medicine, please throw away (dispose) as directed by your pharmacist. The [place] Pollution [employer] has more information about safe disposal: https://www.pca.[facility].[place].us/living-green/managing-unwanted-medications         Primary Care Provider Office Phone # Fax #    Sharath Dinero [phone] [address] [facility] [address]        Equal Access to Services     STEVEN DUARTE AH: Pamela krishnao Sodanii, waaxda luqadaha, qaybta kaalmada adedaisy, suki garcia. So [facility] [place] [facility] [phone].    ATENCIÓN: Si habla español, tiene a harp disposición servicios gratuitos de asistencia lingüística. Llame al [phone].    We comply with applicable federal civil rights laws and [place] laws. We do not discriminate on the basis of race, color, national origin, age, disability, sex, sexual orientation, or gender identity.            Thank you!     Thank you for choosing [facility]  for your care. Our goal is  always to provide you with excellent care. Hearing back from our patients is one way we can continue to improve our services. Please take a few minutes to complete the written survey that you may receive in the mail after your visit with us. Thank you!             Your Updated Medication List - Protect others around you: Learn how to safely use, store and throw away your medicines at www.disposemymeds.org.          This list is accurate as of 8/24/18  3:25 PM.  Always use your most recent med list.                   Brand Name Dispense Instructions for use Diagnosis    ALEVE 220 MG tablet   Generic drug:  naproxen sodium      Take 220 mg by mouth 2 times daily as needed (PAIN) TAKE WITH FOOD        aspirin 81 MG EC tablet      Take 81 mg by mouth daily with food        furosemide 20 MG tablet    LASIX    60 tablet    Take 2 tablets (40 mg) by mouth every morning    Dependent edema       lisinopril 5 MG tablet    PRINIVIL/ZESTRIL    60 tablet    Take 1 tablet (5 mg) by mouth daily    Acute systolic heart failure (H)       metoprolol succinate 100 MG 24 hr tablet    TOPROL-XL    60 tablet    Take 1 tablet (100 mg) by mouth daily    New onset a-fib (H), Acute systolic heart failure (H)       moxifloxacin 0.5 % ophthalmic solution    VIGAMOX     Place 1 drop into the right eye 4 times daily BEGIN THREE DAYS PRIOR TO SURGERY AND CONTINUE FOUR TIMES DAILY FOR 1 WEEK AFTER SURGERY        MULTI-VITAMINS Tabs      Take 1 tablet by mouth daily        oxyCODONE IR 5 MG tablet    ROXICODONE    30 tablet    Take 1 tablet (5 mg) by mouth once for 1 dose    Thoracic aortic aneurysm without rupture (H)       rivaroxaban ANTICOAGULANT 20 MG Tabs tablet    XARELTO    90 tablet    Take 1 tablet (20 mg) by mouth daily (with dinner)    Atrial fibrillation, unspecified type (H)       rosuvastatin 5 MG tablet    CRESTOR    60 tablet    Take 1 tablet (5 mg) by mouth At Bedtime    Thoracic aortic aneurysm without rupture (H), Ascending  aortic aneurysm (H)

## 2018-08-24 NOTE — PROGRESS NOTES
SUBJECTIVE:  Merlyn Escamilla is a 85 year old female here for hospital follow-up.  She recentlyfor new onset atrial fibrillation and right flank pain.  Her flank pain resolved.  She has been following up with cardiology who rearrange some of her medications in regards to hypertension with her new onset atrial fibrillation and heart failure likely worsened with atrial fibrillation.  Her rate has been controlled.  She is tolerating Xarelto well.    She is in the emergency department yesterday with left flank and low back pain.  A CT was performed once again did not show any significant findings.  She was given oxycodone which helped her pain significantly.  Interestingly, her pain is only present when she is laying down trying to sleep.  She does not have any exertional chest pain, shortness of breath, palpitations or other pain.    She has been found to have a worsening ascending aortic aneurysm up to 4.9 cm in her last hospitalization.  She is working on getting [facility] at [facility] for consultation.      Patient Active Problem List    Diagnosis Date Noted     Ascending aortic aneurysm (H) 08/20/2018     Priority: Medium     Thoracic aortic aneurysm without rupture (H) 08/09/2018     Priority: Medium     Slow transit constipation 07/31/2018     Priority: Medium     Renal cyst, right 07/31/2018     Priority: Medium     Large, 9.5cm.  Noted incidentally on CT scan.  Urology recommends no further follow up.       Atrial fibrillation (H) 07/30/2018     Priority: Medium     Primary osteoarthritis of both first carpometacarpal joints 05/22/2018     Priority: Medium     Primary osteoarthritis of both hands 05/22/2018     Priority: Medium     History of colonic polyps 01/25/2018     Priority: Medium     Osteoarthrosis 01/25/2018     Priority: Medium     Overview:   Knee       History of carpal tunnel surgery of left wrist 10/04/2017     Priority: Medium     S/P total knee arthroplasty 10/03/2014      Priority: Medium     AVM (arteriovenous malformation) of colon 10/01/2014     Priority: Medium       Past Medical History:   Diagnosis Date     Carpal tunnel syndrome of left wrist     10/4/2017     Carpal tunnel syndrome of right wrist     10/4/2017     Deep phlebothrombosis during pregnancy     No Comments Provided     Osteoarthritis     Knee     Other specified postprocedural states     10/4/2017     Other specified postprocedural states     1/31/2018     Presence of artificial knee joint     10/3/2014       Past Surgical History:   Procedure Laterality Date     APPENDECTOMY OPEN      No Comments Provided     C TOTAL KNEE ARTHROPLASTY Right 10/01/2014    Dr Domingo     CARPAL TUNNEL RELEASE RT/LT Left 01/31/2018 01/31/2018,[id number],OTHER,Left     CARPAL TUNNEL RELEASE RT/LT Right 10/04/2017     COLONOSCOPY  09/2014    Arteriovenous malformations of right, transverse and splenic flexure     ENDOSCOPIC STRIPPING VEIN(S)      No Comments Provided     HYSTERECTOMY TOTAL ABDOMINAL  06/1987    with squamous metaplasia and leiomoma       Current Outpatient Prescriptions   Medication Sig Dispense Refill     aspirin EC 81 MG EC tablet Take 81 mg by mouth daily with food       furosemide (LASIX) 20 MG tablet Take 2 tablets (40 mg) by mouth every morning 60 tablet 1     lisinopril (PRINIVIL/ZESTRIL) 5 MG tablet Take 1 tablet (5 mg) by mouth daily 60 tablet 3     metoprolol succinate (TOPROL-XL) 100 MG 24 hr tablet Take 1 tablet (100 mg) by mouth daily 60 tablet 3     moxifloxacin (VIGAMOX) 0.5 % ophthalmic solution Place 1 drop into the right eye 4 times daily BEGIN THREE DAYS PRIOR TO SURGERY AND CONTINUE FOUR TIMES DAILY FOR 1 WEEK AFTER SURGERY       Multiple Vitamin (MULTI-VITAMINS) TABS Take 1 tablet by mouth daily       naproxen sodium (ALEVE) 220 MG tablet Take 220 mg by mouth 2 times daily as needed (PAIN) TAKE WITH FOOD       oxyCODONE IR (ROXICODONE) 5 MG tablet Take 1 tablet (5 mg) by mouth once for 1 dose 30  tablet 0     rivaroxaban ANTICOAGULANT (XARELTO) 20 MG TABS tablet Take 1 tablet (20 mg) by mouth daily (with dinner) 90 tablet 3     rosuvastatin (CRESTOR) 5 MG tablet Take 1 tablet (5 mg) by mouth At Bedtime 60 tablet 3     [DISCONTINUED] rivaroxaban ANTICOAGULANT (XARELTO) 20 MG TABS tablet Take 1 tablet (20 mg) by mouth daily (with dinner) 60 tablet 3       Allergies:  Allergies   Allergen Reactions     Morphine      Other reaction(s): Muscle Weakness     Phenylbutazone Unknown       Family History   Problem Relation Age of Onset     HEART DISEASE Mother      Heart Disease     HEART DISEASE Father      Heart Disease     HEART DISEASE Brother      HEART DISEASE Brother      HEART DISEASE Brother      HEART DISEASE Brother      Brain Cancer Sister      HEART DISEASE Sister        Social History   Substance Use Topics     Smoking status: Never Smoker     Smokeless tobacco: Never Used     Alcohol use 3.0 oz/week       ROS:    As above otherwise ROS is unremarkable.      OBJECTIVE:  /58  Pulse 88  Wt 135 lb (61.2 kg)  BMI 24.69 kg/m2    EXAM:  General Appearance: Pleasant, alert, appropriate appearance for age. No acute distress  Lungs: Normal chest wall and respirations. Clear to auscultation, no wheezes or crackles.  Cardiovascular: Irregularly irregular without murmurs.  Musculoskeletal: Trace pedal edema bilaterally.    ASSESSEMENT AND PLAN:    1. Thoracic aortic aneurysm without rupture (H)    2. Atrial fibrillation, unspecified type (H)      She will continue with her follow-up at [facility] for discussion of treatment options for her enlarging ascending aortic aneurysm.    She continues to be rate controlled with atrial fibrillation.  She will transition to Xarelto 20 mg daily.    Cliff Dinero MD  Family Medicine      This document was prepared using voice generated software.  While every attempt was made for accuracy, grammatical errors may exist.

## 2018-08-24 NOTE — ED PROVIDER NOTES
"  History     Chief Complaint   Patient presents with     Rib Pain     HPI  Merlyn Escamilla is a 85 year old female who is with aching pain at night.  No fevers or chills.  No recent fall or trauma.  Reviewed nurse's notes below, similar history was related to me.  Pt arrives to ED from home for left sided, mid-back pain. Per pt, has had this \"aching pain\" for last 4 days that is worse at night and causes her to lose sleep. Pt denies heavy lifting or increased exercise recently, pt denies pain becoming worse with deep breath. Rates pain 7/10, but states pain \"comes and goes\". Pt lives home alone, here with daughter.   Chelsey Caryamel     Problem List:    Patient Active Problem List    Diagnosis Date Noted     Ascending aortic aneurysm (H) 08/20/2018     Priority: Medium     Thoracic aortic aneurysm without rupture (H) 08/09/2018     Priority: Medium     Slow transit constipation 07/31/2018     Priority: Medium     Renal cyst, right 07/31/2018     Priority: Medium     Large, 9.5cm.  Noted incidentally on CT scan.  Urology recommends no further follow up.       Atrial fibrillation (H) 07/30/2018     Priority: Medium     Primary osteoarthritis of both first carpometacarpal joints 05/22/2018     Priority: Medium     Primary osteoarthritis of both hands 05/22/2018     Priority: Medium     History of colonic polyps 01/25/2018     Priority: Medium     Osteoarthrosis 01/25/2018     Priority: Medium     Overview:   Knee       History of carpal tunnel surgery of left wrist 10/04/2017     Priority: Medium     S/P total knee arthroplasty 10/03/2014     Priority: Medium     AVM (arteriovenous malformation) of colon 10/01/2014     Priority: Medium        Past Medical History:    Past Medical History:   Diagnosis Date     Carpal tunnel syndrome of left wrist      Carpal tunnel syndrome of right wrist      Deep phlebothrombosis during pregnancy      Osteoarthritis      Other specified postprocedural states      Other specified " "postprocedural states      Presence of artificial knee joint        Past Surgical History:    Past Surgical History:   Procedure Laterality Date     APPENDECTOMY OPEN      No Comments Provided     C TOTAL KNEE ARTHROPLASTY Right 10/01/2014    Dr Domingo     CARPAL TUNNEL RELEASE RT/LT Left 01/31/2018 01/31/2018,[id number],OTHER,Left     CARPAL TUNNEL RELEASE RT/LT Right 10/04/2017     COLONOSCOPY  09/2014    Arteriovenous malformations of right, transverse and splenic flexure     ENDOSCOPIC STRIPPING VEIN(S)      No Comments Provided     HYSTERECTOMY TOTAL ABDOMINAL  06/1987    with squamous metaplasia and leiomoma       Family History:    Family History   Problem Relation Age of Onset     HEART DISEASE Mother      Heart Disease     HEART DISEASE Father      Heart Disease     HEART DISEASE Brother      HEART DISEASE Brother      HEART DISEASE Brother      HEART DISEASE Brother      Brain Cancer Sister      HEART DISEASE Sister        Social History:  Marital Status:   [5]  Social History   Substance Use Topics     Smoking status: Never Smoker     Smokeless tobacco: Never Used     Alcohol use 3.0 oz/week        Medications:      aspirin EC 81 MG EC tablet   furosemide (LASIX) 20 MG tablet   lisinopril (PRINIVIL/ZESTRIL) 5 MG tablet   metoprolol succinate (TOPROL-XL) 100 MG 24 hr tablet   moxifloxacin (VIGAMOX) 0.5 % ophthalmic solution   Multiple Vitamin (MULTI-VITAMINS) TABS   naproxen sodium (ALEVE) 220 MG tablet   rivaroxaban ANTICOAGULANT (XARELTO) 20 MG TABS tablet   rosuvastatin (CRESTOR) 5 MG tablet         Review of Systems  No fevers chills or shakes no nausea vomiting or diarrhea.  No headache.  Physical Exam   BP: 105/64  Pulse: 74  Temp: 98.2  F (36.8  C)  Resp: 18  Height: 157.5 cm (5' 2\")  Weight: 59.9 kg (132 lb)  SpO2: 100 %      Physical Exam   Constitutional: She is oriented to person, place, and time.   Cardiovascular: Normal rate.    No murmur heard.  Pulmonary/Chest: Effort normal. No " "respiratory distress. She has no wheezes. She has no rales. She exhibits tenderness.   Abdominal: Soft. Bowel sounds are normal.   Musculoskeletal: She exhibits no edema.   Neurological: She is alert and oriented to person, place, and time. She has normal reflexes.   Nursing note and vitals reviewed.      ED Course     ED Course     Procedures         No results found for this or any previous visit (from the past 24 hour(s)).    Medications   oxyCODONE IR (ROXICODONE) tablet 5 mg (5 mg Oral Given 8/24/18 0146)       Assessments & Plan (with Medical Decision Making)       CT Chest w/o Contrast (Final result) Result time: 08/24/18 02:45:14     Final result by Interface, Radiant Ib (08/24/18 02:45:14)     Impression:     IMPRESSION:         No acute intra-thoracic pathology.    THIS DOCUMENT HAS BEEN ELECTRONICALLY SIGNED BY ALEXANDRE GARRETT MD     Narrative:     EXAM:    CT Chest Without Intravenous Contrast    CLINICAL HISTORY:    85 years old, female; Pain; Chest wall pain; Patient HX: Per pt, has had this   \"aching pain\" for last 4 days that is worse at night and causes her to lose   sleep. Pt denies heavy lifting or increased exercise recently, pt denies pain   becoming worse with deep breath. Rates pain 7/10, but states pain \"comes and   goes\". ; Additional info: Left side pain    TECHNIQUE:    Axial computed tomography images of the chest without intravenous contrast.    All CT scans at this facility use at least one of these dose optimization   techniques: automated exposure control; mA and/or kV adjustment per patient   size (includes targeted exams where dose is matched to clinical indication); or   iterative reconstruction.    MIP reconstructed images were created and reviewed.    Coronal and sagittal reformatted images were created and reviewed.    COMPARISON:    CTA CHEST ABDOMEN PELVIS W CONTRAST 7/29/2018 10:35 PM    FINDINGS:    Lungs:  Unremarkable.    Pleural space:  Unremarkable.  No pneumothorax.  No " significant effusion.    Heart:  Unremarkable.    Bones/joints:  Unremarkable.  No acute fracture.  No dislocation.    Soft tissues:  Unremarkable.    Vasculature:  Unremarkable.  No thoracic aortic aneurysm.    Lymph nodes:  Unremarkable.         Discharge Medication List as of 8/24/2018  2:53 AM      START taking these medications    Details   oxyCODONE IR (ROXICODONE) 5 MG tablet Take 1 tablet (5 mg) by mouth once for 1 dose, Disp-6 tablet, R-0, Local Print             Final diagnoses:   Chest wall pain     She feeling better over the course of the ER stay.  Follow-up with primary.  Short course of pain meds.  May need reimaging if this pain continues.  Given negative CT low clinical suspicion for occult malignancy at this time but if this continues would be concerning giving the night pain.  Question MRI.  Return to ER with uncontrolled pain.  8/24/2018   [facility] HospitalSilverio gupta MD  08/27/18 [id number]

## 2018-08-24 NOTE — DISCHARGE INSTRUCTIONS
*CHEST PAIN, UNCERTAIN CAUSE    Based on your exam today, the exact cause of your chest pain is not certain. Your condition does not seem serious at this time, and your pain does not appear to be coming from your heart. However, sometimes the signs of a serious problem take more time to appear. Therefore, watch for the warning signs listed below.  HOME CARE:    1. Rest today and avoid strenuous activity.  2. Take any prescribed medicine as directed.  FOLLOW UP with your doctor in 1-3 days.   GET PROMPT MEDICAL ATTENTION if any of the following occur:    A change in the type of pain: if it feels different, becomes more severe, lasts longer, or begins to spread into your shoulder, arm, neck, jaw or back    Shortness of breath or increased pain with breathing    Weakness, dizziness, or fainting    Cough with blood or dark colored sputum (phlegm)    Fever over 101  F (38.3  C)    Swelling, pain or redness in one leg    0336-6622 [employer]. [address] [place] [address]. All rights reserved. This information is not intended as a substitute for professional medical care. Always follow your healthcare professional's instructions.  This information has been modified by your health care provider with permission from the publisher.

## 2018-08-24 NOTE — TELEPHONE ENCOUNTER
"Call to pt, after ID verifications, advised of the following per provider:    \"Please advised patient that I would like to have her be seen in consult by cardiothoracic surgery for evaluation of her ascending aortic aneurysm in the near future, order has been placed for this at the [facility].  I would like her to be seen sooner given the rapid interval change of the past couple of years.  Additionally, I would like her to make some medication adjustments with her newly identified systolic heart failure.  I would like her to stop the medication diltiazem that was started in the hospital.  I would like her to stop the metoprolol tartrate which was started in the hospital.  In place of these medications she will begin metoprolol succinate 100 mg once daily.  I would also like her to start lisinopril 5 mg daily for heart failure.  I would like her to follow-up in clinic in 1 week.\"    Instructions for medications changes reiterated multiple times. Pt verbalizes understanding. Pt will discuss transportation options to the [facility] with her daughter and call this writer back. Advised that if family cannot bring her, we can check into [facility], [facility], or [facility] for further options. Pt states she will call this writer back. Mirna Molina RN on 8/24/2018 at 1:16 PM    "

## 2018-08-24 NOTE — ED TRIAGE NOTES
"Pt arrives to ED from home for left sided, mid-back pain. Per pt, has had this \"aching pain\" for last 4 days that is worse at night and causes her to lose sleep. Pt denies heavy lifting or increased exercise recently, pt denies pain becoming worse with deep breath. Rates pain 7/10, but states pain \"comes and goes\". Pt lives home alone, here with daughter.   Chelsey CARBAJALBlu Simons    "

## 2018-08-24 NOTE — ED AVS SNAPSHOT
[facility]    [address]    [place] [address]    Phone:  [phone]    Fax:  [phone]                                       Merlyn Escamilla   MRN: [id number]    Department:  [facility]   Date of Visit:  8/24/2018           After Visit Summary Signature Page     I have received my discharge instructions, and my questions have been answered. I have discussed any challenges I see with this plan with the nurse or doctor.    ..........................................................................................................................................  Patient/Patient Representative Signature      ..........................................................................................................................................  Patient Representative Print Name and Relationship to Patient    ..................................................               ................................................  Date                                            Time    ..........................................................................................................................................  Reviewed by Signature/Title    ...................................................              ..............................................  Date                                                            Time          22EPIC Rev 08/18

## 2018-08-24 NOTE — TELEPHONE ENCOUNTER
----- Message from PO Cunningham CNP sent at 8/24/2018 12:56 PM CDT -----  Please advised patient that I would like to have her be seen in consult by cardiothoracic surgery for evaluation of her ascending aortic aneurysm in the near future, order has been placed for this at the [facility].  I would like her to be seen sooner given the rapid interval change of the past couple of years.  Additionally, I would like her to make some medication adjustments with her newly identified systolic heart failure.  I would like her to stop the medication diltiazem that was started in the hospital.  I would like her to stop the metoprolol tartrate which was started in the hospital.  In place of these medications she will begin metoprolol succinate 100 mg once daily.  I would also like her to start lisinopril 5 mg daily for heart failure.  I would like her to follow-up in clinic in 1 week.  Thanks,  ANGELIKA

## 2018-08-24 NOTE — ED AVS SNAPSHOT
[facility]    [address]    [place] [address]    Phone:  [phone]    Fax:  [phone]                                       Merlyn Escamilla   MRN: [id number]    Department:  [facility]   Date of Visit:  8/24/2018           Patient Information     Date Of Birth          8/22/1933        Your diagnoses for this visit were:     Chest wall pain       Follow-up Information     Follow up with Sharath Dinero MD.    Specialty:  Family Practice    Contact information:    [address] [facility] [id number] [phone]          Follow up with [facility].    Specialty:  EMERGENCY MEDICINE    Why:  If symptoms worsen    Contact information:    [address]  [place] [address] [phone]        Discharge Instructions          *CHEST PAIN, UNCERTAIN CAUSE    Based on your exam today, the exact cause of your chest pain is not certain. Your condition does not seem serious at this time, and your pain does not appear to be coming from your heart. However, sometimes the signs of a serious problem take more time to appear. Therefore, watch for the warning signs listed below.  HOME CARE:    1. Rest today and avoid strenuous activity.  2. Take any prescribed medicine as directed.  FOLLOW UP with your doctor in 1-3 days.   GET PROMPT MEDICAL ATTENTION if any of the following occur:    A change in the type of pain: if it feels different, becomes more severe, lasts longer, or begins to spread into your shoulder, arm, neck, jaw or back    Shortness of breath or increased pain with breathing    Weakness, dizziness, or fainting    Cough with blood or dark colored sputum (phlegm)    Fever over 101  F (38.3  C)    Swelling, pain or redness in one leg    6580-2200 [employer]. [address] [place] [address], [place] [address]. All rights reserved. This information is not intended as a substitute for professional  medical care. Always follow your healthcare professional's instructions.  This information has been modified by your health care provider with permission from the publisher.      Your next 10 appointments already scheduled     Aug 24, 2018  3:00 PM CDT   SHORT with Sharath Dinero MD   [facility] ([facility])    [address]  [place] [address]   [phone]            Aug 29, 2018  9:30 AM CDT   CTA ANGIOGRAM CORONARY ARTERY with [facility] Nurse, GHCT1   [facility] ([facility])    [address]  [place] [address]   [phone]           Please allow two hours for this test.  Follow the instructions below:   The day before your exam, drink extra fluids at least six 8-ounce glasses (unless your doctor wants you to restrict your fluids).   No caffeine and no smoking the day of the test.   Do not eat or drink for 2 hours before your exam. You may take your morning medicines with small sips of water.   If you take Viagra, Levitra or Cialis, stop taking it for 48 hours before your test.  For patients with diabetes:   You may need a blood test (creatinine test) within 30 days of your exam; the Cardiologist/Radiologist may require you to get this test prior to your exam   If you are diabetic and take oral hypoglycemics, do not take them on the day of your test.  Bring any scans or X-rays taken at other hospitals, if similar tests were done. Also bring a list of your medicines, including vitamins, minerals and over-the-counter drugs. It is safest to leave personal items at home.  Be sure to tell your doctor:   If you have any allergies, including any reaction to contrast.   If you are breastfeeding or there is a chance you are pregnant.  Please wear loose clothing, such as a sweat suit or jogging clothes. Avoid snaps, zippers and other metal. We may ask you to undress and put on a hospital gown.  If you have any questions, please  call the Imaging Department where you will have your exam.            Aug 30, 2018 10:15 AM CDT   ZIOPATCH MONITOR with  RESPIRATORY THERAPY TECH   [facility] ([facility])    [address]  [place] [address]   [phone]            Sep 05, 2018  3:45 PM CDT   Return Visit with PO Cunningham CNP   [facility] ([facility])    [address]  [place] [address]   [phone]            Sep 25, 2018 12:45 PM CDT   Pre-Op physical with Tootie Garcia MD   [facility] ([facility])    [address]  [place] [address]   [phone]              24 Hour Appointment Hotline     To schedule an appointment at [facility], please call [phone]. If you don't have a family doctor or clinic, we will help you find one. [facility] are conveniently located to serve the needs of you and your family.           Review of your medicines      START taking        Dose / Directions Last dose taken    oxyCODONE IR 5 MG tablet   Commonly known as:  ROXICODONE   Dose:  5 mg   Quantity:  6 tablet        Take 1 tablet (5 mg) by mouth once for 1 dose   Refills:  0          Our records show that you are taking the medicines listed below. If these are incorrect, please call your family doctor or clinic.        Dose / Directions Last dose taken    ALEVE 220 MG tablet   Dose:  220 mg   Generic drug:  naproxen sodium        Take 220 mg by mouth 2 times daily as needed (PAIN) TAKE WITH FOOD   Refills:  0        aspirin 81 MG EC tablet   Dose:  81 mg        Take 81 mg by mouth daily with food   Refills:  0        diltiazem 240 MG 24 hr capsule   Dose:  240 mg   Quantity:  30 capsule        Take 1 capsule (240 mg) by mouth daily   Refills:  3        furosemide 20 MG tablet   Commonly known as:  LASIX   Dose:  40 mg   Quantity:  60 tablet        Take 2  tablets (40 mg) by mouth every morning   Refills:  1        metoprolol tartrate 25 MG tablet   Commonly known as:  LOPRESSOR   Dose:  25 mg   Quantity:  60 tablet        Take 1 tablet (25 mg) by mouth 2 times daily   Refills:  3        moxifloxacin 0.5 % ophthalmic solution   Commonly known as:  VIGAMOX   Dose:  1 drop        Place 1 drop into the right eye 4 times daily BEGIN THREE DAYS PRIOR TO SURGERY AND CONTINUE FOUR TIMES DAILY FOR 1 WEEK AFTER SURGERY   Refills:  0        MULTI-VITAMINS Tabs   Dose:  1 tablet        Take 1 tablet by mouth daily   Refills:  0        rivaroxaban ANTICOAGULANT 20 MG Tabs tablet   Commonly known as:  XARELTO   Dose:  20 mg   Quantity:  60 tablet        Take 1 tablet (20 mg) by mouth daily (with dinner)   Refills:  3        rosuvastatin 5 MG tablet   Commonly known as:  CRESTOR   Dose:  5 mg   Quantity:  60 tablet        Take 1 tablet (5 mg) by mouth At Bedtime   Refills:  3                Information about OPIOIDS     PRESCRIPTION OPIOIDS: WHAT YOU NEED TO KNOW   We gave you an opioid (narcotic) pain medicine. It is important to manage your pain, but opioids are not always the best choice. You should first try all the other options your care team gave you. Take this medicine for as short a time (and as few doses) as possible.    Some activities can increase your pain, such as bandage changes or therapy sessions. It may help to take your pain medicine 30 to 60 minutes before these activities. Reduce your stress by getting enough sleep, working on hobbies you enjoy and practicing relaxation or meditation. Talk to your care team about ways to manage your pain beyond prescription opioids.    These medicines have risks:    DO NOT drive when on new or higher doses of pain medicine. These medicines can affect your alertness and reaction times, and you could be arrested for driving under the influence (DUI). If you need to use opioids long-term, talk to your care team about  driving.    DO NOT operate heavy machinery    DO NOT do any other dangerous activities while taking these medicines.    DO NOT drink any alcohol while taking these medicines.     If the opioid prescribed includes acetaminophen, DO NOT take with any other medicines that contain acetaminophen. Read all labels carefully. Look for the word  acetaminophen  or  Tylenol.  Ask your pharmacist if you have questions or are unsure.    You can get addicted to pain medicines, especially if you have a history of addiction (chemical, alcohol or substance dependence). Talk to your care team about ways to reduce this risk.    All opioids tend to cause constipation. Drink plenty of water and eat foods that have a lot of fiber, such as fruits, vegetables, prune juice, apple juice and high-fiber cereal. Take a laxative (Miralax, milk of magnesia, Colace, Senna) if you don t move your bowels at least every other day. Other side effects include upset stomach, sleepiness, dizziness, throwing up, tolerance (needing more of the medicine to have the same effect), physical dependence and slowed breathing.    Store your pills in a secure place, locked if possible. We will not replace any lost or stolen medicine. If you don t finish your medicine, please throw away (dispose) as directed by your pharmacist. The [place] [employer] has more information about safe disposal: [email or web address]://[email or web address]/living-green/[email or web address]-unwanted-medications        Prescriptions were sent or printed at these locations (1 Prescription)                   Other Prescriptions                Printed at Department/Unit printer (1 of 1)         oxyCODONE IR (ROXICODONE) 5 MG tablet                Procedures and tests performed during your visit     CT Chest w/o Contrast      Orders Needing Specimen Collection     None      Pending Results     No orders found from 8/22/2018 to 8/25/2018.            Pending Culture Results     No orders found from  8/22/2018 to 8/25/2018.            Pending Results Instructions     If you had any lab results that were not finalized at the time of your Discharge, you can call the ED Lab Result RN at [phone]. You will be contacted by this team for any positive Lab results or changes in treatment. The nurses are available 7 days a week from 10A to 6:30P.  You can leave a message 24 hours per day and they will return your call.        Thank you for choosing [facility]       Thank you for choosing [facility] for your care. Our goal is always to provide you with excellent care. Hearing back from our patients is one way we can continue to improve our services. Please take a few minutes to complete the written survey that you may receive in the mail after you visit with us. Thank you!        Care EveryWhere ID     This is your Care EveryWhere ID. This could be used by other organizations to access your [facility] medical records  [id number]        Equal Access to Services     STEVEN DUARTE : Pamela Mendoza, stephani pavon, moon dee, suki salmeron . So [facility] [phone].    ATENCIÓN: Si habla español, tiene a harp disposición servicios gratuitos de asistencia lingüística. Llame al [phone].    We comply with applicable federal civil rights laws and [place] laws. We do not discriminate on the basis of race, color, national origin, age, disability, sex, sexual orientation, or gender identity.            After Visit Summary       This is your record. Keep this with you and show to your community pharmacist(s) and doctor(s) at your next visit.

## 2018-08-25 ASSESSMENT — ANXIETY QUESTIONNAIRES: GAD7 TOTAL SCORE: 7

## 2018-08-25 ASSESSMENT — PATIENT HEALTH QUESTIONNAIRE - PHQ9: SUM OF ALL RESPONSES TO PHQ QUESTIONS 1-9: 9

## 2018-08-27 ENCOUNTER — TELEPHONE (OUTPATIENT)
Dept: CARDIOLOGY | Facility: OTHER | Age: 83
End: 2018-08-27

## 2018-08-27 NOTE — TELEPHONE ENCOUNTER
Call back to pt who states she discussed referral to [facility] with her daughter and they will make sure she gets down there. Referral has been placed. Reiterated medication changes. Confirmed upcoming appointments for CTA, Zio patch, and f/u with PO Lopes CNP. Mirna Molina RN on 8/27/2018 at 11:02 AM

## 2018-08-27 NOTE — TELEPHONE ENCOUNTER
Patient verified .  Reminder call for CCTA test with instructions given.  Patient verbalized understanding.

## 2018-08-27 NOTE — TELEPHONE ENCOUNTER
Returned call. Discussed referral process and timeframe for receiving a call. No further questions. Mirna Molina RN on 8/27/2018 at 2:15 PM

## 2018-08-29 ENCOUNTER — TELEPHONE (OUTPATIENT)
Dept: FAMILY MEDICINE | Facility: OTHER | Age: 83
End: 2018-08-29

## 2018-08-29 ENCOUNTER — HOSPITAL ENCOUNTER (OUTPATIENT)
Dept: CT IMAGING | Facility: OTHER | Age: 83
Discharge: HOME | End: 2018-08-29
Attending: NURSE PRACTITIONER | Admitting: NURSE PRACTITIONER
Payer: MEDICARE

## 2018-08-29 VITALS
SYSTOLIC BLOOD PRESSURE: 121 MMHG | OXYGEN SATURATION: 99 % | RESPIRATION RATE: 16 BRPM | HEART RATE: 77 BPM | DIASTOLIC BLOOD PRESSURE: 97 MMHG

## 2018-08-29 DIAGNOSIS — Z82.49 FAMILY HISTORY OF ISCHEMIC HEART DISEASE: ICD-10-CM

## 2018-08-29 DIAGNOSIS — R06.09 DOE (DYSPNEA ON EXERTION): ICD-10-CM

## 2018-08-29 DIAGNOSIS — I50.21 ACUTE SYSTOLIC HEART FAILURE (H): ICD-10-CM

## 2018-08-29 DIAGNOSIS — R60.0 LOWER EXTREMITY EDEMA: ICD-10-CM

## 2018-08-29 DIAGNOSIS — R07.89 ATYPICAL CHEST PAIN: ICD-10-CM

## 2018-08-29 PROCEDURE — 71275 CT ANGIOGRAPHY CHEST: CPT

## 2018-08-29 PROCEDURE — 25000125 ZZHC RX 250: Performed by: NURSE PRACTITIONER

## 2018-08-29 RX ORDER — METOPROLOL TARTRATE 1 MG/ML
10 INJECTION, SOLUTION INTRAVENOUS
Status: DISCONTINUED | OUTPATIENT
Start: 2018-08-29 | End: 2018-08-30 | Stop reason: HOSPADM

## 2018-08-29 RX ADMIN — IOHEXOL 100 ML: 350 INJECTION, SOLUTION INTRAVENOUS at 10:00

## 2018-08-29 NOTE — PROGRESS NOTES
0930: Patient arrived for a CCTA. . They were connected to a cardiac monitor and vital signs were obtained. The patient's heart rate was 90 to 108 A fib . Pt reports she did take her Toprol  mg at 0800 today 8/29/18 at home.  RN notified  order changed to CTA only.  Notified Natalia Schultz NP.  Instructed pt that Natalia Schultz NP would notify of her of result of today's test.  Medications and allergies were reviewed.  The procedure was explained, and the patient was instructed to rest and relax, as much as possible. A saline lock was established. The patient's heart rate was in range of 130's  to 80's. The patient was offered the restroom, and then they walked to CT. The cardiac monitor was placed there, and patient's heart rate remained irregular. The patient was given one bottle of water, and they were told to  Drink at least 3 other additional glasses of water today, per post contrast instructions. The patient left ambulatory in stable condition.

## 2018-08-29 NOTE — TELEPHONE ENCOUNTER
Changed directions on oxycodone rx written 8/24/18 to 1 po daily as needed instead of 1 tab once. Chart was reviewed and noted that patient stated she only needed when lying down. No action needed.

## 2018-08-30 ENCOUNTER — HOSPITAL ENCOUNTER (OUTPATIENT)
Dept: RESPIRATORY THERAPY | Facility: OTHER | Age: 83
Discharge: HOME | End: 2018-08-30
Attending: NURSE PRACTITIONER | Admitting: NURSE PRACTITIONER
Payer: MEDICARE

## 2018-08-30 DIAGNOSIS — I48.91 NEW ONSET A-FIB (H): ICD-10-CM

## 2018-08-30 PROCEDURE — 0296T ZIO PATCH HOLTER: CPT

## 2018-08-30 PROCEDURE — 0298T ZZC EXT ECG > 48HR TO 21 DAY REVIEW AND INTERPRETATN: CPT | Performed by: INTERNAL MEDICINE

## 2018-08-30 PROCEDURE — 40000275 ZZH STATISTIC RCP TIME EA 10 MIN

## 2018-09-05 ENCOUNTER — OFFICE VISIT (OUTPATIENT)
Dept: CARDIOLOGY | Facility: OTHER | Age: 83
End: 2018-09-05
Attending: NURSE PRACTITIONER
Payer: COMMERCIAL

## 2018-09-05 VITALS
BODY MASS INDEX: 24.18 KG/M2 | SYSTOLIC BLOOD PRESSURE: 118 MMHG | WEIGHT: 131.4 LBS | DIASTOLIC BLOOD PRESSURE: 72 MMHG | RESPIRATION RATE: 14 BRPM | HEART RATE: 90 BPM | HEIGHT: 62 IN

## 2018-09-05 DIAGNOSIS — I50.20 SYSTOLIC CONGESTIVE HEART FAILURE, UNSPECIFIED CONGESTIVE HEART FAILURE CHRONICITY: ICD-10-CM

## 2018-09-05 DIAGNOSIS — I48.91 NEW ONSET A-FIB (H): ICD-10-CM

## 2018-09-05 DIAGNOSIS — R06.09 DOE (DYSPNEA ON EXERTION): Primary | ICD-10-CM

## 2018-09-05 DIAGNOSIS — I71.20 THORACIC AORTIC ANEURYSM WITHOUT RUPTURE (H): ICD-10-CM

## 2018-09-05 DIAGNOSIS — I71.21 ASCENDING AORTIC ANEURYSM (H): ICD-10-CM

## 2018-09-05 PROCEDURE — 99214 OFFICE O/P EST MOD 30 MIN: CPT | Performed by: NURSE PRACTITIONER

## 2018-09-05 PROCEDURE — G0463 HOSPITAL OUTPT CLINIC VISIT: HCPCS

## 2018-09-05 RX ORDER — METOPROLOL SUCCINATE 100 MG/1
TABLET, EXTENDED RELEASE ORAL
Qty: 60 TABLET | Refills: 3 | Status: SHIPPED | OUTPATIENT
Start: 2018-09-05 | End: 2019-05-29

## 2018-09-05 RX ORDER — ROSUVASTATIN CALCIUM 5 MG/1
2.5 TABLET, COATED ORAL AT BEDTIME
Qty: 60 TABLET | Refills: 3
Start: 2018-09-05 | End: 2019-05-29 | Stop reason: ALTCHOICE

## 2018-09-05 RX ORDER — METOPROLOL SUCCINATE 25 MG/1
TABLET, EXTENDED RELEASE ORAL
Qty: 60 TABLET | Refills: 3 | Status: SHIPPED | OUTPATIENT
Start: 2018-09-05 | End: 2019-04-24

## 2018-09-05 ASSESSMENT — PAIN SCALES - GENERAL: PAINLEVEL: NO PAIN (0)

## 2018-09-05 NOTE — NURSING NOTE
"Pt presents today for 2 week follow up. C/o being unable to sleep s/t various pains. States feels like she \"has a band around\" her waist at umbilicus. Denies chest pain/pressure. C/o increased fatigue, feeling full fast when eating. Mirna Molina RN on 9/5/2018 at 3:39 PM   Chief Complaint   Patient presents with     Follow Up For     2 week follow up       Initial /72 (BP Location: Right arm, Patient Position: Sitting, Cuff Size: Adult Regular)  Pulse 90  Resp 14  Ht 1.562 m (5' 1.5\")  Wt 59.6 kg (131 lb 6.4 oz)  BMI 24.43 kg/m2 Estimated body mass index is 24.43 kg/(m^2) as calculated from the following:    Height as of this encounter: 1.562 m (5' 1.5\").    Weight as of this encounter: 59.6 kg (131 lb 6.4 oz).  Medication Reconciliation: complete    Mirna Molina RN    "

## 2018-09-05 NOTE — MR AVS SNAPSHOT
After Visit Summary   9/5/2018    Merlyn Escamilla    MRN: [id number]           Patient Information     Date Of Birth          8/22/1933        Visit Information        Provider Department      9/5/2018 3:45 PM Natalia Schultz APRN CNP [facility]        Today's Diagnoses     WEST (dyspnea on exertion)    -  1    New onset a-fib (H)        Systolic congestive heart failure, unspecified congestive heart failure chronicity (H)        Thoracic aortic aneurysm without rupture (H)        Ascending aortic aneurysm (H)           Follow-ups after your visit        Follow-up notes from your care team     Return in about 4 weeks (around 10/3/2018).      Your next 10 appointments already scheduled     Sep 25, 2018 12:45 PM CDT   Pre-Op physical with Tootie Garcia MD   [facility] ([facility])    [address]  [place] [address]   [phone]            Sep 26, 2018 10:15 AM CDT   Return Visit with PO Cunningham CNP   [facility]    [address]  [place] [address]   [phone]              Future tests that were ordered for you today     Open Future Orders        Priority Expected Expires Ordered    Stress NM Lexiscan Routine  9/5/2019 9/5/2018            Who to contact     If you have questions or need follow up information about today's clinic visit or your schedule please contact [facility] directly at [phone].  Normal or non-critical lab and imaging results will be communicated to you by [employer], letter or phone within 4 business days after the clinic has received the results. If you do not hear from us within 7 days, please contact the clinic through [employer] or phone. If you have a critical or abnormal lab result, we will notify you by phone as soon as possible.  Submit refill requests through [employer] or call your  "pharmacy and they will forward the refill request to us. Please allow 3 business days for your refill to be completed.          Additional Information About Your Visit        Care EveryWhere ID     This is your Care EveryWhere ID. This could be used by other organizations to access your [facility] medical records  [id number]        Your Vitals Were     Pulse Respirations Height BMI (Body Mass Index)          90 14 1.562 m (5' 1.5\") 24.43 kg/m2         Blood Pressure from Last 3 Encounters:   09/05/18 118/72   08/29/18 (!) 121/97   08/24/18 102/58    Weight from Last 3 Encounters:   09/05/18 59.6 kg (131 lb 6.4 oz)   08/24/18 61.2 kg (135 lb)   08/24/18 59.9 kg (132 lb)                 Today's Medication Changes          These changes are accurate as of 9/5/18 11:59 PM.  If you have any questions, ask your nurse or doctor.               These medicines have changed or have updated prescriptions.        Dose/Directions    * metoprolol succinate 25 MG 24 hr tablet   Commonly known as:  TOPROL-XL   This may have changed:  You were already taking a medication with the same name, and this prescription was added. Make sure you understand how and when to take each.   Used for:  New onset a-fib (H), Systolic congestive heart failure, unspecified congestive heart failure chronicity (H)   Changed by:  Natalia Schultz APRN CNP        Take 25 mg tab with 100 mg tab for total of 125 mg every night.   Quantity:  60 tablet   Refills:  3       * metoprolol succinate 100 MG 24 hr tablet   Commonly known as:  TOPROL-XL   This may have changed:    - how much to take  - how to take this  - when to take this  - additional instructions   Used for:  New onset a-fib (H), Systolic congestive heart failure, unspecified congestive heart failure chronicity (H)   Changed by:  Natalia Schultz APRN CNP        Take 100 mg tab with 25 mg tab for total of 125 mg every night.   Quantity:  60 tablet   Refills:  3       rosuvastatin 5 MG tablet "   Commonly known as:  CRESTOR   This may have changed:  how much to take   Used for:  Thoracic aortic aneurysm without rupture (H), Ascending aortic aneurysm (H)   Changed by:  Natalia Schultz APRN CNP        Dose:  2.5 mg   Take 0.5 tablets (2.5 mg) by mouth At Bedtime   Quantity:  60 tablet   Refills:  3       * Notice:  This list has 2 medication(s) that are the same as other medications prescribed for you. Read the directions carefully, and ask your doctor or other care provider to review them with you.         Where to get your medicines      These medications were sent to [facility], - [place], [place] - [address]  [address] , [place] [address]     Phone:  [phone]     metoprolol succinate 100 MG 24 hr tablet    metoprolol succinate 25 MG 24 hr tablet         Some of these will need a paper prescription and others can be bought over the counter.  Ask your nurse if you have questions.     You don't need a prescription for these medications     rosuvastatin 5 MG tablet                Primary Care Provider Office Phone # Fax #    Sharath Dinero [phone]       [address] [facility] [address]        Equal Access to Services     STEVEN DUARTE AH: Hadii boris krishnao Sodanii, waaxda luqadaha, qaybta kaalmada adeegyada, suki garcia. So [facility] [phone].    ATENCIÓN: Si habla español, tiene a harp disposición servicios gratuitos de asistencia lingüística. Llame al [phone].    We comply with applicable federal civil rights laws and [place] laws. We do not discriminate on the basis of race, color, national origin, age, disability, sex, sexual orientation, or gender identity.            Thank you!     Thank you for choosing [facility]  for your care. Our goal is always to provide you with excellent care. Hearing back from our patients is one way we can continue to improve our  services. Please take a few minutes to complete the written survey that you may receive in the mail after your visit with us. Thank you!             Your Updated Medication List - Protect others around you: Learn how to safely use, store and throw away your medicines at www.disposemymeds.org.          This list is accurate as of 9/5/18 11:59 PM.  Always use your most recent med list.                   Brand Name Dispense Instructions for use Diagnosis    aspirin 81 MG EC tablet      Take 81 mg by mouth daily with food        furosemide 20 MG tablet    LASIX    60 tablet    Take 2 tablets (40 mg) by mouth every morning    Dependent edema       lisinopril 5 MG tablet    PRINIVIL/ZESTRIL    60 tablet    Take 1 tablet (5 mg) by mouth daily    Acute systolic heart failure (H)       * metoprolol succinate 25 MG 24 hr tablet    TOPROL-XL    60 tablet    Take 25 mg tab with 100 mg tab for total of 125 mg every night.    New onset a-fib (H), Systolic congestive heart failure, unspecified congestive heart failure chronicity (H)       * metoprolol succinate 100 MG 24 hr tablet    TOPROL-XL    60 tablet    Take 100 mg tab with 25 mg tab for total of 125 mg every night.    New onset a-fib (H), Systolic congestive heart failure, unspecified congestive heart failure chronicity (H)       MULTI-VITAMINS Tabs      Take 1 tablet by mouth daily        rivaroxaban ANTICOAGULANT 20 MG Tabs tablet    XARELTO    90 tablet    Take 1 tablet (20 mg) by mouth daily (with dinner)    Atrial fibrillation, unspecified type (H)       rosuvastatin 5 MG tablet    CRESTOR    60 tablet    Take 0.5 tablets (2.5 mg) by mouth At Bedtime    Thoracic aortic aneurysm without rupture (H), Ascending aortic aneurysm (H)       * Notice:  This list has 2 medication(s) that are the same as other medications prescribed for you. Read the directions carefully, and ask your doctor or other care provider to review them with you.

## 2018-09-06 NOTE — PROGRESS NOTES
[facility] HEART CARE   CARDIOLOGY  PROGRESS NOTE    Merlyn Escamilla   [address]  [place] [address]    Sharath Dinero     Chief Complaint   Patient presents with     Follow Up For     2 week follow up        HPI:   Ms. Escamilla is an 85 year old female who present for cardiology follow-up with thoracic aortic aneurysm, newly diagnosed atrial fibrillation and newly identified systolic heart failure.  Patient has a history of arteriovenous malformation of the colon, right renal cyst and osteoarthritis.     Patient presented to the emergency department on 7/29/2018 with rapid heart rate.  She was found to be in atrial fibrillation with RVR.  She was initially treated on a diltiazem drip and admitted to the hospital.  She was started on oral anticoagulation Xarelto with a [id number] score of 3 (age and female).     She had an echocardiogram on 7/29/2018 with normal LV size and thickness, EF was estimated at 35-40%.  There was diffuse hypokinesis but the septum appeared comparatively more hypokinetic on the study.  The RV was normal in size and function.  Moderate left atrial enlargement was present.  Mild MAC with mild to moderate mitral insufficiency.  The aortic valve is normal in structure and function, trace aortic insufficiency was present.  Moderate to severe tricuspid insufficiency, RVSP was 35 mmHg +  RAP.  There was moderate dilation of the aorta present, ascending aorta 4.8 cm the inferior vena cava was dilated at 2.8 cm without respiratory variability which is consistent with increased right atrial pressure.  There is no pericardial effusion present.     Patient was initially seen by cardiology on 8/20/18.  With newly identified systolic heart failure it was recommended imaging to assess for any potential ischemia or obstructing coronary disease.  This testing has not yet been completed.  Her Lasix was increased to 40 mg daily which did resolve her edema.  It was recommended that she begin  metoprolol succinate 25 mg daily and lisinopril 5 mg daily for GDMT.  With LVEF less than 40%, it was recommended that she discontinue the diltiazem.  She was instructed on sodium restricted diet, daily weight monitoring and monitoring of her overall daily fluid intake.  We reviewed the results of her CT from 7/29/2018 with a ascending aortic dilation at 4.9 cm with no dissection or other acute abnormality identified.  CT from August 1, 2014 showed that the ascending thoracic aorta measured 3.9 cm in maximum diameter.  With this interval development it was recommended she be seen in consult by CT surgery.      Since her last visit patient did undergo a repeat CT. interestingly, this showed the dilation of her mid ascending aorta to be 4.6 x 4.5 cm which is less than the CT from 7/29/2018 with measurement of 4.9 cm and therefore, we can continue with monitoring by serial imaging surveillance.    Today patient reports that she has been feeling good.  Edema has resolved.  She has had no shortness of breath or increased WEST.  No orthopnea or PND.  No changes in appetite or early satiety.  No abdominal bloating.  No chest pain or pressure.  No palpitations.  No lightheadedness or syncope. Weight is down 4 lb since her last visit. She describes herself as largely symptomatic with atrial fibrillation. Rate has been controlled with medication adjustments made at her last cardiology visit.  She is currently wearing a ZIO patch for evaluation of underlying AF burden.    IMAGING RESULTS:   PROCEDURE:  CTA CHEST WITH CONTRAST     HISTORY: ; Acute systolic heart failure (H); WEST (dyspnea on  exertion); Family history of ischemic heart disease; Lower extremity  edema; Atypical chest pain.     TECHNIQUE:  EKG gated helical CT angiographic of the chest with IV  contrast. MIP, 3D and multiplanar images were reviewed. 3-D  postprocessing was performed on a [employer] workstation. CCTA was  deferred due to AFIB with a ventricular rate  in the 90s despite beta  blockade.     COMPARISON:  CT chest WO 8/24/18     AORTIC CTA FINDINGS: No aortic dissection or penetrating  atherosclerotic ulcer is identified. The major arch vessels are  patent, with tortuosity suggesting long-standing hypertension.  Scattered mild calcific atherosclerotic plaque is present. The heart  is borderline enlarged. The main pulmonary artery is within normal  limits in size.      Measurements of the thoracic aorta are as follows:   Sinotubular junction 3.1 x 2.3 cm  Sinus of Valsalva 3.7 x 3.5 x 3.2 cm   Mid ascending aorta 4.6 x 4.5 cm   Proximal aortic arch 4.0 x 4.0 cm  Distal aortic arch 3.1 x 3.4 cm  Descending thoracic aorta 3.6 x 3.8 cm     OTHER FINDINGS:       No abnormal thoracic adenopathy is identified. Limited views of the  upper abdomen reveal no adrenal mass. A prominent right renal cyst is  present. No suspicious osseous lesion is identified.     The pleura is without thickening or effusions.  The central airways  are patent. A 3 mm groundglass nodule at the right lung apex is highly  nonspecific.         IMPRESSION:  Dilation of the mid ascending aorta to 4.6 x 4.5 cm.     CARISSA CARLTON MD      PAST MEDICAL HISTORY:   Past Medical History:   Diagnosis Date     Carpal tunnel syndrome of left wrist     10/4/2017     Carpal tunnel syndrome of right wrist     10/4/2017     Deep phlebothrombosis during pregnancy     No Comments Provided     Osteoarthritis     Knee     Other specified postprocedural states     10/4/2017     Other specified postprocedural states     1/31/2018     Presence of artificial knee joint     10/3/2014          FAMILY HISTORY:   Family History   Problem Relation Age of Onset     HEART DISEASE Mother      Heart Disease     HEART DISEASE Father      Heart Disease     HEART DISEASE Brother      HEART DISEASE Brother      HEART DISEASE Brother      HEART DISEASE Brother      Brain Cancer Sister      HEART DISEASE Sister           PAST  SURGICAL HISTORY:   Past Surgical History:   Procedure Laterality Date     APPENDECTOMY OPEN      No Comments Provided     C TOTAL KNEE ARTHROPLASTY Right 10/01/2014    Dr Domingo     CARPAL TUNNEL RELEASE RT/LT Left 2018,[id number],OTHER,Left     CARPAL TUNNEL RELEASE RT/LT Right 10/04/2017     COLONOSCOPY  2014    Arteriovenous malformations of right, transverse and splenic flexure     ENDOSCOPIC STRIPPING VEIN(S)      No Comments Provided     HYSTERECTOMY TOTAL ABDOMINAL  1987    with squamous metaplasia and leiomoma          SOCIAL HISTORY:   Social History     Social History     Marital status:      Spouse name: N/A     Number of children: N/A     Years of education: N/A     Social History Main Topics     Smoking status: Never Smoker     Smokeless tobacco: Never Used     Alcohol use 3.0 oz/week     Drug use: No      Comment: Drug use: No     Sexual activity: Not Asked     Other Topics Concern     None     Social History Narrative    Lives alone.      2017.     3 daughters, 1 in [facility], 1 in [place] and 1 near [place] border. Lost 1 daughter at age 2 years.    Has 1 living sister, fraternal twin. All other siblings are .           CURRENT MEDICATIONS:   Prior to Admission medications    Medication Sig Start Date End Date Taking? Authorizing Provider   aspirin EC 81 MG EC tablet Take 81 mg by mouth daily with food 14  Yes Reported, Patient   furosemide (LASIX) 20 MG tablet Take 2 tablets (40 mg) by mouth every morning 18  Yes Natalia Schultz APRN CNP   lisinopril (PRINIVIL/ZESTRIL) 5 MG tablet Take 1 tablet (5 mg) by mouth daily 18  Yes Natalia Schultz APRN CNP   metoprolol succinate (TOPROL-XL) 100 MG 24 hr tablet Take 100 mg tab with 25 mg tab for total of 125 mg every night. 18  Yes Natalia Schultz APRN CNP   metoprolol succinate (TOPROL-XL) 25 MG 24 hr tablet Take 25 mg tab with 100 mg tab for total of 125 mg every night. 18   "Yes Natalia Schultz APRN CNP   Multiple Vitamin (MULTI-VITAMINS) TABS Take 1 tablet by mouth daily 7/28/14  Yes Reported, Patient   rivaroxaban ANTICOAGULANT (XARELTO) 20 MG TABS tablet Take 1 tablet (20 mg) by mouth daily (with dinner) 8/24/18  Yes Sharath Dinero MD   rosuvastatin (CRESTOR) 5 MG tablet Take 0.5 tablets (2.5 mg) by mouth At Bedtime 9/5/18  Yes Natalia cShultz APRN CNP          ALLERGIES:   Allergies   Allergen Reactions     Morphine      Other reaction(s): Muscle Weakness     Phenylbutazone Unknown          ROS:   CONSTITUTIONAL: No reported fever or chills. No changes in weight.  ENT: No visual disturbance, ear ache, epistaxis or sore throat.   CARDIOVASCULAR: No chest pain, chest pressure or chest discomfort. No palpitations or lower extremity edema.   RESPIRATORY: No shortness of breath, increased dyspnea upon exertion, no cough, wheezing or hemoptysis.   GI: No reported abdominal pain. No melena or hematochezia.   : No reported hematuria or dysuria.   NEUROLOGICAL: No lightheadedness, dizziness, syncope, ataxia, paresthesias or weakness.   HEMATOLOGIC: No history of anemia. No bleeding or excessive bruising. No history of blood clots.   MUSCULOSKELETAL: No joint pain or swelling, no muscle pain.  ENDOCRINOLOGIC: No temperature intolerance. No hair or skin changes.  SKIN: No abnormal rashes or sores, no unusual itching.      PHYSICAL EXAM:   /72 (BP Location: Right arm, Patient Position: Sitting, Cuff Size: Adult Regular)  Pulse 90  Resp 14  Ht 1.562 m (5' 1.5\")  Wt 59.6 kg (131 lb 6.4 oz)  BMI 24.43 kg/m2  GENERAL: The patient is a well-developed, well-nourished, in no apparent distress.  HEENT: Head is normocephalic and atraumatic. Eyes are symmetrical with normal visual tracking. No icterus, no xanthelasmas. Nares appeared normal without nasal drainage. Mucous membranes are moist, no cyanosis.  NECK: Supple. JVP not visible.   CHEST/ LUNGS: Lungs clear to auscultation, no " rales, rhonchi or wheezes, no use of accessory muscles, no retractions, respirations unlabored and normal respiratory rate.   CARDIO: Irregular rate and rhythm with S1 and S2, no murmur, click or rub.  ABD: Abdomen is soft and nontender, nondistended.   EXTREMITIES: No edema present.   MUSCULOSKELETAL: No joint swelling.   NEUROLOGIC: Alert and oriented X3. Normal speech, gait and affect. No focal neurologic deficits.   SKIN: No jaundice. No rashes or visible skin lesions present. No ecchymosis.       LAB RESULTS:   Office Visit on 07/14/2018   Component Date Value Ref Range Status     WBC 07/14/2018 5.0  4.0 - 11.0 10e9/L Final     RBC Count 07/14/2018 4.44  3.8 - 5.2 10e12/L Final     Hemoglobin 07/14/2018 13.0  11.7 - 15.7 g/dL Final     Hematocrit 07/14/2018 40.8  35.0 - 47.0 % Final     MCV 07/14/2018 92  78 - 100 fl Final     MCH 07/14/2018 29.3  26.5 - 33.0 pg Final     MCHC 07/14/2018 31.9  31.5 - 36.5 g/dL Final     RDW 07/14/2018 13.6  10.0 - 15.0 % Final     Platelet Count 07/14/2018 162  150 - 450 10e9/L Final     Diff Method 07/14/2018 Automated Method   Final     % Neutrophils 07/14/2018 77.1  % Final     % Lymphocytes 07/14/2018 13.5  % Final     % Monocytes 07/14/2018 8.0  % Final     % Eosinophils 07/14/2018 0.4  % Final     % Basophils 07/14/2018 0.8  % Final     % Immature Granulocytes 07/14/2018 0.2  % Final     Absolute Neutrophil 07/14/2018 3.8  1.6 - 8.3 10e9/L Final     Absolute Lymphocytes 07/14/2018 0.7* 0.8 - 5.3 10e9/L Final     Absolute Monocytes 07/14/2018 0.4  0.0 - 1.3 10e9/L Final     Absolute Eosinophils 07/14/2018 0.0  0.0 - 0.7 10e9/L Final     Absolute Basophils 07/14/2018 0.0  0.0 - 0.2 10e9/L Final     Abs Immature Granulocytes 07/14/2018 0.0  0 - 0.4 10e9/L Final     Color Urine 07/14/2018 Yellow   Final     Appearance Urine 07/14/2018 Clear   Final     Glucose Urine 07/14/2018 Negative  NEG^Negative mg/dL Final     Bilirubin Urine 07/14/2018 Negative  NEG^Negative Final      Ketones Urine 07/14/2018 Negative  NEG^Negative mg/dL Final     Specific Gravity Urine 07/14/2018 <1.005  1.000 - 1.030 Final     Blood Urine 07/14/2018 Trace* NEG^Negative Final     pH Urine 07/14/2018 6.0  5.0 - 9.0 pH Final     Protein Albumin Urine 07/14/2018 Negative  NEG^Negative mg/dL Final     Urobilinogen Urine 07/14/2018 0.2  0.2 - 1.0 EU/dL Final     Nitrite Urine 07/14/2018 Negative  NEG^Negative Final     Leukocyte Esterase Urine 07/14/2018 Small* NEG^Negative Final     Source 07/14/2018 Midstream Urine   Final     Lyme Disease Antibodies Serum 07/14/2018 0.13  0.00 - 0.89 Final     WBC Urine 07/14/2018 0 - 5  OTO5^0 - 5 /HPF Final     RBC Urine 07/14/2018 O - 2  OTO2^O - 2 /HPF Final     Squamous Epithelial /LPF Urine 07/14/2018 Few  FEW^Few /LPF Final     Bacteria Urine 07/14/2018 Few* NEG^Negative /HPF Final     Specimen Description 07/14/2018 Midstream Urine   Final     Culture Micro 07/14/2018    Final                    Value:10,000 to 50,000 colonies/mL  mixed urogenital lizz            ASSESSMENT:   Merlyn Escamilla presents for cardiology follow-up with thoracic aortic aneurysm, newly diagnosed atrial fibrillation and newly identified systolic heart failure.  Patient has a history of arteriovenous malformation of the colon, right renal cyst and osteoarthritis.      PLAN:   1. New onset a-fib (H)  She is currently wearing ZIO patch monitor. She remains largely asymptomatic.  She will remain on oral anticoagulation with xarelto without complication.   Admits that rate has been well controlled since she stopped Diltiazem with HFrEF and started metoprolol succinate.   HR in clinic today 90 bpm. She has been instructed to increase her metoprolol succinate to 125 mg daily for improved rate control, she will monitor her BP with this adjustment.   - metoprolol succinate (TOPROL-XL) 25 MG 24 hr tablet; Take 25 mg tab with 100 mg tab for total of 125 mg every night.  Dispense: 60 tablet; Refill: 3  -  metoprolol succinate (TOPROL-XL) 100 MG 24 hr tablet; Take 100 mg tab with 25 mg tab for total of 125 mg every night.  Dispense: 60 tablet; Refill: 3    2. Systolic congestive heart failure, unspecified congestive heart failure chronicity (H)  Newly identified systolic heart failure on echocardiogram with LVEF 35-40%. Etiology includes uncontrolled atrial fib more likely vs. ischemic disease.   NM Lexiscan stress test has been ordered, she denies any anginal symptoms.   She will continue with Lasix to 40 mg daily.  She will continue with metoprolol succinate and Lisinopril for GDMT. Consideration could be made for starting Spironolactone in the future if renal function allows.  She has been instructed on low-sodium diet of no more than 2500 mg of sodium per day.  It has been recommended that she monitor her daily weights and call with a weight fluctuation of 3 pounds and 24 hours or 5 pounds in 1 week.  It is recommended that she keep track of her overall fluid intake, keep less than 2 L per day.    - metoprolol succinate (TOPROL-XL) 25 MG 24 hr tablet; Take 25 mg tab with 100 mg tab for total of 125 mg every night.  Dispense: 60 tablet; Refill: 3  - metoprolol succinate (TOPROL-XL) 100 MG 24 hr tablet; Take 100 mg tab with 25 mg tab for total of 125 mg every night.  Dispense: 60 tablet; Refill: 3  - Stress NM Lexiscan; Future    3. Thoracic aortic aneurysm without rupture (H)  In review of most recent imaging, ascending aorta is measured less from last interval at 4.6 cm.   We will continue with serial imaging for surveillance. Repeat imaging in 3 months via CT.     - rosuvastatin (CRESTOR) 5 MG tablet; Take 0.5 tablets (2.5 mg) by mouth At Bedtime  Dispense: 60 tablet; Refill: 3    4. Ascending aortic aneurysm (H)  See above.  - rosuvastatin (CRESTOR) 5 MG tablet; Take 0.5 tablets (2.5 mg) by mouth At Bedtime  Dispense: 60 tablet; Refill: 3      Thank you for allowing me to participate in the care of your patient.  Please do not hesitate to contact me if you have any questions.     Natalia Schultz

## 2018-09-11 ENCOUNTER — TELEPHONE (OUTPATIENT)
Dept: CARDIOLOGY | Facility: OTHER | Age: 83
End: 2018-09-11

## 2018-09-11 NOTE — TELEPHONE ENCOUNTER
----- Message from PO Cunningham CNP sent at 9/11/2018 11:30 AM CDT -----  Please have patient follow-up in clinic following Holter, we will also discuss plan as she was not able to completed CTA.   Thanks,   ANGELIKA  ----- Message -----     From: Maria De Jesus Beal RN     Sent: 8/29/2018  11:19 AM       To: PO Cunningham CNP    Hi, The CTA chest gated was done today 8/29/18 due to A fib.  Merlyn did take her Toprol  mg 90 minutes before the procedure and she still had A fib with heart rates in 130's to 80's.  She does have her appt for holter monitor on 8/30/18.

## 2018-09-11 NOTE — TELEPHONE ENCOUNTER
Call to pt to verify appt for 09/26/18. Considered moving pt up in schedule, however, results of Holter monitor will not be back, therefore, will leave appt as 09/26. Mirna Molina RN on 9/11/2018 at 1:20 PM

## 2018-09-12 ENCOUNTER — TELEPHONE (OUTPATIENT)
Dept: CARDIOLOGY | Facility: OTHER | Age: 83
End: 2018-09-12

## 2018-09-12 NOTE — TELEPHONE ENCOUNTER
Call back to Tereza and per signed PHI by pt discussed pt's current situation. Advised of recent CTA, ECHO, ZIO, and upcoming stress test tomorrow. Daughter requests someone call pt and reiterate directions for stress test tomorrow (cardiac rehab notified of request). Also notified daughter of upcoming appt for 09/26/2018 where results of testing will be discussed and offered to call her during appt so she can be in the discussion if pt desires. Tereza wishes provider be notified that pt's recent symptoms came on close to the anniversary of pt's husbands death. Will route to provider as FYI. Mirna Molina RN on 9/12/2018 at 11:02 AM

## 2018-09-13 ENCOUNTER — TELEPHONE (OUTPATIENT)
Dept: CARDIOLOGY | Facility: OTHER | Age: 83
End: 2018-09-13

## 2018-09-13 NOTE — TELEPHONE ENCOUNTER
Call back to Tereza, after verification, notified of appt time and date so she can be called in to the appt. She verbalizes understanding and has no further questions or concerns at this time. Mirna Molina RN on 9/13/2018 at 9:49 AM

## 2018-09-14 ENCOUNTER — HOSPITAL ENCOUNTER (OUTPATIENT)
Dept: NUCLEAR MEDICINE | Facility: OTHER | Age: 83
End: 2018-09-14
Attending: NURSE PRACTITIONER
Payer: MEDICARE

## 2018-09-14 ENCOUNTER — HOSPITAL ENCOUNTER (OUTPATIENT)
Dept: NUCLEAR MEDICINE | Facility: OTHER | Age: 83
Discharge: HOME | End: 2018-09-14
Attending: NURSE PRACTITIONER | Admitting: NURSE PRACTITIONER
Payer: MEDICARE

## 2018-09-14 VITALS — SYSTOLIC BLOOD PRESSURE: 124 MMHG | DIASTOLIC BLOOD PRESSURE: 80 MMHG | HEART RATE: 85 BPM

## 2018-09-14 DIAGNOSIS — I50.20 SYSTOLIC CONGESTIVE HEART FAILURE, UNSPECIFIED CONGESTIVE HEART FAILURE CHRONICITY: ICD-10-CM

## 2018-09-14 DIAGNOSIS — R06.09 DOE (DYSPNEA ON EXERTION): ICD-10-CM

## 2018-09-14 PROCEDURE — A9500 TC99M SESTAMIBI: HCPCS | Performed by: NURSE PRACTITIONER

## 2018-09-14 PROCEDURE — 93016 CV STRESS TEST SUPVJ ONLY: CPT | Performed by: INTERNAL MEDICINE

## 2018-09-14 PROCEDURE — 34300033 ZZH RX 343: Performed by: NURSE PRACTITIONER

## 2018-09-14 PROCEDURE — 25000128 H RX IP 250 OP 636: Performed by: INTERNAL MEDICINE

## 2018-09-14 PROCEDURE — 78452 HT MUSCLE IMAGE SPECT MULT: CPT

## 2018-09-14 PROCEDURE — 93017 CV STRESS TEST TRACING ONLY: CPT

## 2018-09-14 PROCEDURE — 93018 CV STRESS TEST I&R ONLY: CPT | Performed by: INTERNAL MEDICINE

## 2018-09-14 RX ORDER — AMINOPHYLLINE 25 MG/ML
50 INJECTION, SOLUTION INTRAVENOUS
Status: DISCONTINUED | OUTPATIENT
Start: 2018-09-14 | End: 2018-09-15 | Stop reason: HOSPADM

## 2018-09-14 RX ORDER — REGADENOSON 0.08 MG/ML
0.4 INJECTION, SOLUTION INTRAVENOUS ONCE
Status: COMPLETED | OUTPATIENT
Start: 2018-09-14 | End: 2018-09-14

## 2018-09-14 RX ADMIN — KIT FOR THE PREPARATION OF TECHNETIUM TC99M SESTAMIBI 32.8 MCI.: 1 INJECTION, POWDER, LYOPHILIZED, FOR SOLUTION PARENTERAL at 10:05

## 2018-09-14 RX ADMIN — KIT FOR THE PREPARATION OF TECHNETIUM TC99M SESTAMIBI 8.96 MCI.: 1 INJECTION, POWDER, LYOPHILIZED, FOR SOLUTION PARENTERAL at 08:15

## 2018-09-14 RX ADMIN — REGADENOSON 0.4 MG: 0.08 INJECTION, SOLUTION INTRAVENOUS at 10:03

## 2018-09-14 NOTE — PROGRESS NOTES
"0800The patient arrived for a Lexiscan Cardiolite stress test.  The procedure, risks, and benefits were discussed with the patient, and the consent was signed.  A saline lock was started,and the Cardiolite was injected by x-ray.  The patient was taken to the waiting area, to await resting images at 0830.  0940 The patient returned from x-ray and was prepped for the stress test. She was noted to be in atrial flutter, rate 85.   Dr. Lundberg arrived, and the patient was administered the Lexiscan per procedure.  The patient felt pressure epigastric 5/10, \"shaky and funny\", after the Lexiscan injection. Dr. Lundberg was contacted for her heart rate being 130 beats per minute in atrial flutter. Patient did not take her Metoprolol last night, but brought in her pill bottles. I was instructed to give her one 25 mg Metoprolol tartrate, post test, by Dr. Lundberg. This was given at a 10:13. She was given coffee to reverse any effects of Lexiscan and a snack. She needed to lay down, and after 15 minutes, her heart rate was down to 85 beats per minute. She still had some tightness epigastrically. This resolved with rest. She taken to x-ray in stable condition for stress images.  The saline lock will be removed by x-ray for proper disposal.  Please see the chart for the complete test results.     "

## 2018-09-25 ENCOUNTER — OFFICE VISIT (OUTPATIENT)
Dept: FAMILY MEDICINE | Facility: OTHER | Age: 83
End: 2018-09-25
Attending: FAMILY MEDICINE
Payer: MEDICARE

## 2018-09-25 VITALS
SYSTOLIC BLOOD PRESSURE: 108 MMHG | BODY MASS INDEX: 23.89 KG/M2 | HEART RATE: 88 BPM | TEMPERATURE: 98.4 F | HEIGHT: 62 IN | DIASTOLIC BLOOD PRESSURE: 66 MMHG | WEIGHT: 129.8 LBS

## 2018-09-25 DIAGNOSIS — H25.89 OTHER AGE-RELATED CATARACT OF RIGHT EYE: Primary | ICD-10-CM

## 2018-09-25 PROCEDURE — G0463 HOSPITAL OUTPT CLINIC VISIT: HCPCS

## 2018-09-25 NOTE — MR AVS SNAPSHOT
"              After Visit Summary   9/25/2018    Merlyn Escamilla    MRN: [id number]           Patient Information     Date Of Birth          8/22/1933        Visit Information        Provider Department      9/25/2018 12:45 PM Tootie Garcia MD [facility]        Today's Diagnoses     Other age-related cataract of right eye    -  1       Follow-ups after your visit        Your next 10 appointments already scheduled     Sep 26, 2018 10:15 AM CDT   [facility] Visit with PO Cunningham CNP   [facility]    [address]  [place] [address] [phone]              Who to contact     If you have questions or need follow up information about today's clinic visit or your schedule please contact [facility] directly at [phone].  Normal or non-critical lab and imaging results will be communicated to you by MyChart, letter or phone within 4 business days after the clinic has received the results. If you do not hear from us within 7 days, please contact the clinic through MyChart or phone. If you have a critical or abnormal lab result, we will notify you by phone as soon as possible.  Submit refill requests through [employer] or call your pharmacy and they will forward the refill request to us. Please allow 3 business days for your refill to be completed.          Additional Information About Your Visit        Care EveryWhere ID     This is your Care EveryWhere ID. This could be used by other organizations to access your [facility] medical records  [id number]        Your Vitals Were     Pulse Temperature Height Breastfeeding? BMI (Body Mass Index)       88 98.4  F (36.9  C) 5' 1.5\" (1.562 m) No 24.13 kg/m2        Blood Pressure from Last 3 Encounters:   09/25/18 108/66   09/14/18 124/80   09/05/18 118/72    Weight from Last 3 Encounters:   09/25/18 129 lb 12.8 oz (58.9 kg)   09/05/18 131 lb 6.4 oz " (59.6 kg)   08/24/18 135 lb (61.2 kg)              Today, you had the following     No orders found for display       Primary Care Provider Office Phone # Fax #    Sharath Dinero [phone] [address]  [place] [address]        Equal Access to Services     [facility]: Hadii aad ku hadasho Soomaali, waaxda luqadaha, qaybta kaalmada adeegyada, suki hanson haycurtisn adenaina julissashima salmeron . So [facility] [phone].    ATENCIÓN: Si habla español, tiene a harp disposición servicios gratuitos de asistencia lingüística. Llame al [phone].    We comply with applicable federal civil rights laws and [place] laws. We do not discriminate on the basis of race, color, national origin, age, disability, sex, sexual orientation, or gender identity.            Thank you!     Thank you for choosing [facility] AND [facility]  for your care. Our goal is always to provide you with excellent care. Hearing back from our patients is one way we can continue to improve our services. Please take a few minutes to complete the written survey that you may receive in the mail after your visit with us. Thank you!             Your Updated Medication List - Protect others around you: Learn how to safely use, store and throw away your medicines at www.disposemymeds.org.          This list is accurate as of 9/25/18  1:05 PM.  Always use your most recent med list.                   Brand Name Dispense Instructions for use Diagnosis    aspirin 81 MG EC tablet      Take 81 mg by mouth daily with food        furosemide 20 MG tablet    LASIX    60 tablet    Take 2 tablets (40 mg) by mouth every morning    Dependent edema       lisinopril 5 MG tablet    PRINIVIL/ZESTRIL    60 tablet    Take 1 tablet (5 mg) by mouth daily    Acute systolic heart failure (H)       * metoprolol succinate 25 MG 24 hr tablet    TOPROL-XL    60 tablet    Take 25 mg tab with 100 mg tab for total of 125 mg every night.    New onset a-fib  (H), Systolic congestive heart failure, unspecified congestive heart failure chronicity (H)       * metoprolol succinate 100 MG 24 hr tablet    TOPROL-XL    60 tablet    Take 100 mg tab with 25 mg tab for total of 125 mg every night.    New onset a-fib (H), Systolic congestive heart failure, unspecified congestive heart failure chronicity (H)       MULTI-VITAMINS Tabs      Take 1 tablet by mouth daily        rivaroxaban ANTICOAGULANT 20 MG Tabs tablet    XARELTO    90 tablet    Take 1 tablet (20 mg) by mouth daily (with dinner)    Atrial fibrillation, unspecified type (H)       rosuvastatin 5 MG tablet    CRESTOR    60 tablet    Take 0.5 tablets (2.5 mg) by mouth At Bedtime    Thoracic aortic aneurysm without rupture (H), Ascending aortic aneurysm (H)       * Notice:  This list has 2 medication(s) that are the same as other medications prescribed for you. Read the directions carefully, and ask your doctor or other care provider to review them with you.

## 2018-09-25 NOTE — NURSING NOTE
Date of Surgery: 10/3/2018   Type of Surgery: Cataract  Surgeon: Dr. Solano  [facility]:  [facility]    Leeann Richard ....................  9/25/2018   12:39 PM

## 2018-09-25 NOTE — PROGRESS NOTES
Not seen for preoperative exam this date.  Patient will be seen by cardiology to determine whether cataract surgery can be done next week.  Tootie Garcia MD  1:03 PM 9/25/2018

## 2018-09-26 ENCOUNTER — OFFICE VISIT (OUTPATIENT)
Dept: CARDIOLOGY | Facility: OTHER | Age: 83
End: 2018-09-26
Attending: NURSE PRACTITIONER
Payer: COMMERCIAL

## 2018-09-26 VITALS
HEIGHT: 62 IN | DIASTOLIC BLOOD PRESSURE: 70 MMHG | WEIGHT: 130.1 LBS | SYSTOLIC BLOOD PRESSURE: 109 MMHG | HEART RATE: 80 BPM | RESPIRATION RATE: 16 BRPM | BODY MASS INDEX: 23.94 KG/M2

## 2018-09-26 DIAGNOSIS — R14.3 FLATULENCE, ERUCTATION, AND GAS PAIN: ICD-10-CM

## 2018-09-26 DIAGNOSIS — I50.22 CHRONIC SYSTOLIC HEART FAILURE (H): ICD-10-CM

## 2018-09-26 DIAGNOSIS — R14.2 FLATULENCE, ERUCTATION, AND GAS PAIN: ICD-10-CM

## 2018-09-26 DIAGNOSIS — R14.1 FLATULENCE, ERUCTATION, AND GAS PAIN: ICD-10-CM

## 2018-09-26 DIAGNOSIS — I45.5 SINUS PAUSE: ICD-10-CM

## 2018-09-26 DIAGNOSIS — Z01.810 PRE-OPERATIVE CARDIOVASCULAR EXAMINATION: ICD-10-CM

## 2018-09-26 DIAGNOSIS — I48.19 PERSISTENT ATRIAL FIBRILLATION (H): Primary | ICD-10-CM

## 2018-09-26 DIAGNOSIS — I71.21 ASCENDING AORTIC ANEURYSM (H): ICD-10-CM

## 2018-09-26 PROCEDURE — 93005 ELECTROCARDIOGRAM TRACING: CPT | Performed by: NURSE PRACTITIONER

## 2018-09-26 PROCEDURE — G0463 HOSPITAL OUTPT CLINIC VISIT: HCPCS | Mod: 25

## 2018-09-26 PROCEDURE — G0463 HOSPITAL OUTPT CLINIC VISIT: HCPCS

## 2018-09-26 PROCEDURE — 99214 OFFICE O/P EST MOD 30 MIN: CPT | Performed by: NURSE PRACTITIONER

## 2018-09-26 PROCEDURE — 93010 ELECTROCARDIOGRAM REPORT: CPT | Performed by: INTERNAL MEDICINE

## 2018-09-26 ASSESSMENT — PAIN SCALES - GENERAL: PAINLEVEL: NO PAIN (0)

## 2018-09-26 NOTE — NURSING NOTE
"Pt presents today for cardiac clearance prior to Cataract surgery. Denies chest pain/pressure, nausea, SOB. C/o fatigue but also states not sleeping well at night. Per provider, pt is to hold Xarelto 2 days prior to procedure. Pt verbalizes understanding and has no further questions or concerns.   Chief Complaint   Patient presents with     Follow Up For     Cardiac clearance, DOS 10/03/18       Initial /70 (BP Location: Right arm, Patient Position: Sitting, Cuff Size: Adult Regular)  Pulse 80  Resp 16  Ht 1.562 m (5' 1.5\")  Wt 59 kg (130 lb 1.6 oz)  BMI 24.18 kg/m2 Estimated body mass index is 24.18 kg/(m^2) as calculated from the following:    Height as of this encounter: 1.562 m (5' 1.5\").    Weight as of this encounter: 59 kg (130 lb 1.6 oz).  Meds Reconciled: complete  Pt is on Aspirin  Pt is on a Statin  PHQ and/or MARVIN reviewed. Pt referred to PCP/MH Provider as appropriate.    Mirna Molina RN      "

## 2018-09-26 NOTE — PATIENT INSTRUCTIONS
You were seen by  PO Lopes CNP      1. Sleep study referral, they will call you to schedule this     2. Stool kit to check for H. Pylori     3. Follow up with Dr. Dinero regarding Restless Leg Syndrome    4. Take Crestor 2.5 mg daily OR take 5 mg every other day    You will follow up with  PO Lopes CNP  in 3 months.       Please call the cardiology office with problems, questions, or concerns at [phone].    If you experience chest pain, chest pressure, chest tightness, shortness of breath, fainting, lightheadedness, nausea, vomiting, or other concerning symptoms, please report to the Emergency Department or call 911. These symptoms may be emergent, and best treated in the Emergency Department.     NEIL LandryN, RN  [facility] Cardiology  [phone]

## 2018-09-26 NOTE — PROGRESS NOTES
[facility] HEART CARE   CARDIOLOGY PROGRESS NOTE    Merlyn Escamilla   [address]  [place] [address]      Sharath Dinero     Chief Complaint   Patient presents with     Follow Up For     Cardiac clearance, DOS 10/03/18        HPI:   Ms. Escamilla is an 85 year old female who presents for cardiology follow-up with thoracic aortic aneurysm, newly diagnosed atrial fibrillation and newly identified systolic heart failure.  Patient has a history of arteriovenous malformation of the colon, right renal cyst and osteoarthritis.      Patient presented to the emergency department on 7/29/2018 with rapid heart rate.  She was found to be in atrial fibrillation with RVR.  She was initially treated on a diltiazem drip and admitted to the hospital.  She was started on oral anticoagulation Xarelto with a [id number] score of 3 (age and female).      She had an echocardiogram on 7/29/2018 with normal LV size and thickness, EF was estimated at 35-40%.  There was diffuse hypokinesis but the septum appeared comparatively more hypokinetic on the study.  The RV was normal in size and function.  Moderate left atrial enlargement was present.  Mild MAC with mild to moderate mitral insufficiency.  The aortic valve is normal in structure and function, trace aortic insufficiency was present.  Moderate to severe tricuspid insufficiency, RVSP was 35 mmHg +  RAP.  There was moderate dilation of the aorta present, ascending aorta 4.8 cm the inferior vena cava was dilated at 2.8 cm without respiratory variability which is consistent with increased right atrial pressure.  There is no pericardial effusion present.      Patient was initially seen by cardiology on 8/20/18.  With newly identified systolic heart failure it was recommended imaging to assess for any potential ischemia or obstructing coronary disease.  This testing has not yet been completed.  Her Lasix was increased to 40 mg daily which did resolve her edema.  It was recommended that  she begin metoprolol succinate 25 mg daily and lisinopril 5 mg daily for GDMT.  With LVEF less than 40%, it was recommended that she discontinue the diltiazem.  She was instructed on sodium restricted diet, daily weight monitoring and monitoring of her overall daily fluid intake.  We reviewed the results of her CT from 7/29/2018 with a ascending aortic dilation at 4.9 cm with no dissection or other acute abnormality identified.  CT from August 1, 2014 showed that the ascending thoracic aorta measured 3.9 cm in maximum diameter.  With this interval development it was recommended she be seen in consult by CT surgery.       Since her last visit patient did undergo a repeat CT. Of significant, this showed the dilation of her mid ascending aorta to be 4.6 x 4.5 cm which is less than the CT from 7/29/2018 with measurement of 4.9 cm and therefore, we can continue with monitoring by serial imaging surveillance.     INTERVAL HISTORY: Today patient reports that she has been feeling good.  Edema has resolved.  She has had no shortness of breath or increased WEST.  No orthopnea or PND.  No changes in appetite or early satiety.  No abdominal bloating.  No chest pain or pressure.  No palpitations.  No lightheadedness or syncope. Weight is stable at 130lb today. She describes herself as largely symptomatic with atrial fibrillation. Rate has been controlled with medication adjustments made at her last cardiology visit.  She is here to review results of ZIO patch monitor and Lexiscan stress test. She is requesting cardiovascular preoperative clearance for right cataract procedure.     IMAGING:  Exam: NM MPI WITH LEXISCAN     HISTORY: ; WEST (dyspnea on exertion); Systolic congestive heart  failure, unspecified congestive heart failure chronicity (H). New  onset atrial fibrillation     COMPARISON: None.     TECHNIQUE: Gated SPECT with wall motion and ejection fraction  calculation. Stress was performed via Lexiscan  pharmacologic  technique. Please see the accompanying internal medicine report for  additional details.  DOSE (Stress/Rest): 32.8 mCi/8.96 mCi Tc-99m Sestamibi ?     FINDINGS: There is good uptake of activity by the left ventricle. The  left ventricular size is normal, with an EDV of 110 mL.     There are no areas of reversible myocardial perfusion deficit to  suggest ischemia. There are no areas of irreversible perfusion deficit  to suggest completed infarct.     Myocardial motility is preserved. The ejection fraction is slightly  reduced, at 49%.            IMPRESSION:   1. No evidence of stress-induced ischemia.  2. Ejection fraction is 49%.     JOSEE SANFORD MD    Procedure: The patient underwent a Lexiscan stress test.    FINDINGS: Resting EKG abnormal showing atrial fibrillation. Lexiscan  and Cardiolite were injected without difficulty. The patient's EKG  remained with atrial fibrillation as well as PVCs. Tachycardia also  did occur and was sustained 15 minutes postinfusion. Her vital signs  remained stable.     Impression         IMPRESSION: Completed Lexiscan stress test. Images for ischemia  pending.    RICH TINSLEY MD     Cardiac Event Monitor    The patient was monitored for 13 days, 2 hours.  Minimum HR was 56, average HR was 91, maximum HR was 165.  There were 2 triggered events.  During the patient triggered events, the rhythm was atrial fibrillation, ventricular ectopic beats.  There were 0 supraventricular ectopic beats.  There were rare ventricular ectopic beats, longest consisted of 2 beats.  Atrial fibrillation present 100%.  1 pause lasting 3.4 seconds    Impression:  Cardiac event monitor revealing chronic atrial fibrillation with a single 3.4 second pause.    Signed, Mumtaz Dixon MD  Internal Medicine & Pediatrics  9/20/2018  2:04 PM    PAST MEDICAL HISTORY:   Past Medical History:   Diagnosis Date     Carpal tunnel syndrome of left wrist     10/4/2017     Carpal tunnel syndrome of right  wrist     10/4/2017     Deep phlebothrombosis during pregnancy     No Comments Provided     Osteoarthritis     Knee     Other specified postprocedural states     10/4/2017     Other specified postprocedural states     2018     Presence of artificial knee joint     10/3/2014          FAMILY HISTORY:   Family History   Problem Relation Age of Onset     HEART DISEASE Mother      Heart Disease     HEART DISEASE Father      Heart Disease     HEART DISEASE Brother      HEART DISEASE Brother      HEART DISEASE Brother      HEART DISEASE Brother      Brain Cancer Sister      HEART DISEASE Sister           PAST SURGICAL HISTORY:   Past Surgical History:   Procedure Laterality Date     APPENDECTOMY OPEN      No Comments Provided     C TOTAL KNEE ARTHROPLASTY Right 10/01/2014    Dr Domingo     CARPAL TUNNEL RELEASE RT/LT Left 2018,[id number],OTHER,Left     CARPAL TUNNEL RELEASE RT/LT Right 10/04/2017     COLONOSCOPY  2014    Arteriovenous malformations of right, transverse and splenic flexure     ENDOSCOPIC STRIPPING VEIN(S)      No Comments Provided     HYSTERECTOMY TOTAL ABDOMINAL  1987    with squamous metaplasia and leiomoma          SOCIAL HISTORY:   Social History     Social History     Marital status:      Spouse name: N/A     Number of children: N/A     Years of education: N/A     Social History Main Topics     Smoking status: Never Smoker     Smokeless tobacco: Never Used     Alcohol use 3.0 oz/week     Drug use: No      Comment: Drug use: No     Sexual activity: Not on file     Other Topics Concern     Not on file     Social History Narrative    Lives alone.      2017.     3 daughters, 1 in [facility], 1 in [place] and 1 [place]. Lost 1 daughter at age 2 years.    Has 1 living sister, fraternal twin. All other siblings are .           CURRENT MEDICATIONS:   Prior to Admission medications    Medication Sig Start Date End Date Taking? Authorizing Provider    aspirin EC 81 MG EC tablet Take 81 mg by mouth daily with food 7/28/14   Reported, Patient   furosemide (LASIX) 20 MG tablet Take 2 tablets (40 mg) by mouth every morning 8/20/18   Natalia Schultz APRN CNP   lisinopril (PRINIVIL/ZESTRIL) 5 MG tablet Take 1 tablet (5 mg) by mouth daily 8/24/18   Natalia Schultz APRN CNP   metoprolol succinate (TOPROL-XL) 100 MG 24 hr tablet Take 100 mg tab with 25 mg tab for total of 125 mg every night. 9/5/18   Natalia Schultz APRN CNP   metoprolol succinate (TOPROL-XL) 25 MG 24 hr tablet Take 25 mg tab with 100 mg tab for total of 125 mg every night. 9/5/18   Natalia Schultz APRN CNP   Multiple Vitamin (MULTI-VITAMINS) TABS Take 1 tablet by mouth daily 7/28/14   Reported, Patient   rivaroxaban ANTICOAGULANT (XARELTO) 20 MG TABS tablet Take 1 tablet (20 mg) by mouth daily (with dinner) 8/24/18   Sharath Dinero MD   rosuvastatin (CRESTOR) 5 MG tablet Take 0.5 tablets (2.5 mg) by mouth At Bedtime 9/5/18   Natalia Schultz APRN CNP          ALLERGIES:   Allergies   Allergen Reactions     Morphine      Other reaction(s): Muscle Weakness     Phenylbutazone Unknown          ROS:   CONSTITUTIONAL: No reported fever or chills. No changes in weight. Does report feeling slightly more fatigued then normal over the past few weeks.   ENT: No visual disturbance, ear ache, epistaxis or sore throat.   CARDIOVASCULAR: No chest pain, chest pressure or chest discomfort. No palpitations or lower extremity edema.   RESPIRATORY: No shortness of breath, increased dyspnea upon exertion, no cough, wheezing or hemoptysis.   GI: She does report occasional abdominal gas pains, frequent belching and flatulence. Does not report any changes in stools. No n/v.  : No reported hematuria or dysuria.   NEUROLOGICAL: No lightheadedness, dizziness, syncope, ataxia, paresthesias or weakness.   HEMATOLOGIC: No history of anemia. No bleeding or excessive bruising. No history of blood clots.  "  MUSCULOSKELETAL: No joint pain or swelling, no muscle pain.  ENDOCRINOLOGIC: No temperature intolerance. No hair or skin changes.  SKIN: No abnormal rashes or sores, no unusual itching.      PHYSICAL EXAM:   /70 (BP Location: Right arm, Patient Position: Sitting, Cuff Size: Adult Regular)  Pulse 80  Resp 16  Ht 1.562 m (5' 1.5\")  Wt 59 kg (130 lb 1.6 oz)  BMI 24.18 kg/m2  GENERAL: The patient is a well-developed, well-nourished, in no apparent distress.  HEENT: Head is normocephalic and atraumatic. Eyes are symmetrical with normal visual tracking. No icterus, no xanthelasmas. Nares appeared normal without nasal drainage. Mucous membranes are moist, no cyanosis.  NECK: Supple. JVP not visible.   CHEST/ LUNGS: Lungs clear to auscultation, no rales, rhonchi or wheezes, no use of accessory muscles, no retractions, respirations unlabored and normal respiratory rate.   CARDIO: Irregular rate and rhythm with S1 and S2, no murmur, click or rub.  ABD: Abdomen is nondistended.   EXTREMITIES: No edema present.   MUSCULOSKELETAL: No joint swelling.   NEUROLOGIC: Alert and oriented X3. Normal speech, gait and affect. No focal neurologic deficits.   SKIN: No jaundice. No rashes or visible skin lesions present. No ecchymosis.       EKG:  Atrial fib with rate controlled at 85 bpm, possible old anterior infarct pattern.    LAB RESULTS:   Office Visit on 07/14/2018   Component Date Value Ref Range Status     WBC 07/14/2018 5.0  4.0 - 11.0 10e9/L Final     RBC Count 07/14/2018 4.44  3.8 - 5.2 10e12/L Final     Hemoglobin 07/14/2018 13.0  11.7 - 15.7 g/dL Final     Hematocrit 07/14/2018 40.8  35.0 - 47.0 % Final     MCV 07/14/2018 92  78 - 100 fl Final     MCH 07/14/2018 29.3  26.5 - 33.0 pg Final     MCHC 07/14/2018 31.9  31.5 - 36.5 g/dL Final     RDW 07/14/2018 13.6  10.0 - 15.0 % Final     Platelet Count 07/14/2018 162  150 - 450 10e9/L Final     Diff Method 07/14/2018 Automated Method   Final     % Neutrophils " 07/14/2018 77.1  % Final     % Lymphocytes 07/14/2018 13.5  % Final     % Monocytes 07/14/2018 8.0  % Final     % Eosinophils 07/14/2018 0.4  % Final     % Basophils 07/14/2018 0.8  % Final     % Immature Granulocytes 07/14/2018 0.2  % Final     Absolute Neutrophil 07/14/2018 3.8  1.6 - 8.3 10e9/L Final     Absolute Lymphocytes 07/14/2018 0.7* 0.8 - 5.3 10e9/L Final     Absolute Monocytes 07/14/2018 0.4  0.0 - 1.3 10e9/L Final     Absolute Eosinophils 07/14/2018 0.0  0.0 - 0.7 10e9/L Final     Absolute Basophils 07/14/2018 0.0  0.0 - 0.2 10e9/L Final     Abs Immature Granulocytes 07/14/2018 0.0  0 - 0.4 10e9/L Final     Color Urine 07/14/2018 Yellow   Final     Appearance Urine 07/14/2018 Clear   Final     Glucose Urine 07/14/2018 Negative  NEG^Negative mg/dL Final     Bilirubin Urine 07/14/2018 Negative  NEG^Negative Final     Ketones Urine 07/14/2018 Negative  NEG^Negative mg/dL Final     Specific Gravity Urine 07/14/2018 <1.005  1.000 - 1.030 Final     Blood Urine 07/14/2018 Trace* NEG^Negative Final     pH Urine 07/14/2018 6.0  5.0 - 9.0 pH Final     Protein Albumin Urine 07/14/2018 Negative  NEG^Negative mg/dL Final     Urobilinogen Urine 07/14/2018 0.2  0.2 - 1.0 EU/dL Final     Nitrite Urine 07/14/2018 Negative  NEG^Negative Final     Leukocyte Esterase Urine 07/14/2018 Small* NEG^Negative Final     Source 07/14/2018 Midstream Urine   Final     Lyme Disease Antibodies Serum 07/14/2018 0.13  0.00 - 0.89 Final     WBC Urine 07/14/2018 0 - 5  OTO5^0 - 5 /HPF Final     RBC Urine 07/14/2018 O - 2  OTO2^O - 2 /HPF Final     Squamous Epithelial /LPF Urine 07/14/2018 Few  FEW^Few /LPF Final     Bacteria Urine 07/14/2018 Few* NEG^Negative /HPF Final     Specimen Description 07/14/2018 Midstream Urine   Final     Culture Micro 07/14/2018    Final                    Value:10,000 to 50,000 colonies/mL  mixed urogenital lizz            ASSESSMENT:   Merlyn Escamilla presents for cardiology follow-up with thoracic aortic  aneurysm, newly diagnosed atrial fibrillation and newly identified systolic heart failure.  Patient has a history of arteriovenous malformation of the colon, right renal cyst and osteoarthritis.  She is here to review results of ZIO patch monitor and Lexiscan stress test. She is requesting cardiovascular preoperative clearance for right cataract procedure.     PLAN:   1. Persistent atrial fibrillation (H)  Newly diagnosed atrial fibrillation with 100% EF burden.  She continues to report being largely asymptomatic with atrial fibrillation.  She does note feeling somewhat more fatigued over the past few weeks.  She is currently well rate controlled on metoprolol succinate 125 mg every evening, her blood pressure has remained stable with this.  She denies any symptomatic hypotension.  She is currently appropriately anticoagulated on Xarelto without complication.  Family is inquiring possible treatment/management options for atrial fibrillation.  He was discussed that DCCV is considered successful with 100% AF burden, although could be considered if increasingly symptomatic or difficulty with rate control.  We could consider digoxin or other antiarrhythmic if she has issues with rate control however, her current beta-blocker has been acceptable and keeping her rate stabilized.  Family questioned ablation and again given the fact that she is largely asymptomatic this would likely not be favored.  Concern for possible sleep apnea with no diagnosis of atrial fibrillation, a sleep study has been ordered.    - EKG 12-lead, tracing only (Same Day)  - SLEEP EVALUATION & MANAGEMENT REFERRAL - ADULT -[facility] and [facility] [phone] (Age 13 and up); Future    2. Ascending aortic aneurysm (H)  In review of most recent imaging, ascending aorta is measured less from last interval imaging at 4.6 cm.   We will continue with serial imaging for surveillance. Repeat imaging in 3-6 months via CT.     3.  Chronic systolic heart failure (H)  Newly identified systolic heart failure on echocardiogram with LVEF 35-40%. Etiology includes uncontrolled atrial fib more likely then ischemic disease. NM Lexiscan stress reviewed today with no evidence of stress induced myocardial ischemia, normal perfusion study.   She will continue with Lasix to 40 mg daily.  She will continue with metoprolol succinate and Lisinopril for GDMT.   She has been instructed on low-sodium diet of no more than 2500 mg of sodium per day.  It has been recommended that she monitor her daily weights and call with a weight fluctuation of 3 pounds and 24 hours or 5 pounds in 1 week.  It is recommended that she keep track of her overall fluid intake, keep less than 2 L per day.    - SLEEP EVALUATION & MANAGEMENT REFERRAL - [facility] and [facility] [phone] (Age 13 and up); Future    4. Pre-operative cardiovascular examination  Patient requesting preop exam/ clearance for scheduled cataract procedure at [facility].   She is cleared for surgery. NM Lexiscan stress test with no evidence of stress induced ischemia. HF stable, likely rate induced with newly identified atrial fib which is currently under good control and patient is largely asymptomatic.   She has been advised to hold on Xarelto 2 days prior to surgery and start again immediately following. We did discuss that there would be a gap in anticoagulation coverage for this short period of time and may place her at low risk for cryptogenic CVA as she is in chronic AF. She understands this and feels comfortable holding this medication for procedure listed above.     5. Flatulence, eructation, and gas pain  She has large concerns with continued belching and gas pain, she does describe possible picture of Hpylori and therefore testing has been ordered. If positive, she will need to see her PCP for treatment.     - H Pylori antigen, stool; Future    6. Sinus  pause  Reviewed results of ZIO patch monitor with one single episode of sinus pause lasting 3.4 sec at 9:17 am on 9/11. Today she admits that she feels nearly certain she was sleeping at this time. She denies any episodes of lightheadedness or syncope. It has been recommended sleep study evaluation with this finding during sleep and with new onset AF and reduced systolic function.     - SLEEP EVALUATION & MANAGEMENT REFERRAL - ADULT -[facility] [phone] (Age 13 and up); Future    Follow-up with cardiology in 3 months, certainly sooner if needed.     Thank you for allowing me to participate in the care of your patient. Please do not hesitate to contact me if you have any questions.     Natalia Schultz

## 2018-09-26 NOTE — MR AVS SNAPSHOT
After Visit Summary   9/26/2018    Merlyn Escamilla    MRN: [id number]           Patient Information     Date Of Birth          8/22/1933        Visit Information        Provider Department      9/26/2018 10:15 AM Natalia Schultz APRN CNP [facility]        Today's Diagnoses     Persistent atrial fibrillation (H)    -  1    Ascending aortic aneurysm (H)        Chronic systolic heart failure (H)        Pre-operative cardiovascular examination        Flatulence, eructation, and gas pain          Care Instructions    You were seen by  PO Lopes CNP      1. Sleep study referral, they will call you to schedule this     2. Stool kit to check for H. Pylori     3. Follow up with Dr. Dinero regarding Restless Leg Syndrome    4. Take Crestor 2.5 mg daily OR take 5 mg every other day    You will follow up with  PO Lopes CNP  in 3 months.       Please call the cardiology office with problems, questions, or concerns at [phone].    If you experience chest pain, chest pressure, chest tightness, shortness of breath, fainting, lightheadedness, nausea, vomiting, or other concerning symptoms, please report to the Emergency Department or call 911. These symptoms may be emergent, and best treated in the Emergency Department.     RC Landry, RN  [facility]  [phone]             Follow-ups after your visit        Future tests that were ordered for you today     Open Future Orders        Priority Expected Expires Ordered    H Pylori antigen, stool Routine  10/26/2018 9/26/2018            Who to contact     If you have questions or need follow up information about today's clinic visit or your schedule please contact [facility] directly at [phone].  Normal or non-critical lab and imaging results will be communicated to you by MyChart, letter or phone within 4 business days after the clinic has received the results. If you do not  "hear from us within 7 days, please contact the clinic through [employer] or phone. If you have a critical or abnormal lab result, we will notify you by phone as soon as possible.  Submit refill requests through [employer] or call your pharmacy and they will forward the refill request to us. Please allow 3 business days for your refill to be completed.          Additional Information About Your Visit        Care EveryWhere ID     This is your Care EveryWhere ID. This could be used by other organizations to access your [place] medical records  [id number]        Your Vitals Were     Pulse Respirations Height BMI (Body Mass Index)          80 16 1.562 m (5' 1.5\") 24.18 kg/m2         Blood Pressure from Last 3 Encounters:   09/26/18 109/70   09/25/18 108/66   09/14/18 124/80    Weight from Last 3 Encounters:   09/26/18 59 kg (130 lb 1.6 oz)   09/25/18 58.9 kg (129 lb 12.8 oz)   09/05/18 59.6 kg (131 lb 6.4 oz)              We Performed the Following     EKG 12-lead, tracing only (Same Day)        Primary Care Provider Office Phone # Fax #    Shraath Dinero [phone] [address] [facility] [address]        Equal Access to Services     DAVID DUARTE : Hadii boris ku hadasho Soomaali, waaxda luqadaha, qaybta kaalmada adeegyada, suki salmeron . So [facility] [phone].    ATENCIÓN: Si habla español, tiene a harp disposición servicios gratuitos de asistencia lingüística. ame al [phone].    We comply with applicable federal civil rights laws and [place] laws. We do not discriminate on the basis of race, color, national origin, age, disability, sex, sexual orientation, or gender identity.            Thank you!     Thank you for choosing [facility] AND [facility]  for your care. Our goal is always to provide you with excellent care. Hearing back from our patients is one way we can continue to improve our services. Please take a few minutes to complete the " written survey that you may receive in the mail after your visit with us. Thank you!             Your Updated Medication List - Protect others around you: Learn how to safely use, store and throw away your medicines at www.disposemymeds.org.          This list is accurate as of 9/26/18 11:13 AM.  Always use your most recent med list.                   Brand Name Dispense Instructions for use Diagnosis    aspirin 81 MG EC tablet      Take 81 mg by mouth daily with food        furosemide 20 MG tablet    LASIX    60 tablet    Take 2 tablets (40 mg) by mouth every morning    Dependent edema       lisinopril 5 MG tablet    PRINIVIL/ZESTRIL    60 tablet    Take 1 tablet (5 mg) by mouth daily    Acute systolic heart failure (H)       * metoprolol succinate 25 MG 24 hr tablet    TOPROL-XL    60 tablet    Take 25 mg tab with 100 mg tab for total of 125 mg every night.    New onset a-fib (H), Systolic congestive heart failure, unspecified congestive heart failure chronicity (H)       * metoprolol succinate 100 MG 24 hr tablet    TOPROL-XL    60 tablet    Take 100 mg tab with 25 mg tab for total of 125 mg every night.    New onset a-fib (H), Systolic congestive heart failure, unspecified congestive heart failure chronicity (H)       MULTI-VITAMINS Tabs      Take 1 tablet by mouth daily        rivaroxaban ANTICOAGULANT 20 MG Tabs tablet    XARELTO    90 tablet    Take 1 tablet (20 mg) by mouth daily (with dinner)    Atrial fibrillation, unspecified type (H)       rosuvastatin 5 MG tablet    CRESTOR    60 tablet    Take 0.5 tablets (2.5 mg) by mouth At Bedtime    Thoracic aortic aneurysm without rupture (H), Ascending aortic aneurysm (H)       * Notice:  This list has 2 medication(s) that are the same as other medications prescribed for you. Read the directions carefully, and ask your doctor or other care provider to review them with you.

## 2018-09-26 NOTE — NURSING NOTE
Performed EKG in clinic per VORB from PO Lopes CNP.  Order sent to provider for co-sign.  H. Pylori teaching and collection kit provided to pt. Mirna Molina 9/26/2018 12:46 PM

## 2018-09-27 DIAGNOSIS — R14.1 FLATULENCE, ERUCTATION, AND GAS PAIN: ICD-10-CM

## 2018-09-27 DIAGNOSIS — R14.2 FLATULENCE, ERUCTATION, AND GAS PAIN: ICD-10-CM

## 2018-09-27 DIAGNOSIS — R14.3 FLATULENCE, ERUCTATION, AND GAS PAIN: ICD-10-CM

## 2018-09-27 PROCEDURE — 87338 HPYLORI STOOL AG IA: CPT | Performed by: NURSE PRACTITIONER

## 2018-09-28 LAB
H PYLORI AG STL QL IA: NORMAL
SPECIMEN SOURCE: NORMAL

## 2018-10-01 DIAGNOSIS — R60.9 DEPENDENT EDEMA: ICD-10-CM

## 2018-10-03 NOTE — TELEPHONE ENCOUNTER
FUROSEMIDE 20 MG TABLET        Last Written Prescription Date:  8/20/18  Last Fill Quantity: 60,   # refills: 1  Last Office Visit: 8/24/18  Future Office visit:

## 2018-10-04 RX ORDER — FUROSEMIDE 20 MG
TABLET ORAL
Qty: 60 TABLET | Refills: 3 | Status: SHIPPED | OUTPATIENT
Start: 2018-10-04 | End: 2019-05-29

## 2018-10-04 NOTE — TELEPHONE ENCOUNTER
"Refill request for: Furosemide 20 mg tabs   From: [facility]  Rx written date: 08/20/2018 #60 with 1 refill  LOV: 09/26/2018 with PO Lopes CNP   Next appt: none noted in cardiology  Protocol: Diuretics protocol failed d/t no normal creatinine in last 12 months.      Creatinine on 07/29/18 noted as 0.62. Per LOV note with PO Lopes CNP on 09/26/2018, \"She will continue with Lasix to 40 mg daily.\" Pt is to follow up in 3 months. Prescription approved per [facility] Refill Protocol. Mirna Molina RN on 10/4/2018 at 9:47 AM     "

## 2018-10-11 NOTE — PATIENT INSTRUCTIONS
Urine sample did show very minimal bacteria so will culture to see if it is a UTI.  If culture grows out infection then will start you on antibiotics.    Blood counts were normal today - no indication of infection, no anemia    Lyme testing - will call when results available    Follow up with your primary provider for further evaluation    [id number]

## 2018-11-05 ENCOUNTER — OFFICE VISIT (OUTPATIENT)
Dept: FAMILY MEDICINE | Facility: OTHER | Age: 83
End: 2018-11-05
Attending: NURSE PRACTITIONER
Payer: MEDICARE

## 2018-11-05 VITALS
TEMPERATURE: 98 F | DIASTOLIC BLOOD PRESSURE: 64 MMHG | SYSTOLIC BLOOD PRESSURE: 118 MMHG | HEIGHT: 61 IN | WEIGHT: 135.13 LBS | HEART RATE: 106 BPM | OXYGEN SATURATION: 98 % | BODY MASS INDEX: 25.51 KG/M2

## 2018-11-05 DIAGNOSIS — R19.7 DIARRHEA, UNSPECIFIED TYPE: ICD-10-CM

## 2018-11-05 DIAGNOSIS — Z23 NEED FOR PNEUMOCOCCAL VACCINATION: Primary | ICD-10-CM

## 2018-11-05 PROCEDURE — G0009 ADMIN PNEUMOCOCCAL VACCINE: HCPCS

## 2018-11-05 PROCEDURE — G0463 HOSPITAL OUTPT CLINIC VISIT: HCPCS

## 2018-11-05 PROCEDURE — G0463 HOSPITAL OUTPT CLINIC VISIT: HCPCS | Mod: 25

## 2018-11-05 PROCEDURE — 96372 THER/PROPH/DIAG INJ SC/IM: CPT

## 2018-11-05 PROCEDURE — 90732 PPSV23 VACC 2 YRS+ SUBQ/IM: CPT | Performed by: NURSE PRACTITIONER

## 2018-11-05 PROCEDURE — 99213 OFFICE O/P EST LOW 20 MIN: CPT | Performed by: NURSE PRACTITIONER

## 2018-11-05 RX ORDER — KETOROLAC TROMETHAMINE 5 MG/ML
SOLUTION OPHTHALMIC
Refills: 1 | COMMUNITY
Start: 2018-11-02 | End: 2019-05-22

## 2018-11-05 ASSESSMENT — PAIN SCALES - GENERAL: PAINLEVEL: NO PAIN (0)

## 2018-11-05 NOTE — NURSING NOTE
"Chief Complaint   Patient presents with     Diarrhea         Initial /64  Pulse 106  Temp 98  F (36.7  C) (Temporal)  Ht 5' 0.5\" (1.537 m)  Wt 135 lb 2 oz (61.3 kg)  SpO2 98%  BMI 25.96 kg/m2 Estimated body mass index is 25.96 kg/(m^2) as calculated from the following:    Height as of this encounter: 5' 0.5\" (1.537 m).    Weight as of this encounter: 135 lb 2 oz (61.3 kg).    Medication Reconciliation: complete      Norma J. Gosselin, LPN  "

## 2018-11-05 NOTE — MR AVS SNAPSHOT
After Visit Summary   11/5/2018    Merlyn Escamilla    MRN: [id number]           Patient Information     Date Of Birth          8/22/1933        Visit Information        Provider Department      11/5/2018 1:00 PM Beverly Patiño NP [facility]        Today's Diagnoses     Need for pneumococcal vaccination    -  1    Diarrhea, unspecified type          Care Instructions    ASSESSMENT/PLAN:     1. Need for pneumococcal vaccination  Given today    - GH IMM-  PNEUMOCOCCAL VACCINE,ADULT,SQ OR IM    2. Diarrhea, unspecified type  Unknown cause at this time, though improving without any concerning symptoms. Continue to monitor at home, return to clinic if nausea, vomiting, excessive diarrhea, blood in stool, abdominal pain, fever, or chills should develop.       Follow up as previously instructed by your primary care provider.     Beverly Patiño NP  [facility] AND \[facility]\""            Follow-ups after your visit        Who to contact     If you have questions or need follow up information about today's clinic visit or your schedule please contact [facility] AND \[facility]\"" directly at [phone].  Normal or non-critical lab and imaging results will be communicated to you by [employer], letter or phone within 4 business days after the clinic has received the results. If you do not hear from us within 7 days, please contact the clinic through [employer] or phone. If you have a critical or abnormal lab result, we will notify you by phone as soon as possible.  Submit refill requests through [employer] or call your pharmacy and they will forward the refill request to us. Please allow 3 business days for your refill to be completed.          Additional Information About Your Visit        [employer] Information     [employer] lets you send messages to your doctor, view your test results, renew your prescriptions, schedule appointments and more. To sign up, go to [email or web address] . Click  "on \"Log in\" on the left side of the screen, which will take you to the Welcome page. Then click on \"Sign up Now\" on the right side of the page.     You will be asked to enter the access code listed below, as well as some personal information. Please follow the directions to create your username and password.     Your access code is: [email or web address]  Expires: 2/3/2019  1:21 PM     Your access code will  in 90 days. If you need help or a new code, please call your [place] clinic or [phone].        Care EveryWhere ID     This is your Care EveryWhere ID. This could be used by other organizations to access your [place] medical records  [id number]        Your Vitals Were     Pulse Temperature Height Pulse Oximetry BMI (Body Mass Index)       106 98  F (36.7  C) (Temporal) 5' 0.5\" (1.537 m) 98% 25.96 kg/m2        Blood Pressure from Last 3 Encounters:   18 118/64   18 109/70   18 108/66    Weight from Last 3 Encounters:   18 135 lb 2 oz (61.3 kg)   18 130 lb 1.6 oz (59 kg)   18 129 lb 12.8 oz (58.9 kg)              We Performed the Following     GH IMM-  PNEUMOCOCCAL VACCINE,ADULT,SQ OR IM        Primary Care Provider Office Phone # Fax #    Sharath Dinero [phone] [address] [facility] [address]        Equal Access to Services     [facility]: Hadii boris hobbs Sodanii, waaxda luqadaha, qaybta kaalmada leila, suki garcia. So [facility] [phone].    ATENCIÓN: Si habla español, tiene a harp disposición servicios gratuitos de asistencia lingüística. Llame al [phone].    We comply with applicable federal civil rights laws and [place] laws. We do not discriminate on the basis of race, color, national origin, age, disability, sex, sexual orientation, or gender identity.            Thank you!     Thank you for choosing [facility] AND [facility]  for your care. Our goal is always to provide you " with excellent care. Hearing back from our patients is one way we can continue to improve our services. Please take a few minutes to complete the written survey that you may receive in the mail after your visit with us. Thank you!             Your Updated Medication List - Protect others around you: Learn how to safely use, store and throw away your medicines at www.disposemymeds.org.          This list is accurate as of 11/5/18  1:21 PM.  Always use your most recent med list.                   Brand Name Dispense Instructions for use Diagnosis    aspirin 81 MG EC tablet      Take 81 mg by mouth daily with food        furosemide 20 MG tablet    LASIX    60 tablet    Take 2 tablets (40 mg) by mouth every morning    Dependent edema       ketorolac 0.5 % ophthalmic solution    ACULAR     Place 1 drop in left eye four times a day; Start using 3 days before surgery then 4 times daily for 1 week after then 3 times daily for 5 we        lisinopril 5 MG tablet    PRINIVIL/ZESTRIL    60 tablet    Take 1 tablet (5 mg) by mouth daily    Acute systolic heart failure (H)       * metoprolol succinate 25 MG 24 hr tablet    TOPROL-XL    60 tablet    Take 25 mg tab with 100 mg tab for total of 125 mg every night.    New onset a-fib (H), Systolic congestive heart failure, unspecified congestive heart failure chronicity       * metoprolol succinate 100 MG 24 hr tablet    TOPROL-XL    60 tablet    Take 100 mg tab with 25 mg tab for total of 125 mg every night.    New onset a-fib (H), Systolic congestive heart failure, unspecified congestive heart failure chronicity       MULTI-VITAMINS Tabs      Take 1 tablet by mouth daily        rivaroxaban ANTICOAGULANT 20 MG Tabs tablet    XARELTO    90 tablet    Take 1 tablet (20 mg) by mouth daily (with dinner)    Atrial fibrillation, unspecified type (H)       rosuvastatin 5 MG tablet    CRESTOR    60 tablet    Take 0.5 tablets (2.5 mg) by mouth At Bedtime    Thoracic aortic aneurysm without  rupture (H), Ascending aortic aneurysm (H)       * Notice:  This list has 2 medication(s) that are the same as other medications prescribed for you. Read the directions carefully, and ask your doctor or other care provider to review them with you.

## 2018-11-05 NOTE — PATIENT INSTRUCTIONS
ASSESSMENT/PLAN:     1. Need for pneumococcal vaccination  Given today    -  IMM-  PNEUMOCOCCAL VACCINE,ADULT,SQ OR IM    2. Diarrhea, unspecified type  Unknown cause at this time, though improving without any concerning symptoms. Continue to monitor at home, return to clinic if nausea, vomiting, excessive diarrhea, blood in stool, abdominal pain, fever, or chills should develop.       Follow up as previously instructed by your primary care provider.     Beverly Patiño NP  [facility] AND [facility]

## 2018-11-05 NOTE — PROGRESS NOTES
SUBJECTIVE:   Merlyn Escamilla is a 85 year old female who presents to clinic today for the following health issues:    Diarrhea  Onset: 4-5 days    Description:   Consistency of stool: formed but soft; was liquid when first started   Blood in stool: YES- one time a small on toilet paper only  Number of loose stools in past 24 hours: has 5-6 stools per day currently    Progression of Symptoms:  improving    Accompanying Signs & Symptoms:  Fever: no   Nausea or vomiting; no   Abdominal pain: no   Episodes of constipation: no   Weight loss: YES - approx 5lb per home scale    History:   Ill contacts: no   Recent use of antibiotics: no    Recent travels: no          Recent medication-new or changes(Rx or OTC): no     Precipitating factors:   none    Alleviating factors:   Pepto bismol - has improved    Therapies Tried and outcome:  PeptoBismol; Outcome: improved    Problem list and histories reviewed & adjusted, as indicated.  Additional history: none    Patient Active Problem List   Diagnosis     AVM (arteriovenous malformation) of colon     History of colonic polyps     Osteoarthrosis     History of carpal tunnel surgery of left wrist     S/P total knee arthroplasty     Primary osteoarthritis of both first carpometacarpal joints     Primary osteoarthritis of both hands     Atrial fibrillation (H)     Slow transit constipation     Renal cyst, right     Thoracic aortic aneurysm without rupture (H)     Ascending aortic aneurysm (H)     Chronic systolic heart failure (H)     Past Surgical History:   Procedure Laterality Date     APPENDECTOMY OPEN      No Comments Provided     C TOTAL KNEE ARTHROPLASTY Right 10/01/2014    Dr Domingo     CARPAL TUNNEL RELEASE RT/LT Left 01/31/2018 01/31/2018,[id number],OTHER,Left     CARPAL TUNNEL RELEASE RT/LT Right 10/04/2017     COLONOSCOPY  09/2014    Arteriovenous malformations of right, transverse and splenic flexure     ENDOSCOPIC STRIPPING VEIN(S)      No Comments Provided      HYSTERECTOMY TOTAL ABDOMINAL  06/1987    with squamous metaplasia and leiomoma       Social History   Substance Use Topics     Smoking status: Never Smoker     Smokeless tobacco: Never Used     Alcohol use 3.0 oz/week     Family History   Problem Relation Age of Onset     HEART DISEASE Mother      Heart Disease     HEART DISEASE Father      Heart Disease     HEART DISEASE Brother      HEART DISEASE Brother      HEART DISEASE Brother      HEART DISEASE Brother      Brain Cancer Sister      HEART DISEASE Sister          Current Outpatient Prescriptions   Medication Sig Dispense Refill     aspirin EC 81 MG EC tablet Take 81 mg by mouth daily with food       furosemide (LASIX) 20 MG tablet Take 2 tablets (40 mg) by mouth every morning 60 tablet 3     ketorolac (ACULAR) 0.5 % ophthalmic solution Place 1 drop in left eye four times a day; Start using 3 days before surgery then 4 times daily for 1 week after then 3 times daily for 5 we  1     lisinopril (PRINIVIL/ZESTRIL) 5 MG tablet Take 1 tablet (5 mg) by mouth daily 60 tablet 3     metoprolol succinate (TOPROL-XL) 100 MG 24 hr tablet Take 100 mg tab with 25 mg tab for total of 125 mg every night. 60 tablet 3     metoprolol succinate (TOPROL-XL) 25 MG 24 hr tablet Take 25 mg tab with 100 mg tab for total of 125 mg every night. 60 tablet 3     Multiple Vitamin (MULTI-VITAMINS) TABS Take 1 tablet by mouth daily       rivaroxaban ANTICOAGULANT (XARELTO) 20 MG TABS tablet Take 1 tablet (20 mg) by mouth daily (with dinner) 90 tablet 3     rosuvastatin (CRESTOR) 5 MG tablet Take 0.5 tablets (2.5 mg) by mouth At Bedtime 60 tablet 3     Allergies   Allergen Reactions     Morphine      Other reaction(s): Muscle Weakness     Phenylbutazone Unknown       Reviewed and updated as needed this visit by clinical staff  Tobacco  Allergies  Meds  Med Hx  Surg Hx  Fam Hx  Soc Hx      Reviewed and updated as needed this visit by Provider         ROS:  As above    OBJECTIVE:     BP  "118/64  Pulse 106  Temp 98  F (36.7  C) (Temporal)  Ht 5' 0.5\" (1.537 m)  Wt 135 lb 2 oz (61.3 kg)  SpO2 98%  BMI 25.96 kg/m2  Body mass index is 25.96 kg/(m^2).  GENERAL: healthy, alert and no distress  RESP: lungs clear to auscultation - no rales, rhonchi or wheezes  CV: irregularly irregular rhythm, normal S1 S2, no S3 or S4, no murmur, click or rub, peripheral pulses strong and no peripheral edema  ABDOMEN: soft, nontender, no hepatosplenomegaly, no masses and bowel sounds normal    Diagnostic Test Results:  none     ASSESSMENT/PLAN:     1. Need for pneumococcal vaccination  Given today    - [facility]-  PNEUMOCOCCAL VACCINE,ADULT,SQ OR IM    2. Diarrhea, unspecified type  Unknown cause at this time, though improving without any concerning symptoms. Continue to monitor at home, return to clinic if nausea, vomiting, excessive diarrhea, blood in stool, fever, or chills should develop.       Follow up as previously instructed by your primary care provider.     Beverly Patiño NP  [facility] AND [facility]    "

## 2019-02-22 DIAGNOSIS — I50.22 CHRONIC SYSTOLIC HEART FAILURE (H): Primary | ICD-10-CM

## 2019-02-22 RX ORDER — METOPROLOL SUCCINATE 100 MG/1
100 TABLET, EXTENDED RELEASE ORAL DAILY
Qty: 7 TABLET | Refills: 0 | Status: SHIPPED | OUTPATIENT
Start: 2019-02-22 | End: 2019-05-22 | Stop reason: DRUGHIGH

## 2019-04-24 DIAGNOSIS — I50.20 SYSTOLIC CONGESTIVE HEART FAILURE (H): ICD-10-CM

## 2019-04-24 DIAGNOSIS — I48.91 NEW ONSET A-FIB (H): ICD-10-CM

## 2019-04-24 NOTE — TELEPHONE ENCOUNTER
Metoprolol       Last Written Prescription Date:  2/22/2019  Last Fill Quantity: 7,   # refills: 0  Last Office Visit: 11/5/2018  Future Office visit:

## 2019-04-25 RX ORDER — METOPROLOL SUCCINATE 25 MG/1
TABLET, EXTENDED RELEASE ORAL
Qty: 30 TABLET | Refills: 0 | Status: SHIPPED | OUTPATIENT
Start: 2019-04-25 | End: 2019-05-29

## 2019-04-25 NOTE — TELEPHONE ENCOUNTER
Patient is out of medication and requesting a refill. Last office visit 9/26/18. Refilling 30 day supply + 0 refills per CPA.     [facility]

## 2019-05-15 DIAGNOSIS — I50.21 ACUTE SYSTOLIC HEART FAILURE (H): ICD-10-CM

## 2019-05-15 RX ORDER — LISINOPRIL 5 MG/1
5 TABLET ORAL DAILY
Qty: 90 TABLET | Refills: 0 | Status: SHIPPED | OUTPATIENT
Start: 2019-05-15 | End: 2019-05-29

## 2019-05-15 NOTE — TELEPHONE ENCOUNTER
"Refill request for: Lisinopril 5 mg tabs   From: [facility]  Rx written date: 08/24/2018 #60 with 3 refills  LOV: 09/26/2018 with SONYA  Next appt: none noted  Protocol: ACE Inhibitors (Including Combos) Protocol Failed   Normal serum creatinine on file in past 12 months       Per LOV with [facility], \"She will continue with metoprolol succinate and Lisinopril for GDMT.\" Pt was to follow up in 3 months (Dec 2018). Pt overdue for f/u. Will send reminder letter and provide carlin refill. Mirna Molina RN on 5/15/2019 at 1:09 PM   "

## 2019-05-15 NOTE — LETTER
May 15, 2019      Merlyn Escamilla  [address]  [place] [address]        Dear Merlyn,       A refill of Lisinopril 5 mg tablets has been requested from your pharmacy. Review of our records indicates that you are due for follow-up with [facility]. Please call [phone] to schedule an appointment at your convenience. A limited 90-day refill has been provided to allow time for you to be seen.      Sincerely,        Cardiology Nurse

## 2019-05-22 ENCOUNTER — OFFICE VISIT (OUTPATIENT)
Dept: FAMILY MEDICINE | Facility: OTHER | Age: 84
End: 2019-05-22
Attending: NURSE PRACTITIONER
Payer: COMMERCIAL

## 2019-05-22 VITALS
DIASTOLIC BLOOD PRESSURE: 70 MMHG | TEMPERATURE: 97.6 F | HEART RATE: 110 BPM | BODY MASS INDEX: 24.62 KG/M2 | SYSTOLIC BLOOD PRESSURE: 130 MMHG | WEIGHT: 133.8 LBS | RESPIRATION RATE: 20 BRPM | OXYGEN SATURATION: 93 % | HEIGHT: 62 IN

## 2019-05-22 DIAGNOSIS — J06.9 VIRAL URI WITH COUGH: Primary | ICD-10-CM

## 2019-05-22 PROCEDURE — 99213 OFFICE O/P EST LOW 20 MIN: CPT | Performed by: NURSE PRACTITIONER

## 2019-05-22 PROCEDURE — G0463 HOSPITAL OUTPT CLINIC VISIT: HCPCS | Performed by: NURSE PRACTITIONER

## 2019-05-22 ASSESSMENT — MIFFLIN-ST. JEOR: SCORE: 997.22

## 2019-05-22 ASSESSMENT — PAIN SCALES - GENERAL: PAINLEVEL: NO PAIN (0)

## 2019-05-22 NOTE — PROGRESS NOTES
"Subjective     Merlyn Escamilla is a 85 year old female who presents to clinic today for the following health issues:    HPI   ENT Symptoms             Symptoms: cc Present Absent Comment   Fever/Chills  x  Chills and sweats 2 nights ago   Fatigue  x     Muscle Aches   x    Eye Irritation  x  L irritated   Sneezing  x     Nasal Guillermo/Drg  x  Snot is yellowish   Sinus Pressure/Pain  x     Loss of smell   x    Dental pain   x    Sore Throat   x    Swollen Glands   x    Ear Pain/Fullness   x    Cough  x  Productive of yellow phlegm   Wheeze   x    Chest Pain   x    Shortness of breath  x  With long coughing fit   Rash   x    Other   x      Symptom duration:  3 days \"or longer\"   Symptom severity:  mild   Treatments tried:  tylenol, aleve, resting   Contacts:  none       Reviewed and updated as needed this visit by Provider  Tobacco  Allergies  Meds  Problems  Med Hx  Surg Hx  Fam Hx  Soc Hx          Review of Systems   ROS COMP: As above      Objective    /70   Pulse 110   Temp 97.6  F (36.4  C) (Temporal)   Resp 20   Ht 1.562 m (5' 1.5\")   Wt 60.7 kg (133 lb 12.8 oz)   SpO2 93%   BMI 24.87 kg/m    Body mass index is 24.87 kg/m .  Physical Exam   GENERAL: healthy, alert, no distress and elderly  EYES: Eyes grossly normal to inspection, PERRL and conjunctivae and sclerae normal  HENT: ear canals and TM's normal, nose and mouth without ulcers or lesions, edematous nasal mucosa, clear rhinorrhea, cobblestoning of posterior oropharynx  NECK: no adenopathy, no asymmetry, masses, or scars and thyroid normal to palpation  RESP: lungs clear to auscultation - no rales, rhonchi or wheezes  CV: regular rate and rhythm, normal S1 S2, no S3 or S4, no murmur, click or rub, no peripheral edema and peripheral pulses strong  ABDOMEN: soft, nontender, no hepatosplenomegaly, no masses and bowel sounds normal  MS: no gross musculoskeletal defects noted, no edema  SKIN: no suspicious lesions or rashes  NEURO: Normal " strength and tone, mentation intact and speech normal  PSYCH: mentation appears normal, affect normal/bright    Diagnostic Test Results:  Labs reviewed in Epic  none         Assessment & Plan     1. Viral URI with cough  Viral URI versus allergies. Discussed OTC options for symptom relief at this time, nasal saline spray, antihistamine. If worsening, or if no improvement, return to clinic for further evaluation.       Beverly Patiño NP  [facility]

## 2019-05-22 NOTE — NURSING NOTE
"Chief Complaint   Patient presents with     Cough     x 3 days         Initial /70   Pulse 110   Temp 97.6  F (36.4  C) (Temporal)   Resp 20   Ht 1.562 m (5' 1.5\")   Wt 60.7 kg (133 lb 12.8 oz)   SpO2 93%   BMI 24.87 kg/m   Estimated body mass index is 24.87 kg/m  as calculated from the following:    Height as of this encounter: 1.562 m (5' 1.5\").    Weight as of this encounter: 60.7 kg (133 lb 12.8 oz).    Medication Reconciliation: complete      Norma J. Gosselin, LPN  "

## 2019-05-29 ENCOUNTER — HOSPITAL ENCOUNTER (OUTPATIENT)
Dept: GENERAL RADIOLOGY | Facility: OTHER | Age: 84
Discharge: HOME | End: 2019-05-29
Attending: NURSE PRACTITIONER | Admitting: NURSE PRACTITIONER
Payer: MEDICARE

## 2019-05-29 ENCOUNTER — OFFICE VISIT (OUTPATIENT)
Dept: CARDIOLOGY | Facility: OTHER | Age: 84
End: 2019-05-29
Attending: NURSE PRACTITIONER
Payer: MEDICARE

## 2019-05-29 VITALS
RESPIRATION RATE: 20 BRPM | DIASTOLIC BLOOD PRESSURE: 68 MMHG | OXYGEN SATURATION: 97 % | BODY MASS INDEX: 24.48 KG/M2 | HEIGHT: 62 IN | SYSTOLIC BLOOD PRESSURE: 118 MMHG | HEART RATE: 112 BPM | WEIGHT: 133 LBS | TEMPERATURE: 98.8 F

## 2019-05-29 DIAGNOSIS — I50.22 CHRONIC SYSTOLIC HEART FAILURE (H): ICD-10-CM

## 2019-05-29 DIAGNOSIS — J98.8 VIRAL RESPIRATORY ILLNESS: ICD-10-CM

## 2019-05-29 DIAGNOSIS — I48.20 CHRONIC ATRIAL FIBRILLATION (H): ICD-10-CM

## 2019-05-29 DIAGNOSIS — I50.22 CHRONIC SYSTOLIC HEART FAILURE (H): Primary | ICD-10-CM

## 2019-05-29 DIAGNOSIS — B97.89 VIRAL RESPIRATORY ILLNESS: ICD-10-CM

## 2019-05-29 DIAGNOSIS — R09.89 ABNORMAL LUNG SOUNDS: ICD-10-CM

## 2019-05-29 DIAGNOSIS — I48.91 ATRIAL FIBRILLATION WITH RVR (H): ICD-10-CM

## 2019-05-29 DIAGNOSIS — I71.21 ASCENDING AORTIC ANEURYSM (H): ICD-10-CM

## 2019-05-29 PROCEDURE — G0463 HOSPITAL OUTPT CLINIC VISIT: HCPCS | Mod: 25

## 2019-05-29 PROCEDURE — G0463 HOSPITAL OUTPT CLINIC VISIT: HCPCS

## 2019-05-29 PROCEDURE — 71046 X-RAY EXAM CHEST 2 VIEWS: CPT

## 2019-05-29 PROCEDURE — 99214 OFFICE O/P EST MOD 30 MIN: CPT | Performed by: NURSE PRACTITIONER

## 2019-05-29 RX ORDER — FUROSEMIDE 20 MG
TABLET ORAL
Qty: 180 TABLET | Refills: 3 | Status: SHIPPED | OUTPATIENT
Start: 2019-05-29 | End: 2020-06-02

## 2019-05-29 RX ORDER — METOPROLOL SUCCINATE 25 MG/1
TABLET, EXTENDED RELEASE ORAL
Qty: 90 TABLET | Refills: 3 | Status: SHIPPED | OUTPATIENT
Start: 2019-05-29 | End: 2020-06-02

## 2019-05-29 RX ORDER — METOPROLOL SUCCINATE 100 MG/1
TABLET, EXTENDED RELEASE ORAL
Qty: 90 TABLET | Refills: 3 | Status: SHIPPED | OUTPATIENT
Start: 2019-05-29 | End: 2020-06-02

## 2019-05-29 RX ORDER — LISINOPRIL 5 MG/1
5 TABLET ORAL DAILY
Qty: 90 TABLET | Refills: 3 | Status: SHIPPED | OUTPATIENT
Start: 2019-05-29 | End: 2020-06-02

## 2019-05-29 RX ORDER — DIGOXIN 125 MCG
125 TABLET ORAL DAILY
Qty: 90 TABLET | Refills: 3 | Status: SHIPPED | OUTPATIENT
Start: 2019-05-29 | End: 2020-06-02

## 2019-05-29 ASSESSMENT — MIFFLIN-ST. JEOR: SCORE: 993.59

## 2019-05-29 ASSESSMENT — PAIN SCALES - GENERAL: PAINLEVEL: MODERATE PAIN (5)

## 2019-05-29 NOTE — PATIENT INSTRUCTIONS
You were seen by  PO Lopes CNP      1. STOP taking Crestor.     2. STOP taking Aspirin.     3. START taking Digoxin 0.125 mcg once daily.     4. An echocardiogram has been ordered. You will be called to schedule this. You will receive instructions for testing at that time. We will call with results.     5. A chest x-ray has been ordered. We will call with results.       You will follow up with [facility] in 6 weeks, sooner if needed.       Please call the cardiology office with problems, questions, or concerns at [phone].    If you experience chest pain, chest pressure, chest tightness, shortness of breath, fainting, lightheadedness, nausea, vomiting, or other concerning symptoms, please report to the Emergency Department or call 911. These symptoms may be emergent, and best treated in the Emergency Department.     NEIL LandryN, RN  [facility]  [phone]

## 2019-05-29 NOTE — PROGRESS NOTES
[facility] HEART CARE   CARDIOLOGY PROGRESS NOTE    Merlyn Escamilla   [address]  [place] [address]      Sharath Dinero     Chief Complaint   Patient presents with     Follow Up     needs to get med refills        HPI:   Ms. Escamilla is an 85 year old female who presents for 6 month cardiology follow-up with thoracic aortic aneurysm, chronic atrial fibrillation and newly identified systolic heart failure.  Patient has a history of arteriovenous malformation of the colon, right renal cyst and osteoarthritis.      Patient presented to the emergency department on 7/29/2018 with rapid heart rate.  She was found to be in atrial fibrillation with RVR.  She was initially treated on a diltiazem drip and admitted to the hospital.  She was started on oral anticoagulation Xarelto with a [id number] score of 3 (age and female).      She had an echocardiogram on 7/29/2018 with normal LV size and thickness, EF was estimated at 35-40%. There was diffuse hypokinesis but the septum appeared comparatively more hypokinetic on the study.  The RV was normal in size and function.  Moderate left atrial enlargement was present.  Mild MAC with mild to moderate mitral insufficiency.  The aortic valve is normal in structure and function, trace aortic insufficiency was present.  Moderate to severe tricuspid insufficiency, RVSP was 35 mmHg +  RAP.  There was moderate dilation of the aorta present, ascending aorta 4.8 cm the inferior vena cava was dilated at 2.8 cm without respiratory variability which is consistent with increased right atrial pressure.  There is no pericardial effusion present.      Patient was initially seen by cardiology on 8/20/18.  With newly identified systolic heart failure it was recommended stress imaging to assess for any potential ischemia or obstructing coronary disease. At that visit her Lasix was increased to 40 mg daily which did resolve her edema. She was started on metoprolol succinate and lisinopril for  GDMT.  With LVEF less than 40%, it was recommended that she discontinue the diltiazem. She was instructed on sodium restricted diet, daily weight monitoring and monitoring of her overall daily fluid intake.  We previously reviewed the results of her CT from 7/29/2018 with a ascending aortic dilation at 4.9 cm with no dissection or other acute abnormality identified.  CT from August 1, 2014 showed that the ascending thoracic aorta measured 3.9 cm in maximum diameter.      Since her last visit patient did undergo a repeat CT. Of significant, this showed the dilation of her mid ascending aorta to be 4.6 x 4.5 cm which is less than the CT from 7/29/2018 with measurement of 4.9 cm and therefore, we can continue with monitoring by serial imaging surveillance.     LAURENO in August of 2018 with 100 % AF burden. Average HR 91 bpm and 1 pause nocturnal lasting 3.4 seconds. Patient declined sleep study referral with this overnight pause which may be suggestive of untreated sleep apnea.     Today patient report that she has been battling a cold for 2 weeks with congestion and productive cough. No reported fever or chills. Last few visits with rate increased to >100 bpm. Rate today 112 representing RVR with AF. Otherwise, she is doing well with AF and has remained asymptomatic. No palpitations or chest pressure. No shortness of breath or WEST. No lightheadedness or syncope. Weight has been stable with HFrEF, no increased edema. No orthopnea or PND.     PAST MEDICAL HISTORY:   Past Medical History:   Diagnosis Date     Carpal tunnel syndrome of left wrist     10/4/2017     Carpal tunnel syndrome of right wrist     10/4/2017     Deep phlebothrombosis during pregnancy     No Comments Provided     Osteoarthritis     Knee     Other specified postprocedural states     10/4/2017     Other specified postprocedural states     1/31/2018     Presence of artificial knee joint     10/3/2014          FAMILY HISTORY:   Family History   Problem  Relation Age of Onset     Heart Disease Mother         Heart Disease     Heart Disease Father         Heart Disease     Heart Disease Brother      Heart Disease Brother      Heart Disease Brother      Heart Disease Brother      Brain Cancer Sister      Heart Disease Sister           PAST SURGICAL HISTORY:   Past Surgical History:   Procedure Laterality Date     APPENDECTOMY OPEN      No Comments Provided     C TOTAL KNEE ARTHROPLASTY Right 10/01/2014    Dr Domingo     CARPAL TUNNEL RELEASE RT/LT Left 2018,[id number],OTHER,Left     CARPAL TUNNEL RELEASE RT/LT Right 10/04/2017     COLONOSCOPY  2014    Arteriovenous malformations of right, transverse and splenic flexure     ENDOSCOPIC STRIPPING VEIN(S)      No Comments Provided     HYSTERECTOMY TOTAL ABDOMINAL  1987    with squamous metaplasia and leiomoma          SOCIAL HISTORY:   Social History     Social History     Marital status:      Spouse name: N/A     Number of children: N/A     Years of education: N/A     Social History Main Topics     Smoking status: Never Smoker     Smokeless tobacco: Never Used     Alcohol use 3.0 oz/week     Drug use: No      Comment: Drug use: No     Sexual activity: Not on file     Other Topics Concern     Not on file     Social History Narrative    Lives alone.      2017.     3 daughters, 1 in [facility], 1 in [place] and 1 near [facility]. Lost 1 daughter at age 2 years.    Has 1 living sister, fraternal twin. All other siblings are .           CURRENT MEDICATIONS:   Prior to Admission medications    Medication Sig Start Date End Date Taking? Authorizing Provider   aspirin EC 81 MG EC tablet Take 81 mg by mouth daily with food 14   Reported, Patient   furosemide (LASIX) 20 MG tablet Take 2 tablets (40 mg) by mouth every morning 18   Natalia Schultz APRN CNP   lisinopril (PRINIVIL/ZESTRIL) 5 MG tablet Take 1 tablet (5 mg) by mouth daily 18   Natalia Schultz APRN CNP  "  metoprolol succinate (TOPROL-XL) 100 MG 24 hr tablet Take 100 mg tab with 25 mg tab for total of 125 mg every night. 9/5/18   Natalia Schultz APRN CNP   metoprolol succinate (TOPROL-XL) 25 MG 24 hr tablet Take 25 mg tab with 100 mg tab for total of 125 mg every night. 9/5/18   Natalia Schultz APRN CNP   Multiple Vitamin (MULTI-VITAMINS) TABS Take 1 tablet by mouth daily 7/28/14   Reported, Patient   rivaroxaban ANTICOAGULANT (XARELTO) 20 MG TABS tablet Take 1 tablet (20 mg) by mouth daily (with dinner) 8/24/18   Sharath Dinero MD   rosuvastatin (CRESTOR) 5 MG tablet Take 0.5 tablets (2.5 mg) by mouth At Bedtime 9/5/18   Natalia Schultz APRN CNP          ALLERGIES:   Allergies   Allergen Reactions     Morphine      Other reaction(s): Muscle Weakness     Phenylbutazone Unknown          ROS:   CONSTITUTIONAL: No reported fever or chills. No changes in weight.  ENT: No visual disturbance, ear ache, epistaxis or sore throat.   CARDIOVASCULAR: No chest pain, chest pressure or chest discomfort. No palpitations or lower extremity edema.   RESPIRATORY: No shortness of breath, increased dyspnea upon exertion. Positive for cough, no wheezing or hemoptysis.   GI: No reported abdominal pain, melena or hematochezia.  : No reported hematuria or dysuria.   NEUROLOGICAL: No lightheadedness, dizziness, syncope, ataxia, paresthesias or weakness.   HEMATOLOGIC: No history of anemia. No bleeding or excessive bruising. No history of blood clots.   MUSCULOSKELETAL: Positive for joint swelling, this improved when she cut back on her crestor to every other day.  ENDOCRINOLOGIC: No temperature intolerance. No hair or skin changes.  SKIN: No abnormal rashes or sores, no unusual itching.      PHYSICAL EXAM:   /68 (BP Location: Right arm, Patient Position: Sitting, Cuff Size: Adult Regular)   Pulse 112   Temp 98.8  F (37.1  C) (Tympanic)   Resp 20   Ht 1.562 m (5' 1.5\")   Wt 60.3 kg (133 lb)   SpO2 97%   BMI 24.72 " kg/m    GENERAL: The patient is a well-developed, well-nourished, in no apparent distress.  HEENT: Head is normocephalic and atraumatic. Eyes are symmetrical with normal visual tracking. No icterus, no xanthelasmas. Nares appeared normal without nasal drainage. Mucous membranes are moist, no cyanosis.  NECK: Supple. JVP not visible.   CHEST/ LUNGS: Cackles over right upper lung, otherwise clear.   CARDIO: Irregular rate and rhythm with S1 and S2, no murmur, click or rub.  ABD: Abdomen is nondistended.   EXTREMITIES: No edema present.   NEUROLOGIC: Alert and oriented X3. Normal speech, gait and affect. No focal neurologic deficits.   SKIN: No jaundice. No rashes or visible skin lesions present. No ecchymosis.     LAB RESULTS:   Office Visit on 07/14/2018   Component Date Value Ref Range Status     WBC 07/14/2018 5.0  4.0 - 11.0 10e9/L Final     RBC Count 07/14/2018 4.44  3.8 - 5.2 10e12/L Final     Hemoglobin 07/14/2018 13.0  11.7 - 15.7 g/dL Final     Hematocrit 07/14/2018 40.8  35.0 - 47.0 % Final     MCV 07/14/2018 92  78 - 100 fl Final     MCH 07/14/2018 29.3  26.5 - 33.0 pg Final     MCHC 07/14/2018 31.9  31.5 - 36.5 g/dL Final     RDW 07/14/2018 13.6  10.0 - 15.0 % Final     Platelet Count 07/14/2018 162  150 - 450 10e9/L Final     Diff Method 07/14/2018 Automated Method   Final     % Neutrophils 07/14/2018 77.1  % Final     % Lymphocytes 07/14/2018 13.5  % Final     % Monocytes 07/14/2018 8.0  % Final     % Eosinophils 07/14/2018 0.4  % Final     % Basophils 07/14/2018 0.8  % Final     % Immature Granulocytes 07/14/2018 0.2  % Final     Absolute Neutrophil 07/14/2018 3.8  1.6 - 8.3 10e9/L Final     Absolute Lymphocytes 07/14/2018 0.7* 0.8 - 5.3 10e9/L Final     Absolute Monocytes 07/14/2018 0.4  0.0 - 1.3 10e9/L Final     Absolute Eosinophils 07/14/2018 0.0  0.0 - 0.7 10e9/L Final     Absolute Basophils 07/14/2018 0.0  0.0 - 0.2 10e9/L Final     Abs Immature Granulocytes 07/14/2018 0.0  0 - 0.4 10e9/L Final      Color Urine 07/14/2018 Yellow   Final     Appearance Urine 07/14/2018 Clear   Final     Glucose Urine 07/14/2018 Negative  NEG^Negative mg/dL Final     Bilirubin Urine 07/14/2018 Negative  NEG^Negative Final     Ketones Urine 07/14/2018 Negative  NEG^Negative mg/dL Final     Specific Gravity Urine 07/14/2018 <1.005  1.000 - 1.030 Final     Blood Urine 07/14/2018 Trace* NEG^Negative Final     pH Urine 07/14/2018 6.0  5.0 - 9.0 pH Final     Protein Albumin Urine 07/14/2018 Negative  NEG^Negative mg/dL Final     Urobilinogen Urine 07/14/2018 0.2  0.2 - 1.0 EU/dL Final     Nitrite Urine 07/14/2018 Negative  NEG^Negative Final     Leukocyte Esterase Urine 07/14/2018 Small* NEG^Negative Final     Source 07/14/2018 Midstream Urine   Final     Lyme Disease Antibodies Serum 07/14/2018 0.13  0.00 - 0.89 Final     WBC Urine 07/14/2018 0 - 5  OTO5^0 - 5 /HPF Final     RBC Urine 07/14/2018 O - 2  OTO2^O - 2 /HPF Final     Squamous Epithelial /LPF Urine 07/14/2018 Few  FEW^Few /LPF Final     Bacteria Urine 07/14/2018 Few* NEG^Negative /HPF Final     Specimen Description 07/14/2018 Midstream Urine   Final     Culture Micro 07/14/2018    Final                    Value:10,000 to 50,000 colonies/mL  mixed urogenital lizz            ASSESSMENT:   Merlyn Escamilla presents for 6 month cardiology follow-up with thoracic aortic aneurysm, chronic atrial fibrillation and newly identified systolic heart failure.  Patient has a history of arteriovenous malformation of the colon, right renal cyst and osteoarthritis.  Today patient report that she has been battling a cold for 2 weeks with congestion and productive cough. No reported fever or chills. Last few visits with rate increased to >100 bpm. Rate today 112 representing RVR with AF. Otherwise, she is doing well with AF and has remained asymptomatic. No palpitations or chest pressure. No shortness of breath or WEST. No lightheadedness or syncope. Weight has been stable with HFrEF, no  increased edema. No orthopnea or PND.     PLAN:  1. Chronic systolic heart failure (H)  Chronic HFrEF with LVEF 35-40%, NYHA FC I-II.   No evidence of ischemia on stress testing.   She will continue with Lasix to 40 mg daily.  She will continue with metoprolol succinate and Lisinopril for GDMT.   She has been instructed on low-sodium diet of no more than 2500 mg of sodium per day.  It has been recommended that she monitor her daily weights and call with a weight fluctuation of 3 pounds and 24 hours or 5 pounds in 1 week.  It is recommended that she keep track of her overall fluid intake, keep less than 2 L per day.    - digoxin (LANOXIN) 125 MCG tablet; Take 1 tablet (125 mcg) by mouth daily  Dispense: 90 tablet; Refill: 3  - furosemide (LASIX) 20 MG tablet; Take 2 tablets (40 mg) by mouth every morning  Dispense: 180 tablet; Refill: 3  - lisinopril (PRINIVIL/ZESTRIL) 5 MG tablet; Take 1 tablet (5 mg) by mouth daily  Dispense: 90 tablet; Refill: 3  - metoprolol succinate ER (TOPROL-XL) 100 MG 24 hr tablet; Take 100 mg tab with 25 mg tab for total of 125 mg every night.  Dispense: 90 tablet; Refill: 3  - metoprolol succinate ER (TOPROL-XL) 25 MG 24 hr tablet; Take 25 mg tab with 100 mg tab for total of 125 mg every night.  Dispense: 90 tablet; Refill: 3  - X-ray Chest 2 vws*; Future  - Echocardiogram Complete; Future    2. Ascending aortic aneurysm (H)  In review of most recent imaging, ascending aorta measured less from last interval imaging at 4.6 cm.   It was recommended continued serial imaging for surveillance.  Repeat imaging by echocardiogram ordered.     - metoprolol succinate ER (TOPROL-XL) 100 MG 24 hr tablet; Take 100 mg tab with 25 mg tab for total of 125 mg every night.  Dispense: 90 tablet; Refill: 3  - metoprolol succinate ER (TOPROL-XL) 25 MG 24 hr tablet; Take 25 mg tab with 100 mg tab for total of 125 mg every night.  Dispense: 90 tablet; Refill: 3  - Echocardiogram Complete; Future    3. Chronic  atrial fibrillation (H)  Newly diagnosed atrial fibrillation with 100% EF burden.  She continues to report being largely asymptomatic with atrial fibrillation.   She had been well rate controlled on metoprolol succinate 125 mg every evening. Over the past few months has been noted to have RVR with rates above 100 bpm.   Given her difficulty with ventricular rate control for past few months and HFrEF, it is recommended that she start digoxin 0.125 mcg daily.    She is currently appropriately anticoagulated on Xarelto without complication.  There was concern for possible sleep apnea given nocturnal sinus pauses on recent cardiac monitoring and with chronic atrial fibrillation.  Sleep study was recommended for which patient declined.     - digoxin (LANOXIN) 125 MCG tablet; Take 1 tablet (125 mcg) by mouth daily  Dispense: 90 tablet; Refill: 3  - metoprolol succinate ER (TOPROL-XL) 100 MG 24 hr tablet; Take 100 mg tab with 25 mg tab for total of 125 mg every night.  Dispense: 90 tablet; Refill: 3  - metoprolol succinate ER (TOPROL-XL) 25 MG 24 hr tablet; Take 25 mg tab with 100 mg tab for total of 125 mg every night.  Dispense: 90 tablet; Refill: 3  - rivaroxaban ANTICOAGULANT (XARELTO) 20 MG TABS tablet; Take 1 tablet (20 mg) by mouth daily (with dinner)  Dispense: 90 tablet; Refill: 3  - Echocardiogram Complete; Future    4. Abnormal lung sounds  Recent viral illness for past two weeks, crackles of upper right. Chest XR ordered.   - X-ray Chest 2 vws*; Future    5. Viral respiratory illness  See above.  - X-ray Chest 2 vws*; Future    6. Atrial fibrillation with RVR (H)  See above.    - metoprolol succinate ER (TOPROL-XL) 100 MG 24 hr tablet; Take 100 mg tab with 25 mg tab for total of 125 mg every night.  Dispense: 90 tablet; Refill: 3  - metoprolol succinate ER (TOPROL-XL) 25 MG 24 hr tablet; Take 25 mg tab with 100 mg tab for total of 125 mg every night.  Dispense: 90 tablet; Refill: 3    Thank you for allowing me  to participate in the care of your patient. Please do not hesitate to contact me if you have any questions.     Natalia Schultz

## 2019-05-29 NOTE — NURSING NOTE
"Chief Complaint   Patient presents with     Follow Up     needs to get med refills       Initial /68 (BP Location: Right arm, Patient Position: Sitting, Cuff Size: Adult Regular)   Pulse 112   Temp 98.8  F (37.1  C) (Tympanic)   Resp 20   Ht 1.562 m (5' 1.5\")   Wt 60.3 kg (133 lb)   SpO2 97%   BMI 24.72 kg/m   Estimated body mass index is 24.72 kg/m  as calculated from the following:    Height as of this encounter: 1.562 m (5' 1.5\").    Weight as of this encounter: 60.3 kg (133 lb).  Meds Reconciled: complete  Pt is not on Aspirin  Pt is on a Statin  PHQ and/or MARVIN reviewed. Pt referred to PCP/MH Provider as appropriate.    Trina Delcid LPN      "

## 2019-06-03 ENCOUNTER — HOSPITAL ENCOUNTER (OUTPATIENT)
Dept: CARDIOLOGY | Facility: OTHER | Age: 84
Discharge: HOME | End: 2019-06-03
Attending: NURSE PRACTITIONER | Admitting: NURSE PRACTITIONER
Payer: MEDICARE

## 2019-06-03 DIAGNOSIS — I50.22 CHRONIC SYSTOLIC HEART FAILURE (H): ICD-10-CM

## 2019-06-03 DIAGNOSIS — I71.21 ASCENDING AORTIC ANEURYSM (H): ICD-10-CM

## 2019-06-03 DIAGNOSIS — I48.20 CHRONIC ATRIAL FIBRILLATION (H): ICD-10-CM

## 2019-06-03 PROCEDURE — 93306 TTE W/DOPPLER COMPLETE: CPT | Mod: 26 | Performed by: INTERNAL MEDICINE

## 2019-06-03 PROCEDURE — 93306 TTE W/DOPPLER COMPLETE: CPT

## 2019-12-30 ENCOUNTER — OFFICE VISIT (OUTPATIENT)
Dept: CARDIOLOGY | Facility: OTHER | Age: 84
End: 2019-12-30
Attending: NURSE PRACTITIONER
Payer: COMMERCIAL

## 2019-12-30 VITALS
BODY MASS INDEX: 24.73 KG/M2 | RESPIRATION RATE: 18 BRPM | OXYGEN SATURATION: 97 % | TEMPERATURE: 98.3 F | HEIGHT: 61 IN | HEART RATE: 100 BPM | DIASTOLIC BLOOD PRESSURE: 70 MMHG | WEIGHT: 131 LBS | SYSTOLIC BLOOD PRESSURE: 124 MMHG

## 2019-12-30 DIAGNOSIS — I50.22 CHRONIC SYSTOLIC HEART FAILURE (H): Primary | ICD-10-CM

## 2019-12-30 DIAGNOSIS — I48.20 CHRONIC ATRIAL FIBRILLATION (H): ICD-10-CM

## 2019-12-30 DIAGNOSIS — I71.21 ASCENDING AORTIC ANEURYSM (H): ICD-10-CM

## 2019-12-30 PROCEDURE — G0463 HOSPITAL OUTPT CLINIC VISIT: HCPCS

## 2019-12-30 PROCEDURE — 99214 OFFICE O/P EST MOD 30 MIN: CPT | Performed by: NURSE PRACTITIONER

## 2019-12-30 ASSESSMENT — ANXIETY QUESTIONNAIRES
3. WORRYING TOO MUCH ABOUT DIFFERENT THINGS: NOT AT ALL
5. BEING SO RESTLESS THAT IT IS HARD TO SIT STILL: NOT AT ALL
2. NOT BEING ABLE TO STOP OR CONTROL WORRYING: NOT AT ALL
6. BECOMING EASILY ANNOYED OR IRRITABLE: NOT AT ALL
IF YOU CHECKED OFF ANY PROBLEMS ON THIS QUESTIONNAIRE, HOW DIFFICULT HAVE THESE PROBLEMS MADE IT FOR YOU TO DO YOUR WORK, TAKE CARE OF THINGS AT HOME, OR GET ALONG WITH OTHER PEOPLE: NOT DIFFICULT AT ALL
GAD7 TOTAL SCORE: 0
7. FEELING AFRAID AS IF SOMETHING AWFUL MIGHT HAPPEN: NOT AT ALL
1. FEELING NERVOUS, ANXIOUS, OR ON EDGE: NOT AT ALL

## 2019-12-30 ASSESSMENT — PATIENT HEALTH QUESTIONNAIRE - PHQ9
SUM OF ALL RESPONSES TO PHQ QUESTIONS 1-9: 0
5. POOR APPETITE OR OVEREATING: NOT AT ALL

## 2019-12-30 ASSESSMENT — PAIN SCALES - GENERAL: PAINLEVEL: MODERATE PAIN (4)

## 2019-12-30 ASSESSMENT — MIFFLIN-ST. JEOR: SCORE: 979.46

## 2019-12-30 NOTE — NURSING NOTE
"Chief Complaint   Patient presents with     Follow Up     6 month follow up       Initial /70 (BP Location: Right arm, Patient Position: Sitting, Cuff Size: Adult Regular)   Pulse 100   Temp 98.3  F (36.8  C) (Tympanic)   Resp 18   Ht 1.562 m (5' 1.5\")   Wt 59.4 kg (131 lb)   SpO2 97%   BMI 24.35 kg/m   Estimated body mass index is 24.35 kg/m  as calculated from the following:    Height as of this encounter: 1.562 m (5' 1.5\").    Weight as of this encounter: 59.4 kg (131 lb).  Meds Reconciled: complete  Pt is not on Aspirin  Pt is not on a Statin  PHQ and/or MARVIN reviewed. Pt referred to PCP/MH Provider as appropriate.    Trina Delcid LPN      "

## 2019-12-30 NOTE — PROGRESS NOTES
[facility] HEART CARE   CARDIOLOGY PROGRESS NOTE    Merlyn Escamilla   [address]  [place] [address]      Sharath Dinero     Chief Complaint   Patient presents with     Follow Up     6 month follow up        HPI:   Ms. Escamilla is an 86 year old female who presents for cardiology follow-up to visit on 5/29/2019 with thoracic aortic aneurysm, chronic atrial fibrillation and chronic systolic heart failure.  Patient has a history of arteriovenous malformation of the colon, right renal cyst and osteoarthritis.      Patient presented to the emergency department on 7/29/2018 with rapid heart rate.  She was found to be in atrial fibrillation with RVR.  She was initially treated on a diltiazem drip and admitted to the hospital.  She was started on oral anticoagulation Xarelto with a [id number] score of 3 (age and female).      She had an echocardiogram on 7/29/2018 with normal LV size and thickness, EF was estimated at 35-40%. There was diffuse hypokinesis but the septum appeared comparatively more hypokinetic on the study.  The RV was normal in size and function.  Moderate left atrial enlargement was present.  Mild MAC with mild to moderate mitral insufficiency.  The aortic valve is normal in structure and function, trace aortic insufficiency was present.  Moderate to severe tricuspid insufficiency, RVSP was 35 mmHg +  RAP.  There was moderate dilation of the aorta present, ascending aorta 4.8 cm the inferior vena cava was dilated at 2.8 cm without respiratory variability which is consistent with increased right atrial pressure.  There is no pericardial effusion present.      Patient was initially seen by cardiology on 8/20/18.  With newly identified systolic heart failure it was recommended stress imaging to assess for any potential ischemia or obstructing coronary disease. At that visit her Lasix was increased to 40 mg daily which did resolve her edema. She was started on metoprolol succinate and lisinopril for  GDMT.  With LVEF less than 40%, it was recommended that she discontinue the diltiazem. She was instructed on sodium restricted diet, daily weight monitoring and monitoring of her overall daily fluid intake.  We previously reviewed the results of her CT from 7/29/2018 with a ascending aortic dilation at 4.9 cm with no dissection or other acute abnormality identified.  CT from August 1, 2014 showed that the ascending thoracic aorta measured 3.9 cm in maximum diameter.      Since her last visit patient did undergo a repeat CT. Of significant, this showed the dilation of her mid ascending aorta to be 4.6 x 4.5 cm which is less than the CT from 7/29/2018 with measurement of 4.9 cm and therefore, we can continue with monitoring by serial imaging surveillance.  Last echocardiogram imaging on 6/3/2019 with mildly reduced LV function, LVEF 45 to 50%.  Mild to moderate diffuse hypokinesis.  Normal RV systolic function.  Severe LA dilation with moderate right atrial dilation.  Moderate to severe tricuspid regurgitation, moderate mitral regurgitation, dilated IVC with mild pulmonary hypertension.  Ascending aorta dilation measured at 4.8 cm.  No pericardial effusion.  In comparison to study on 7/31/2018 there is been no significant change.     LAURENO in August of 2018 with 100 % AF burden. Average HR 91 bpm and 1 pause nocturnal lasting 3.4 seconds. Patient declined sleep study referral with this overnight pause which may be suggestive of untreated sleep apnea.     Chronic atrial fibrillation with 100% AF burden. She continues to report being largely asymptomatic with atrial fibrillation. She had been well rate controlled on metoprolol succinate 125 mg every evening. Over the past few months has been noted to have RVR with rates above 100 bpm. Given her difficulty with ventricular rate control for past few months and HFrEF, it is recommended that she start digoxin 0.125 mcg daily.  She remains appropriately anticoagulated on  Xarelto without complication.There was concern for possible sleep apnea given nocturnal sinus pauses on recent cardiac monitoring and with chronic atrial fibrillation.  Sleep study was recommended for which patient declined.     INTERVAL HISTORY:  Today patient reports that she is actually doing very well.  Overall, rate control has improved since starting the digoxin.  She does not describe any increased shortness of breath.  No chest pain or pressure.  No palpitations, lightheaded or syncope.  No increased edema.    PAST MEDICAL HISTORY:   Past Medical History:   Diagnosis Date     Carpal tunnel syndrome of left wrist     10/4/2017     Carpal tunnel syndrome of right wrist     10/4/2017     Deep phlebothrombosis during pregnancy     No Comments Provided     Osteoarthritis     Knee     Other specified postprocedural states     10/4/2017     Other specified postprocedural states     1/31/2018     Presence of artificial knee joint     10/3/2014          FAMILY HISTORY:   Family History   Problem Relation Age of Onset     Heart Disease Mother         Heart Disease     Heart Disease Father         Heart Disease     Heart Disease Brother      Heart Disease Brother      Heart Disease Brother      Heart Disease Brother      Brain Cancer Sister      Heart Disease Sister           PAST SURGICAL HISTORY:   Past Surgical History:   Procedure Laterality Date     APPENDECTOMY OPEN      No Comments Provided     C TOTAL KNEE ARTHROPLASTY Right 10/01/2014    Dr Domingo     CARPAL TUNNEL RELEASE RT/LT Left 01/31/2018 01/31/2018,[id number],OTHER,Left     CARPAL TUNNEL RELEASE RT/LT Right 10/04/2017     COLONOSCOPY  09/2014    Arteriovenous malformations of right, transverse and splenic flexure     ENDOSCOPIC STRIPPING VEIN(S)      No Comments Provided     HYSTERECTOMY TOTAL ABDOMINAL  06/1987    with squamous metaplasia and leiomoma          SOCIAL HISTORY:   Social History     Social History     Marital status:      Spouse  name: N/A     Number of children: N/A     Years of education: N/A     Social History Main Topics     Smoking status: Never Smoker     Smokeless tobacco: Never Used     Alcohol use 3.0 oz/week     Drug use: No      Comment: Drug use: No     Sexual activity: Not on file     Other Topics Concern     Not on file     Social History Narrative    Lives alone.      2017.     3 daughters, 1 in [facility], 1 in [place] and 1 [place]. Lost 1 daughter at age 2 years.    Has 1 living sister, fraternal twin. All other siblings are .           CURRENT MEDICATIONS:   Prior to Admission medications    Medication Sig Start Date End Date Taking? Authorizing Provider   aspirin EC 81 MG EC tablet Take 81 mg by mouth daily with food 14   Reported, Patient   furosemide (LASIX) 20 MG tablet Take 2 tablets (40 mg) by mouth every morning 18   Natalia Schultz APRN CNP   lisinopril (PRINIVIL/ZESTRIL) 5 MG tablet Take 1 tablet (5 mg) by mouth daily 18   Natalia Schultz APRN CNP   metoprolol succinate (TOPROL-XL) 100 MG 24 hr tablet Take 100 mg tab with 25 mg tab for total of 125 mg every night. 18   Natalia Schultz APRN CNP   metoprolol succinate (TOPROL-XL) 25 MG 24 hr tablet Take 25 mg tab with 100 mg tab for total of 125 mg every night. 18   Natalia Schultz APRN CNP   Multiple Vitamin (MULTI-VITAMINS) TABS Take 1 tablet by mouth daily 14   Reported, Patient   rivaroxaban ANTICOAGULANT (XARELTO) 20 MG TABS tablet Take 1 tablet (20 mg) by mouth daily (with dinner) 18   Sharath Dinero MD   rosuvastatin (CRESTOR) 5 MG tablet Take 0.5 tablets (2.5 mg) by mouth At Bedtime 18   Natalia Schultz APRN CNP          ALLERGIES:   Allergies   Allergen Reactions     Morphine      Other reaction(s): Muscle Weakness     Phenylbutazone Unknown          ROS:   CONSTITUTIONAL: No reported fever or chills.  Weight has remained stable.  ENT: No visual disturbance, ear ache,  "epistaxis or sore throat.   CARDIOVASCULAR: No chest pain, chest pressure or chest discomfort. No palpitations or increased lower extremity edema.   RESPIRATORY: No increased dyspnea upon exertion.  No cough, no wheezing or hemoptysis.  No orthopnea or PND.  GI: No reported abdominal pain, melena or hematochezia.  : No reported hematuria or dysuria.   NEUROLOGICAL: No lightheadedness, dizziness, syncope, ataxia, paresthesias or weakness.   HEMATOLOGIC: No history of anemia. No bleeding or excessive bruising. No history of blood clots.   MUSCULOSKELETAL: Positive for joint swelling, this improved when she cut back on her crestor to every other day.  ENDOCRINOLOGIC: No temperature intolerance. No hair or skin changes.  SKIN: No abnormal rashes or sores, no unusual itching.      PHYSICAL EXAM:   /70 (BP Location: Right arm, Patient Position: Sitting, Cuff Size: Adult Regular)   Pulse 100   Temp 98.3  F (36.8  C) (Tympanic)   Resp 18   Ht 1.562 m (5' 1.5\")   Wt 59.4 kg (131 lb)   SpO2 97%   BMI 24.35 kg/m    GENERAL: The patient is a well-developed, well-nourished, in no apparent distress.  HEENT: Head is normocephalic and atraumatic. Eyes are symmetrical with normal visual tracking. No icterus, no xanthelasmas. Nares appeared normal without nasal drainage. Mucous membranes are moist, no cyanosis.  NECK: Supple. JVP not visible.   CHEST/ LUNGS: Lungs sounds clear.  CARDIO: Irregular rate and rhythm with S1 and S2, no murmur, click or rub.  ABD: Abdomen is nondistended.   EXTREMITIES: No edema present.   NEUROLOGIC: Alert and oriented X3. Normal speech, gait and affect. No focal neurologic deficits.   SKIN: No jaundice. No rashes or visible skin lesions present. No ecchymosis.     LAB RESULTS:   Office Visit on 07/14/2018   Component Date Value Ref Range Status     WBC 07/14/2018 5.0  4.0 - 11.0 10e9/L Final     RBC Count 07/14/2018 4.44  3.8 - 5.2 10e12/L Final     Hemoglobin 07/14/2018 13.0  11.7 - 15.7 " g/dL Final     Hematocrit 07/14/2018 40.8  35.0 - 47.0 % Final     MCV 07/14/2018 92  78 - 100 fl Final     MCH 07/14/2018 29.3  26.5 - 33.0 pg Final     MCHC 07/14/2018 31.9  31.5 - 36.5 g/dL Final     RDW 07/14/2018 13.6  10.0 - 15.0 % Final     Platelet Count 07/14/2018 162  150 - 450 10e9/L Final     Diff Method 07/14/2018 Automated Method   Final     % Neutrophils 07/14/2018 77.1  % Final     % Lymphocytes 07/14/2018 13.5  % Final     % Monocytes 07/14/2018 8.0  % Final     % Eosinophils 07/14/2018 0.4  % Final     % Basophils 07/14/2018 0.8  % Final     % Immature Granulocytes 07/14/2018 0.2  % Final     Absolute Neutrophil 07/14/2018 3.8  1.6 - 8.3 10e9/L Final     Absolute Lymphocytes 07/14/2018 0.7* 0.8 - 5.3 10e9/L Final     Absolute Monocytes 07/14/2018 0.4  0.0 - 1.3 10e9/L Final     Absolute Eosinophils 07/14/2018 0.0  0.0 - 0.7 10e9/L Final     Absolute Basophils 07/14/2018 0.0  0.0 - 0.2 10e9/L Final     Abs Immature Granulocytes 07/14/2018 0.0  0 - 0.4 10e9/L Final     Color Urine 07/14/2018 Yellow   Final     Appearance Urine 07/14/2018 Clear   Final     Glucose Urine 07/14/2018 Negative  NEG^Negative mg/dL Final     Bilirubin Urine 07/14/2018 Negative  NEG^Negative Final     Ketones Urine 07/14/2018 Negative  NEG^Negative mg/dL Final     Specific Gravity Urine 07/14/2018 <1.005  1.000 - 1.030 Final     Blood Urine 07/14/2018 Trace* NEG^Negative Final     pH Urine 07/14/2018 6.0  5.0 - 9.0 pH Final     Protein Albumin Urine 07/14/2018 Negative  NEG^Negative mg/dL Final     Urobilinogen Urine 07/14/2018 0.2  0.2 - 1.0 EU/dL Final     Nitrite Urine 07/14/2018 Negative  NEG^Negative Final     Leukocyte Esterase Urine 07/14/2018 Small* NEG^Negative Final     Source 07/14/2018 Midstream Urine   Final     Lyme Disease Antibodies Serum 07/14/2018 0.13  0.00 - 0.89 Final     WBC Urine 07/14/2018 0 - 5  OTO5^0 - 5 /HPF Final     RBC Urine 07/14/2018 O - 2  OTO2^O - 2 /HPF Final     Squamous Epithelial /LPF  Urine 07/14/2018 Few  FEW^Few /LPF Final     Bacteria Urine 07/14/2018 Few* NEG^Negative /HPF Final     Specimen Description 07/14/2018 Midstream Urine   Final     Culture Micro 07/14/2018    Final                    Value:10,000 to 50,000 colonies/mL  mixed urogenital lizz            ASSESSMENT:   Merlyn Escamilla presents for cardiology follow-up to visit on 5/29/2019 with thoracic aortic aneurysm, chronic atrial fibrillation and chronic systolic heart failure.  Patient has a history of arteriovenous malformation of the colon, right renal cyst and osteoarthritis.  Today patient reports that she is actually doing very well.  Overall, rate control has improved since starting the digoxin.  She does not describe any increased shortness of breath.  No chest pain or pressure.  No palpitations, lightheaded or syncope.  No increased edema.    PLAN:  1. Chronic systolic heart failure (H)  Chronic HFrEF with borderline LVEF 45-50%, NYHA FC II.   No evidence of ischemia on stress testing (9/2018)  She will continue with Lasix to 40 mg daily.  She will continue with metoprolol succinate and Lisinopril for GDMT.   She has been instructed on low-sodium diet of no more than 2500 mg of sodium per day.  It has been recommended that she monitor her daily weights and call with a weight fluctuation of 3 pounds and 24 hours or 5 pounds in 1 week.  It is recommended that she keep track of her overall fluid intake, keep less than 2 L per day.  Due for labs today, BMP.    - Echocardiogram Complete; Future    2. Ascending aortic aneurysm (H)  In review of most recent imaging (6/2019), moderate ascending aorta dilation stable at 4.8 cm.  It was recommended continued serial imaging for surveillance, repeat echocardiogram in 3 months.  BP goal <130/80 recommended.     3. Chronic atrial fibrillation  Chronic atrial fibrillation with 100% AF burden, patient has remained asymptomatic.  She had been taking  metoprolol succinate 112.5 mg daily.  Recommended she increase back to 125 mg daily.   She will continue on digoxin 0.125 mcg daily.    She is currently appropriately anticoagulated on Xarelto without complication.  There was concern for possible sleep apnea given nocturnal sinus pauses on recent cardiac monitoring and with chronic atrial fibrillation.  Sleep study was recommended for which patient declined.   Return for BP and HR check with nurse in one week.      Thank you for allowing me to participate in the care of your patient. Please do not hesitate to contact me if you have any questions.     Natalia Schultz

## 2019-12-30 NOTE — PATIENT INSTRUCTIONS
You were seen by  PO Lopes CNP       1. A Echocardiogram has been ordered for 3 months. You will be called to schedule this. You will receive instructions for testing at that time. You will be contacted with results.       2. Take Metoprolol succinate (Toprol XL) 125mg daily.    3. No other changes.    4.  Come in for an nurse only in 1 week to have blood pressure and heart rate check.    You will follow up with [facility] in 6 months, sooner if needed.       Please call the cardiology office with problems, questions, or concerns at [phone].    If you experience chest pain, chest pressure, chest tightness, shortness of breath, fainting, lightheadedness, nausea, vomiting, or other concerning symptoms, please report to the Emergency Department or call 911. These symptoms may be emergent, and best treated in the Emergency Department.     Cardiology Nurses  MINOR Contreras, GUILHERME TENORIO, GUILHERME  [facility] (Unit [address])  [phone]

## 2019-12-31 ASSESSMENT — ANXIETY QUESTIONNAIRES: GAD7 TOTAL SCORE: 0

## 2020-01-07 ENCOUNTER — ALLIED HEALTH/NURSE VISIT (OUTPATIENT)
Dept: CARDIOLOGY | Facility: OTHER | Age: 85
End: 2020-01-07
Attending: NURSE PRACTITIONER
Payer: COMMERCIAL

## 2020-01-07 VITALS
OXYGEN SATURATION: 97 % | RESPIRATION RATE: 16 BRPM | HEART RATE: 80 BPM | DIASTOLIC BLOOD PRESSURE: 78 MMHG | SYSTOLIC BLOOD PRESSURE: 128 MMHG

## 2020-01-07 DIAGNOSIS — I48.20 CHRONIC ATRIAL FIBRILLATION (H): Primary | ICD-10-CM

## 2020-01-07 NOTE — NURSING NOTE
Patient was in today for a blood pressure and heart rate check.  Vitals were within normal limits.  Patient will continue to check blood pressure and heart rate at home.  Vidhya Bueno RN on 1/7/2020 at 2:56 PM

## 2020-04-15 ENCOUNTER — TELEPHONE (OUTPATIENT)
Dept: FAMILY MEDICINE | Facility: OTHER | Age: 85
End: 2020-04-15

## 2020-04-15 DIAGNOSIS — E78.49 OTHER HYPERLIPIDEMIA: Primary | ICD-10-CM

## 2020-04-15 NOTE — TELEPHONE ENCOUNTER
Reviewing statins on our patients and I see conflicting information on rosuvastatin for Merlyn. Last note seems to show she was to be on 1/2 tab every other day, but chart shows as discontinued. She has not filled since 2019. If she is to be continuing on this we will need an rx.

## 2020-04-28 ENCOUNTER — HOSPITAL ENCOUNTER (OUTPATIENT)
Dept: CARDIOLOGY | Facility: OTHER | Age: 85
Discharge: HOME | End: 2020-04-28
Attending: NURSE PRACTITIONER | Admitting: NURSE PRACTITIONER
Payer: MEDICARE

## 2020-04-28 VITALS — SYSTOLIC BLOOD PRESSURE: 136 MMHG | DIASTOLIC BLOOD PRESSURE: 74 MMHG

## 2020-04-28 DIAGNOSIS — I71.21 ASCENDING AORTIC ANEURYSM (H): ICD-10-CM

## 2020-04-28 DIAGNOSIS — I50.22 CHRONIC SYSTOLIC HEART FAILURE (H): ICD-10-CM

## 2020-04-28 PROCEDURE — 93306 TTE W/DOPPLER COMPLETE: CPT | Mod: 26 | Performed by: INTERNAL MEDICINE

## 2020-04-28 PROCEDURE — 93306 TTE W/DOPPLER COMPLETE: CPT

## 2020-04-29 ENCOUNTER — TELEPHONE (OUTPATIENT)
Dept: CARDIOLOGY | Facility: OTHER | Age: 85
End: 2020-04-29

## 2020-04-29 DIAGNOSIS — I71.21 ASCENDING AORTIC ANEURYSM (H): Primary | ICD-10-CM

## 2020-04-29 NOTE — TELEPHONE ENCOUNTER
Natalia Schultz APRN CNP  P  Cardiology               Please notify patient of results from echocardiogram. Left ventricular size normal with mildly reduced function at 45-50% (normal function at rest is 50-65%). Mitral valve is mild to moderately leaky along with moderate leakiness of tricuspid valve. These findings have not changed. We did see a change in size of the ascending aorta which is now up to 5.1 cm. I would like to get a CT scan of this soon to follow-up on this.   Thanks,   PO Warner CNP      After verification of name and date of birth, pt notified of results per provider.  Patient is aware that xray will be call her to schedule a CT of chest. Pt verbalizes understanding and has no further questions or concerns.  Vidhya Bueno RN on 4/29/2020 at 4:02 PM

## 2020-05-01 ENCOUNTER — HOSPITAL ENCOUNTER (OUTPATIENT)
Dept: CT IMAGING | Facility: OTHER | Age: 85
End: 2020-05-01
Attending: NURSE PRACTITIONER
Payer: MEDICARE

## 2020-05-01 VITALS
DIASTOLIC BLOOD PRESSURE: 79 MMHG | HEART RATE: 71 BPM | OXYGEN SATURATION: 97 % | SYSTOLIC BLOOD PRESSURE: 143 MMHG | RESPIRATION RATE: 19 BRPM

## 2020-05-01 DIAGNOSIS — E78.49 OTHER HYPERLIPIDEMIA: ICD-10-CM

## 2020-05-01 DIAGNOSIS — I50.22 CHRONIC SYSTOLIC HEART FAILURE (H): ICD-10-CM

## 2020-05-01 DIAGNOSIS — I71.21 ASCENDING AORTIC ANEURYSM (H): ICD-10-CM

## 2020-05-01 LAB
ANION GAP SERPL CALCULATED.3IONS-SCNC: 7 MMOL/L (ref 3–14)
BUN SERPL-MCNC: 15 MG/DL (ref 7–25)
CALCIUM SERPL-MCNC: 9.6 MG/DL (ref 8.6–10.3)
CHLORIDE SERPL-SCNC: 104 MMOL/L (ref 98–107)
CHOLEST SERPL-MCNC: 173 MG/DL
CO2 SERPL-SCNC: 29 MMOL/L (ref 21–31)
CREAT SERPL-MCNC: 0.75 MG/DL (ref 0.6–1.2)
GFR SERPL CREATININE-BSD FRML MDRD: 73 ML/MIN/{1.73_M2}
GLUCOSE SERPL-MCNC: 105 MG/DL (ref 70–105)
HDLC SERPL-MCNC: 44 MG/DL (ref 23–92)
LDLC SERPL CALC-MCNC: 114 MG/DL
NONHDLC SERPL-MCNC: 129 MG/DL
POTASSIUM SERPL-SCNC: 4.2 MMOL/L (ref 3.5–5.1)
SODIUM SERPL-SCNC: 140 MMOL/L (ref 134–144)
TRIGL SERPL-MCNC: 74 MG/DL

## 2020-05-01 PROCEDURE — 71275 CT ANGIOGRAPHY CHEST: CPT

## 2020-05-01 PROCEDURE — 25000132 ZZH RX MED GY IP 250 OP 250 PS 637: Performed by: RADIOLOGY

## 2020-05-01 PROCEDURE — 36415 COLL VENOUS BLD VENIPUNCTURE: CPT | Mod: ZL | Performed by: NURSE PRACTITIONER

## 2020-05-01 PROCEDURE — 25500064 ZZH RX 255 OP 636: Performed by: NURSE PRACTITIONER

## 2020-05-01 PROCEDURE — 80048 BASIC METABOLIC PNL TOTAL CA: CPT | Mod: ZL | Performed by: NURSE PRACTITIONER

## 2020-05-01 PROCEDURE — 80061 LIPID PANEL: CPT | Mod: ZL | Performed by: NURSE PRACTITIONER

## 2020-05-01 RX ORDER — METOPROLOL TARTRATE 1 MG/ML
10 INJECTION, SOLUTION INTRAVENOUS
Status: DISCONTINUED | OUTPATIENT
Start: 2020-05-01 | End: 2020-05-02 | Stop reason: HOSPADM

## 2020-05-01 RX ORDER — METOPROLOL TARTRATE 50 MG
50 TABLET ORAL ONCE
Status: COMPLETED | OUTPATIENT
Start: 2020-05-01 | End: 2020-05-01

## 2020-05-01 RX ADMIN — IOHEXOL 100 ML: 350 INJECTION, SOLUTION INTRAVENOUS at 09:01

## 2020-05-01 RX ADMIN — METOPROLOL TARTRATE 50 MG: 50 TABLET ORAL at 08:21

## 2020-05-01 NOTE — PROGRESS NOTES
0800:  Patient arrived for a CTA of the chest. Labs were drawn this am creatinine wnl. They were connected to a cardiac monitor and vital signs were obtained. The patient's heart rate was 63 to 69 A fib . Medications and allergies were reviewed. Metoprolol 50 mg po given per protocol. The procedure was explained, and the patient was instructed to rest and relax, as much as possible. A saline lock was established. The patient's heart rate 64. The patient was offered the restroom, and then they walked to CT. The cardiac monitor was placed there, and CTA of chest was performed per protocol. The patient was given one bottle of water, and they were told to  Drink at least 3 other additional glasses of water today, per post contrast instructions.  The patient was instructed that the ordering MD will call with results in one to two days with test results. The patient left ambulatory in stable condition.

## 2020-05-04 ENCOUNTER — TELEPHONE (OUTPATIENT)
Dept: CARDIOLOGY | Facility: OTHER | Age: 85
End: 2020-05-04

## 2020-05-04 DIAGNOSIS — I71.21 ASCENDING AORTIC ANEURYSM (H): Primary | ICD-10-CM

## 2020-05-04 NOTE — TELEPHONE ENCOUNTER
Called patient referral was placed for Vascular Consult at  this is closer for patient.  Patient is aware they will call her and schedule this.  She will call on Friday if she has not heard anything.  Vidhya Bueno RN on 5/4/2020 at 11:45 AM

## 2020-05-04 NOTE — TELEPHONE ENCOUNTER
----- Message from Vidhya Bueno RN sent at 5/4/2020 11:31 AM CDT -----  After verification of name and date of birth, patient notified of results per provider. Also a verbal to call her daughter Flor with results  Patient and daughter verbalizes understanding would like the referral sent to [facility] for the Vascular Consult.  Vidhya Bueno RN on 5/4/2020 at 11:31 AM

## 2020-05-12 ENCOUNTER — TELEPHONE (OUTPATIENT)
Dept: CARDIOLOGY | Facility: OTHER | Age: 85
End: 2020-05-12

## 2020-05-12 NOTE — TELEPHONE ENCOUNTER
Called and spoke to pt after confirming last name and . She stated that she had a referral for cardiology. She states that they did call her and said they would call back last Friday but have not called back can someone give  Kristan pagan a call and see whats going on. Was not able to reach them.    Kevin Martinez LPN .......  2020  5:01 PM

## 2020-05-13 NOTE — TELEPHONE ENCOUNTER
Received a call from Vicky GAXIOLA from Dr. Mahmood's office at [facility], Vicky GAXIOLA will be calling patient today to follow up.  Vidhya Bueno RN on 5/14/2020 at 8:36 AM            Notified patient that there is a call in to dr. Mahmood's nurse, will call patient when I have an answer to her question.  Vidhya Bueno RN on 5/13/2020 at 4:16 PM        Called and left a message for Dr. Mahmood's nurse Vicky to return call.  Vidhya Bueno RN on 5/13/2020 at 4:07 PM    Called and talked with patient this morning she states that [facility] with Dr. Meneses.  She states they were going to call her back on 05/08/2020 she never received a call she did give a number for Dr. Mahmood's nurse Vicky at [phone].  Will talk with PO Lopes CNP in regards to this prior to scheduling appointment.  Vidhya Bueno RN on 5/13/2020 at 8:40 AM

## 2020-06-02 DIAGNOSIS — I48.91 ATRIAL FIBRILLATION WITH RVR (H): ICD-10-CM

## 2020-06-02 DIAGNOSIS — I71.21 ASCENDING AORTIC ANEURYSM (H): ICD-10-CM

## 2020-06-02 DIAGNOSIS — I48.20 CHRONIC ATRIAL FIBRILLATION (H): ICD-10-CM

## 2020-06-02 DIAGNOSIS — I50.22 CHRONIC SYSTOLIC HEART FAILURE (H): ICD-10-CM

## 2020-06-02 RX ORDER — FUROSEMIDE 20 MG
TABLET ORAL
Qty: 180 TABLET | Refills: 3 | Status: SHIPPED | OUTPATIENT
Start: 2020-06-02 | End: 2021-06-02

## 2020-06-02 RX ORDER — LISINOPRIL 5 MG/1
TABLET ORAL
Qty: 90 TABLET | Refills: 3 | Status: SHIPPED | OUTPATIENT
Start: 2020-06-02 | End: 2021-06-01

## 2020-06-02 RX ORDER — RIVAROXABAN 20 MG/1
TABLET, FILM COATED ORAL
Qty: 90 TABLET | Refills: 3 | Status: SHIPPED | OUTPATIENT
Start: 2020-06-02 | End: 2021-06-02

## 2020-06-02 RX ORDER — METOPROLOL SUCCINATE 25 MG/1
TABLET, EXTENDED RELEASE ORAL
Qty: 90 TABLET | Refills: 3 | Status: SHIPPED | OUTPATIENT
Start: 2020-06-02 | End: 2021-06-02

## 2020-06-02 RX ORDER — METOPROLOL SUCCINATE 100 MG/1
TABLET, EXTENDED RELEASE ORAL
Qty: 90 TABLET | Refills: 3 | Status: SHIPPED | OUTPATIENT
Start: 2020-06-02 | End: 2021-06-02

## 2020-06-02 RX ORDER — ROSUVASTATIN CALCIUM 10 MG/1
TABLET, FILM COATED ORAL
Qty: 90 TABLET | Refills: 3 | Status: SHIPPED | OUTPATIENT
Start: 2020-06-02 | End: 2021-06-01

## 2020-06-02 NOTE — TELEPHONE ENCOUNTER
"Requested Prescriptions   Pending Prescriptions Disp Refills     lisinopril (ZESTRIL) 5 MG tablet [Pharmacy Med Name: lisinopril 5 mg tablet] 90 tablet 3     Sig: TAKE 1 TABLET BY MOUTH ONCE DAILY       ACE Inhibitors (Including Combos) Protocol Failed - 6/2/2020  4:35 PM        Failed - Blood pressure under 140/90 in past 12 months     BP Readings from Last 3 Encounters:   05/01/20 (!) 143/79   04/28/20 136/74   01/07/20 128/78                 Passed - Recent (12 mo) or future (30 days) visit within the authorizing provider's specialty     Patient has had an office visit with the authorizing provider or a provider within the authorizing providers department within the previous 12 mos or has a future within next 30 days. See \"Patient Info\" tab in inbasket, or \"Choose Columns\" in Meds & Orders section of the refill encounter.              Passed - Medication is active on med list        Passed - Patient is age 18 or older        Passed - No active pregnancy on record        Passed - Normal serum creatinine on file in past 12 months     Recent Labs   Lab Test 05/01/20  0717   CR 0.75       Ok to refill medication if creatinine is low          Passed - Normal serum potassium on file in past 12 months     Recent Labs   Lab Test 05/01/20  0717   POTASSIUM 4.2             Passed - No positive pregnancy test within past 12 months           metoprolol succinate ER (TOPROL-XL) 25 MG 24 hr tablet [Pharmacy Med Name: metoprolol succinate ER 25 mg tablet,extended release 24 hr] 90 tablet 3     Sig: Take 25 mg tab with 100 mg tab for total of 125 mg every night.       Beta-Blockers Protocol Failed - 6/2/2020  4:35 PM        Failed - Blood pressure under 140/90 in past 12 months     BP Readings from Last 3 Encounters:   05/01/20 (!) 143/79   04/28/20 136/74   01/07/20 128/78                 Passed - Patient is age 6 or older        Passed - Recent (12 mo) or future (30 days) visit within the authorizing provider's specialty     " "Patient has had an office visit with the authorizing provider or a provider within the authorizing providers department within the previous 12 mos or has a future within next 30 days. See \"Patient Info\" tab in inbasket, or \"Choose Columns\" in Meds & Orders section of the refill encounter.              Passed - Medication is active on med list           XARELTO ANTICOAGULANT 20 MG TABS tablet [Pharmacy Med Name: Xarelto 20 mg tablet] 90 tablet 3     Sig: Take 1 tablet (20 mg) by mouth daily (with dinner)       Direct Oral Anticoagulant Agents Failed - 6/2/2020  4:35 PM        Failed - Normal Platelets on file in past 12 months     Recent Labs   Lab Test 07/29/18  2155                  Failed - Creatinine Clearance greater than 50 ml/min on file in past 3 mos     No lab results found.          Passed - Medication is active on med list        Passed - Serum creatinine less than or equal to 1.4 on file in past 3 mos     Recent Labs   Lab Test 05/01/20  0717   CR 0.75       Ok to refill medication if creatinine is low          Passed - Patient is 18 years of age or older        Passed - No active pregnancy on record        Passed - No positive pregnancy test within past 12 months        Passed - Recent (6 mo) or future (30 days) visit within the authorizing provider's specialty           DIGITEK 125 MCG tablet [Pharmacy Med Name: Digitek 125 mcg (0.125 mg) tablet] 90 tablet 3     Sig: TAKE 1 TABLET BY MOUTH ONCE DAILY       Cardiac Glycoside Agents Protocol Failed - 6/2/2020  4:35 PM        Failed - Normal digoxin level on file in past 12 mos     No lab results found.    Invalid input(s):  [id number]          Passed - Medication is active on med list        Passed - Patient is 18 years of age or older        Passed - Patient is not pregnant        Passed - Normal serum creatinine on file in past 12 mos     Recent Labs   Lab Test 05/01/20  0717   CR 0.75       Ok to refill medication if creatinine is low          " "Passed - No positive pregnancy test on file in past 12 mos        Passed - Recent (6 mo) or future (30 days) visit within the authorizing provider's specialty     Patient had office visit in the last 6 months or has a visit in the next 30 days with authorizing provider or within the authorizing provider's specialty.  See \"Patient Info\" tab in inLibertadCardsket, or \"Choose Columns\" in Meds & Orders section of the refill encounter.               furosemide (LASIX) 20 MG tablet [Pharmacy Med Name: furosemide 20 mg tablet] 180 tablet 3     Sig: Take 2 tablets (40 mg) by mouth every morning       Diuretics (Including Combos) Protocol Failed - 6/2/2020  4:35 PM        Failed - Blood pressure under 140/90 in past 12 months     BP Readings from Last 3 Encounters:   05/01/20 (!) 143/79   04/28/20 136/74   01/07/20 128/78                 Passed - Recent (12 mo) or future (30 days) visit within the authorizing provider's specialty     Patient has had an office visit with the authorizing provider or a provider within the authorizing providers department within the previous 12 mos or has a future within next 30 days. See \"Patient Info\" tab in inGendelet, or \"Choose Columns\" in Meds & Orders section of the refill encounter.              Passed - Medication is active on med list        Passed - Patient is age 18 or older        Passed - No active pregancy on record        Passed - Normal serum creatinine on file in past 12 months     Recent Labs   Lab Test 05/01/20  0717   CR 0.75              Passed - Normal serum potassium on file in past 12 months     Recent Labs   Lab Test 05/01/20  0717   POTASSIUM 4.2                    Passed - Normal serum sodium on file in past 12 months     Recent Labs   Lab Test 05/01/20  0717                 Passed - No positive pregnancy test in past 12 months           metoprolol succinate ER (TOPROL-XL) 100 MG 24 hr tablet [Pharmacy Med Name: metoprolol succinate  mg tablet,extended release 24 hr] " "90 tablet 3     Sig: Take 100 mg tab with 25 mg tab for total of 125 mg every night.       Beta-Blockers Protocol Failed - 6/2/2020  4:35 PM        Failed - Blood pressure under 140/90 in past 12 months     BP Readings from Last 3 Encounters:   05/01/20 (!) 143/79   04/28/20 136/74   01/07/20 128/78                 Passed - Patient is age 6 or older        Passed - Recent (12 mo) or future (30 days) visit within the authorizing provider's specialty     Patient has had an office visit with the authorizing provider or a provider within the authorizing providers department within the previous 12 mos or has a future within next 30 days. See \"Patient Info\" tab in inbasket, or \"Choose Columns\" in Meds & Orders section of the refill encounter.              Passed - Medication is active on med list           Last Written Prescription Date:  05/29/2019  Last Fill Quantity: 90 days,   # refills: 3  Last Office Visit: 12/30/2019 with PO Lopes CNP  Future Office visit:   None noted.  Unable to complete prescription refill per RN medication refill policy. Will route to provider for review and consideration.  Vidhya Bueno RN on 6/2/2020 at 4:48 PM            "

## 2020-09-14 ENCOUNTER — OFFICE VISIT (OUTPATIENT)
Dept: FAMILY MEDICINE | Facility: OTHER | Age: 85
End: 2020-09-14
Attending: FAMILY MEDICINE
Payer: MEDICARE

## 2020-09-14 ENCOUNTER — HOSPITAL ENCOUNTER (OUTPATIENT)
Dept: GENERAL RADIOLOGY | Facility: OTHER | Age: 85
End: 2020-09-14
Attending: FAMILY MEDICINE
Payer: MEDICARE

## 2020-09-14 VITALS
BODY MASS INDEX: 24.77 KG/M2 | RESPIRATION RATE: 16 BRPM | OXYGEN SATURATION: 99 % | WEIGHT: 134.6 LBS | HEIGHT: 62 IN | SYSTOLIC BLOOD PRESSURE: 138 MMHG | HEART RATE: 83 BPM | TEMPERATURE: 97.4 F | DIASTOLIC BLOOD PRESSURE: 88 MMHG

## 2020-09-14 DIAGNOSIS — R10.32 LEFT GROIN PAIN: Primary | ICD-10-CM

## 2020-09-14 DIAGNOSIS — R10.32 LEFT GROIN PAIN: ICD-10-CM

## 2020-09-14 PROCEDURE — G0463 HOSPITAL OUTPT CLINIC VISIT: HCPCS | Mod: 25

## 2020-09-14 PROCEDURE — G0463 HOSPITAL OUTPT CLINIC VISIT: HCPCS

## 2020-09-14 PROCEDURE — 73502 X-RAY EXAM HIP UNI 2-3 VIEWS: CPT

## 2020-09-14 PROCEDURE — 99213 OFFICE O/P EST LOW 20 MIN: CPT | Performed by: FAMILY MEDICINE

## 2020-09-14 ASSESSMENT — PAIN SCALES - GENERAL: PAINLEVEL: EXTREME PAIN (8)

## 2020-09-14 ASSESSMENT — MIFFLIN-ST. JEOR: SCORE: 990.85

## 2020-09-14 NOTE — PROGRESS NOTES
"  SUBJECTIVE:   Merlyn Escamilla is a 87 year old female who presents to clinic today for the following health issues:    HPI  Left Groin Pain:  Her symptoms have been present for the past week.  She reports a \"catching\" sensation when she walks which is associated with intermittent pain, which she describes as \"grabbing.\"  She denies lower extremity weakness, numbness, or tingling.  She has tried Tylenol and Naproxen with some improvement.  She reports that she has had symptoms like this in the past but never lasting so long.    Past Medical History:   Diagnosis Date     Carpal tunnel syndrome of left wrist     10/4/2017     Carpal tunnel syndrome of right wrist     10/4/2017     Deep phlebothrombosis during pregnancy     No Comments Provided     Osteoarthritis     Knee     Other specified postprocedural states     10/4/2017     Other specified postprocedural states     1/31/2018     Presence of artificial knee joint     10/3/2014      Past Surgical History:   Procedure Laterality Date     APPENDECTOMY OPEN      No Comments Provided     C TOTAL KNEE ARTHROPLASTY Right 10/01/2014    Dr Domingo     CARPAL TUNNEL RELEASE RT/LT Left 01/31/2018 01/31/2018,[id number],OTHER,Left     CARPAL TUNNEL RELEASE RT/LT Right 10/04/2017     COLONOSCOPY  09/2014    Arteriovenous malformations of right, transverse and splenic flexure     ENDOSCOPIC STRIPPING VEIN(S)      No Comments Provided     HYSTERECTOMY TOTAL ABDOMINAL  06/1987    with squamous metaplasia and leiomoma     Family History   Problem Relation Age of Onset     Heart Disease Mother         Heart Disease     Heart Disease Father         Heart Disease     Heart Disease Brother      Heart Disease Brother      Heart Disease Brother      Heart Disease Brother      Brain Cancer Sister      Heart Disease Sister      Social History     Tobacco Use     Smoking status: Never Smoker     Smokeless tobacco: Never Used   Substance Use Topics     Alcohol use: Yes     Alcohol/week: 5.0 " "standard drinks     Current Outpatient Medications   Medication Sig Dispense Refill     DIGITEK 125 MCG tablet TAKE 1 TABLET BY MOUTH ONCE DAILY 90 tablet 3     furosemide (LASIX) 20 MG tablet Take 2 tablets (40 mg) by mouth every morning 180 tablet 3     lisinopril (ZESTRIL) 5 MG tablet TAKE 1 TABLET BY MOUTH ONCE DAILY 90 tablet 3     metoprolol succinate ER (TOPROL-XL) 100 MG 24 hr tablet Take 100 mg tab with 25 mg tab for total of 125 mg every night. 90 tablet 3     metoprolol succinate ER (TOPROL-XL) 25 MG 24 hr tablet Take 25 mg tab with 100 mg tab for total of 125 mg every night. 90 tablet 3     Multiple Vitamin (MULTI-VITAMINS) TABS Take 1 tablet by mouth daily       XARELTO ANTICOAGULANT 20 MG TABS tablet Take 1 tablet (20 mg) by mouth daily (with dinner) 90 tablet 3     Allergies   Allergen Reactions     Morphine      Other reaction(s): Muscle Weakness     Phenylbutazone Unknown       Review of Systems   Constitutional: Negative for chills and fever.      OBJECTIVE:     /88   Pulse 83   Temp 97.4  F (36.3  C) (Tympanic)   Resp 16   Ht 1.562 m (5' 1.5\")   Wt 61.1 kg (134 lb 9.6 oz)   SpO2 99%   BMI 25.02 kg/m    Body mass index is 25.02 kg/m .  Physical Exam  Constitutional:       General: She is not in acute distress.     Appearance: Normal appearance. She is not ill-appearing.   Musculoskeletal:      Comments: No deformity of left hip or lower extremity.  Tenderness to palpation of hip joint and along medial aspect of left thigh.  No hernia palpated.   Neurological:      Mental Status: She is alert.      Comments: Normal lower extremity strength bilaterally.     Diagnostic Test Results:  XR Hip LT: Generalized osteopenia and moderate bilateral hip degenerative change.    ASSESSMENT/PLAN:     1. Left groin pain  Recommend conservative therapy with rest, ice, and OTC pain relief medications as needed.  Referral to physical therapy.  Follow-up if symptoms worsening or failing to improve.  - XR " Hip Left 2-3 Views; Future  - PHYSICAL THERAPY REFERRAL; Future      DO ANKIT Correa [facility]

## 2020-09-14 NOTE — NURSING NOTE
"Chief Complaint   Patient presents with     Groin Pain     when walking     Hurting x 1 week    Initial /88   Pulse 83   Temp 97.4  F (36.3  C) (Tympanic)   Resp 16   Ht 1.562 m (5' 1.5\")   Wt 61.1 kg (134 lb 9.6 oz)   SpO2 99%   BMI 25.02 kg/m   Estimated body mass index is 25.02 kg/m  as calculated from the following:    Height as of this encounter: 1.562 m (5' 1.5\").    Weight as of this encounter: 61.1 kg (134 lb 9.6 oz).    Medication Reconciliation: complete      Norma J. Gosselin, LPN  "

## 2020-09-16 ENCOUNTER — TELEPHONE (OUTPATIENT)
Dept: FAMILY MEDICINE | Facility: OTHER | Age: 85
End: 2020-09-16

## 2020-09-16 ASSESSMENT — ENCOUNTER SYMPTOMS
FEVER: 0
CHILLS: 0

## 2020-09-16 NOTE — TELEPHONE ENCOUNTER
Patient would like to know the results of her xray she had done of 9/14. Please put in a result note so we can give her this information. Thank you.  Champ Coker LPN on 9/16/2020 at 3:48 PM

## 2020-09-23 ENCOUNTER — HOSPITAL ENCOUNTER (OUTPATIENT)
Dept: PHYSICAL THERAPY | Facility: OTHER | Age: 85
Setting detail: THERAPIES SERIES
End: 2020-09-23
Attending: FAMILY MEDICINE
Payer: MEDICARE

## 2020-09-23 DIAGNOSIS — R10.32 LEFT GROIN PAIN: ICD-10-CM

## 2020-09-23 PROCEDURE — 97110 THERAPEUTIC EXERCISES: CPT | Mod: GP | Performed by: PHYSICAL THERAPIST

## 2020-09-23 PROCEDURE — 97161 PT EVAL LOW COMPLEX 20 MIN: CPT | Mod: GP | Performed by: PHYSICAL THERAPIST

## 2020-09-23 NOTE — PROGRESS NOTES
"   09/23/20 1300   General Information   Type of Visit Initial OP Ortho PT Evaluation   Start of Care Date 09/23/20   Referring Physician Dr Gonzalez   Patient/Family Goals Statement reduce pain; improve walking   Orders Evaluate and Treat   Date of Order 09/14/20   Certification Required? Yes   Medical Diagnosis R10.32 (ICD-10-CM) - Left groin pain    Surgical/Medical history reviewed Yes   Precautions/Limitations no known precautions/limitations   Weight-Bearing Status - LLE full weight-bearing   Weight-Bearing Status - RLE full weight-bearing   General Information Comments please refer to pt's medical record for any additional information   Body Part(s)   Body Part(s) Hip   Presentation and Etiology   Pertinent history of current problem (include personal factors and/or comorbidities that impact the POC) Has noticed L groin pain for the past month with no specific RAFFY. She's had similar discomfort in the past and it has gone away on it's own. This episode is lasting a lot longer. She has been using a cane \"just in case\" so she doesn't fall. Denies falling in the past year. Feels it the most when she is walking and describes it as a \"grabbing\" sensation. Some days are better than others and it is better now compared to 2-3 weeks ago.    Impairments A. Pain;D. Decreased ROM;E. Decreased flexibility;F. Decreased strength and endurance   Functional Limitations perform activities of daily living;perform desired leisure / sports activities   Symptom Location L hip/groin area   How/Where did it occur From insidious onset   Onset date of current episode/exacerbation 08/23/20   Chronicity Chronic   Pain rating (0-10 point scale) Best (/10);Worst (/10)   Best (/10) 2   Worst (/10) 8   Pain quality G. Cramping   Frequency of pain/symptoms B. Intermittent   Pain/symptoms exacerbated by B. Walking;G. Certain positions;J. ADL;K. Home tasks   Pain/symptoms eased by C. Rest;I. OTC medication(s);F. Certain positions;E. Changing " positions  (uses a cane with ambulation)   Progression of symptoms since onset: Improved   Current / Previous Interventions   Diagnostic Tests: X-ray   X-ray Results Results   X-ray results see pt's chart for details   Prior Level of Function   Prior Level of Function-Mobility Ind   Prior Level of Function-ADLs Ind   Current Level of Function   Current Community Support Family/friend caregiver   Patient role/employment history F. Retired   Living environment House/townhome   Home/community accessibility Stairs within home   Current equipment-Gait/Locomotion Standard cane   Current equipment-ADL None   Fall Risk Screen   Fall screen completed by PT   Have you fallen 2 or more times in the past year? No   Have you fallen and had an injury in the past year? No   Is patient a fall risk? No;Department fall risk interventions implemented   Abuse Screen (yes response referral indicated)   Feels Unsafe at Home or Work/School no   Feels Threatened by Someone no   Does Anyone Try to Keep You From Having Contact with Others or Doing Things Outside Your Home? no   Physical Signs of Abuse Present no   Functional Scales   Functional Scales Other   Other Scales  PSFS   Hip Objective Findings   Observation pt is very pleasant and in no acute distress   Gait/Locomotion uses a single end leonard willow cane more for security measures.   Hip ROM Comments normal hip ROM per her age and physical condition   Hip/Knee Strength Comments 4/5 MMT bilat hip, all planes. increased discomfort with resistance to hip flexion   Planned Therapy Interventions   Planned Therapy Interventions groups;joint mobilization;manual therapy;motor coordination training;ROM;strengthening;stretching   Planned Modality Interventions   Planned Modality Interventions Cryotherapy;Hot packs;Ultrasound   Clinical Impression   Criteria for Skilled Therapeutic Interventions Met yes, treatment indicated   PT Diagnosis hip flexor strain; L hip pain   Influenced by the  following impairments pain with ambulation   Functional limitations due to impairments walking communtity distances   Clinical Presentation Stable/Uncomplicated   Clinical Decision Making (Complexity) Low complexity   Therapy Frequency 2 times/Week   Predicted Duration of Therapy Intervention (days/wks) 8 weeks   Risk & Benefits of therapy have been explained Yes   Patient, Family & other staff in agreement with plan of care Yes   Clinical Impression Comments exam consistent with possible L hip flexor strain   Education Assessment   Preferred Learning Style Listening;Reading;Demonstration;Pictures/video   Barriers to Learning No barriers   ORTHO GOALS   PT Ortho Eval Goals 1;2;3   Ortho Goal 1   Goal Identifier Pain   Goal Description Pt to subjectively report a max pain level of 2/10 throughout the day for improved ADLs, ie dressing LEs   Target Date 11/04/20   Ortho Goal 2   Goal Identifier Ambulation   Goal Description Pt to ambulate community distances without an assistive device for improved mobility.   Target Date 11/18/20   Ortho Goal 3   Goal Identifier Sleep   Goal Description Pt to sleep through the night and not wake due to pain for improved wellness.   Target Date 11/18/20   Total Evaluation Time   PT Eval, Low Complexity Minutes (43697) 15   Therapy Certification   Certification date from 09/23/20   Certification date to 11/18/20   Medical Diagnosis R10.32 (ICD-10-CM) - Left groin pain

## 2020-09-23 NOTE — PROGRESS NOTES
[facility]          OUTPATIENT PHYSICAL THERAPY ORTHOPEDIC EVALUATION  PLAN OF TREATMENT FOR OUTPATIENT REHABILITATION  (COMPLETE FOR INITIAL CLAIMS ONLY)  Patient's Last Name, First Name, M.I.  YOB: 1933  Merlyn Escamilla    Provider s Name:  [facility]   Medical Record No.  [id number]   Start of Care Date:  09/23/20   Onset Date:  08/23/20   Type:     _X__PT   ___OT   ___SLP Medical Diagnosis:  R10.32 (ICD-10-CM) - Left groin pain      PT Diagnosis:  hip flexor strain; L hip pain   Visits from SOC:  1      _________________________________________________________________________________  Plan of Treatment/Functional Goals:  groups, joint mobilization, manual therapy, motor coordination training, ROM, strengthening, stretching     Cryotherapy, Hot packs, Ultrasound     Goals  Goal Identifier: (P) Pain  Goal Description: (P) Pt to subjectively report a max pain level of 2/10 throughout the day for improved ADLs, ie dressing LEs  Target Date: (P) 11/04/20    Goal Identifier: (P) Ambulation  Goal Description: (P) Pt to ambulate community distances without an assistive device for improved mobility.  Target Date: (P) 11/18/20    Goal Identifier: (P) Sleep  Goal Description: (P) Pt to sleep through the night and not wake due to pain for improved wellness.  Target Date: (P) 11/18/20      Therapy Frequency:  2 times/Week  Predicted Duration of Therapy Intervention:  8 weeks    Washington Shields, PT, ATC                 I CERTIFY THE NEED FOR THESE SERVICES FURNISHED UNDER        THIS PLAN OF TREATMENT AND WHILE UNDER MY CARE     (Physician co-signature of this document indicates review and certification of the therapy plan).                       Certification Date From:  09/23/20   Certification Date To:  11/18/20    Referring Provider:  Dr Gonzalez    Initial Assessment        See Epic Evaluation Start of Care Date: 09/23/20

## 2020-10-01 ENCOUNTER — HOSPITAL ENCOUNTER (OUTPATIENT)
Dept: PHYSICAL THERAPY | Facility: OTHER | Age: 85
Setting detail: THERAPIES SERIES
End: 2020-10-01
Attending: FAMILY MEDICINE
Payer: MEDICARE

## 2020-10-01 PROCEDURE — 97110 THERAPEUTIC EXERCISES: CPT | Mod: GP | Performed by: PHYSICAL THERAPIST

## 2020-10-06 ENCOUNTER — HOSPITAL ENCOUNTER (OUTPATIENT)
Dept: PHYSICAL THERAPY | Facility: OTHER | Age: 85
Setting detail: THERAPIES SERIES
End: 2020-10-06
Attending: FAMILY MEDICINE
Payer: MEDICARE

## 2020-10-06 PROCEDURE — 97110 THERAPEUTIC EXERCISES: CPT | Mod: GP | Performed by: PHYSICAL THERAPIST

## 2020-10-08 ENCOUNTER — HOSPITAL ENCOUNTER (OUTPATIENT)
Dept: PHYSICAL THERAPY | Facility: OTHER | Age: 85
Setting detail: THERAPIES SERIES
End: 2020-10-08
Attending: FAMILY MEDICINE
Payer: MEDICARE

## 2020-10-08 PROCEDURE — 97110 THERAPEUTIC EXERCISES: CPT | Mod: GP | Performed by: PHYSICAL THERAPIST

## 2020-10-15 NOTE — PROGRESS NOTES
[facility]          OUTPATIENT PHYSICAL THERAPY ORTHOPEDIC EVALUATION  PLAN OF TREATMENT FOR OUTPATIENT REHABILITATION  (COMPLETE FOR INITIAL CLAIMS ONLY)  Patient's Last Name, First Name, M.I.  YOB: 1933  Merlyn Escamilla    Provider s Name:  [facility]   Medical Record No.  [id number]   Start of Care Date:  09/23/20   Onset Date:  08/23/20   Type:     _X__PT   ___OT   ___SLP Medical Diagnosis:  R10.32 (ICD-10-CM) - Left groin pain      PT Diagnosis:  hip flexor strain; L hip pain   Visits from SOC:  1      _________________________________________________________________________________  Plan of Treatment/Functional Goals:  groups, joint mobilization, manual therapy, motor coordination training, ROM, strengthening, stretching     Cryotherapy, Hot packs, Ultrasound     Goals  Goal Identifier: Pain  Goal Description: Pt to subjectively report a max pain level of 2/10 throughout the day for improved ADLs, ie dressing LEs  Target Date: 11/04/20    Goal Identifier: Ambulation  Goal Description: Pt to ambulate community distances without an assistive device for improved mobility.  Target Date: 11/18/20    Goal Identifier: Sleep  Goal Description: Pt to sleep through the night and not wake due to pain for improved wellness.  Target Date: 11/18/20        Therapy Frequency:  2 times/Week  Predicted Duration of Therapy Intervention:  8 weeks    Washington Shields, PT, ATC                 I CERTIFY THE NEED FOR THESE SERVICES FURNISHED UNDER        THIS PLAN OF TREATMENT AND WHILE UNDER MY CARE     (Physician co-signature of this document indicates review and certification of the therapy plan).                       Certification Date From:  09/23/20   Certification Date To:  11/18/20    Referring Provider:  Dr Gonzalez/Dr Dinero    Initial Assessment        See Epic Evaluation Start of Care Date: 09/23/20

## 2020-10-16 ENCOUNTER — HOSPITAL ENCOUNTER (OUTPATIENT)
Dept: PHYSICAL THERAPY | Facility: OTHER | Age: 85
Setting detail: THERAPIES SERIES
End: 2020-10-16
Attending: FAMILY MEDICINE
Payer: MEDICARE

## 2020-10-16 PROCEDURE — 97110 THERAPEUTIC EXERCISES: CPT | Mod: GP | Performed by: PHYSICAL THERAPIST

## 2020-11-05 NOTE — PROGRESS NOTES
Outpatient Physical Therapy Discharge Note     Patient: Merlyn Escamilla  : 1933    Beginning/End Dates:  20 to 10/16/20    Referring Provider: Dr Gonzalez    Therapy Diagnosis: R10.32 (ICD-10-CM) - Left groin pain      Plan:  Discharge from therapy.    Discharge:   Pt has not returned to physical therapy after a trial of self mgmt techniques indicating that she is doing well with HEP. Please refer to pt's chart for most recent information on objective measurements, goals or any additional information. This is an unplanned DC    Reason for Discharge:   Patient has not scheduled further appointments.    Discharge Plan: Patient to continue home program.     
36.5

## 2020-12-18 ENCOUNTER — OFFICE VISIT (OUTPATIENT)
Dept: FAMILY MEDICINE | Facility: OTHER | Age: 85
End: 2020-12-18
Attending: FAMILY MEDICINE
Payer: COMMERCIAL

## 2020-12-18 ENCOUNTER — TELEPHONE (OUTPATIENT)
Dept: FAMILY MEDICINE | Facility: OTHER | Age: 85
End: 2020-12-18

## 2020-12-18 VITALS
WEIGHT: 129.4 LBS | HEART RATE: 77 BPM | DIASTOLIC BLOOD PRESSURE: 88 MMHG | SYSTOLIC BLOOD PRESSURE: 132 MMHG | TEMPERATURE: 99.2 F | OXYGEN SATURATION: 97 % | RESPIRATION RATE: 18 BRPM | BODY MASS INDEX: 24.05 KG/M2

## 2020-12-18 DIAGNOSIS — R10.32 LEFT GROIN PAIN: ICD-10-CM

## 2020-12-18 DIAGNOSIS — K46.9 NON-RECURRENT ABDOMINAL HERNIA WITHOUT OBSTRUCTION OR GANGRENE, UNSPECIFIED HERNIA TYPE: Primary | ICD-10-CM

## 2020-12-18 PROCEDURE — G0463 HOSPITAL OUTPT CLINIC VISIT: HCPCS

## 2020-12-18 PROCEDURE — 99213 OFFICE O/P EST LOW 20 MIN: CPT | Performed by: FAMILY MEDICINE

## 2020-12-18 SDOH — HEALTH STABILITY: MENTAL HEALTH: HOW OFTEN DO YOU HAVE A DRINK CONTAINING ALCOHOL?: 4 OR MORE TIMES A WEEK

## 2020-12-18 SDOH — HEALTH STABILITY: MENTAL HEALTH: HOW MANY STANDARD DRINKS CONTAINING ALCOHOL DO YOU HAVE ON A TYPICAL DAY?: 1 OR 2

## 2020-12-18 SDOH — HEALTH STABILITY: MENTAL HEALTH: HOW OFTEN DO YOU HAVE 6 OR MORE DRINKS ON ONE OCCASION?: NOT ASKED

## 2020-12-18 ASSESSMENT — ENCOUNTER SYMPTOMS
ABDOMINAL PAIN: 0
DYSURIA: 0
DIARRHEA: 0
CONSTIPATION: 0
FEVER: 0
CHILLS: 0
HEMATOCHEZIA: 0

## 2020-12-18 ASSESSMENT — PAIN SCALES - GENERAL: PAINLEVEL: SEVERE PAIN (7)

## 2020-12-18 NOTE — PROGRESS NOTES
"  SUBJECTIVE:   Merlyn Escamilla is a 87 year old female who presents to clinic today for the following health issues:    HPI  Left Groin Pain:  This has been an ongoing problem for her.  XR of her left hp revealed generalized osteopenia and moderate bilateral hip degenerative change.  She has been managing her pain with Tylenol and Ibuprofen.  She participated in physical therapy and continues to do at-home exercises.  She reports that this helps but pain is still significant and affecting her mobility.    She also reports a \"lump\" in the area of her left lower abdomen.  She denies any pain in this area.  It is soft and mobile.  She first noticed it two weeks ago and has not seen any change in that time.    Past Medical History:   Diagnosis Date     Carpal tunnel syndrome of left wrist     10/4/2017     Carpal tunnel syndrome of right wrist     10/4/2017     Deep phlebothrombosis during pregnancy     No Comments Provided     Osteoarthritis     Knee     Other specified postprocedural states     10/4/2017     Other specified postprocedural states     1/31/2018     Presence of artificial knee joint     10/3/2014      Past Surgical History:   Procedure Laterality Date     APPENDECTOMY OPEN      No Comments Provided     C TOTAL KNEE ARTHROPLASTY Right 10/01/2014    Dr Domingo     CARPAL TUNNEL RELEASE RT/LT Left 01/31/2018 01/31/2018,[id number],OTHER,Left     CARPAL TUNNEL RELEASE RT/LT Right 10/04/2017     COLONOSCOPY  09/2014    Arteriovenous malformations of right, transverse and splenic flexure     ENDOSCOPIC STRIPPING VEIN(S)      No Comments Provided     HYSTERECTOMY TOTAL ABDOMINAL  06/1987    with squamous metaplasia and leiomoma     Family History   Problem Relation Age of Onset     Heart Disease Mother         Heart Disease     Heart Disease Father         Heart Disease     Heart Disease Brother      Heart Disease Brother      Heart Disease Brother      Heart Disease Brother      Brain Cancer Sister      Heart " Disease Sister      Social History     Tobacco Use     Smoking status: Never Smoker     Smokeless tobacco: Never Used   Substance Use Topics     Alcohol use: Yes     Alcohol/week: 5.0 standard drinks     Frequency: 4 or more times a week     Drinks per session: 1 or 2     Current Outpatient Medications   Medication Sig Dispense Refill     DIGITEK 125 MCG tablet TAKE 1 TABLET BY MOUTH ONCE DAILY 90 tablet 3     furosemide (LASIX) 20 MG tablet Take 2 tablets (40 mg) by mouth every morning 180 tablet 3     lisinopril (ZESTRIL) 5 MG tablet TAKE 1 TABLET BY MOUTH ONCE DAILY 90 tablet 3     metoprolol succinate ER (TOPROL-XL) 100 MG 24 hr tablet Take 100 mg tab with 25 mg tab for total of 125 mg every night. 90 tablet 3     metoprolol succinate ER (TOPROL-XL) 25 MG 24 hr tablet Take 25 mg tab with 100 mg tab for total of 125 mg every night. 90 tablet 3     Multiple Vitamin (MULTI-VITAMINS) TABS Take 1 tablet by mouth daily       XARELTO ANTICOAGULANT 20 MG TABS tablet Take 1 tablet (20 mg) by mouth daily (with dinner) 90 tablet 3     Allergies   Allergen Reactions     Morphine      Other reaction(s): Muscle Weakness     Phenylbutazone Unknown     Review of Systems   Constitutional: Negative for chills and fever.   Gastrointestinal: Negative for abdominal pain, constipation, diarrhea and hematochezia.   Genitourinary: Negative for dysuria.      OBJECTIVE:     /88 (BP Location: Left arm, Patient Position: Sitting, Cuff Size: Adult Small)   Pulse 77   Temp 99.2  F (37.3  C) (Tympanic)   Resp 18   Wt 58.7 kg (129 lb 6.4 oz)   SpO2 97%   BMI 24.05 kg/m    Body mass index is 24.05 kg/m .  Physical Exam  Constitutional:       General: She is not in acute distress.     Appearance: Normal appearance. She is not ill-appearing.   Abdominal:      General: Bowel sounds are normal.      Palpations: Abdomen is soft.      Tenderness: There is no abdominal tenderness. There is no guarding or rebound.      Comments: Hernia  present in left lower quadrant along inguinal region.  Soft and easily reducible.  No tenderness to palpation.   Musculoskeletal:      Comments: No tenderness to palpation of left hip joint. Hypertonicity and tenderness to palpation of medial thigh muscles.   Neurological:      Mental Status: She is alert.      Comments: Antalgic gait.   Psychiatric:         Mood and Affect: Mood normal.       ASSESSMENT/PLAN:     1. Non-recurrent abdominal hernia without obstruction or gangrene, unspecified hernia type  Femoral versus inguinal.  Soft and easily reducible.  Recommend continued monitoring at this time.  Discussed importance of reevaluation should hernia increase in size or become painful.  Discussed importance of emergent evaluation if hernia becomes increasingly painful, firm, and she is unable to reduce it.    2. Left groin pain  XR again reviewed.  She has had some improvement with physical therapy but pain is affecting her mobility.  Will refer to ortho for further evaluation.  She may benefit from a joint injection.      Vilma Gonzalez DO  [facility] AND [facility]

## 2020-12-18 NOTE — NURSING NOTE
"Chief Complaint   Patient presents with     Groin Pain     radiating to left side        Initial /88 (BP Location: Left arm, Patient Position: Sitting, Cuff Size: Adult Small)   Pulse 77   Temp 99.2  F (37.3  C) (Tympanic)   Resp 18   Wt 58.7 kg (129 lb 6.4 oz)   SpO2 97%   BMI 24.05 kg/m   Estimated body mass index is 24.05 kg/m  as calculated from the following:    Height as of 9/14/20: 1.562 m (5' 1.5\").    Weight as of this encounter: 58.7 kg (129 lb 6.4 oz).  Medication Reconciliation: complete    Jessi Kelly  "

## 2020-12-24 NOTE — TELEPHONE ENCOUNTER
I see that the patient had an appointment after this call came in and she has not called back, so I am closing this phone note.  Jimena Eduardo CMA ([facility])

## 2021-02-03 ENCOUNTER — TELEPHONE (OUTPATIENT)
Dept: FAMILY MEDICINE | Facility: OTHER | Age: 86
End: 2021-02-03

## 2021-02-03 ENCOUNTER — OFFICE VISIT (OUTPATIENT)
Dept: ORTHOPEDICS | Facility: OTHER | Age: 86
End: 2021-02-03
Attending: FAMILY MEDICINE
Payer: COMMERCIAL

## 2021-02-03 VITALS
SYSTOLIC BLOOD PRESSURE: 114 MMHG | WEIGHT: 128 LBS | BODY MASS INDEX: 23.79 KG/M2 | DIASTOLIC BLOOD PRESSURE: 66 MMHG | HEART RATE: 88 BPM

## 2021-02-03 DIAGNOSIS — R10.32 LEFT GROIN PAIN: ICD-10-CM

## 2021-02-03 DIAGNOSIS — M16.12 PRIMARY OSTEOARTHRITIS OF LEFT HIP: Primary | ICD-10-CM

## 2021-02-03 PROCEDURE — 99214 OFFICE O/P EST MOD 30 MIN: CPT | Performed by: ORTHOPAEDIC SURGERY

## 2021-02-03 PROCEDURE — G0463 HOSPITAL OUTPT CLINIC VISIT: HCPCS

## 2021-02-03 NOTE — TELEPHONE ENCOUNTER
Patient is scheduled to have a left hip injection on 2/08/21 with radiology and she needs orders to discontinue her Xarelto prior to her procedure.     Dunia Kathleen LPN on 2/3/2021 at 4:08 PM

## 2021-02-03 NOTE — PROGRESS NOTES
Patient is here for consult on her left hip pain.  Sharmaine Gaytan LPN .....................2/3/2021 1:01 PM

## 2021-02-03 NOTE — TELEPHONE ENCOUNTER
NEY White says she needs ok to discontinue blood thinner prior to procedure. Please call to advise. Thank you.  Daysi Peres

## 2021-02-03 NOTE — PROGRESS NOTES
Visit Date:   02/03/2021      REASON FOR EVALUATION:  Left hip pain.      HISTORY OF PRESENT ILLNESS:  Merlyn comes in with regards to her left hip.  She has had discomfort here for the last 4 months and is most intense notation at this point in time.  She is here to look at options.  She has had x-rays done, which shows a moderate amount of hip arthritis.  She has not undergone any injections.  Has done some therapies.  Therapy has helped a little bit, but not complete resolution.  She is using a cane for ambulation.  Reports pain deep within the groin.  No injury prior to onset otherwise noted at this time.      MEDICATIONS:  Have been reviewed and of note, she is on Xarelto.      ALLERGIES:  NOTED TO MORPHINE AS WELL AS FENTANYL     REVIEW OF SYSTEMS:  A 12-point otherwise negative with the exception as stated above.      PHYSICAL EXAMINATION:   VITAL SIGNS:  The patient is 5 feet 1 inch, 129 pounds.  Blood pressure 114/66, pulse 88.   GENERAL:  She is alert and oriented x3, cooperative with exam, in no acute distress.  Does ambulate with use of a cane.  Affect appropriate as well.   EXTREMITIES:  Left hip shows hip flex to 90, internal rotation 5, external rotation 15.  Pain with hip flex and internal rotation is otherwise noted.  Neurovascular examination intact.  Some mild limping is present at this time in addition to that.  No skin lesions seen.  Dorsalis pedis pulse 1+.      X-rays reviewed, which do show a moderate amount of left hip osteoarthrosis at this point in time.      IMPRESSION:  Left hip osteoarthrosis, moderate approaching severe.      PLAN:  Left hip injection has been recommended for her.  We will get her set up on that at this point in time.  We will plan to determine a response in regards to that.  She has an excellent response, we certainly can repeat that in 3-6 months.  If she does not get a great response, discussion about joint reconstruction certainly would be recommended to be  considered in that scenario moving forward.  She will keep me posted in regards to her response at this time.      TIME SPENT ON APPOINTMENT:  Roughly 30 minutes.         ARIAN HAND MD             D: 2021   T: 2021   MT: ANGELIKA      Name:     CYNDY BOSE   MRN:      [id number]        Account:      [id number]   :      1933           Visit Date:   2021      Document: [id number]

## 2021-02-04 ENCOUNTER — TELEPHONE (OUTPATIENT)
Dept: ORTHOPEDICS | Facility: OTHER | Age: 86
End: 2021-02-04

## 2021-02-04 NOTE — TELEPHONE ENCOUNTER
Called patient with recommendations per PO Lopes CNP.  Patient verbalized understanding.  Vidhya Bueno RN on 2/4/2021 at 3:22 PM    Natalia Schultz APRN CNP Green, Glenda M, RN   Caller: Unspecified (Today,  9:07 AM)             Patient can hold her Xarelto for 48 hours prior to procedural injection, restart following. There is small risk for developing blood clot with risk for CVA while she is off the blood thinner given that she is chronically in atrial fib as underlying rhythm.   Thanks,   PO Warner CNP

## 2021-02-04 NOTE — TELEPHONE ENCOUNTER
Called and talked with patient she will be having a cortisone injection with Dr. Morales on 02/08/2021.  Patient is currently taking Xarelto she is wondering about about stopping Xarelto 2 days prior to injections.  Will send this to PO Lopes CNP to review and further recommendations.  Vidhya Bueno RN on 2/4/2021 at 9:49 AM    [facility]-pt is going to have an injection and has questions about weather to stop blood thinners     Please call and advise    Thank You    Shonda White on 2/4/2021 at 9:10 AM

## 2021-02-08 ENCOUNTER — HOSPITAL ENCOUNTER (OUTPATIENT)
Dept: GENERAL RADIOLOGY | Facility: OTHER | Age: 86
Discharge: HOME | End: 2021-02-08
Attending: ORTHOPAEDIC SURGERY | Admitting: ORTHOPAEDIC SURGERY
Payer: MEDICARE

## 2021-02-08 DIAGNOSIS — R10.32 LEFT GROIN PAIN: ICD-10-CM

## 2021-02-08 DIAGNOSIS — M16.12 PRIMARY OSTEOARTHRITIS OF LEFT HIP: ICD-10-CM

## 2021-02-08 PROCEDURE — 250N000011 HC RX IP 250 OP 636: Performed by: RADIOLOGY

## 2021-02-08 PROCEDURE — 255N000002 HC RX 255 OP 636: Performed by: RADIOLOGY

## 2021-02-08 PROCEDURE — 20610 DRAIN/INJ JOINT/BURSA W/O US: CPT | Mod: LT

## 2021-02-08 PROCEDURE — 250N000009 HC RX 250: Performed by: RADIOLOGY

## 2021-02-08 RX ORDER — LIDOCAINE HYDROCHLORIDE 10 MG/ML
2 INJECTION, SOLUTION INFILTRATION; PERINEURAL ONCE
Status: COMPLETED | OUTPATIENT
Start: 2021-02-08 | End: 2021-02-08

## 2021-02-08 RX ORDER — BUPIVACAINE HYDROCHLORIDE 5 MG/ML
3 INJECTION, SOLUTION EPIDURAL; INTRACAUDAL ONCE
Status: COMPLETED | OUTPATIENT
Start: 2021-02-08 | End: 2021-02-08

## 2021-02-08 RX ORDER — TRIAMCINOLONE ACETONIDE 40 MG/ML
40 INJECTION, SUSPENSION INTRA-ARTICULAR; INTRAMUSCULAR ONCE
Status: COMPLETED | OUTPATIENT
Start: 2021-02-08 | End: 2021-02-08

## 2021-02-08 RX ADMIN — IOHEXOL 2 ML: 240 INJECTION, SOLUTION INTRATHECAL; INTRAVASCULAR; INTRAVENOUS; ORAL at 13:10

## 2021-02-08 RX ADMIN — LIDOCAINE HYDROCHLORIDE 2 ML: 10 INJECTION, SOLUTION INFILTRATION; PERINEURAL at 13:11

## 2021-02-08 RX ADMIN — TRIAMCINOLONE ACETONIDE 40 MG: 40 INJECTION, SUSPENSION INTRA-ARTICULAR; INTRAMUSCULAR at 13:11

## 2021-02-08 RX ADMIN — BUPIVACAINE HYDROCHLORIDE 3 ML: 5 INJECTION, SOLUTION EPIDURAL; INTRACAUDAL at 13:10

## 2021-02-26 ENCOUNTER — IMMUNIZATION (OUTPATIENT)
Dept: FAMILY MEDICINE | Facility: OTHER | Age: 86
End: 2021-02-26
Attending: FAMILY MEDICINE
Payer: MEDICARE

## 2021-02-26 PROCEDURE — 91300 PR COVID VAC PFIZER DIL RECON 30 MCG/0.3 ML IM: CPT

## 2021-03-19 ENCOUNTER — IMMUNIZATION (OUTPATIENT)
Dept: FAMILY MEDICINE | Facility: OTHER | Age: 86
End: 2021-03-19
Attending: FAMILY MEDICINE
Payer: MEDICARE

## 2021-03-19 PROCEDURE — 91300 PR COVID VAC PFIZER DIL RECON 30 MCG/0.3 ML IM: CPT

## 2021-04-08 ENCOUNTER — HOSPITAL ENCOUNTER (OUTPATIENT)
Dept: GENERAL RADIOLOGY | Facility: OTHER | Age: 86
Discharge: HOME | End: 2021-04-08
Attending: FAMILY MEDICINE | Admitting: FAMILY MEDICINE
Payer: MEDICARE

## 2021-04-08 DIAGNOSIS — M16.10 PRIMARY OSTEOARTHRITIS OF HIP: ICD-10-CM

## 2021-04-08 PROCEDURE — 250N000011 HC RX IP 250 OP 636: Performed by: RADIOLOGY

## 2021-04-08 PROCEDURE — 255N000002 HC RX 255 OP 636: Performed by: RADIOLOGY

## 2021-04-08 PROCEDURE — 20610 DRAIN/INJ JOINT/BURSA W/O US: CPT | Mod: LT

## 2021-04-08 PROCEDURE — 250N000009 HC RX 250: Performed by: RADIOLOGY

## 2021-04-08 RX ORDER — LIDOCAINE HYDROCHLORIDE 10 MG/ML
2 INJECTION, SOLUTION INFILTRATION; PERINEURAL ONCE
Status: COMPLETED | OUTPATIENT
Start: 2021-04-08 | End: 2021-04-08

## 2021-04-08 RX ORDER — BUPIVACAINE HYDROCHLORIDE 5 MG/ML
3 INJECTION, SOLUTION EPIDURAL; INTRACAUDAL ONCE
Status: COMPLETED | OUTPATIENT
Start: 2021-04-08 | End: 2021-04-08

## 2021-04-08 RX ORDER — TRIAMCINOLONE ACETONIDE 40 MG/ML
40 INJECTION, SUSPENSION INTRA-ARTICULAR; INTRAMUSCULAR ONCE
Status: COMPLETED | OUTPATIENT
Start: 2021-04-08 | End: 2021-04-08

## 2021-04-08 RX ADMIN — TRIAMCINOLONE ACETONIDE 40 MG: 40 INJECTION, SUSPENSION INTRA-ARTICULAR; INTRAMUSCULAR at 13:42

## 2021-04-08 RX ADMIN — BUPIVACAINE HYDROCHLORIDE 3 ML: 5 INJECTION, SOLUTION EPIDURAL; INTRACAUDAL at 13:31

## 2021-04-08 RX ADMIN — LIDOCAINE HYDROCHLORIDE 2 ML: 10 INJECTION, SOLUTION INFILTRATION; PERINEURAL at 13:42

## 2021-04-08 RX ADMIN — IOHEXOL 2 ML: 240 INJECTION, SOLUTION INTRATHECAL; INTRAVASCULAR; INTRAVENOUS; ORAL at 13:31

## 2021-06-01 DIAGNOSIS — I48.20 CHRONIC ATRIAL FIBRILLATION (H): ICD-10-CM

## 2021-06-01 DIAGNOSIS — I50.22 CHRONIC SYSTOLIC HEART FAILURE (H): ICD-10-CM

## 2021-06-01 RX ORDER — LISINOPRIL 5 MG/1
TABLET ORAL
Qty: 90 TABLET | Refills: 0 | Status: SHIPPED | OUTPATIENT
Start: 2021-06-01 | End: 2021-06-02

## 2021-06-01 RX ORDER — ROSUVASTATIN CALCIUM 10 MG/1
TABLET, FILM COATED ORAL
Qty: 90 TABLET | Refills: 0 | Status: SHIPPED | OUTPATIENT
Start: 2021-06-01 | End: 2021-06-02

## 2021-06-01 NOTE — TELEPHONE ENCOUNTER
"Pt.'s last office visit with PO Lopes CNP was on 12/30/19 and note states:  \"She will continue with metoprolol succinate and Lisinopril for GDMT.\"  \"She will continue on digoxin 0.125 mcg daily.\"    Routing refill request to provider for review/approval because:  Labs abnormal and has not been seen in over a year.    Unable to complete prescription refill per RN Medication Refill Policy.  To MD to address.  Sonam Quiroz RN ....................  6/1/2021   3:27 PM    "

## 2021-06-02 ENCOUNTER — OFFICE VISIT (OUTPATIENT)
Dept: FAMILY MEDICINE | Facility: OTHER | Age: 86
End: 2021-06-02
Attending: FAMILY MEDICINE
Payer: COMMERCIAL

## 2021-06-02 VITALS
OXYGEN SATURATION: 97 % | WEIGHT: 125 LBS | HEART RATE: 88 BPM | HEIGHT: 61 IN | TEMPERATURE: 97.3 F | BODY MASS INDEX: 23.6 KG/M2 | RESPIRATION RATE: 16 BRPM | DIASTOLIC BLOOD PRESSURE: 72 MMHG | SYSTOLIC BLOOD PRESSURE: 118 MMHG

## 2021-06-02 DIAGNOSIS — I71.21 ASCENDING AORTIC ANEURYSM (H): ICD-10-CM

## 2021-06-02 DIAGNOSIS — I48.91 ATRIAL FIBRILLATION WITH RVR (H): ICD-10-CM

## 2021-06-02 DIAGNOSIS — I48.20 CHRONIC ATRIAL FIBRILLATION (H): ICD-10-CM

## 2021-06-02 DIAGNOSIS — Z00.00 ENCOUNTER FOR MEDICARE ANNUAL WELLNESS EXAM: Primary | ICD-10-CM

## 2021-06-02 DIAGNOSIS — I50.22 CHRONIC SYSTOLIC HEART FAILURE (H): ICD-10-CM

## 2021-06-02 LAB
ALBUMIN SERPL-MCNC: 3.7 G/DL (ref 3.5–5.7)
ALP SERPL-CCNC: 84 U/L (ref 34–104)
ALT SERPL W P-5'-P-CCNC: 7 U/L (ref 7–52)
ANION GAP SERPL CALCULATED.3IONS-SCNC: 9 MMOL/L (ref 3–14)
AST SERPL W P-5'-P-CCNC: 14 U/L (ref 13–39)
BASOPHILS # BLD AUTO: 0.1 10E9/L (ref 0–0.2)
BASOPHILS NFR BLD AUTO: 0.9 %
BILIRUB SERPL-MCNC: 0.5 MG/DL (ref 0.3–1)
BUN SERPL-MCNC: 17 MG/DL (ref 7–25)
CALCIUM SERPL-MCNC: 9.6 MG/DL (ref 8.6–10.3)
CHLORIDE SERPL-SCNC: 101 MMOL/L (ref 98–107)
CO2 SERPL-SCNC: 28 MMOL/L (ref 21–31)
CREAT SERPL-MCNC: 0.76 MG/DL (ref 0.6–1.2)
DIFFERENTIAL METHOD BLD: ABNORMAL
DIGOXIN SERPL-MCNC: 0.7 UG/L (ref 0.8–2)
EOSINOPHIL # BLD AUTO: 0 10E9/L (ref 0–0.7)
EOSINOPHIL NFR BLD AUTO: 0.6 %
ERYTHROCYTE [DISTWIDTH] IN BLOOD BY AUTOMATED COUNT: 17.2 % (ref 10–15)
GFR SERPL CREATININE-BSD FRML MDRD: 72 ML/MIN/{1.73_M2}
GLUCOSE SERPL-MCNC: 104 MG/DL (ref 70–105)
HCT VFR BLD AUTO: 37.2 % (ref 35–47)
HGB BLD-MCNC: 11.7 G/DL (ref 11.7–15.7)
IMM GRANULOCYTES # BLD: 0 10E9/L (ref 0–0.4)
IMM GRANULOCYTES NFR BLD: 0.2 %
LYMPHOCYTES # BLD AUTO: 1.3 10E9/L (ref 0.8–5.3)
LYMPHOCYTES NFR BLD AUTO: 20.7 %
MCH RBC QN AUTO: 27.7 PG (ref 26.5–33)
MCHC RBC AUTO-ENTMCNC: 31.5 G/DL (ref 31.5–36.5)
MCV RBC AUTO: 88 FL (ref 78–100)
MONOCYTES # BLD AUTO: 0.4 10E9/L (ref 0–1.3)
MONOCYTES NFR BLD AUTO: 6.6 %
NEUTROPHILS # BLD AUTO: 4.5 10E9/L (ref 1.6–8.3)
NEUTROPHILS NFR BLD AUTO: 71 %
PLATELET # BLD AUTO: 269 10E9/L (ref 150–450)
POTASSIUM SERPL-SCNC: 4.1 MMOL/L (ref 3.5–5.1)
PROT SERPL-MCNC: 7.2 G/DL (ref 6.4–8.9)
RBC # BLD AUTO: 4.22 10E12/L (ref 3.8–5.2)
SODIUM SERPL-SCNC: 138 MMOL/L (ref 134–144)
WBC # BLD AUTO: 6.3 10E9/L (ref 4–11)

## 2021-06-02 PROCEDURE — 85025 COMPLETE CBC W/AUTO DIFF WBC: CPT | Mod: ZL | Performed by: FAMILY MEDICINE

## 2021-06-02 PROCEDURE — 80053 COMPREHEN METABOLIC PANEL: CPT | Mod: ZL | Performed by: FAMILY MEDICINE

## 2021-06-02 PROCEDURE — 80162 ASSAY OF DIGOXIN TOTAL: CPT | Mod: ZL | Performed by: FAMILY MEDICINE

## 2021-06-02 PROCEDURE — 36415 COLL VENOUS BLD VENIPUNCTURE: CPT | Mod: ZL | Performed by: FAMILY MEDICINE

## 2021-06-02 PROCEDURE — G0439 PPPS, SUBSEQ VISIT: HCPCS | Performed by: FAMILY MEDICINE

## 2021-06-02 PROCEDURE — G0463 HOSPITAL OUTPT CLINIC VISIT: HCPCS

## 2021-06-02 PROCEDURE — 99214 OFFICE O/P EST MOD 30 MIN: CPT | Mod: 25 | Performed by: FAMILY MEDICINE

## 2021-06-02 RX ORDER — LISINOPRIL 5 MG/1
5 TABLET ORAL DAILY
Qty: 90 TABLET | Refills: 0 | Status: SHIPPED | OUTPATIENT
Start: 2021-06-02 | End: 2021-10-20

## 2021-06-02 RX ORDER — METOPROLOL SUCCINATE 25 MG/1
25 TABLET, EXTENDED RELEASE ORAL AT BEDTIME
Qty: 90 TABLET | Refills: 0 | Status: SHIPPED | OUTPATIENT
Start: 2021-06-02 | End: 2021-09-28

## 2021-06-02 RX ORDER — METOPROLOL SUCCINATE 25 MG/1
TABLET, EXTENDED RELEASE ORAL
Qty: 90 TABLET | Refills: 3 | Status: CANCELLED | OUTPATIENT
Start: 2021-06-02

## 2021-06-02 RX ORDER — METOPROLOL SUCCINATE 100 MG/1
100 TABLET, EXTENDED RELEASE ORAL AT BEDTIME
Qty: 90 TABLET | Refills: 0 | Status: SHIPPED | OUTPATIENT
Start: 2021-06-02 | End: 2021-10-20

## 2021-06-02 RX ORDER — DIGOXIN 125 MCG
125 TABLET ORAL DAILY
Qty: 90 TABLET | Refills: 0 | Status: SHIPPED | OUTPATIENT
Start: 2021-06-02 | End: 2021-10-20

## 2021-06-02 RX ORDER — FUROSEMIDE 20 MG
40 TABLET ORAL DAILY
Qty: 180 TABLET | Refills: 0 | Status: SHIPPED | OUTPATIENT
Start: 2021-06-02 | End: 2021-09-07

## 2021-06-02 RX ORDER — METOPROLOL SUCCINATE 100 MG/1
TABLET, EXTENDED RELEASE ORAL
Qty: 90 TABLET | Refills: 3 | Status: CANCELLED | OUTPATIENT
Start: 2021-06-02

## 2021-06-02 ASSESSMENT — MIFFLIN-ST. JEOR: SCORE: 931.44

## 2021-06-02 ASSESSMENT — PAIN SCALES - GENERAL: PAINLEVEL: NO PAIN (0)

## 2021-06-02 NOTE — PATIENT INSTRUCTIONS
Patient Education   Personalized Prevention Plan  You are due for the preventive services outlined below.  Your care team is available to assist you in scheduling these services.  If you have already completed any of these items, please share that information with your care team to update in your medical record.  Health Maintenance Due   Topic Date Due     Heart Failure Action Plan  Never done     Digoxin Lab  Never done     Diptheria Tetanus Pertussis (DTAP/TDAP/TD) Vaccine (1 - Tdap) 08/22/1958     Zoster (Shingles) Vaccine (1 of 2) Never done     Annual Wellness Visit  Never done     Complete Blood Count  07/29/2019     Liver Monitoring Lab  07/31/2019     Basic Metabolic Panel  11/01/2020     FALL RISK ASSESSMENT  12/30/2020     Cholesterol Lab  05/01/2021

## 2021-06-02 NOTE — NURSING NOTE
"Chief Complaint   Patient presents with     Medicare Visit     Annual         Initial /72   Pulse 88   Temp 97.3  F (36.3  C) (Temporal)   Resp 16   Ht 1.537 m (5' 0.5\")   Wt 56.7 kg (125 lb)   SpO2 97%   BMI 24.01 kg/m   Estimated body mass index is 24.01 kg/m  as calculated from the following:    Height as of this encounter: 1.537 m (5' 0.5\").    Weight as of this encounter: 56.7 kg (125 lb).         Medication Reconciliation: Complete      Norma J. Gosselin, LPN   "

## 2021-06-02 NOTE — PROGRESS NOTES
"SUBJECTIVE:   Merlyn Escamilla is a 87 year old female who presents for Preventive Visit.    Patient has been advised of split billing requirements and indicates understanding: Yes  Are you in the first 12 months of your Medicare Part B coverage?  No    Physical Health:    In general, how would you rate your overall physical health? good    Outside of work, how many days during the week do you exercise? 6-7 days/week    Outside of work, approximately how many minutes a day do you exercise?15-30 minutes    If you drink alcohol do you typically have >3 drinks per day or >7 drinks per week? No    Do you usually eat at least 4 servings of fruit and vegetables a day, include whole grains & fiber and avoid regularly eating high fat or \"junk\" foods? Yes    Do you have any problems taking medications regularly?  No    Do you have any side effects from medications? none    Needs assistance for the following daily activities: transportation    Which of the following safety concerns are present in your home?  none identified     Hearing impairment: No    In the past 6 months, have you been bothered by leaking of urine? no    Mental Health:    In general, how would you rate your overall mental or emotional health? good  PHQ-2 Score: 0    Do you feel safe in your environment? Yes    Have you ever done Advance Care Planning? (For example, a Health Directive, POLST, or a discussion with a medical provider or your loved ones about your wishes): No, advance care planning information given to patient to review.  Patient plans to discuss their wishes with loved ones or provider.      Atrial Fibrillation:  Denies any palpitations or chest pain.  She has been taking her Digoxin and Xarelto as directed.    Congestive Heart Failure:  No shortness of breath or paroxysmal nocturnal dyspnea.  She denies any lower extremity swelling.  She is taking her beta blocker without complications.    Ascending Aortic Aneurysm, Hypertension:  She reports " checking her blood pressure at home around three times per week but does not remember any of her values.  She has been taking Lisinopril as directed without complications or adverse effects.    Left Groin Pain:  She reports left groin pain since last August.  She receives Cortisone injections intermittently.  She reports that her first injection lasted her two months.  The most recent injection lasted about a month and a half.    Fall risk:  Fallen 2 or more times in the past year?: No  Any fall with injury in the past year?: No    Cognitive Screenin) Repeat 3 items (Leader, Season, Table)    2) Clock draw: NORMAL  3) 3 item recall: Recalls 2 objects   Results: NORMAL clock, 1-2 items recalled: COGNITIVE IMPAIRMENT LESS LIKELY    Mini-CogTM Copyright ELVIS Wild. Licensed by the author for use in [facility]; reprinted with permission (matt@[email or web address]). All rights reserved.      Do you have sleep apnea, excessive snoring or daytime drowsiness?: no    Reviewed and updated as needed this visit by clinical staff  Tobacco  Allergies  Meds  Problems  Med Hx  Surg Hx  Fam Hx  Soc Hx        Reviewed and updated as needed this visit by Provider                Social History     Tobacco Use     Smoking status: Never Smoker     Smokeless tobacco: Never Used   Substance Use Topics     Alcohol use: Yes     Alcohol/week: 5.0 standard drinks     Frequency: 4 or more times a week     Drinks per session: 1 or 2                     Current providers sharing in care for this patient include:   Patient Care Team:  Sharath Dinero as PCP - General (Family Practice)  Vilma Gonzalez DO as Assigned PCP  Natalia Schultz APRN CNP as Assigned Heart and Vascular Provider  Clement Morales MD as Assigned Musculoskeletal Provider   [facility]    The following health maintenance items are reviewed in Epic and correct as of today:  Health Maintenance   Topic Date Due     HF ACTION PLAN  Never done     DIGOXIN   "Never done     DTAP/TDAP/TD IMMUNIZATION (1 - Tdap) 08/22/1958     ZOSTER IMMUNIZATION (1 of 2) Never done     MEDICARE ANNUAL WELLNESS VISIT  Never done     CBC  07/29/2019     ALT  07/31/2019     BMP  11/01/2020     FALL RISK ASSESSMENT  12/30/2020     LIPID  05/01/2021     INFLUENZA VACCINE (Season Ended) 09/01/2021     ADVANCE CARE PLANNING  07/31/2023     TSH W/FREE T4 REFLEX  Completed     PHQ-2  Completed     Pneumococcal Vaccine: 65+ Years  Completed     COVID-19 Vaccine  Completed     IPV IMMUNIZATION  Aged Out     MENINGITIS IMMUNIZATION  Aged Out     HEPATITIS B IMMUNIZATION  Aged Out     Lab work is in process  Mammogram Screening: Mammogram Screening - Patient over age 75, has elected to discontinue screenings.    ROS:  Constitutional, HEENT, cardiovascular, pulmonary, GI, , musculoskeletal, neuro, skin, endocrine and psych systems are negative, except as otherwise noted.    OBJECTIVE:   /72   Pulse 88   Temp 97.3  F (36.3  C) (Temporal)   Resp 16   Ht 1.537 m (5' 0.5\")   Wt 56.7 kg (125 lb)   SpO2 97%   BMI 24.01 kg/m   Estimated body mass index is 24.01 kg/m  as calculated from the following:    Height as of this encounter: 1.537 m (5' 0.5\").    Weight as of this encounter: 56.7 kg (125 lb).  EXAM:   GENERAL: healthy, alert and no distress  EYES: Eyes grossly normal to inspection, PERRL and conjunctivae and sclerae normal  HENT: ear canals and TM's normal, nose and mouth without ulcers or lesions  NECK: no adenopathy, no asymmetry, masses, or scars and thyroid normal to palpation  RESP: lungs clear to auscultation - no rales, rhonchi or wheezes  CV: regular rate and rhythm, normal S1 S2, no S3 or S4, no murmur, click or rub, no peripheral edema and peripheral pulses strong  ABDOMEN: soft, nontender, no hepatosplenomegaly, no masses and bowel sounds normal  MS: no gross musculoskeletal defects noted, no edema  NEURO: Normal strength and tone, mentation intact and speech normal  PSYCH: " mentation appears normal, affect normal/bright    Diagnostic Test Results:  Labs reviewed in Epic    ASSESSMENT / PLAN:   1. Encounter for Medicare annual wellness exam  BP at goal < 130/80.  Due for laboratory medication monitoring.  CBC, CMP, and Digoxin level ordered.  Follow-up with cardiology.  She is overdue for this.  Follow-up with orthopedics regarding her left hip pain.  She is outside the age benefit group for colon, breast, and cervical cancer screening.  Recommend she obtain Tetanus booster and shingles vaccine through her pharmacy.  Remainder of immunizations up-to-date.  Encouraged healthy diet and exercise.  No cognitive impairment.  No depression.    2. Chronic atrial fibrillation (H)  - digoxin (DIGITEK) 125 MCG tablet; Take 1 tablet (125 mcg) by mouth daily  Dispense: 90 tablet; Refill: 0  - metoprolol succinate ER (TOPROL-XL) 100 MG 24 hr tablet; Take 1 tablet (100 mg) by mouth At Bedtime To be taken with 25 mg tablet to equal 125 mg daily.  Dispense: 90 tablet; Refill: 0  - metoprolol succinate ER (TOPROL-XL) 25 MG 24 hr tablet; Take 1 tablet (25 mg) by mouth At Bedtime To be taken with 100 mg tablet to equal 125 mg daily.  Dispense: 90 tablet; Refill: 0  - rivaroxaban ANTICOAGULANT (XARELTO ANTICOAGULANT) 20 MG TABS tablet; Take 1 tablet (20 mg) by mouth daily (with dinner)  Dispense: 90 tablet; Refill: 0  - Digoxin  - Comprehensive Metabolic Panel  - CBC and Differential    3. Chronic systolic heart failure (H)  - digoxin (DIGITEK) 125 MCG tablet; Take 1 tablet (125 mcg) by mouth daily  Dispense: 90 tablet; Refill: 0  - furosemide (LASIX) 20 MG tablet; Take 2 tablets (40 mg) by mouth daily  Dispense: 180 tablet; Refill: 0  - lisinopril (ZESTRIL) 5 MG tablet; Take 1 tablet (5 mg) by mouth daily  Dispense: 90 tablet; Refill: 0  - metoprolol succinate ER (TOPROL-XL) 100 MG 24 hr tablet; Take 1 tablet (100 mg) by mouth At Bedtime To be taken with 25 mg tablet to equal 125 mg daily.  Dispense: 90  "tablet; Refill: 0  - metoprolol succinate ER (TOPROL-XL) 25 MG 24 hr tablet; Take 1 tablet (25 mg) by mouth At Bedtime To be taken with 100 mg tablet to equal 125 mg daily.  Dispense: 90 tablet; Refill: 0  - Digoxin  - Comprehensive Metabolic Panel  - CBC and Differential    4. Ascending aortic aneurysm (H)  - metoprolol succinate ER (TOPROL-XL) 100 MG 24 hr tablet; Take 1 tablet (100 mg) by mouth At Bedtime To be taken with 25 mg tablet to equal 125 mg daily.  Dispense: 90 tablet; Refill: 0  - metoprolol succinate ER (TOPROL-XL) 25 MG 24 hr tablet; Take 1 tablet (25 mg) by mouth At Bedtime To be taken with 100 mg tablet to equal 125 mg daily.  Dispense: 90 tablet; Refill: 0    5. Atrial fibrillation with RVR (H)  - metoprolol succinate ER (TOPROL-XL) 100 MG 24 hr tablet; Take 1 tablet (100 mg) by mouth At Bedtime To be taken with 25 mg tablet to equal 125 mg daily.  Dispense: 90 tablet; Refill: 0  - metoprolol succinate ER (TOPROL-XL) 25 MG 24 hr tablet; Take 1 tablet (25 mg) by mouth At Bedtime To be taken with 100 mg tablet to equal 125 mg daily.  Dispense: 90 tablet; Refill: 0    Patient has been advised of split billing requirements and indicates understanding: Yes    COUNSELING:  Reviewed preventive health counseling, as reflected in patient instructions       Regular exercise       Healthy diet/nutrition       Vision screening       Hearing screening       Dental care       Immunizations       Osteoporosis prevention/bone health    Estimated body mass index is 24.01 kg/m  as calculated from the following:    Height as of this encounter: 1.537 m (5' 0.5\").    Weight as of this encounter: 56.7 kg (125 lb).        She reports that she has never smoked. She has never used smokeless tobacco.    Appropriate preventive services were discussed with this patient, including applicable screening as appropriate for cardiovascular disease, diabetes, osteopenia/osteoporosis, and glaucoma.  As appropriate for age/gender, " discussed screening for colorectal cancer, prostate cancer, breast cancer, and cervical cancer. Checklist reviewing preventive services available has been given to the patient.    Reviewed patients plan of care and provided an AVS. The Basic Care Plan (routine screening as documented in Health Maintenance) for Merlyn meets the Care Plan requirement. This Care Plan has been established and reviewed with the Patient.    Counseling Resources:  ATP IV Guidelines  Pooled Cohorts Equation Calculator  Breast Cancer Risk Calculator  BRCA-Related Cancer Risk Assessment: FHS-7 Tool  FRAX Risk Assessment  ICSI Preventive Guidelines  Dietary Guidelines for Americans, 2010  USDA's MyPlate  ASA Prophylaxis  Lung CA Screening    Vilma Gonzalez DO  [facility] AND [facility]

## 2021-06-10 ENCOUNTER — TELEPHONE (OUTPATIENT)
Dept: FAMILY MEDICINE | Facility: OTHER | Age: 86
End: 2021-06-10

## 2021-06-10 NOTE — TELEPHONE ENCOUNTER
Patient would like to get a pain medication for groin & hip pain.  Please call    [facility] is [facility] on 6/10/2021 at 12:31 PM

## 2021-06-11 ENCOUNTER — TELEPHONE (OUTPATIENT)
Dept: FAMILY MEDICINE | Facility: OTHER | Age: 86
End: 2021-06-11

## 2021-06-11 NOTE — TELEPHONE ENCOUNTER
Patient talked to [facility] nurse yesterday as patient needed something for pain for hip and groin and they were all aware  was gone and the nurse was going to find some other doctor or someone to help her---Please call today-uses [facility]- I saw a note sent yesterday to [facility] but he was gone then also-

## 2021-06-14 NOTE — TELEPHONE ENCOUNTER
Left message for patient to call back. Patient is scheduled for an appointment tomorrow.    Dunia Kathleen LPN on 6/14/2021 at 2:35 PM

## 2021-06-15 ENCOUNTER — OFFICE VISIT (OUTPATIENT)
Dept: FAMILY MEDICINE | Facility: OTHER | Age: 86
End: 2021-06-15
Attending: FAMILY MEDICINE
Payer: COMMERCIAL

## 2021-06-15 VITALS
HEART RATE: 88 BPM | RESPIRATION RATE: 20 BRPM | DIASTOLIC BLOOD PRESSURE: 68 MMHG | WEIGHT: 123.2 LBS | BODY MASS INDEX: 23.26 KG/M2 | HEIGHT: 61 IN | OXYGEN SATURATION: 99 % | SYSTOLIC BLOOD PRESSURE: 110 MMHG

## 2021-06-15 DIAGNOSIS — M16.12 PRIMARY OSTEOARTHRITIS OF LEFT HIP: ICD-10-CM

## 2021-06-15 DIAGNOSIS — I71.21 ASCENDING AORTIC ANEURYSM (H): Primary | ICD-10-CM

## 2021-06-15 PROCEDURE — 99213 OFFICE O/P EST LOW 20 MIN: CPT | Performed by: FAMILY MEDICINE

## 2021-06-15 PROCEDURE — G0463 HOSPITAL OUTPT CLINIC VISIT: HCPCS

## 2021-06-15 ASSESSMENT — PAIN SCALES - GENERAL: PAINLEVEL: SEVERE PAIN (6)

## 2021-06-15 ASSESSMENT — MIFFLIN-ST. JEOR: SCORE: 923.27

## 2021-06-15 NOTE — PROGRESS NOTES
"SUBJECTIVE:  Merlyn Escamilla is a 87 year old female here for follow-up.  She has a history of left hip osteoarthritis.  She has had periodic steroid injections into her hip and is scheduled for her next injection at the beginning of July.  She states that the injections continue to help relieve her pain but it is not long-lasting.  Previous x-ray showed significant osteoarthritis of her hip.    She also has a history of heart failure with reduced ejection fraction, ascending aortic aneurysm.  Her last imaging study of her aorta was in 2020 which showed 5.1 cm of her ascending aorta.    ROS:    As above otherwise ROS is unremarkable.    OBJECTIVE:  /68   Pulse 88   Resp 20   Ht 1.537 m (5' 0.5\")   Wt 55.9 kg (123 lb 3.2 oz)   SpO2 99%   BMI 23.66 kg/m      EXAM:  General Appearance: Pleasant, alert, appropriate appearance for age. No acute distress  No exam today.    ASSESSEMENT AND PLAN:    1. Ascending aortic aneurysm (H)    2. Primary osteoarthritis of left hip      In regards to her ascending aortic aneurysm will refer her to vascular surgery.  She is requesting [facility] due to location.  Discussed that she has been on 5 cm so repair may be indicated.  Certainly she would need to be cleared from cardiology and vascular surgery prior to any hip replacement and I appreciate their consultation.    In the meantime, blood pressure is well controlled, will continue current regimen.    In regard to her left hip osteoarthritis I discussed that at this time replacement is not an option as she needs further work-up and clearance from cardiology given her history of heart failure and aneurysm beyond 5 cm.  She will continue with her hip injection as scheduled in July and continue with Tylenol as she is avoiding anti-inflammatories given her Xarelto use.    Further treatment options will be dependent on cardiology work-up.    Cliff Dinero MD  Family Medicine  "

## 2021-06-15 NOTE — NURSING NOTE
Patient presents today for left hip pain.    Medication Reconciliation Complete    Dunia Kathleen LPN  6/15/2021 2:49 PM

## 2021-06-16 ENCOUNTER — MEDICAL CORRESPONDENCE (OUTPATIENT)
Dept: HEALTH INFORMATION MANAGEMENT | Facility: OTHER | Age: 86
End: 2021-06-16

## 2021-06-24 ENCOUNTER — TELEPHONE (OUTPATIENT)
Dept: FAMILY MEDICINE | Facility: OTHER | Age: 86
End: 2021-06-24

## 2021-06-24 NOTE — TELEPHONE ENCOUNTER
Patient is requesting a referral to a Cardiologist.  They were suppose to get a call from one, but no one has called them.  Please call    Marium Patel on 6/24/2021 at 11:54 AM

## 2021-06-25 NOTE — TELEPHONE ENCOUNTER
Notified patient that [facility] will call to schedule once approval is done. Patient voiced understanding.  Leeann Ivey LPN ....................  6/25/2021   1:10 PM

## 2021-07-08 ENCOUNTER — HOSPITAL ENCOUNTER (OUTPATIENT)
Dept: GENERAL RADIOLOGY | Facility: OTHER | Age: 86
Discharge: HOME | End: 2021-07-08
Attending: FAMILY MEDICINE | Admitting: FAMILY MEDICINE
Payer: MEDICARE

## 2021-07-08 DIAGNOSIS — M16.10 PRIMARY OSTEOARTHRITIS OF HIP: ICD-10-CM

## 2021-07-08 PROCEDURE — 255N000002 HC RX 255 OP 636: Performed by: RADIOLOGY

## 2021-07-08 PROCEDURE — 250N000011 HC RX IP 250 OP 636: Performed by: RADIOLOGY

## 2021-07-08 PROCEDURE — 250N000009 HC RX 250: Performed by: RADIOLOGY

## 2021-07-08 PROCEDURE — 20610 DRAIN/INJ JOINT/BURSA W/O US: CPT | Mod: LT

## 2021-07-08 RX ORDER — LIDOCAINE HYDROCHLORIDE 10 MG/ML
2 INJECTION, SOLUTION INFILTRATION; PERINEURAL ONCE
Status: COMPLETED | OUTPATIENT
Start: 2021-07-08 | End: 2021-07-08

## 2021-07-08 RX ORDER — BUPIVACAINE HYDROCHLORIDE 5 MG/ML
3 INJECTION, SOLUTION EPIDURAL; INTRACAUDAL ONCE
Status: COMPLETED | OUTPATIENT
Start: 2021-07-08 | End: 2021-07-08

## 2021-07-08 RX ORDER — TRIAMCINOLONE ACETONIDE 40 MG/ML
40 INJECTION, SUSPENSION INTRA-ARTICULAR; INTRAMUSCULAR ONCE
Status: COMPLETED | OUTPATIENT
Start: 2021-07-08 | End: 2021-07-08

## 2021-07-08 RX ADMIN — IOHEXOL 2 ML: 240 INJECTION, SOLUTION INTRATHECAL; INTRAVASCULAR; INTRAVENOUS; ORAL at 12:15

## 2021-07-08 RX ADMIN — BUPIVACAINE HYDROCHLORIDE 3 ML: 5 INJECTION, SOLUTION EPIDURAL; INTRACAUDAL at 12:15

## 2021-07-08 RX ADMIN — LIDOCAINE HYDROCHLORIDE 2 ML: 10 INJECTION, SOLUTION INFILTRATION; PERINEURAL at 12:15

## 2021-07-08 RX ADMIN — TRIAMCINOLONE ACETONIDE 40 MG: 40 INJECTION, SUSPENSION INTRA-ARTICULAR; INTRAMUSCULAR at 12:15

## 2021-07-15 ENCOUNTER — MYC MEDICAL ADVICE (OUTPATIENT)
Dept: FAMILY MEDICINE | Facility: OTHER | Age: 86
End: 2021-07-15

## 2021-07-15 DIAGNOSIS — I71.21 ASCENDING AORTIC ANEURYSM (H): Primary | ICD-10-CM

## 2021-07-15 DIAGNOSIS — M16.12 PRIMARY OSTEOARTHRITIS OF LEFT HIP: ICD-10-CM

## 2021-07-16 NOTE — TELEPHONE ENCOUNTER
Referral for vascular surgery was completed on Krissy 15 and patient's family reports that they have not been called.  Please help coordinate this.

## 2021-07-16 NOTE — TELEPHONE ENCOUNTER
Please see response per Sharath Dinero MD.  Patient's phone busy at this time.  Will try to contact again.    Saumya Contreras LPN............7/16/2021 10:03 AM

## 2021-07-19 NOTE — TELEPHONE ENCOUNTER
Returned call to patients daughter. They would like to try gabapentin. She was previously doing PT and it was more aggravating to her pain then helpful.    Dunia Kathleen LPN on 7/19/2021 at 4:20 PM

## 2021-07-20 RX ORDER — GABAPENTIN 300 MG/1
300 CAPSULE ORAL AT BEDTIME
Qty: 30 CAPSULE | Refills: 3 | Status: SHIPPED | OUTPATIENT
Start: 2021-07-20 | End: 2023-02-13

## 2021-07-20 NOTE — TELEPHONE ENCOUNTER
Ok, we can start gabapentin 300mg at night and titrate up to tid if needed.    Still need to clarify follow up with cardiology as they requested or vascular surgery as I recommended and ordered last time I saw her.

## 2021-07-20 NOTE — TELEPHONE ENCOUNTER
Patients daughter was notified of prescription sent to pharmacy. They would like to see cardiology and not a vascular surgeon.     Dunia Kathleen LPN on 7/20/2021 at 4:23 PM

## 2021-08-02 ENCOUNTER — MYC MEDICAL ADVICE (OUTPATIENT)
Dept: FAMILY MEDICINE | Facility: OTHER | Age: 86
End: 2021-08-02

## 2021-08-03 ENCOUNTER — MYC MEDICAL ADVICE (OUTPATIENT)
Dept: FAMILY MEDICINE | Facility: OTHER | Age: 86
End: 2021-08-03

## 2021-08-04 ENCOUNTER — MYC MEDICAL ADVICE (OUTPATIENT)
Dept: FAMILY MEDICINE | Facility: OTHER | Age: 86
End: 2021-08-04

## 2021-08-04 DIAGNOSIS — M16.12 PRIMARY OSTEOARTHRITIS OF LEFT HIP: Primary | ICD-10-CM

## 2021-08-05 ENCOUNTER — MYC MEDICAL ADVICE (OUTPATIENT)
Dept: FAMILY MEDICINE | Facility: OTHER | Age: 86
End: 2021-08-05

## 2021-08-05 RX ORDER — TRAMADOL HYDROCHLORIDE 50 MG/1
50 TABLET ORAL EVERY 6 HOURS PRN
Qty: 10 TABLET | Refills: 0 | Status: SHIPPED | OUTPATIENT
Start: 2021-08-05 | End: 2021-08-24

## 2021-08-19 ENCOUNTER — MYC MEDICAL ADVICE (OUTPATIENT)
Dept: ORTHOPEDICS | Facility: OTHER | Age: 86
End: 2021-08-19

## 2021-08-19 ENCOUNTER — MYC MEDICAL ADVICE (OUTPATIENT)
Dept: FAMILY MEDICINE | Facility: OTHER | Age: 86
End: 2021-08-19

## 2021-08-23 DIAGNOSIS — M16.12 PRIMARY OSTEOARTHRITIS OF LEFT HIP: ICD-10-CM

## 2021-08-24 RX ORDER — TRAMADOL HYDROCHLORIDE 50 MG/1
TABLET ORAL
Qty: 10 TABLET | Refills: 0 | Status: SHIPPED | OUTPATIENT
Start: 2021-08-24 | End: 2021-09-03

## 2021-08-24 NOTE — TELEPHONE ENCOUNTER
[facility] sent Rx request for the following:      Requested Prescriptions   Pending Prescriptions Disp Refills     traMADol (ULTRAM) 50 MG tablet [Pharmacy Med Name: tramadol 50 mg tablet] 10 tablet 0     Sig: TAKE 1 TABLET BY MOUTH EVERY 6 HOURS AS NEEDED for SEVERE pain   Last Prescription Date:   8/5/21  Last Fill Qty/Refills:         10, R-0 (End: 8/8/21)    Last Office Visit:              6/15/21  Future Office visit:           None  Routing refill request to provider for review/approval because:  Drug not on the [facility], [facility] or [facility] refill protocol or controlled substance    In clinical absence of patient's primary, Sharath Dinero, patient is requesting that this message be sent to the covering provider for consideration please.  He is scheduled to return Friday 8/27/21.    Unable to complete prescription refill per RN Medication Refill Policy. Saumya Urbina RN .............. 8/24/2021  10:56 AM

## 2021-08-24 NOTE — TELEPHONE ENCOUNTER
Upon review, there is a refill message for tramadol opened on 8/23/21.  I will close this duplicate message.  Jimena Eduardo CMA ([facility])

## 2021-09-02 DIAGNOSIS — I50.22 CHRONIC SYSTOLIC HEART FAILURE (H): ICD-10-CM

## 2021-09-02 DIAGNOSIS — M16.12 PRIMARY OSTEOARTHRITIS OF LEFT HIP: ICD-10-CM

## 2021-09-03 ENCOUNTER — MYC MEDICAL ADVICE (OUTPATIENT)
Dept: CARDIOLOGY | Facility: OTHER | Age: 86
End: 2021-09-03

## 2021-09-03 RX ORDER — TRAMADOL HYDROCHLORIDE 50 MG/1
TABLET ORAL
Qty: 30 TABLET | Refills: 0 | Status: SHIPPED | OUTPATIENT
Start: 2021-09-03 | End: 2021-11-04

## 2021-09-07 RX ORDER — FUROSEMIDE 20 MG
40 TABLET ORAL DAILY
Qty: 180 TABLET | Refills: 3 | Status: SHIPPED | OUTPATIENT
Start: 2021-09-07 | End: 2022-07-15

## 2021-09-07 NOTE — TELEPHONE ENCOUNTER
[facility] sent Rx request for the following:      Requested Prescriptions   Pending Prescriptions Disp Refills     furosemide (LASIX) 20 MG tablet [Pharmacy Med Name: furosemide 20 mg tablet] 180 tablet 0     Sig: Take 2 tablets (40 mg) by mouth daily   Last Prescription Date:   6/2/21  Last Fill Qty/Refills:         180, R-0    Last Office Visit:              6/15/21   Future Office visit:           None    Unable to complete prescription refill per RN Medication Refill Policy. Saumya Urbina RN .............. 9/7/2021  2:53 PM

## 2021-09-08 DIAGNOSIS — R10.32 LEFT GROIN PAIN: Primary | ICD-10-CM

## 2021-09-08 NOTE — PROGRESS NOTES
Telephone conversation with patient and she continues to have hip pain in spite of injection. Patient has an upcoming cardiology test to evaluate if she is a good candidate for surgery. We plan to set her up for another injection and PT referral. If pain continues to escalate we plan to update xrays and proceed forward with hip replacement

## 2021-09-15 ENCOUNTER — MYC MEDICAL ADVICE (OUTPATIENT)
Dept: FAMILY MEDICINE | Facility: OTHER | Age: 86
End: 2021-09-15

## 2021-09-17 NOTE — TELEPHONE ENCOUNTER
There is an order for 10/1/2021 for another injection. Last one done 7/8/2021. How soon can she have another injection?    Rody Abdul, CMA on 9/17/2021 at 9:51 AM

## 2021-09-25 DIAGNOSIS — I48.20 CHRONIC ATRIAL FIBRILLATION (H): ICD-10-CM

## 2021-09-25 DIAGNOSIS — I50.22 CHRONIC SYSTOLIC HEART FAILURE (H): ICD-10-CM

## 2021-09-25 DIAGNOSIS — I48.91 ATRIAL FIBRILLATION WITH RVR (H): ICD-10-CM

## 2021-09-25 DIAGNOSIS — I71.21 ASCENDING AORTIC ANEURYSM (H): ICD-10-CM

## 2021-09-27 ENCOUNTER — MYC MEDICAL ADVICE (OUTPATIENT)
Dept: FAMILY MEDICINE | Facility: OTHER | Age: 86
End: 2021-09-27

## 2021-09-28 RX ORDER — METOPROLOL SUCCINATE 25 MG/1
25 TABLET, EXTENDED RELEASE ORAL AT BEDTIME
Qty: 90 TABLET | Refills: 0 | Status: SHIPPED | OUTPATIENT
Start: 2021-09-28 | End: 2021-10-26

## 2021-09-28 NOTE — TELEPHONE ENCOUNTER
"[facility] sent Rx request for the following:   metoprolol succinate ER (TOPROL-XL) 25 MG 24 hr tablet  SigTake 1 tablet (25 mg) by mouth At Bedtime To be taken with 100 mg tablet to equal 125 mg daily.    Last Prescription Date:   06/02/2021  Last Fill Qty/Refills:         90, R-0    Last Office Visit:              06/15/2021 (Bar)   Future Office visit:           None noted   Beta-Blockers Protocol Passed - 9/25/2021  8:02 AM        Passed - Blood pressure under 140/90 in past 12 months     BP Readings from Last 3 Encounters:   06/15/21 110/68   06/02/21 118/72   02/03/21 114/66                 Passed - Patient is age 6 or older        Passed - Recent (12 mo) or future (30 days) visit within the authorizing provider's specialty     Patient has had an office visit with the authorizing provider or a provider within the authorizing providers department within the previous 12 mos or has a future within next 30 days. See \"Patient Info\" tab in inbasket, or \"Choose Columns\" in Meds & Orders section of the refill encounter.              Passed - Medication is active on med list         Prescription approved per [facility] Refill Protocol.  Vika Chong RN ....................  9/28/2021   8:03 AM        "

## 2021-09-28 NOTE — PROGRESS NOTES
[facility] HEART CARE   CARDIOLOGY CONSULT     Merlyn Escamilla   8/22/1933  [id number]    Sharath Dinero     Chief Complaint   Patient presents with     Follow Up     ascending aortic aneurysm          HPI:   Mrs. Escamilla is an 88-year-old female who is being seen by cardiology for an ascending aortic aneurysm.  It was 5.1 cm on 5/1/2020.  Previously, is 4.6 cm on 8/20/2018.     She has been followed for an ascending aortic aneurysm.  She is concerned about left hip pain and arthritis.  Her hip needs to be replaced.  She has had injections previously.  She saw Dr. Morales in February, 2021.  It was felt to try injections then potentially consider replacement in future.  Apparently, the injection worked for a while but her pain is severe.  It has been a big problem for her over the last year.  She has been told that her ascending aortic aneurysm needs to be evaluated/managed prior to consideration of hip surgery.  She states she has seen CT surgery in [place] and been turned down for intervention of this vessel.  She denies any chest pain or chest tightness which would suggest dissection.  She is due for repeat echocardiogram and CTA which was recommended on 9/30/2021.  We discussed symptoms of a dissection and she was told to call an ambulance and go emergently to the ER if they should arise.  If her aneurysm is found to be 5.5 cm or greater, will consider a referral to the [facility].    She has a history of atrial fibrillation.  I believe he was diagnosed in 7/29/2018.  Creatinine clearance on 9/30/2021 was 44.  She should be on Xarelto 15 mg daily.  She has not had any bleeding or bruising issues.  She does not get any palpitations or fluttering in her chest.  She has not had any significant bleeding or bruising.  [id number] score is 3 getting x2 points for age and x1 for gender.    Left hip arthritis.  She is getting injection on 10/1/2021 with Dr. Corbin who is an interventional radiologist.  She is  seeing orthopedics on 10/4/2021 in [place].  She was offered referral to be seen locally but will keep her current appointments.      IMAGING RESULTS:   CTA chest on 5/1/20:  Dilation of the mid ascending aorta to 5.1 x 4.7 cm, previously 4.6 x 4.5 cm.    CTA chest on 8/29/18:  Dilation of the mid ascending aorta to 4.6 x 4.5 cm.    ECHO on 4/28/20:  Left ventricular size is normal. Mildly (EF 45-50%) reduced left ventricular  function is present. Mild diffuse hypokinesis is present.  Right ventricular function, chamber size, wall motion, and thickness are normal.  Mild to moderate mitral insufficiency is present. At least moderate tricuspid  insufficiency is present.  Dilation of the inferior vena cava is present with normal respiratory variation in diameter. No pericardial effusion is present.  Ascending aorta 5.1 cm.  Compared to prior study (6/3/2019), ascending aorta is mosre dilated (5.1cm).  vs. 4.8 cm). Otherwise no significant change.    Stress test on 9/14/21:  1. No evidence of stress-induced ischemia.  2. Ejection fraction is 49%.    Zio patch on 8/30/18:  The patient was monitored for 13 days, 2 hours.   Minimum HR was 56, average HR was 91, maximum HR was 165.   There were 2 triggered events.   During the patient triggered events, the rhythm was atrial fibrillation, ventricular ectopic beats.   There were 0 supraventricular ectopic beats.   There were rare ventricular ectopic beats, longest consisted of 2 beats.   Atrial fibrillation present 100%.   x1 pause lasting 3.4 seconds at 9:17 AM on 9/11/18.       CURRENT MEDICATIONS:   Prior to Admission medications    Medication Sig Start Date End Date Taking? Authorizing Provider   digoxin (DIGITEK) 125 MCG tablet Take 1 tablet (125 mcg) by mouth daily 6/2/21   Vilma Gonzalez DO   furosemide (LASIX) 20 MG tablet Take 2 tablets (40 mg) by mouth daily 9/7/21   Sharath Dinero   gabapentin (NEURONTIN) 300 MG capsule Take 1 capsule (300 mg) by mouth At Bedtime  7/20/21   Sharath Dinero   lisinopril (ZESTRIL) 5 MG tablet Take 1 tablet (5 mg) by mouth daily 6/2/21   Vilma Gonzalez DO   metoprolol succinate ER (TOPROL-XL) 100 MG 24 hr tablet Take 1 tablet (100 mg) by mouth At Bedtime To be taken with 25 mg tablet to equal 125 mg daily. 6/2/21   Vilma Gonzalez DO   metoprolol succinate ER (TOPROL-XL) 25 MG 24 hr tablet Take 1 tablet (25 mg) by mouth At Bedtime To be taken with 100 mg tablet to equal 125 mg daily. 9/28/21   Vilma Gonzalez DO   Multiple Vitamin (MULTI-VITAMINS) TABS Take 1 tablet by mouth daily 7/28/14   Reported, Patient   rivaroxaban ANTICOAGULANT (XARELTO ANTICOAGULANT) 20 MG TABS tablet Take 1 tablet (20 mg) by mouth daily (with dinner) 6/2/21   Vilma Gonzalez DO   traMADol (ULTRAM) 50 MG tablet TAKE 1 TABLET BY MOUTH EVERY 6 HOURS AS NEEDED for SEVERE pain 9/3/21   Joselo Blount MD       ALLERGIES:   Allergies   Allergen Reactions     Morphine      Other reaction(s): Muscle Weakness     Phenylbutazone Unknown        PAST MEDICAL HISTORY:   Past Medical History:   Diagnosis Date     Carpal tunnel syndrome of left wrist     10/4/2017     Carpal tunnel syndrome of right wrist     10/4/2017     Deep phlebothrombosis during pregnancy     No Comments Provided     Osteoarthritis     Knee     Other specified postprocedural states     10/4/2017     Other specified postprocedural states     1/31/2018     Presence of artificial knee joint     10/3/2014        PAST SURGICAL HISTORY:   Past Surgical History:   Procedure Laterality Date     APPENDECTOMY OPEN      No Comments Provided     C TOTAL KNEE ARTHROPLASTY Right 10/01/2014    Dr Domingo     CARPAL TUNNEL RELEASE RT/LT Left 01/31/2018 01/31/2018,[id number],OTHER,Left     CARPAL TUNNEL RELEASE RT/LT Right 10/04/2017     COLONOSCOPY  09/2014    Arteriovenous malformations of right, transverse and splenic flexure     ENDOSCOPIC STRIPPING VEIN(S)      No Comments Provided     HYSTERECTOMY TOTAL  ABDOMINAL  1987    with squamous metaplasia and leiomoma        FAMILY HISTORY:   Family History   Problem Relation Age of Onset     Heart Disease Mother         Heart Disease     Heart Disease Father         Heart Disease     Heart Disease Brother      Heart Disease Brother      Heart Disease Brother      Heart Disease Brother      Brain Cancer Sister      Heart Disease Sister         SOCIAL HISTORY:   Social History     Socioeconomic History     Marital status:      Spouse name: Not on file     Number of children: Not on file     Years of education: Not on file     Highest education level: Not on file   Occupational History     Not on file   Tobacco Use     Smoking status: Never Smoker     Smokeless tobacco: Never Used   Substance and Sexual Activity     Alcohol use: Yes     Alcohol/week: 5.0 standard drinks     Drug use: No     Comment: Drug use: No     Sexual activity: Not Currently   Other Topics Concern     Parent/sibling w/ CABG, MI or angioplasty before 65F 55M? Not Asked   Social History Narrative    Lives alone.      2017.     3 daughters, 1 in [facility], 1 in [place] and 1 near [place]. Lost 1 daughter at age 2 years.    Has 1 living sister, fraternal twin. All other siblings are .      Social Determinants of Health     Financial Resource Strain:      Difficulty of Paying Living Expenses:    Food Insecurity:      Worried About Running Out of Food in the Last Year:      Ran Out of Food in the Last Year:    Transportation Needs:      Lack of Transportation (Medical):      Lack of Transportation (Non-Medical):    Physical Activity:      Days of Exercise per Week:      Minutes of Exercise per Session:    Stress:      Feeling of Stress :    Social Connections:      Frequency of Communication with Friends and Family:      Frequency of Social Gatherings with Friends and Family:      Attends Confucianism Services:      Active Member of Clubs or Organizations:      Attends Club or  Organization Meetings:      Marital Status:    Intimate Partner Violence:      Fear of Current or Ex-Partner:      Emotionally Abused:      Physically Abused:      Sexually Abused:           ROS:   CONSTITUTIONAL: No weight loss, fever, chills, but admits to weakness and fatigue.   HEENT: Eyes: No visual changes. Ears, Nose, Throat: No hearing loss, congestion or difficulty swallowing.   CARDIOVASCULAR: No chest pain, chest pressure or chest discomfort. No palpitations but has some mild lower extremity edema.   RESPIRATORY: No shortness of breath, dyspnea upon exertion, cough or sputum production.   GASTROINTESTINAL: No abdominal pain. No anorexia, nausea, vomiting or diarrhea.   NEUROLOGICAL: No headache, lightheadedness, dizziness, syncope, ataxia admits to generalized weakness.   HEMATOLOGIC: No anemia, bleeding or bruising.   PSYCHIATRIC: No history of depression or anxiety.   ENDOCRINOLOGIC: No reports of sweating, cold or heat intolerance. No polyuria or polydipsia.   SKIN: No abnormal rashes or itching.       PHYSICAL EXAM:   GENERAL: The patient is a well-developed, well-nourished, in no apparent distress. Alert and oriented x3.   HEENT: Head is normocephalic and atraumatic. Eyes are symmetrical with normal visual tracking.  HEART: Irregular rate irregular rhythm, S1S2 present without murmur, rub or gallop.   LUNGS: Respirations regular and unlabored. Clear to auscultation.   EXTREMITIES: Minimal peripheral edema present.   NEUROLOGIC: Alert and oriented X3.    SKIN: No jaundice. No rashes or visible skin lesions present.        LAB RESULTS:   No visits with results within 2 Month(s) from this visit.   Latest known visit with results is:   Office Visit on 06/02/2021   Component Date Value Ref Range Status     Digoxin Level 06/02/2021 0.7* 0.8 - 2.0 ug/L Final     Sodium 06/02/2021 138  134 - 144 mmol/L Final     Potassium 06/02/2021 4.1  3.5 - 5.1 mmol/L Final     Chloride 06/02/2021 101  98 - 107 mmol/L  Final     Carbon Dioxide 06/02/2021 28  21 - 31 mmol/L Final     Anion Gap 06/02/2021 9  3 - 14 mmol/L Final     Glucose 06/02/2021 104  70 - 105 mg/dL Final     Urea Nitrogen 06/02/2021 17  7 - 25 mg/dL Final     Creatinine 06/02/2021 0.76  0.60 - 1.20 mg/dL Final     GFR Estimate 06/02/2021 72  >60 mL/min/[1.73_m2] Final     GFR Estimate If Black 06/02/2021 87  >60 mL/min/[1.73_m2] Final     Calcium 06/02/2021 9.6  8.6 - 10.3 mg/dL Final     Bilirubin Total 06/02/2021 0.5  0.3 - 1.0 mg/dL Final     Albumin 06/02/2021 3.7  3.5 - 5.7 g/dL Final     Protein Total 06/02/2021 7.2  6.4 - 8.9 g/dL Final     Alkaline Phosphatase 06/02/2021 84  34 - 104 U/L Final     ALT 06/02/2021 7  7 - 52 U/L Final     AST 06/02/2021 14  13 - 39 U/L Final     WBC 06/02/2021 6.3  4.0 - 11.0 10e9/L Final     RBC Count 06/02/2021 4.22  3.8 - 5.2 10e12/L Final     Hemoglobin 06/02/2021 11.7  11.7 - 15.7 g/dL Final     Hematocrit 06/02/2021 37.2  35.0 - 47.0 % Final     MCV 06/02/2021 88  78 - 100 fl Final     MCH 06/02/2021 27.7  26.5 - 33.0 pg Final     MCHC 06/02/2021 31.5  31.5 - 36.5 g/dL Final     RDW 06/02/2021 17.2* 10.0 - 15.0 % Final     Platelet Count 06/02/2021 269  150 - 450 10e9/L Final     Diff Method 06/02/2021 Automated Method   Final     % Neutrophils 06/02/2021 71.0  % Final     % Lymphocytes 06/02/2021 20.7  % Final     % Monocytes 06/02/2021 6.6  % Final     % Eosinophils 06/02/2021 0.6  % Final     % Basophils 06/02/2021 0.9  % Final     % Immature Granulocytes 06/02/2021 0.2  % Final     Absolute Neutrophil 06/02/2021 4.5  1.6 - 8.3 10e9/L Final     Absolute Lymphocytes 06/02/2021 1.3  0.8 - 5.3 10e9/L Final     Absolute Monocytes 06/02/2021 0.4  0.0 - 1.3 10e9/L Final     Absolute Eosinophils 06/02/2021 0.0  0.0 - 0.7 10e9/L Final     Absolute Basophils 06/02/2021 0.1  0.0 - 0.2 10e9/L Final     Abs Immature Granulocytes 06/02/2021 0.0  0 - 0.4 10e9/L Final          ASSESSMENT:       ICD-10-CM    1. Ascending aortic  aneurysm (H)  I71.2 Echocardiogram Complete     CTA Chest with Contrast     Adult Cardiology Eval Referral     EKG 12-lead, tracing only   2. Thoracic aortic aneurysm without rupture (H)  I71.2 Echocardiogram Complete     CTA Chest with Contrast   3. Chronic systolic heart failure (H)  I50.22 Echocardiogram Complete   4. Longstanding persistent atrial fibrillation (H)  I48.11 rivaroxaban ANTICOAGULANT (XARELTO ANTICOAGULANT) 15 MG TABS tablet   5. Nodule of right lung  R91.1    6. Moderate tricuspid regurgitation  I07.1 Echocardiogram Complete   7. Chronic atrial fibrillation (H)  I48.20 rivaroxaban ANTICOAGULANT (XARELTO ANTICOAGULANT) 15 MG TABS tablet         PLAN:   1.  Ascending aortic aneurysm: Measured at 4.6 cm on 8/20/2018 and at 5.1 cm on 5/1/2020.  Will need an updated CTA of chest as well as echocardiogram to assess the size.  If 5.5 cm or greater, will discuss the need for ascending aortic aneurysm replacement.  2.  Persistent atrial fibrillation: Diagnosed on 7/20/2018.  Currently on metoprolol, digoxin, and Xarelto.  She is asymptomatic.  Heart rate controlled today.  EKG today shows atrial fibrillation at a rate of 66 bpm.  [id number] score of 3.  Getting a point for female gender and x2 for age.  Digoxin level on 6/2/2021 of 0.7.  3.  CHF: EF of 40-45%.  We will plan for a repeat echocardiogram.  She has minimal swelling.  Continue Lasix 40 mg daily, metoprolol 125 mg XL daily, and lisinopril 5 mg daily.  Ideally, would increase the CH F medications.  Blood pressures have been sof in the 100s to 110s systolically.  4.  Left hip pain: She plans for injection with Dr. oCrbin on 10/1/2021.  She is to see a orthopedic surgeon in [place] on 10/4/2021.  5.  Follow-up after completion of testing.        Thank you for allowing me to participate in the care of your patient. Please do not hesitate to contact me if you have any questions.     Sharath Ingram, DO

## 2021-09-30 ENCOUNTER — OFFICE VISIT (OUTPATIENT)
Dept: CARDIOLOGY | Facility: OTHER | Age: 86
End: 2021-09-30
Attending: FAMILY MEDICINE
Payer: COMMERCIAL

## 2021-09-30 VITALS
RESPIRATION RATE: 18 BRPM | SYSTOLIC BLOOD PRESSURE: 100 MMHG | DIASTOLIC BLOOD PRESSURE: 52 MMHG | HEIGHT: 60 IN | OXYGEN SATURATION: 98 % | BODY MASS INDEX: 23.75 KG/M2 | TEMPERATURE: 98.7 F | WEIGHT: 121 LBS | HEART RATE: 88 BPM

## 2021-09-30 DIAGNOSIS — I48.20 CHRONIC ATRIAL FIBRILLATION (H): ICD-10-CM

## 2021-09-30 DIAGNOSIS — I71.21 ASCENDING AORTIC ANEURYSM (H): Primary | ICD-10-CM

## 2021-09-30 DIAGNOSIS — I48.11 LONGSTANDING PERSISTENT ATRIAL FIBRILLATION (H): ICD-10-CM

## 2021-09-30 DIAGNOSIS — R91.1 NODULE OF RIGHT LUNG: ICD-10-CM

## 2021-09-30 DIAGNOSIS — I07.1 MODERATE TRICUSPID REGURGITATION: ICD-10-CM

## 2021-09-30 DIAGNOSIS — I71.20 THORACIC AORTIC ANEURYSM WITHOUT RUPTURE (H): ICD-10-CM

## 2021-09-30 DIAGNOSIS — I50.22 CHRONIC SYSTOLIC HEART FAILURE (H): ICD-10-CM

## 2021-09-30 LAB
ATRIAL RATE - MUSE: 80 BPM
DIASTOLIC BLOOD PRESSURE - MUSE: NORMAL MMHG
INTERPRETATION ECG - MUSE: NORMAL
P AXIS - MUSE: NORMAL DEGREES
PR INTERVAL - MUSE: NORMAL MS
QRS DURATION - MUSE: 92 MS
QT - MUSE: 380 MS
QTC - MUSE: 398 MS
R AXIS - MUSE: -13 DEGREES
SYSTOLIC BLOOD PRESSURE - MUSE: NORMAL MMHG
T AXIS - MUSE: 1 DEGREES
VENTRICULAR RATE- MUSE: 66 BPM

## 2021-09-30 PROCEDURE — G0463 HOSPITAL OUTPT CLINIC VISIT: HCPCS | Mod: 25

## 2021-09-30 PROCEDURE — 99204 OFFICE O/P NEW MOD 45 MIN: CPT | Performed by: INTERNAL MEDICINE

## 2021-09-30 PROCEDURE — 93010 ELECTROCARDIOGRAM REPORT: CPT | Performed by: INTERNAL MEDICINE

## 2021-09-30 PROCEDURE — 93005 ELECTROCARDIOGRAM TRACING: CPT | Performed by: INTERNAL MEDICINE

## 2021-09-30 PROCEDURE — G0463 HOSPITAL OUTPT CLINIC VISIT: HCPCS

## 2021-09-30 ASSESSMENT — PAIN SCALES - GENERAL: PAINLEVEL: SEVERE PAIN (6)

## 2021-09-30 ASSESSMENT — MIFFLIN-ST. JEOR: SCORE: 904.1

## 2021-09-30 NOTE — PATIENT INSTRUCTIONS
You were seen by  Sharath Ingram, DO     1. Echocardiogram has been ordered. You will be called to schedule this.  You will receive instructions for testing at that time.  You will be contacted with results.     2. A CT of the chest has been ordered. You will be called to schedule this. You will receive instructions for testing at that time. You will be contacted with results.      You will follow up with [facility] after testing is done, sooner if needed.     Please call the cardiology office with problems, questions, or concerns at [phone].    If you experience chest pain, chest pressure, chest tightness, shortness of breath, fainting, lightheadedness, nausea, vomiting, or other concerning symptoms, please report to the Emergency Department or call 911. These symptoms may be emergent, and best treated in the Emergency Department.     Cardiology Nurses  MINOR Kee, MINOR Voss, LPN  GUILHERME Mena  [facility] (Unit 3C)  [phone]

## 2021-10-01 ENCOUNTER — HOSPITAL ENCOUNTER (OUTPATIENT)
Dept: GENERAL RADIOLOGY | Facility: OTHER | Age: 86
Discharge: HOME | End: 2021-10-01
Attending: ORTHOPAEDIC SURGERY | Admitting: ORTHOPAEDIC SURGERY
Payer: MEDICARE

## 2021-10-01 DIAGNOSIS — R10.32 LEFT GROIN PAIN: ICD-10-CM

## 2021-10-01 PROCEDURE — 250N000009 HC RX 250: Performed by: RADIOLOGY

## 2021-10-01 PROCEDURE — 250N000011 HC RX IP 250 OP 636: Performed by: RADIOLOGY

## 2021-10-01 PROCEDURE — 20610 DRAIN/INJ JOINT/BURSA W/O US: CPT | Mod: LT

## 2021-10-01 PROCEDURE — 255N000002 HC RX 255 OP 636: Performed by: RADIOLOGY

## 2021-10-01 RX ORDER — BUPIVACAINE HYDROCHLORIDE 5 MG/ML
3 INJECTION, SOLUTION EPIDURAL; INTRACAUDAL ONCE
Status: COMPLETED | OUTPATIENT
Start: 2021-10-01 | End: 2021-10-01

## 2021-10-01 RX ORDER — TRIAMCINOLONE ACETONIDE 40 MG/ML
40 INJECTION, SUSPENSION INTRA-ARTICULAR; INTRAMUSCULAR ONCE
Status: COMPLETED | OUTPATIENT
Start: 2021-10-01 | End: 2021-10-01

## 2021-10-01 RX ORDER — LIDOCAINE HYDROCHLORIDE 10 MG/ML
2 INJECTION, SOLUTION INFILTRATION; PERINEURAL ONCE
Status: COMPLETED | OUTPATIENT
Start: 2021-10-01 | End: 2021-10-01

## 2021-10-01 RX ADMIN — LIDOCAINE HYDROCHLORIDE 2 ML: 10 INJECTION, SOLUTION INFILTRATION; PERINEURAL at 15:18

## 2021-10-01 RX ADMIN — BUPIVACAINE HYDROCHLORIDE 3 ML: 5 INJECTION, SOLUTION EPIDURAL; INTRACAUDAL; PERINEURAL at 15:18

## 2021-10-01 RX ADMIN — IOHEXOL 2 ML: 240 INJECTION, SOLUTION INTRATHECAL; INTRAVASCULAR; INTRAVENOUS; ORAL at 15:17

## 2021-10-01 RX ADMIN — TRIAMCINOLONE ACETONIDE 40 MG: 40 INJECTION, SUSPENSION INTRA-ARTICULAR; INTRAMUSCULAR at 15:19

## 2021-10-04 ENCOUNTER — TRANSFERRED RECORDS (OUTPATIENT)
Dept: HEALTH INFORMATION MANAGEMENT | Facility: OTHER | Age: 86
End: 2021-10-04

## 2021-10-08 ENCOUNTER — MYC MEDICAL ADVICE (OUTPATIENT)
Dept: FAMILY MEDICINE | Facility: OTHER | Age: 86
End: 2021-10-08

## 2021-10-09 ENCOUNTER — HEALTH MAINTENANCE LETTER (OUTPATIENT)
Age: 86
End: 2021-10-09

## 2021-10-12 NOTE — TELEPHONE ENCOUNTER
Contacted Tere[facility] and scheduled pre op appointment for 11/22/21.    Dunia Kathleen LPN on 10/12/2021 at 8:37 AM

## 2021-10-14 DIAGNOSIS — I48.11 LONGSTANDING PERSISTENT ATRIAL FIBRILLATION (H): ICD-10-CM

## 2021-10-14 DIAGNOSIS — I71.20 THORACIC AORTIC ANEURYSM WITHOUT RUPTURE (H): ICD-10-CM

## 2021-10-14 DIAGNOSIS — I07.1 MODERATE TRICUSPID REGURGITATION: ICD-10-CM

## 2021-10-14 DIAGNOSIS — I50.22 CHRONIC SYSTOLIC HEART FAILURE (H): Primary | ICD-10-CM

## 2021-10-18 ENCOUNTER — TELEPHONE (OUTPATIENT)
Dept: CARDIOLOGY | Facility: OTHER | Age: 86
End: 2021-10-18

## 2021-10-18 DIAGNOSIS — I48.20 CHRONIC ATRIAL FIBRILLATION (H): ICD-10-CM

## 2021-10-18 DIAGNOSIS — I48.91 ATRIAL FIBRILLATION WITH RVR (H): ICD-10-CM

## 2021-10-18 DIAGNOSIS — I50.22 CHRONIC SYSTOLIC HEART FAILURE (H): ICD-10-CM

## 2021-10-18 DIAGNOSIS — I71.21 ASCENDING AORTIC ANEURYSM (H): ICD-10-CM

## 2021-10-19 ENCOUNTER — HOSPITAL ENCOUNTER (OUTPATIENT)
Dept: CT IMAGING | Facility: OTHER | Age: 86
End: 2021-10-19
Attending: INTERNAL MEDICINE
Payer: MEDICARE

## 2021-10-19 ENCOUNTER — HOSPITAL ENCOUNTER (OUTPATIENT)
Dept: CARDIOLOGY | Facility: OTHER | Age: 86
End: 2021-10-19
Attending: INTERNAL MEDICINE
Payer: MEDICARE

## 2021-10-19 ENCOUNTER — LAB (OUTPATIENT)
Dept: LAB | Facility: OTHER | Age: 86
End: 2021-10-19
Attending: FAMILY MEDICINE
Payer: MEDICARE

## 2021-10-19 VITALS
DIASTOLIC BLOOD PRESSURE: 70 MMHG | SYSTOLIC BLOOD PRESSURE: 117 MMHG | OXYGEN SATURATION: 98 % | RESPIRATION RATE: 12 BRPM | HEART RATE: 66 BPM

## 2021-10-19 DIAGNOSIS — I71.21 ASCENDING AORTIC ANEURYSM (H): ICD-10-CM

## 2021-10-19 DIAGNOSIS — I07.1 MODERATE TRICUSPID REGURGITATION: ICD-10-CM

## 2021-10-19 DIAGNOSIS — I50.22 CHRONIC SYSTOLIC HEART FAILURE (H): ICD-10-CM

## 2021-10-19 DIAGNOSIS — I71.20 THORACIC AORTIC ANEURYSM WITHOUT RUPTURE (H): ICD-10-CM

## 2021-10-19 DIAGNOSIS — I48.11 LONGSTANDING PERSISTENT ATRIAL FIBRILLATION (H): ICD-10-CM

## 2021-10-19 LAB
ANION GAP SERPL CALCULATED.3IONS-SCNC: 7 MMOL/L (ref 3–14)
BUN SERPL-MCNC: 20 MG/DL (ref 7–25)
CALCIUM SERPL-MCNC: 9.5 MG/DL (ref 8.6–10.3)
CHLORIDE BLD-SCNC: 103 MMOL/L (ref 98–107)
CO2 SERPL-SCNC: 31 MMOL/L (ref 21–31)
CREAT SERPL-MCNC: 0.71 MG/DL (ref 0.6–1.2)
DIGOXIN SERPL-MCNC: 0.5 UG/L
ERYTHROCYTE [DISTWIDTH] IN BLOOD BY AUTOMATED COUNT: 15.5 % (ref 10–15)
GFR SERPL CREATININE-BSD FRML MDRD: 76 ML/MIN/1.73M2
GLUCOSE BLD-MCNC: 108 MG/DL (ref 70–105)
HCT VFR BLD AUTO: 39.9 % (ref 35–47)
HGB BLD-MCNC: 12.5 G/DL (ref 11.7–15.7)
LVEF ECHO: NORMAL
MCH RBC QN AUTO: 28.7 PG (ref 26.5–33)
MCHC RBC AUTO-ENTMCNC: 31.3 G/DL (ref 31.5–36.5)
MCV RBC AUTO: 92 FL (ref 78–100)
PLATELET # BLD AUTO: 228 10E3/UL (ref 150–450)
POTASSIUM BLD-SCNC: 4.2 MMOL/L (ref 3.5–5.1)
RBC # BLD AUTO: 4.36 10E6/UL (ref 3.8–5.2)
SODIUM SERPL-SCNC: 141 MMOL/L (ref 134–144)
WBC # BLD AUTO: 6.8 10E3/UL (ref 4–11)

## 2021-10-19 PROCEDURE — 250N000013 HC RX MED GY IP 250 OP 250 PS 637: Performed by: RADIOLOGY

## 2021-10-19 PROCEDURE — 250N000011 HC RX IP 250 OP 636: Performed by: INTERNAL MEDICINE

## 2021-10-19 PROCEDURE — 80162 ASSAY OF DIGOXIN TOTAL: CPT | Mod: ZL

## 2021-10-19 PROCEDURE — 36415 COLL VENOUS BLD VENIPUNCTURE: CPT | Mod: ZL

## 2021-10-19 PROCEDURE — 80048 BASIC METABOLIC PNL TOTAL CA: CPT | Mod: ZL

## 2021-10-19 PROCEDURE — 93306 TTE W/DOPPLER COMPLETE: CPT | Mod: 26 | Performed by: INTERNAL MEDICINE

## 2021-10-19 PROCEDURE — 85014 HEMATOCRIT: CPT | Mod: ZL

## 2021-10-19 PROCEDURE — 93306 TTE W/DOPPLER COMPLETE: CPT

## 2021-10-19 PROCEDURE — 76377 3D RENDER W/INTRP POSTPROCES: CPT

## 2021-10-19 RX ORDER — METOPROLOL TARTRATE 50 MG
50 TABLET ORAL ONCE
Status: COMPLETED | OUTPATIENT
Start: 2021-10-19 | End: 2021-10-19

## 2021-10-19 RX ORDER — IOPAMIDOL 755 MG/ML
100 INJECTION, SOLUTION INTRAVASCULAR ONCE
Status: COMPLETED | OUTPATIENT
Start: 2021-10-19 | End: 2021-10-19

## 2021-10-19 RX ORDER — METOPROLOL TARTRATE 1 MG/ML
10 INJECTION, SOLUTION INTRAVENOUS
Status: DISCONTINUED | OUTPATIENT
Start: 2021-10-19 | End: 2021-10-20 | Stop reason: HOSPADM

## 2021-10-19 RX ADMIN — METOPROLOL TARTRATE 50 MG: 50 TABLET, FILM COATED ORAL at 11:16

## 2021-10-19 RX ADMIN — IOPAMIDOL 100 ML: 755 INJECTION, SOLUTION INTRAVENOUS at 12:23

## 2021-10-19 NOTE — PROGRESS NOTES
11:20:   Patient arrived for a CTA of the chest. Diagnosis of AAA. They were connected to a cardiac monitor and vital signs were obtained. The patient's heart rate was 79 A Fib /70 . Medications and allergies were reviewed. She was administered the metoprolol 50 mg. The procedure was explained, and the patient was instructed to rest and relax, as much as possible. A saline lock was established. The patient's heart rate 66. The patient was offered the restroom, and then they were wheeled to CT in a wheelchair.  The cardiac monitor was placed there, and CTA of the chest was performed per procedure. The patient was given one bottle of water, and they were told to  Drink at least 3 other additional glasses of water today, per post contrast instructions.  The patient was instructed that the ordering MD will call with results in one to two days with test results. The patient left ambulatory in stable condition.

## 2021-10-20 DIAGNOSIS — I50.22 CHRONIC SYSTOLIC HEART FAILURE (H): ICD-10-CM

## 2021-10-20 DIAGNOSIS — I71.21 ASCENDING AORTIC ANEURYSM (H): ICD-10-CM

## 2021-10-20 DIAGNOSIS — I71.20 THORACIC AORTIC ANEURYSM WITHOUT RUPTURE (H): Primary | ICD-10-CM

## 2021-10-20 DIAGNOSIS — I07.1 MODERATE TRICUSPID REGURGITATION: ICD-10-CM

## 2021-10-20 RX ORDER — METOPROLOL SUCCINATE 100 MG/1
100 TABLET, EXTENDED RELEASE ORAL AT BEDTIME
Qty: 90 TABLET | Refills: 0 | Status: SHIPPED | OUTPATIENT
Start: 2021-10-20 | End: 2022-01-17

## 2021-10-20 RX ORDER — LISINOPRIL 5 MG/1
5 TABLET ORAL DAILY
Qty: 90 TABLET | Refills: 0 | Status: SHIPPED | OUTPATIENT
Start: 2021-10-20 | End: 2022-01-17

## 2021-10-20 RX ORDER — METOPROLOL SUCCINATE 25 MG/1
25 TABLET, EXTENDED RELEASE ORAL AT BEDTIME
Qty: 90 TABLET | Refills: 0 | OUTPATIENT
Start: 2021-10-20

## 2021-10-20 RX ORDER — ROSUVASTATIN CALCIUM 10 MG/1
TABLET, FILM COATED ORAL
Qty: 90 TABLET | Refills: 0 | Status: SHIPPED | OUTPATIENT
Start: 2021-10-20 | End: 2022-01-17

## 2021-10-20 NOTE — TELEPHONE ENCOUNTER
"[facility] sent Rx request for the following:     Requested Prescriptions   Pending Prescriptions Disp Refills     metoprolol succinate ER (TOPROL-XL) 100 MG 24 hr tablet [Pharmacy Med Name: metoprolol succinate  mg tablet,extended release 24 hr] 90 tablet 0     Sig: Take 1 tablet (100 mg) by mouth At Bedtime To be taken with 25 mg tablet to equal 125 mg daily.       Beta-Blockers Protocol Passed - 10/18/2021  8:01 AM        Passed - Blood pressure under 140/90 in past 12 months     BP Readings from Last 3 Encounters:   10/19/21 117/70   09/30/21 100/52   06/15/21 110/68                 Passed - Patient is age 6 or older        Passed - Recent (12 mo) or future (30 days) visit within the authorizing provider's specialty     Patient has had an office visit with the authorizing provider or a provider within the authorizing providers department within the previous 12 mos or has a future within next 30 days. See \"Patient Info\" tab in inbasket, or \"Choose Columns\" in Meds & Orders section of the refill encounter.              Passed - Medication is active on med list           DIGITEK 125 MCG tablet [Pharmacy Med Name: Digitek 125 mcg (0.125 mg) tablet] 90 tablet 0     Sig: Take 1 tablet (125 mcg) by mouth daily       Cardiac Glycoside Agents Protocol Failed - 10/18/2021  8:01 AM        Failed - Normal digoxin level on file in past 12 mos     Recent Labs   Lab Test 10/19/21  1024   DIGOXIN 0.5             Passed - Medication is active on med list        Passed - Patient is 18 years of age or older        Passed - Patient is not pregnant        Passed - Normal serum creatinine on file in past 12 mos     Recent Labs   Lab Test 10/19/21  1024   CR 0.71       Ok to refill medication if creatinine is low          Passed - No positive pregnancy test on file in past 12 mos        Passed - Recent (6 mo) or future (30 days) visit within the authorizing provider's specialty     Patient had office visit in the last 6 " "months or has a visit in the next 30 days with authorizing provider or within the authorizing provider's specialty.  See \"Patient Info\" tab in inbasket, or \"Choose Columns\" in Meds & Orders section of the refill encounter.               lisinopril (ZESTRIL) 5 MG tablet [Pharmacy Med Name: lisinopril 5 mg tablet] 90 tablet 0     Sig: Take 1 tablet (5 mg) by mouth daily       ACE Inhibitors (Including Combos) Protocol Passed - 10/18/2021  8:01 AM        Passed - Blood pressure under 140/90 in past 12 months     BP Readings from Last 3 Encounters:   10/19/21 117/70   09/30/21 100/52   06/15/21 110/68                 Passed - Recent (12 mo) or future (30 days) visit within the authorizing provider's specialty     Patient has had an office visit with the authorizing provider or a provider within the authorizing providers department within the previous 12 mos or has a future within next 30 days. See \"Patient Info\" tab in inLotLinxet, or \"Choose Columns\" in Meds & Orders section of the refill encounter.              Passed - Medication is active on med list        Passed - Patient is age 18 or older        Passed - No active pregnancy on record        Passed - Normal serum creatinine on file in past 12 months     Recent Labs   Lab Test 10/19/21  1024   CR 0.71       Ok to refill medication if creatinine is low          Passed - Normal serum potassium on file in past 12 months     Recent Labs   Lab Test 10/19/21  1024   POTASSIUM 4.2             Passed - No positive pregnancy test within past 12 months           metoprolol succinate ER (TOPROL-XL) 25 MG 24 hr tablet [Pharmacy Med Name: metoprolol succinate ER 25 mg tablet,extended release 24 hr] 90 tablet 0     Sig: Take 1 tablet (25 mg) by mouth At Bedtime To be taken with 100 mg tablet to equal 125 mg daily.       Beta-Blockers Protocol Passed - 10/18/2021  8:01 AM        Passed - Blood pressure under 140/90 in past 12 months     BP Readings from Last 3 Encounters: " "  10/19/21 117/70   09/30/21 100/52   06/15/21 110/68                 Passed - Patient is age 6 or older        Passed - Recent (12 mo) or future (30 days) visit within the authorizing provider's specialty     Patient has had an office visit with the authorizing provider or a provider within the authorizing providers department within the previous 12 mos or has a future within next 30 days. See \"Patient Info\" tab in inbasket, or \"Choose Columns\" in Meds & Orders section of the refill encounter.              Passed - Medication is active on med list         LOV: 06/15/2021 (Soular)  FOV: 11/22/2021 (Soular)    Prescription approved per [facility] Refill Protocol.  Vika Chong RN ....................  10/20/2021   2:09 PM      "

## 2021-10-26 DIAGNOSIS — I48.91 ATRIAL FIBRILLATION WITH RVR (H): ICD-10-CM

## 2021-10-26 DIAGNOSIS — I50.22 CHRONIC SYSTOLIC HEART FAILURE (H): ICD-10-CM

## 2021-10-26 DIAGNOSIS — I48.20 CHRONIC ATRIAL FIBRILLATION (H): ICD-10-CM

## 2021-10-26 DIAGNOSIS — I71.21 ASCENDING AORTIC ANEURYSM (H): ICD-10-CM

## 2021-10-26 RX ORDER — METOPROLOL SUCCINATE 25 MG/1
25 TABLET, EXTENDED RELEASE ORAL AT BEDTIME
Qty: 90 TABLET | Refills: 0 | Status: SHIPPED | OUTPATIENT
Start: 2021-10-26 | End: 2022-01-17

## 2021-10-26 NOTE — TELEPHONE ENCOUNTER
Patient is out of medication and requesting a refill. Last office visit 9/30/21. Refilling 90 day supply + 0 refills per CPA.     Jefferson Little [facility]

## 2021-11-04 ENCOUNTER — MYC REFILL (OUTPATIENT)
Dept: FAMILY MEDICINE | Facility: OTHER | Age: 86
End: 2021-11-04

## 2021-11-04 DIAGNOSIS — M16.12 PRIMARY OSTEOARTHRITIS OF LEFT HIP: ICD-10-CM

## 2021-11-05 RX ORDER — TRAMADOL HYDROCHLORIDE 50 MG/1
50 TABLET ORAL EVERY 6 HOURS PRN
Qty: 30 TABLET | Refills: 0 | Status: SHIPPED | OUTPATIENT
Start: 2021-11-05 | End: 2023-02-13

## 2021-11-05 RX ORDER — TRAMADOL HYDROCHLORIDE 50 MG/1
TABLET ORAL
Qty: 30 TABLET | Refills: 0 | OUTPATIENT
Start: 2021-11-05

## 2021-11-05 NOTE — TELEPHONE ENCOUNTER
Patient lives alone at home.  Called daughter yesterday to tell her that she is out of Tramadol and is having chronic hip pain.  Tramadol was last filled on 9-3-2021, for 30 tabs, take 1 tab po every 6 hours PRN.  Daughter tried to call the facility for assistance yesterday and was declined by [facility] .      Patient is going to have right hip replaced in [place] on 12-.  Family would like to have a provider consider another refill until surgery is completed.      Trina Daniel LPN 11/5/2021 3:44 PM

## 2021-11-09 ENCOUNTER — OFFICE VISIT (OUTPATIENT)
Dept: CARDIOLOGY | Facility: OTHER | Age: 86
End: 2021-11-09
Attending: INTERNAL MEDICINE
Payer: COMMERCIAL

## 2021-11-09 VITALS
WEIGHT: 123 LBS | HEART RATE: 90 BPM | TEMPERATURE: 98.7 F | RESPIRATION RATE: 18 BRPM | OXYGEN SATURATION: 98 % | DIASTOLIC BLOOD PRESSURE: 64 MMHG | BODY MASS INDEX: 24.15 KG/M2 | HEIGHT: 60 IN | SYSTOLIC BLOOD PRESSURE: 98 MMHG

## 2021-11-09 DIAGNOSIS — I50.22 CHRONIC SYSTOLIC HEART FAILURE (H): ICD-10-CM

## 2021-11-09 DIAGNOSIS — I71.21 ASCENDING AORTIC ANEURYSM (H): ICD-10-CM

## 2021-11-09 DIAGNOSIS — I51.7 ATRIAL ENLARGEMENT, BILATERAL: ICD-10-CM

## 2021-11-09 DIAGNOSIS — I71.20 THORACIC AORTIC ANEURYSM WITHOUT RUPTURE (H): ICD-10-CM

## 2021-11-09 DIAGNOSIS — I48.11 LONGSTANDING PERSISTENT ATRIAL FIBRILLATION (H): Primary | ICD-10-CM

## 2021-11-09 DIAGNOSIS — I07.1 MODERATE TRICUSPID REGURGITATION: ICD-10-CM

## 2021-11-09 PROCEDURE — 99214 OFFICE O/P EST MOD 30 MIN: CPT | Performed by: INTERNAL MEDICINE

## 2021-11-09 PROCEDURE — G0463 HOSPITAL OUTPT CLINIC VISIT: HCPCS

## 2021-11-09 ASSESSMENT — MIFFLIN-ST. JEOR: SCORE: 913.17

## 2021-11-09 ASSESSMENT — PAIN SCALES - GENERAL: PAINLEVEL: SEVERE PAIN (6)

## 2021-11-09 NOTE — PROGRESS NOTES
[facility]   CARDIOLOGY PROGRESS NOTE     Chief Complaint   Patient presents with     Follow Up     tests          Diagnosis:  1.  TAAA 5.1 cm on 10/19/2021. 5.1 cm on 5/1/20.  2.  Thoracic aortic aneurysm.  3.  Systolic heart [id number]-04-14% on 4/28/19. 50-55% on 10/19/21  4.  Persistent atrial fibrillation on digoxin, metoprolol, and Xarelto. New on 7/20/18.  5.  Right lung nodule.  6.  Moderate TR on 10/19/2021.  7.  Severe PARMINDER on 10/19/2021.    Assessment/Plan:    1.  Ascending aortic aneurysm: Measured at 4.6 cm on 8/20/2018 and at 5.1 cm on 5/1/2020.  Stable at 1.5 cm on echo/CTA of chest on 10/19/2021.  Plan for repeat echocardiogram in 1 year. If 5.5 cm or greater, will discuss the need for ascending aortic aneurysm replacement.  2.  Persistent atrial fibrillation: Diagnosed on 7/20/2018. Currently on metoprolol, digoxin, and Xarelto.  She is asymptomatic.  Heart rate controlled today. [id number] score of 3.  Getting a point for female gender and x2 for age.  Digoxin level on 6/2/2021 of 0.7.  3.  CHF: EF of 40-45% on 4/28/2020.  EF of 50-55% in 10/19/2021.  Echo in 1 year. She has minimal swelling.  Continue Lasix 40 mg daily, metoprolol 125 mg XL daily, and lisinopril 5 mg daily.  Ideally, would increase the CHF medications.  Blood pressures have been in the 90's to 110's systolically.  4.  Left hip pain: She plans for injection with Dr. Corbin on 10/1/2021.    Plan for hip [facility] at [facility] on 1214/21.    5.  Follow-up in 1 year after completion of echocardiogram.      Interval history:  Merlyn is here in follow-up.  She had a CTA of her chest as well as echocardiogram.  Both of these imaging showed a stable ascending aortic aneurysm at 5.1 cm.  Previously, CTA of her chest on 5/1/2020 was measured at 5.1 cm.  Echo on 4/28/2020 showed an aneurysm at the same size which was 5.1 cm.  She was told if she has significant chest or ripping pain to her back that she would need to call  ambulance and go to the ER immediately.  We will plan for an echocardiogram in a year to assess size of the vessel.  She understands if it is 5.5 cm she will need surgery.  Related to atrial fibrillation, she has been asymptomatic.  She has not any significant bleeding or bruising on Xarelto.  We will continue digoxin, metoprolol, and Xarelto.  She is hopeful to have COVID-19 vaccine booster today.      HPI:    Mrs. Escamilla is being seen by cardiology for an ascending aortic aneurysm.  It was 5.1 cm on 5/1/2020.  Previously, is 4.6 cm on 8/20/2018.      She has been followed for an ascending aortic aneurysm.  She is concerned about left hip pain and arthritis.  Her hip needs to be replaced.  She has had injections previously.  She saw Dr. Morales in February, 2021.  It was felt to try injections then potentially consider replacement in future.  Apparently, the injection worked for a while but her pain is severe.  It has been a big problem for her over the last year.  She has been told that her ascending aortic aneurysm needs to be evaluated/managed prior to consideration of hip surgery.  She states she has seen CT surgery in [place] and been turned down for intervention of this vessel.  She denies any chest pain or chest tightness which would suggest dissection.  She is due for repeat echocardiogram and CTA which was recommended on 9/30/2021.  We discussed symptoms of a dissection and she was told to call an ambulance and go emergently to the ER if they should arise.  If her aneurysm is found to be 5.5 cm or greater, will consider a referral to the [facility].     She has a history of atrial fibrillation.  I believe he was diagnosed in 7/29/2018.  Creatinine clearance on 9/30/2021 was 44.  She should be on Xarelto 15 mg daily.  She has not had any bleeding or bruising issues.  She does not get any palpitations or fluttering in her chest.  She has not had any significant bleeding or bruising.  [id number]  score is 3 getting x2 points for age and x1 for gender.     Left hip arthritis.  She is getting injection on 10/1/2021 with Dr. Corbin who is an interventional radiologist.  She is seeing orthopedics on 10/4/2021 in [place].  She was offered referral to be seen locally but will keep her current appointments.      Relevant testing:  ECHO on 10/19/21:  Left ventricular function is decreased. The ejection fraction is 50-55% (borderline).  Global right ventricular function is normal.  Moderate tricuspid insufficiency and mild mitral insufficiency is present.  Moderate dilatation of the aorta is present. Ascending aorta 5.1 cm.  IVC diameter and respiratory changes fall into an intermediate range suggesting an RA pressure of 8 mmHg.  No pericardial effusion is present.    CTA chest on 10/19/21:  Dilation of the mid ascending aorta to 5.1 x 4.8 cm,  previously 5.1 x 4.7 and 4.6 x 4.5 cm in 2018.  Cardiomegaly with progressive biatrial dilation when compared to 2018.    CTA chest on 5/1/20:  Dilation of the mid ascending aorta to 5.1 x 4.7 cm, previously 4.6 x 4.5 cm.     CTA chest on 8/29/18:  Dilation of the mid ascending aorta to 4.6 x 4.5 cm.     ECHO on 4/28/20:  Left ventricular size is normal. Mildly (EF 45-50%) reduced left ventricular  function is present. Mild diffuse hypokinesis is present.  Right ventricular function, chamber size, wall motion, and thickness are normal.  Mild to moderate mitral insufficiency is present. At least moderate tricuspid  insufficiency is present.  Dilation of the inferior vena cava is present with normal respiratory variation in diameter. No pericardial effusion is present.  Ascending aorta 5.1 cm.  Compared to prior study (6/3/2019), ascending aorta is more dilated (5.1cm).  vs. 4.8 cm). Otherwise no significant change.     Stress test on 9/14/21:  1. No evidence of stress-induced ischemia.  2. Ejection fraction is 49%.     Zio patch on 8/30/18:  The patient was monitored for 13 days,  2 hours.   Minimum HR was 56, average HR was 91, maximum HR was 165.   There were 2 triggered events.   During the patient triggered events, the rhythm was atrial fibrillation, ventricular ectopic beats.   There were 0 supraventricular ectopic beats.   There were rare ventricular ectopic beats, longest consisted of 2 beats.   Atrial fibrillation present 100%.   x1 pause lasting 3.4 seconds at 9:17 AM on 9/11/18.        ICD-10-CM    1. Longstanding persistent atrial fibrillation (H)  I48.11    2. Thoracic aortic aneurysm without rupture (H)  I71.2 Echocardiogram Complete   3. Ascending aortic aneurysm (H)  I71.2 Echocardiogram Complete   4. Chronic systolic heart failure (H)  I50.22 Echocardiogram Complete   5. Moderate tricuspid regurgitation  I07.1 Echocardiogram Complete   6. Atrial enlargement, bilateral  I51.7 Echocardiogram Complete       Past Medical History:   Diagnosis Date     Carpal tunnel syndrome of left wrist     10/4/2017     Carpal tunnel syndrome of right wrist     10/4/2017     Deep phlebothrombosis during pregnancy     No Comments Provided     Osteoarthritis     Knee     Other specified postprocedural states     10/4/2017     Other specified postprocedural states     1/31/2018     Presence of artificial knee joint     10/3/2014       Past Surgical History:   Procedure Laterality Date     APPENDECTOMY OPEN      No Comments Provided     C TOTAL KNEE ARTHROPLASTY Right 10/01/2014    Dr Domingo     CARPAL TUNNEL RELEASE RT/LT Left 01/31/2018 01/31/2018,[id number],OTHER,Left     CARPAL TUNNEL RELEASE RT/LT Right 10/04/2017     COLONOSCOPY  09/2014    Arteriovenous malformations of right, transverse and splenic flexure     ENDOSCOPIC STRIPPING VEIN(S)      No Comments Provided     HYSTERECTOMY TOTAL ABDOMINAL  06/1987    with squamous metaplasia and leiomoma       Allergies   Allergen Reactions     Morphine      Other reaction(s): Muscle Weakness     Phenylbutazone Unknown       Current Outpatient  Medications   Medication Sig Dispense Refill     DIGITEK 125 MCG tablet Take 1 tablet (125 mcg) by mouth daily 90 tablet 0     furosemide (LASIX) 20 MG tablet Take 2 tablets (40 mg) by mouth daily 180 tablet 3     gabapentin (NEURONTIN) 300 MG capsule Take 1 capsule (300 mg) by mouth At Bedtime 30 capsule 3     lisinopril (ZESTRIL) 5 MG tablet Take 1 tablet (5 mg) by mouth daily 90 tablet 0     metoprolol succinate ER (TOPROL-XL) 100 MG 24 hr tablet Take 1 tablet (100 mg) by mouth At Bedtime To be taken with 25 mg tablet to equal 125 mg daily. 90 tablet 0     metoprolol succinate ER (TOPROL-XL) 25 MG 24 hr tablet Take 1 tablet (25 mg) by mouth At Bedtime To be taken with 100 mg tablet to equal 125 mg daily. 90 tablet 0     Multiple Vitamin (MULTI-VITAMINS) TABS Take 1 tablet by mouth daily       rivaroxaban ANTICOAGULANT (XARELTO ANTICOAGULANT) 15 MG TABS tablet Take 1 tablet (15 mg) by mouth daily (with dinner) 90 tablet 3     traMADol (ULTRAM) 50 MG tablet Take 1 tablet (50 mg) by mouth every 6 hours as needed for severe pain 30 tablet 0       Social History     Socioeconomic History     Marital status:      Spouse name: Not on file     Number of children: Not on file     Years of education: Not on file     Highest education level: Not on file   Occupational History     Not on file   Tobacco Use     Smoking status: Never Smoker     Smokeless tobacco: Never Used   Substance and Sexual Activity     Alcohol use: Yes     Alcohol/week: 5.0 standard drinks     Comment: slick daily     Drug use: No     Comment: Drug use: No     Sexual activity: Not Currently   Other Topics Concern     Parent/sibling w/ CABG, MI or angioplasty before 65F 55M? Not Asked   Social History Narrative    Lives alone.      2017.     3 daughters, 1 in [facility], 1 in [place] and 1 near [place]. Lost 1 daughter at age 2 years.    Has 1 living sister, fraternal twin. All other siblings are .      Social Determinants of  Health     Financial Resource Strain: Not on file   Food Insecurity: Not on file   Transportation Needs: Not on file   Physical Activity: Not on file   Stress: Not on file   Social Connections: Not on file   Intimate Partner Violence: Not on file   Housing Stability: Not on file       LAB RESULTS:   Lab on 10/19/2021   Component Date Value Ref Range Status     Digoxin 10/19/2021 0.5    ug/L Final     WBC Count 10/19/2021 6.8  4.0 - 11.0 10e3/uL Final     RBC Count 10/19/2021 4.36  3.80 - 5.20 10e6/uL Final     Hemoglobin 10/19/2021 12.5  11.7 - 15.7 g/dL Final     Hematocrit 10/19/2021 39.9  35.0 - 47.0 % Final     MCV 10/19/2021 92  78 - 100 fL Final     MCH 10/19/2021 28.7  26.5 - 33.0 pg Final     MCHC 10/19/2021 31.3* 31.5 - 36.5 g/dL Final     RDW 10/19/2021 15.5* 10.0 - 15.0 % Final     Platelet Count 10/19/2021 228  150 - 450 10e3/uL Final     Sodium 10/19/2021 141  134 - 144 mmol/L Final     Potassium 10/19/2021 4.2  3.5 - 5.1 mmol/L Final     Chloride 10/19/2021 103  98 - 107 mmol/L Final     Carbon Dioxide (CO2) 10/19/2021 31  21 - 31 mmol/L Final     Anion Gap 10/19/2021 7  3 - 14 mmol/L Final     Urea Nitrogen 10/19/2021 20  7 - 25 mg/dL Final     Creatinine 10/19/2021 0.71  0.60 - 1.20 mg/dL Final     Calcium 10/19/2021 9.5  8.6 - 10.3 mg/dL Final     Glucose 10/19/2021 108* 70 - 105 mg/dL Final     GFR Estimate 10/19/2021 76  >60 mL/min/1.73m2 Final   Office Visit on 09/30/2021   Component Date Value Ref Range Status     Ventricular Rate 09/30/2021 66  BPM Final     Atrial Rate 09/30/2021 80  BPM Final     QRS Duration 09/30/2021 92  ms Final     QT 09/30/2021 380  ms Final     QTc 09/30/2021 398  ms Final     R AXIS 09/30/2021 -13  degrees Final     T Axis 09/30/2021 1  degrees Final     Interpretation ECG 09/30/2021    Final                    Value:Atrial fibrillation  Low voltage QRS  Abnormal ECG  No previous ECGs available  Confirmed by MD KARTIK, SUMMER ([id number]) on 9/30/2021 6:30:07 PM       "    Review of systems: Negative except that which was noted in the HPI.    Physical examination:  BP 98/64 (BP Location: Right arm, Patient Position: Sitting, Cuff Size: Adult Regular)   Pulse 90   Temp 98.7  F (37.1  C) (Tympanic)   Resp 18   Ht 1.53 m (5' 0.24\")   Wt 55.8 kg (123 lb)   SpO2 98%   BMI 23.83 kg/m      GENERAL APPEARANCE: healthy, alert and no distress  HEENT: no icterus, no xanthelasmas, normal pupil size and reaction, no cyanosis.  NECK: no adenopathy, no asymmetry, masses.  CHEST: lungs clear to auscultation - no rales, rhonchi or wheezes, no use of accessory muscles, no retractions, respirations are unlabored, normal respiratory rate  CARDIOVASCULAR: regular rhythm, normal S1 with physiologic split S2, no S3 or S4 and no murmur, click or rub  EXTREMITIES: no clubbing, cyanosis or edema  NEURO: alert and oriented normal speech, and affect  VASC: No vascular bruits heard.  SKIN: no ecchymoses, no rashes        Thank you for allowing me to participate in the care of your patient. Please do not hesitate to contact me if you have any questions.     Sharath Ingram, DO          "

## 2021-11-09 NOTE — PATIENT INSTRUCTIONS
You were seen by  Sharath Ingram, DO     1. Echocardiogram has been ordered. You will be called to schedule this.  You will receive instructions for testing at that time.  You will be contacted with results.       You will follow up with [facility] in 1 year after completion of your echocardiogram, sooner if needed.     Please call the cardiology office with problems, questions, or concerns at [phone].    If you experience chest pain, chest pressure, chest tightness, shortness of breath, fainting, lightheadedness, nausea, vomiting, or other concerning symptoms, please report to the Emergency Department or call 911. These symptoms may be emergent, and best treated in the Emergency Department.     Cardiology Nurses  MINOR Masters LPN Amy, LPN  [facility] (Unit 3C)  [phone]

## 2021-11-09 NOTE — NURSING NOTE
"Patient comes in for follow up after tests.  Shanika Madrigal LPN ....................11/9/2021   3:13 PM  Chief Complaint   Patient presents with     Follow Up     tests       Initial BP 98/64 (BP Location: Right arm, Patient Position: Sitting, Cuff Size: Adult Regular)   Pulse 90   Temp 98.7  F (37.1  C) (Tympanic)   Resp 18   Ht 1.53 m (5' 0.24\")   Wt 55.8 kg (123 lb)   SpO2 98%   BMI 23.83 kg/m   Estimated body mass index is 23.83 kg/m  as calculated from the following:    Height as of this encounter: 1.53 m (5' 0.24\").    Weight as of this encounter: 55.8 kg (123 lb).  Meds Reconciled: complete  Pt is not on Aspirin  Pt is not on a Statin  PHQ and/or MARVIN reviewed. Pt referred to PCP/MH Provider as appropriate.    Shanika Madrigal LPN    FOOD SECURITY SCREENING QUESTIONS  Hunger Vital Signs:  Within the past 12 months we worried whether our food would run out before we got money to buy more. Never  Within the past 12 months the food we bought just didn't last and we didn't have money to get more. Never  Shanika Madrigal LPN 11/9/2021 3:13 PM    "

## 2021-11-22 ENCOUNTER — IMMUNIZATION (OUTPATIENT)
Dept: FAMILY MEDICINE | Facility: OTHER | Age: 86
End: 2021-11-22
Attending: FAMILY MEDICINE
Payer: COMMERCIAL

## 2021-11-22 ENCOUNTER — OFFICE VISIT (OUTPATIENT)
Dept: FAMILY MEDICINE | Facility: OTHER | Age: 86
End: 2021-11-22
Attending: FAMILY MEDICINE
Payer: MEDICARE

## 2021-11-22 VITALS
TEMPERATURE: 98.8 F | HEART RATE: 72 BPM | OXYGEN SATURATION: 99 % | SYSTOLIC BLOOD PRESSURE: 108 MMHG | BODY MASS INDEX: 24.23 KG/M2 | HEIGHT: 60 IN | WEIGHT: 123.4 LBS | DIASTOLIC BLOOD PRESSURE: 68 MMHG | RESPIRATION RATE: 20 BRPM

## 2021-11-22 DIAGNOSIS — I48.11 LONGSTANDING PERSISTENT ATRIAL FIBRILLATION (H): ICD-10-CM

## 2021-11-22 DIAGNOSIS — I71.20 THORACIC AORTIC ANEURYSM WITHOUT RUPTURE (H): ICD-10-CM

## 2021-11-22 DIAGNOSIS — M16.12 PRIMARY OSTEOARTHRITIS OF LEFT HIP: ICD-10-CM

## 2021-11-22 DIAGNOSIS — Z01.818 PREOP GENERAL PHYSICAL EXAM: Primary | ICD-10-CM

## 2021-11-22 DIAGNOSIS — I51.7 ATRIAL ENLARGEMENT, BILATERAL: ICD-10-CM

## 2021-11-22 LAB
ANION GAP SERPL CALCULATED.3IONS-SCNC: 8 MMOL/L (ref 3–14)
BUN SERPL-MCNC: 19 MG/DL (ref 7–25)
CALCIUM SERPL-MCNC: 9.5 MG/DL (ref 8.6–10.3)
CHLORIDE BLD-SCNC: 101 MMOL/L (ref 98–107)
CO2 SERPL-SCNC: 31 MMOL/L (ref 21–31)
CREAT SERPL-MCNC: 0.67 MG/DL (ref 0.6–1.2)
GFR SERPL CREATININE-BSD FRML MDRD: 79 ML/MIN/1.73M2
GLUCOSE BLD-MCNC: 99 MG/DL (ref 70–105)
HGB BLD-MCNC: 12.2 G/DL (ref 11.7–15.7)
POTASSIUM BLD-SCNC: 4 MMOL/L (ref 3.5–5.1)
SODIUM SERPL-SCNC: 140 MMOL/L (ref 134–144)

## 2021-11-22 PROCEDURE — 85018 HEMOGLOBIN: CPT | Mod: ZL | Performed by: FAMILY MEDICINE

## 2021-11-22 PROCEDURE — 91300 PR COVID VAC PFIZER DIL RECON 30 MCG/0.3 ML IM: CPT

## 2021-11-22 PROCEDURE — G0463 HOSPITAL OUTPT CLINIC VISIT: HCPCS | Mod: 25 | Performed by: FAMILY MEDICINE

## 2021-11-22 PROCEDURE — 36415 COLL VENOUS BLD VENIPUNCTURE: CPT | Mod: ZL | Performed by: FAMILY MEDICINE

## 2021-11-22 PROCEDURE — 80048 BASIC METABOLIC PNL TOTAL CA: CPT | Mod: ZL | Performed by: FAMILY MEDICINE

## 2021-11-22 PROCEDURE — 99214 OFFICE O/P EST MOD 30 MIN: CPT | Performed by: FAMILY MEDICINE

## 2021-11-22 ASSESSMENT — MIFFLIN-ST. JEOR: SCORE: 915.05

## 2021-11-22 ASSESSMENT — PAIN SCALES - GENERAL: PAINLEVEL: WORST PAIN (10)

## 2021-11-22 NOTE — PATIENT INSTRUCTIONS

## 2021-11-22 NOTE — NURSING NOTE
Patient presents today for pre op exam.    Medication Reconciliation Complete    Dunia Kathleen LPN  11/22/2021 11:00 AM

## 2021-11-22 NOTE — PROGRESS NOTES
[facility] AND [facility]  [address]  [place] [address]  Phone: [phone]  Fax: [phone]  Primary Provider: Summer Greenfield  Pre-op Performing Provider: SUMMER GREENFIELD      PREOPERATIVE EVALUATION:  Today's date: 11/22/2021    Merlyn Escamilla is a 88 year old female who presents for a preoperative evaluation.    Surgical Information:  Surgery/Procedure: Right Hip  Surgery Location: [facility]  Surgeon: Dr. Matt  Surgery Date: 12/14/21   Time of Surgery:    Where patient plans to recover: At home with family  Fax number for surgical facility: [phone]    Type of Anesthesia Anticipated: Spinal    Assessment & Plan     The proposed surgical procedure is considered INTERMEDIATE risk.    Preop general physical exam  She is currently optimized for upcoming procedure.  She will stop Xarelto 3 days prior to her procedure, be n.p.o. the day of her procedure.  Feel free to contact me with any concerns.  - Basic Metabolic Panel; Future  - Hemoglobin; Future    Thoracic aortic aneurysm without rupture (H)  This has been stable with measurements of 5.1 cm seen over the past couple of years.  She is currently having no cardiovascular symptoms.  Continue follow-up with cardiology.    Longstanding persistent atrial fibrillation (H)  Chronic, hold Xarelto 3 days prior to her procedure.  Resume Xarelto immediately after procedure.    Atrial enlargement, bilateral  Chronic and stable, currently asymptomatic.    Primary osteoarthritis of left hip  She is requesting home care referral as she is unable to drive.  She would like to meet with them prior to her procedure to see if there is anything she should be doing before her procedure and help set this up for when she returns home from the hospital.  - HOME CARE NURSING REFERRAL    RECOMMENDATION:  APPROVAL GIVEN to proceed with proposed procedure, without further diagnostic evaluation.    Subjective     HPI related to upcoming procedure: Has a  history of significant left hip osteoarthritis.  She has had periodic injections in the past which have been somewhat helpful however her most recent ones have not been helpful.  She has now confined to a wheelchair based on her pain.  She has had knee replacement in the past with good success.    She has a history of thoracic aortic aneurysm has been monitored by cardiology.  She was last seen in October where continued monitoring was recommended by cardiology.  This included a CT angiogram as well as echocardiogram.    She is otherwise doing well and has no other concerns.    Preop Questions 11/22/2021   1. Have you ever had a heart attack or stroke? No   2. Have you ever had surgery on your heart or blood vessels, such as a stent placement, a coronary artery bypass, or surgery on an artery in your head, neck, heart, or legs? YES - vein stripping   3. Do you have chest pain with activity? No   4. Do you have a history of  heart failure? No   5. Do you currently have a cold, bronchitis or symptoms of other infection? No   6. Do you have a cough, shortness of breath, or wheezing? No   7. Do you or anyone in your family have previous history of blood clots? YES - personal, years ago after child birth   8. Do you or does anyone in your family have a serious bleeding problem such as prolonged bleeding following surgeries or cuts? No   9. Have you ever had problems with anemia or been told to take iron pills? No   10. Have you had any abnormal blood loss such as black, tarry or bloody stools, or abnormal vaginal bleeding? No   11. Have you ever had a blood transfusion? YES - many years ago, tolerated well   11a. Have you ever had a transfusion reaction? No   12. Are you willing to have a blood transfusion if it is medically needed before, during, or after your surgery? Yes   13. Have you or any of your relatives ever had problems with anesthesia? No   14. Do you have sleep apnea, excessive snoring or daytime drowsiness?  No   15. Do you have any artifical heart valves or other implanted medical devices like a pacemaker, defibrillator, or continuous glucose monitor? No   16. Do you have artificial joints? YES - fingers, right knee   17. Are you allergic to latex? No       Health Care Directive:  Patient has a Health Care Directive on file      Preoperative Review of :   reviewed - controlled substances reflected in medication list.    Review of Systems  CONSTITUTIONAL: NEGATIVE for fever, chills, change in weight  INTEGUMENTARY/SKIN: NEGATIVE for worrisome rashes, moles or lesions  EYES: NEGATIVE for vision changes or irritation  ENT/MOUTH: NEGATIVE for ear, mouth and throat problems  RESP: NEGATIVE for significant cough or SOB  CV: NEGATIVE for chest pain, palpitations or peripheral edema  GI: NEGATIVE for nausea, abdominal pain, heartburn, or change in bowel habits  : NEGATIVE for frequency, dysuria, or hematuria  MUSCULOSKELETAL: see above  NEURO: NEGATIVE for weakness, dizziness or paresthesias  ENDOCRINE: NEGATIVE for temperature intolerance, skin/hair changes  HEME: NEGATIVE for bleeding problems  PSYCHIATRIC: NEGATIVE for changes in mood or affect    Patient Active Problem List    Diagnosis Date Noted     Atrial enlargement, bilateral 11/09/2021     Priority: Medium     Nodule of right lung 09/30/2021     Priority: Medium     Moderate tricuspid regurgitation 09/30/2021     Priority: Medium     Chronic systolic heart failure (H) 09/26/2018     Priority: Medium     Ascending aortic aneurysm (H) 08/20/2018     Priority: Medium     Thoracic aortic aneurysm without rupture (H) 08/09/2018     Priority: Medium     Slow transit constipation 07/31/2018     Priority: Medium     Renal cyst, right 07/31/2018     Priority: Medium     Large, 9.5cm.  Noted incidentally on CT scan.  Urology recommends no further follow up.       Longstanding persistent atrial fibrillation (H) 07/30/2018     Priority: Medium     Primary osteoarthritis  of both first carpometacarpal joints 05/22/2018     Priority: Medium     Primary osteoarthritis of both hands 05/22/2018     Priority: Medium     History of colonic polyps 01/25/2018     Priority: Medium     Osteoarthrosis 01/25/2018     Priority: Medium     Overview:   Knee       History of carpal tunnel surgery of left wrist 10/04/2017     Priority: Medium     S/P total knee arthroplasty 10/03/2014     Priority: Medium     AVM (arteriovenous malformation) of colon 10/01/2014     Priority: Medium      Past Medical History:   Diagnosis Date     Carpal tunnel syndrome of left wrist     10/4/2017     Carpal tunnel syndrome of right wrist     10/4/2017     Deep phlebothrombosis during pregnancy     No Comments Provided     Osteoarthritis     Knee     Other specified postprocedural states     10/4/2017     Other specified postprocedural states     1/31/2018     Presence of artificial knee joint     10/3/2014     Past Surgical History:   Procedure Laterality Date     APPENDECTOMY OPEN      No Comments Provided     C TOTAL KNEE ARTHROPLASTY Right 10/01/2014    Dr Domingo     CARPAL TUNNEL RELEASE RT/LT Left 01/31/2018 01/31/2018,[id number],OTHER,Left     CARPAL TUNNEL RELEASE RT/LT Right 10/04/2017     COLONOSCOPY  09/2014    Arteriovenous malformations of right, transverse and splenic flexure     ENDOSCOPIC STRIPPING VEIN(S)      No Comments Provided     HYSTERECTOMY TOTAL ABDOMINAL  06/1987    with squamous metaplasia and leiomoma     Current Outpatient Medications   Medication Sig Dispense Refill     DIGITEK 125 MCG tablet Take 1 tablet (125 mcg) by mouth daily 90 tablet 0     furosemide (LASIX) 20 MG tablet Take 2 tablets (40 mg) by mouth daily 180 tablet 3     gabapentin (NEURONTIN) 300 MG capsule Take 1 capsule (300 mg) by mouth At Bedtime 30 capsule 3     lisinopril (ZESTRIL) 5 MG tablet Take 1 tablet (5 mg) by mouth daily 90 tablet 0     metoprolol succinate ER (TOPROL-XL) 100 MG 24 hr tablet Take 1 tablet (100 mg)  "by mouth At Bedtime To be taken with 25 mg tablet to equal 125 mg daily. 90 tablet 0     metoprolol succinate ER (TOPROL-XL) 25 MG 24 hr tablet Take 1 tablet (25 mg) by mouth At Bedtime To be taken with 100 mg tablet to equal 125 mg daily. 90 tablet 0     Multiple Vitamin (MULTI-VITAMINS) TABS Take 1 tablet by mouth daily       rivaroxaban ANTICOAGULANT (XARELTO ANTICOAGULANT) 15 MG TABS tablet Take 1 tablet (15 mg) by mouth daily (with dinner) 90 tablet 3     traMADol (ULTRAM) 50 MG tablet Take 1 tablet (50 mg) by mouth every 6 hours as needed for severe pain 30 tablet 0       Allergies   Allergen Reactions     Morphine      Other reaction(s): Muscle Weakness     Phenylbutazone Unknown        Social History     Tobacco Use     Smoking status: Never Smoker     Smokeless tobacco: Never Used   Substance Use Topics     Alcohol use: Yes     Alcohol/week: 5.0 standard drinks     Comment: slick daily     History   Drug Use No     Comment: Drug use: No         Objective     /68   Pulse 72   Temp 98.8  F (37.1  C)   Resp 20   Ht 1.53 m (5' 0.24\")   Wt 56 kg (123 lb 6.4 oz)   SpO2 99%   BMI 23.91 kg/m      Physical Exam    GENERAL APPEARANCE: healthy, alert and no distress     EYES: EOMI, PERRL     HENT: ear canals and TM's normal and nose and mouth without ulcers or lesions     NECK: no adenopathy, no asymmetry, masses, or scars and thyroid normal to palpation     RESP: lungs clear to auscultation - no rales, rhonchi or wheezes     CV: Irregularly irregular rhythm     ABDOMEN:  soft, nontender, no HSM or masses and bowel sounds normal     MS: Reduction of range of motion her left hip due to pain.     SKIN: no suspicious lesions or rashes     NEURO: Normal strength and tone, sensory exam grossly normal, mentation intact and speech normal     PSYCH: mentation appears normal. and affect normal/bright     LYMPHATICS: No cervical adenopathy    Diagnostics:  Results for orders placed or performed in visit on " 11/22/21   Hemoglobin     Status: Normal   Result Value Ref Range    Hemoglobin 12.2 11.7 - 15.7 g/dL   Basic Metabolic Panel     Status: Normal   Result Value Ref Range    Sodium 140 134 - 144 mmol/L    Potassium 4.0 3.5 - 5.1 mmol/L    Chloride 101 98 - 107 mmol/L    Carbon Dioxide (CO2) 31 21 - 31 mmol/L    Anion Gap 8 3 - 14 mmol/L    Urea Nitrogen 19 7 - 25 mg/dL    Creatinine 0.67 0.60 - 1.20 mg/dL    Calcium 9.5 8.6 - 10.3 mg/dL    Glucose 99 70 - 105 mg/dL    GFR Estimate 79 >60 mL/min/1.73m2        EKG from September 30 shows atrial fibrillation without any ischemic changes.    CTA CHEST WITH CONTRAST, October 19, 2021     HISTORY:  Evaluate for pulmonary embolism.  Thoracic aortic aneurysm  (TAA), follow up; Aortic disease, nontraumatic; Ascending aortic  aneurysm (H); Thoracic aortic aneurysm without rupture (H)     TECHNIQUE:  Initial pre-contrast  and localizer images were  obtained.  Contrast enhanced helical CT aortic angiography was then  performed.  Routine transaxial and post-processed (multiplanar and/or  MIP) reformations were obtained.     COMPARISON:  5/1/2020, 8/29/2018     MEDS/CONTRAST: 100 ml isovue 370     AORTIC CTA FINDINGS: No aortic dissection or penetrating  atherosclerotic ulcer is identified. The major arch vessels are  patent, with tortuosity suggesting long-standing hypertension.  Scattered mild calcific atherosclerotic plaque is present. The heart  is enlarged, with progressive biatrial dilation. The main pulmonary  artery is within normal limits in size.      Measurements of the thoracic aorta are as follows:   Sinotubular junction 2.9 x 2.2 cm  Sinus of Valsalva 3.7 x 3.6 x 3.5 cm   Mid ascending aorta 5.1 x 4.8 cm, previously 5.1 x 4.7 and 4.6 x 4.5  cm in 2018   Proximal aortic arch 4.5 cm, previously up to 4.3, previously 4.0 cm  and 2018  Distal aortic arch 3.2 x 3.5 cm  Descending thoracic aorta 3.5 x 3.2 cm     OTHER FINDINGS:       No abnormal thoracic adenopathy is  identified. Limited views of the  upper abdomen reveal no adrenal mass. The pancreatic duct is upper  normal in caliber, unchanged when compared to 2018. No suspicious  osseous lesion is identified.     The pleura is without thickening or effusions.  The central airways  are patent. Mild atelectasis and scarring are present in the right  lower lobe. A 3 mm nodule at the right lung apex another 3 mm nodule  in the lingula are unchanged.                                                                      IMPRESSION:  Dilation of the mid ascending aorta to 5.1 x 4.8 cm,  previously 5.1 x 4.7 and 4.6 x 4.5 cm in 2018.     Cardiomegaly with progressive biatrial dilation when compared to 2018.    Echocardiogram, October 19, 2021  Interpretation Summary  Left ventricular function is decreased. The ejection fraction is 50-55%  (borderline).  Global right ventricular function is normal.  Moderate tricuspid insufficiency and mild mitral insufficiency is present.  Moderate dilatation of the aorta is present. Ascending aorta 5.1 cm.  IVC diameter and respiratory changes fall into an intermediate range  suggesting an RA pressure of 8 mmHg.  No pericardial effusion is present.  ______________________________________________________________________________                Documentation of a Face-to-Face Physician Encounter December 1, 2021    Merlyn DIAZ Andi  8/22/1933  [id number]    I certify that this patient is under my care and that I, or a nurse practitioner or physician's assistant working with me, had a face-to-face encounter that meets the physician face-to-face encounter requirements with this patient on: 11/22/2021.    The encounter with the patient was in whole, or in part, for the following medical condition, which is the primary reason for home health care:  Encounter Diagnoses   Name Primary?     Preop general physical exam Yes     Thoracic aortic aneurysm without rupture (H)      Longstanding persistent atrial  fibrillation (H)      Atrial enlargement, bilateral      Primary osteoarthritis of left hip        I certify that, based on my findings, the following services are medically necessary home health services: Occupational Therapy and Physical Therapy    My clinical findings support the need for the above services because: weakness, pain related to OA of the hip.  Optimizing periop function to maximize post op therapy.  Requested by her surgeon.    Further, I certify that my clinical findings support that this patient is homebound (i.e. absences from home require considerable and taxing effort and are for medical reasons or [facility] services or infrequently or of short duration when for other reasons) because: is currently not driving, relies on family for transportation.      Physician signature ___________________________________   December 1, 2021  Physician name: Sharath Dinero    Fax ([phone]) or scan/email ([email or web address]) this completed document to [facility] within 24 hours of the referral date.  Questions: [phone].

## 2021-11-23 ENCOUNTER — TELEPHONE (OUTPATIENT)
Dept: FAMILY MEDICINE | Facility: OTHER | Age: 86
End: 2021-11-23
Payer: COMMERCIAL

## 2021-11-23 PROCEDURE — G0180 MD CERTIFICATION HHA PATIENT: HCPCS | Performed by: FAMILY MEDICINE

## 2021-11-23 NOTE — TELEPHONE ENCOUNTER
Request for Home Care Physical Therapy orders as follows:    PT eval and treat date:  11/23/21    1x/wk x 3wks then  Continuation frequency =  2 x week x __3__ weeks                Effective date = 11/23/21    Interventions include:    Therapeutic Exercise  Therapeutic Activity  Gait Training  Transfer Training  Gait/Balance/Dysfunction  Home Exercise Program  Home Safety Assessment        Therapist:  Terri Mishra PT  Please respond with .HOMECAREAGREE, if you are in agreement. Please sign with your comments and signature, if you are not in agreement.    Request for Home Care Occupational Therapy orders as follows:      OT eval and treat date:  11/23/21    Continuation frequency =  1 x month x 2 months                Effective date = 11/23/21    Interventions include:    Therapeutic Exercise  ADL training  Adaptive equipment  Home safety assessment                                     For therapist: NÉSTOR Edwards/YUNG  Please respond with .HOMECAREAGREE, if you are in agreement. Please sign with your comments and signature, if you are not in agreement.

## 2021-11-23 NOTE — TELEPHONE ENCOUNTER
"URGENT: per CMS best practice, response is requested within 24 hours.    Home Care regulation mandates that you are notified about drug discrepancies, interactions & contraindications. Response within a 24 hour timeframe is established by CMS as \"best practice\" for the delivery of home health care. Home Care is required to report if the 24 hour timeframe was met. The home health clinician will contact you again if this timeframe is not met or if the response does not address all concerns.       Situation:        The patient was admitted to [facility], Upon admission, a med reconciliation was completed to identify any drug discrepancies, interactions or contraindications. Home Care's drug regime review has revealed significant medication issues.     You are being contacted for clarifying orders related to the medication issues.     Background:        Assessment:     Patient is not taking the following medications, which are on her clinic list:  Gabapentin          Multiple vitamin    Patient is taking the following medication, which is not on her clinic list:  Tylenol 500 mg;  1-2 tabs every 6 hours as needed.    During a routine med recontiliation, the following moderate med interactions were noted:  Digitek and lasix  Lasix and lisinopril      Recommendations:    Please evaluate this information and indicate below whether or not changes are required. A copy of the patient's drug interaction/contraindications report is available upon request.       CLINIC NURSE - If changes are made, please update the Epic medication profile.     Please sign this encounter with your electronic signature.          "

## 2021-12-03 ENCOUNTER — TELEPHONE (OUTPATIENT)
Dept: FAMILY MEDICINE | Facility: OTHER | Age: 86
End: 2021-12-03
Payer: COMMERCIAL

## 2021-12-03 DIAGNOSIS — M16.12 UNILATERAL PRIMARY OSTEOARTHRITIS, LEFT HIP: Primary | ICD-10-CM

## 2021-12-03 NOTE — TELEPHONE ENCOUNTER
Dr. Dinero reviewed and completed the following home care or hospice form(s) for Merlyn Escamilla  on December 3, 2021 .  This covers the certification period effective 11/23/21-01/21/22.    [facility] and [facility]

## 2021-12-11 ENCOUNTER — ALLIED HEALTH/NURSE VISIT (OUTPATIENT)
Dept: FAMILY MEDICINE | Facility: OTHER | Age: 86
End: 2021-12-11
Attending: FAMILY MEDICINE
Payer: MEDICARE

## 2021-12-11 DIAGNOSIS — Z20.822 COVID-19 RULED OUT: Primary | ICD-10-CM

## 2021-12-11 PROCEDURE — C9803 HOPD COVID-19 SPEC COLLECT: HCPCS

## 2021-12-11 PROCEDURE — U0003 INFECTIOUS AGENT DETECTION BY NUCLEIC ACID (DNA OR RNA); SEVERE ACUTE RESPIRATORY SYNDROME CORONAVIRUS 2 (SARS-COV-2) (CORONAVIRUS DISEASE [COVID-19]), AMPLIFIED PROBE TECHNIQUE, MAKING USE OF HIGH THROUGHPUT TECHNOLOGIES AS DESCRIBED BY CMS-2020-01-R: HCPCS | Mod: ZL

## 2021-12-12 LAB — SARS-COV-2 RNA RESP QL NAA+PROBE: NEGATIVE

## 2021-12-27 ENCOUNTER — OFFICE VISIT (OUTPATIENT)
Dept: FAMILY MEDICINE | Facility: OTHER | Age: 86
End: 2021-12-27
Attending: FAMILY MEDICINE
Payer: COMMERCIAL

## 2021-12-27 VITALS
HEART RATE: 94 BPM | OXYGEN SATURATION: 97 % | TEMPERATURE: 98.2 F | DIASTOLIC BLOOD PRESSURE: 70 MMHG | SYSTOLIC BLOOD PRESSURE: 110 MMHG | RESPIRATION RATE: 16 BRPM

## 2021-12-27 DIAGNOSIS — Z48.02 ENCOUNTER FOR STAPLE REMOVAL: Primary | ICD-10-CM

## 2021-12-27 PROCEDURE — G0463 HOSPITAL OUTPT CLINIC VISIT: HCPCS

## 2021-12-27 PROCEDURE — 99212 OFFICE O/P EST SF 10 MIN: CPT | Performed by: FAMILY MEDICINE

## 2021-12-27 ASSESSMENT — PAIN SCALES - GENERAL: PAINLEVEL: EXTREME PAIN (8)

## 2021-12-27 NOTE — PROGRESS NOTES
Nurse visit for staple removal to avoid driving to [place] in inclement weather. Per nurse, incision looks good without signs of infection. Patient states she is doing fine. Plans to keep scheduled follow up with surgeon. No charge for today's visit. Moni Bazzi MD

## 2021-12-27 NOTE — NURSING NOTE
"Patient presents to the clinic today for staple removal.  Mine Broussard LPN 12/27/2021   2:22 PM    Chief Complaint   Patient presents with     RECHECK     Staple Removal       Initial /70 (BP Location: Right arm, Patient Position: Sitting, Cuff Size: Adult Regular)   Pulse 94   Temp 98.2  F (36.8  C) (Tympanic)   Resp 16   SpO2 97%   Breastfeeding No  Estimated body mass index is 23.91 kg/m  as calculated from the following:    Height as of 11/22/21: 1.53 m (5' 0.24\").    Weight as of 11/22/21: 56 kg (123 lb 6.4 oz).  Medication Reconciliation: complete  Mine Broussard LPN    "

## 2022-01-05 NOTE — TELEPHONE ENCOUNTER
Addended by: MICHEL BENTLEY on: 1/4/2022 06:26 PM     Modules accepted: Orders     Please return her call.  Regarding referral to [facility].

## 2022-07-15 DIAGNOSIS — I50.22 CHRONIC SYSTOLIC HEART FAILURE (H): ICD-10-CM

## 2022-07-15 DIAGNOSIS — I48.20 CHRONIC ATRIAL FIBRILLATION (H): ICD-10-CM

## 2022-07-15 DIAGNOSIS — I48.11 LONGSTANDING PERSISTENT ATRIAL FIBRILLATION (H): ICD-10-CM

## 2022-07-15 RX ORDER — FUROSEMIDE 20 MG
40 TABLET ORAL DAILY
Qty: 180 TABLET | Refills: 3 | Status: SHIPPED | OUTPATIENT
Start: 2022-07-15 | End: 2023-07-03

## 2022-07-15 NOTE — TELEPHONE ENCOUNTER
Disp Refills Start End SAILAJA   rivaroxaban ANTICOAGULANT (XARELTO ANTICOAGULANT) 15 MG TABS tablet 90 tablet 3 9/30/2021  No   Sig: Take 1 tablet (15 mg) by mouth daily (with dinner)       LOV: 11/9/2021  Future Office visit: No future appointment scheduled at this time.      Routing refill request to provider for review/approval.    Requested Prescriptions   Pending Prescriptions Disp Refills     XARELTO ANTICOAGULANT 15 MG TABS tablet [Pharmacy Med Name: Xarelto 15 mg tablet] 90 tablet 3     Sig: Take 1 tablet (15 mg) by mouth daily (with dinner)       Direct Oral Anticoagulant Agents Failed - 7/15/2022  8:15 AM        Failed - Creatinine Clearance greater than 50 ml/min on file in past 3 mos     No lab results found.          Failed - Serum creatinine less than or equal to 1.4 on file in past 3 mos     Recent Labs   Lab Test 11/22/21  1132   CR 0.67       Ok to refill medication if creatinine is low          Failed - Recent (6 mo) or future (30 days) visit within the authorizing provider's specialty        Passed - Normal Platelets on file in past 12 months     Recent Labs   Lab Test 10/19/21  1024                  Passed - Medication is active on med list        Passed - Patient is 18 years of age or older        Passed - No active pregnancy on record        Passed - No positive pregnancy test within past 12 months           Routed to provider for review and consideration. Bettina Borden RN  ....................  7/15/2022   1:05 PM

## 2022-07-15 NOTE — TELEPHONE ENCOUNTER
[facility] sent Rx request for the following:      Requested Prescriptions   Pending Prescriptions Disp Refills     furosemide (LASIX) 20 MG tablet [Pharmacy Med Name: furosemide 20 mg tablet] 180 tablet 3     Sig: Take 2 tablets (40 mg) by mouth daily       Diuretics (Including Combos) Protocol Passed - 7/15/2022  8:02 AM       Last Prescription Date:   9/7/2021  Last Fill Qty/Refills:         180, R-3    Last Office Visit:              11/22/2021  Future Office visit:             Future Appointments 7/15/2022 - 1/11/2023      Date Visit Type Length Department Provider     10/18/2022  1:30 PM ECHO COMPLETE 60 min  CARDIAC SERVICES [facility]    Location Instructions:     [facility] is located [address]              11/8/2022  3:15 PM RETURN 30 min  CARDIOLOGY Sharath Ingram,     Location Instructions:     [facility] is located [address] and [address]                 Pharmacy is requesting refills to be placed on file.   Routing refill request to provider for review/approval because:  Unable to complete prescription refill per RN Medication Refill Policy.     Armida Madrigal RN on 7/15/2022 at 9:45 AM

## 2022-07-16 ENCOUNTER — HEALTH MAINTENANCE LETTER (OUTPATIENT)
Age: 87
End: 2022-07-16

## 2022-07-16 RX ORDER — RIVAROXABAN 15 MG/1
TABLET, FILM COATED ORAL
Qty: 90 TABLET | Refills: 3 | Status: SHIPPED | OUTPATIENT
Start: 2022-07-16 | End: 2023-07-03

## 2022-09-17 ENCOUNTER — HEALTH MAINTENANCE LETTER (OUTPATIENT)
Age: 87
End: 2022-09-17

## 2022-10-18 ENCOUNTER — HOSPITAL ENCOUNTER (OUTPATIENT)
Dept: CARDIOLOGY | Facility: OTHER | Age: 87
Discharge: HOME | End: 2022-10-18
Attending: INTERNAL MEDICINE | Admitting: INTERNAL MEDICINE
Payer: MEDICARE

## 2022-10-18 DIAGNOSIS — I50.22 CHRONIC SYSTOLIC HEART FAILURE (H): ICD-10-CM

## 2022-10-18 DIAGNOSIS — Z96.642 HISTORY OF TOTAL LEFT HIP REPLACEMENT: ICD-10-CM

## 2022-10-18 DIAGNOSIS — I71.21 ASCENDING AORTIC ANEURYSM (H): ICD-10-CM

## 2022-10-18 DIAGNOSIS — I71.20 THORACIC AORTIC ANEURYSM WITHOUT RUPTURE (H): ICD-10-CM

## 2022-10-18 DIAGNOSIS — I07.1 MODERATE TRICUSPID REGURGITATION: ICD-10-CM

## 2022-10-18 DIAGNOSIS — I51.7 ATRIAL ENLARGEMENT, BILATERAL: ICD-10-CM

## 2022-10-18 DIAGNOSIS — I71.21 ANEURYSM OF ASCENDING AORTA WITHOUT RUPTURE (H): ICD-10-CM

## 2022-10-18 DIAGNOSIS — I71.20 THORACIC AORTIC ANEURYSM WITHOUT RUPTURE, UNSPECIFIED PART (H): Primary | ICD-10-CM

## 2022-10-18 DIAGNOSIS — M16.12 PRIMARY OSTEOARTHRITIS OF LEFT HIP: Primary | ICD-10-CM

## 2022-10-18 LAB — LVEF ECHO: NORMAL

## 2022-10-18 PROCEDURE — 93306 TTE W/DOPPLER COMPLETE: CPT | Mod: 26 | Performed by: STUDENT IN AN ORGANIZED HEALTH CARE EDUCATION/TRAINING PROGRAM

## 2022-10-18 PROCEDURE — 93306 TTE W/DOPPLER COMPLETE: CPT

## 2022-10-25 ENCOUNTER — OFFICE VISIT (OUTPATIENT)
Dept: ORTHOPEDICS | Facility: OTHER | Age: 87
End: 2022-10-25
Attending: ORTHOPAEDIC SURGERY
Payer: MEDICARE

## 2022-10-25 ENCOUNTER — HOSPITAL ENCOUNTER (OUTPATIENT)
Dept: GENERAL RADIOLOGY | Facility: OTHER | Age: 87
Discharge: HOME | End: 2022-10-25
Attending: ORTHOPAEDIC SURGERY
Payer: MEDICARE

## 2022-10-25 DIAGNOSIS — Z96.642 HISTORY OF TOTAL LEFT HIP REPLACEMENT: Primary | ICD-10-CM

## 2022-10-25 DIAGNOSIS — Z96.642 HISTORY OF TOTAL LEFT HIP REPLACEMENT: ICD-10-CM

## 2022-10-25 PROCEDURE — 73502 X-RAY EXAM HIP UNI 2-3 VIEWS: CPT

## 2022-10-25 PROCEDURE — 99212 OFFICE O/P EST SF 10 MIN: CPT | Performed by: ORTHOPAEDIC SURGERY

## 2022-10-25 PROCEDURE — G0463 HOSPITAL OUTPT CLINIC VISIT: HCPCS

## 2022-10-25 NOTE — PROGRESS NOTES
Patient is here for follow up on her left total hip.  Sharmaine Gaytan LPN .....................10/25/2022 12:25 PM

## 2022-10-25 NOTE — PROGRESS NOTES
Visit Date: 10/25/2022    REASON FOR EVALUATION:  Left hip replacement.    HISTORY:  Merlyn comes back, left hip replacement.  She had replacement after a fall here.  She reports overall she is doing well, happy with the outcome of surgery, she is here for routine x-ray and examination at this time.    PHYSICAL EXAMINATION:  Examination of her hip shows excellent range of motion, minimal swelling.  Neurovascular examination is otherwise intact.    IMAGING:  X-rays reviewed, 2 views of the hip.  The patient's components are in stable alignment and position at this current time.    IMPRESSION:  Total hip replacement, doing well.    PLAN:  Increase activities as tolerated.  Follow up examination with myself otherwise on a p.r.n. basis.  If her right hip does bother her, certainly she can look at an injection, intra-articular for that, down the road.    Clement Morales MD        D: 10/25/2022   T: 10/25/2022   MT: SERG    Name:     MERLYN BOSE  MRN:      [id number]        Account:    [id number]   :      1933           Visit Date: 10/25/2022     Document: [id number]

## 2022-11-14 NOTE — PROGRESS NOTES
[facility] HEART CARE   CARDIOLOGY PROGRESS NOTE     Chief Complaint   Patient presents with     Follow Up     annual          Diagnosis:  1.  TAAA 5.1 cm on 10/19/2021 and 10/18/22. 5.1 cm on 5/1/20.  2.  Thoracic aortic aneurysm.  3.  CHF-55-60% on 10/18/22. Systolic heart vsjejia-92-26% on 4/28/20. 50-55% on 10/19/21  4.  Atrial fib-digoxin, metoprolol, and Xarelto. New on 7/20/18.  5.  Right lung nodule.  6.  TR-mild/moderate on 10/18/22.  7.  Severe PARMINDER on 10/19/2021.    Assessment/Plan:    1.  TAAA: Stable at 5.1 cm in 10/18/2022.  Previously measured at at 4.6 cm on 8/20/2018 and at 5.1 cm on 5/1/2020.  Discussed findings using the heart model in the room and [employer].  Patient aware that if 5.5 cm or greater, will discuss the need for ascending aortic aneurysm replacement.  2.  Persistent atrial fibrillation: Diagnosed on 7/20/2018. Currently on metoprolol, digoxin, and Xarelto.  She is asymptomatic but EKG today shows A. fib with a heart rate of 84 bpm. [id number] score of 3.  Getting a point for female gender and x2 for age. We will plan for labs today which include a digoxin level.  3.  CHF: Resolved.  Most recent echocardiogram on 10/18/2022 showed an EF of 55-60%.  EF of 40-45% on 4/28/2020.  EF of 50-55% in 10/19/2021.  Minimal swelling today.  We will plan for repeat echocardiogram in 1 year.  Continue Lasix 40 mg daily, metoprolol 125 mg XL daily, and lisinopril 5 mg daily.  Ideally, would increase the CHF medications.    Blood pressure today was 120/64.  Pressures have been soft making it difficult to increase medications.    4.  Labs today.  She will have routine labs today which includes a digoxin level.  Labs include lipid, vitamin D, CBC, CMP, TSH, BNP, magnesium, A1c, and digoxin level.  5.  Follow-up in 1 year after completion of echocardiogram.        Interval history:  Merlyn is here in follow-up.  She had a CTA of her chest as well as echocardiogram.  Both of these imaging showed a stable  ascending aortic aneurysm at 5.1 cm.  Most recently, her echocardiogram shows a stable but severely enlarged aortic aneurysm at 5.1 cm on 10/18/2022.  On 10/19/2021 she had an aneurysm which was 5.1 cm.  Patient aware she has significant chest discomfort particularly with a ripping pain to her back, she should present to the ER.  She was told to mention to them that she has a history of an ascending aortic aneurysm.  She was told to take an ambulance to expedite care.  Previously, she had a CTA of her chest on 5/1/2020 which measured the aneurysm at 5.1 cm.  Echo on 4/28/2020 which showed an aneurysm at the same size at 5.1 cm. We will plan for an echocardiogram in a year to assess the size of the vessel.  She understands if it is 5.5 cm or greater, she will need surgery.  Related to atrial fibrillation, she has been asymptomatic.  She has not any significant bleeding or bruising on Xarelto.  We will continue digoxin, metoprolol, and Xarelto.  She is due for labs today which includes a digoxin level.  Follow-up in 1 year after completion of echocardiogram.      HPI:    Mrs. Escamilla is being seen by cardiology for an ascending aortic aneurysm.  It was 5.1 cm on 5/1/2020.  Previously, is 4.6 cm on 8/20/2018.      She has been followed for an ascending aortic aneurysm.  She is concerned about left hip pain and arthritis.  Her hip needs to be replaced.  She has had injections previously.  She saw Dr. Morales in February, 2021.  It was felt to try injections then potentially consider replacement in future.  Apparently, the injection worked for a while but her pain is severe.  It has been a big problem for her over the last year.  She has been told that her ascending aortic aneurysm needs to be evaluated/managed prior to consideration of hip surgery.  She states she has seen CT surgery in [place] and been turned down for intervention of this vessel.  She denies any chest pain or chest tightness which would suggest  dissection.  She is due for repeat echocardiogram and CTA which was recommended on 9/30/2021.  We discussed symptoms of a dissection and she was told to call an ambulance and go emergently to the ER if they should arise.  If her aneurysm is found to be 5.5 cm or greater, will consider a referral to the [facility].     She has a history of atrial fibrillation.  I believe he was diagnosed in 7/29/2018.  Creatinine clearance on 9/30/2021 was 44.  She should be on Xarelto 15 mg daily.  She has not had any bleeding or bruising issues.  She does not get any palpitations or fluttering in her chest.  She has not had any significant bleeding or bruising.  [id number] score is 3 getting x2 points for age and x1 for gender.     Left hip arthritis.  She is getting injection on 10/1/2021 with Dr. Corbin who is an interventional radiologist.  She is seeing orthopedics on 10/4/2021 in [place].  She was offered referral to be seen locally but will keep her current appointments.      Relevant testing:  ECHO on 10/18/22:  Left ventricular function is decreased. The ejection fraction is 50-55%  (borderline).  Global right ventricular function is normal.  Moderate tricuspid insufficiency and mild mitral insufficiency is present.  Moderate dilatation of the aorta is present. Ascending aorta 5.1 cm.  IVC diameter and respiratory changes fall into an intermediate range  suggesting an RA pressure of 8 mmHg.  No pericardial effusion is present.    ECHO on 10/19/21:  Left ventricular function is decreased. The ejection fraction is 50-55% (borderline).  Global right ventricular function is normal.  Moderate tricuspid insufficiency and mild mitral insufficiency is present.  Moderate dilatation of the aorta is present. Ascending aorta 5.1 cm.  IVC diameter and respiratory changes fall into an intermediate range suggesting an RA pressure of 8 mmHg.  No pericardial effusion is present.    CTA chest on 10/19/21:  Dilation of the mid  ascending aorta to 5.1 x 4.8 cm,  previously 5.1 x 4.7 and 4.6 x 4.5 cm in 2018.  Cardiomegaly with progressive biatrial dilation when compared to 2018.    CTA chest on 5/1/20:  Dilation of the mid ascending aorta to 5.1 x 4.7 cm, previously 4.6 x 4.5 cm.     CTA chest on 8/29/18:  Dilation of the mid ascending aorta to 4.6 x 4.5 cm.     ECHO on 4/28/20:  Left ventricular size is normal. Mildly (EF 45-50%) reduced left ventricular  function is present. Mild diffuse hypokinesis is present.  Right ventricular function, chamber size, wall motion, and thickness are normal.  Mild to moderate mitral insufficiency is present. At least moderate tricuspid  insufficiency is present.  Dilation of the inferior vena cava is present with normal respiratory variation in diameter. No pericardial effusion is present.  Ascending aorta 5.1 cm.  Compared to prior study (6/3/2019), ascending aorta is more dilated (5.1cm).  vs. 4.8 cm). Otherwise no significant change.     Stress test on 9/14/21:  1. No evidence of stress-induced ischemia.  2. Ejection fraction is 49%.     Zio patch on 8/30/18:  The patient was monitored for 13 days, 2 hours.   Minimum HR was 56, average HR was 91, maximum HR was 165.   There were 2 triggered events.   During the patient triggered events, the rhythm was atrial fibrillation, ventricular ectopic beats.   There were 0 supraventricular ectopic beats.   There were rare ventricular ectopic beats, longest consisted of 2 beats.   Atrial fibrillation present 100%.   x1 pause lasting 3.4 seconds at 9:17 AM on 9/11/18.        ICD-10-CM    1. Chronic systolic heart failure (H)  I50.22 EKG 12-lead, tracing only     Echocardiogram Complete     Magnesium     N terminal pro BNP outpatient     Comprehensive metabolic panel     CBC with platelets     Magnesium     N terminal pro BNP outpatient     Comprehensive metabolic panel     CBC with platelets      2. Aneurysm of ascending aorta without rupture  I71.21  Echocardiogram Complete     CBC with platelets     CBC with platelets      3. Moderate tricuspid regurgitation  I07.1 Echocardiogram Complete     N terminal pro BNP outpatient     CBC with platelets     N terminal pro BNP outpatient     CBC with platelets      4. Longstanding persistent atrial fibrillation (H)  I48.11 Echocardiogram Complete     TSH Reflex GH     CBC with platelets     TSH Reflex GH     CBC with platelets      5. Atrial enlargement, bilateral  I51.7 CBC with platelets     CBC with platelets      6. Vitamin D deficiency  E55.9 Vitamin D Total GH     Vitamin D Total GH      7. Encounter for monitoring digoxin therapy  Z51.81 Digoxin level    Z79.899 Digoxin level      8. Hyperglycemia  R73.9 Hemoglobin A1c     TSH Reflex GH     Comprehensive metabolic panel     Hemoglobin A1c     TSH Reflex GH     Comprehensive metabolic panel      9. Lipid screening  Z13.220 TSH Reflex GH     Lipid Profile     TSH Reflex GH     Lipid Profile          Past Medical History:   Diagnosis Date     Carpal tunnel syndrome of left wrist     10/4/2017     Carpal tunnel syndrome of right wrist     10/4/2017     Deep phlebothrombosis during pregnancy     No Comments Provided     Osteoarthritis     Knee     Other specified postprocedural states     10/4/2017     Other specified postprocedural states     1/31/2018     Presence of artificial knee joint     10/3/2014       Past Surgical History:   Procedure Laterality Date     APPENDECTOMY OPEN      No Comments Provided     CARPAL TUNNEL RELEASE RT/LT Left 01/31/2018 01/31/2018,[id number],OTHER,Left     CARPAL TUNNEL RELEASE RT/LT Right 10/04/2017     COLONOSCOPY  09/2014    Arteriovenous malformations of right, transverse and splenic flexure     ENDOSCOPIC STRIPPING VEIN(S)      No Comments Provided     HYSTERECTOMY TOTAL ABDOMINAL  06/1987    with squamous metaplasia and leiomoma     ZZC TOTAL KNEE ARTHROPLASTY Right 10/01/2014    Dr Domingo       Allergies   Allergen Reactions      Morphine      Other reaction(s): Muscle Weakness     Phenylbutazone Unknown       Current Outpatient Medications   Medication Sig Dispense Refill     DIGITEK 125 MCG tablet Take 1 tablet (125 mcg) by mouth daily 90 tablet 3     furosemide (LASIX) 20 MG tablet Take 2 tablets (40 mg) by mouth daily 180 tablet 3     gabapentin (NEURONTIN) 300 MG capsule Take 1 capsule (300 mg) by mouth At Bedtime 30 capsule 3     lisinopril (ZESTRIL) 5 MG tablet Take 1 tablet (5 mg) by mouth daily 90 tablet 3     metoprolol succinate ER (TOPROL-XL) 100 MG 24 hr tablet Take 1 tablet (100 mg) by mouth At Bedtime To be taken with 25 mg tablet to equal 125 mg daily. 90 tablet 3     metoprolol succinate ER (TOPROL-XL) 25 MG 24 hr tablet Take 1 tablet (25 mg) by mouth At Bedtime To be taken with 100 mg tablet to equal 125 mg daily. 90 tablet 3     Multiple Vitamin (MULTI-VITAMINS) TABS Take 1 tablet by mouth daily       traMADol (ULTRAM) 50 MG tablet Take 1 tablet (50 mg) by mouth every 6 hours as needed for severe pain 30 tablet 0     XARELTO ANTICOAGULANT 15 MG TABS tablet Take 1 tablet (15 mg) by mouth daily (with dinner) 90 tablet 3       Social History     Socioeconomic History     Marital status:      Spouse name: Not on file     Number of children: Not on file     Years of education: Not on file     Highest education level: Not on file   Occupational History     Not on file   Tobacco Use     Smoking status: Never     Smokeless tobacco: Never   Vaping Use     Vaping Use: Never used   Substance and Sexual Activity     Alcohol use: Yes     Alcohol/week: 5.0 standard drinks     Comment: slick daily     Drug use: No     Comment: Drug use: No     Sexual activity: Not Currently   Other Topics Concern     Parent/sibling w/ CABG, MI or angioplasty before 65F 55M? Not Asked   Social History Narrative    Lives alone.      7/2017.     3 daughters, 1 in [facility], 1 in [place] and 1 near [place]. Lost 1 daughter at age 2  years.    Has 1 living sister, fraternal twin. All other siblings are .      Social Determinants of Health     Financial Resource Strain: Not on file   Food Insecurity: Not on file   Transportation Needs: Not on file   Physical Activity: Not on file   Stress: Not on file   Social Connections: Not on file   Intimate Partner Violence: Not on file   Housing Stability: Not on file       LAB RESULTS:   Lab on 10/19/2021   Component Date Value Ref Range Status     Digoxin 10/19/2021 0.5    ug/L Final     WBC Count 10/19/2021 6.8  4.0 - 11.0 10e3/uL Final     RBC Count 10/19/2021 4.36  3.80 - 5.20 10e6/uL Final     Hemoglobin 10/19/2021 12.5  11.7 - 15.7 g/dL Final     Hematocrit 10/19/2021 39.9  35.0 - 47.0 % Final     MCV 10/19/2021 92  78 - 100 fL Final     MCH 10/19/2021 28.7  26.5 - 33.0 pg Final     MCHC 10/19/2021 31.3* 31.5 - 36.5 g/dL Final     RDW 10/19/2021 15.5* 10.0 - 15.0 % Final     Platelet Count 10/19/2021 228  150 - 450 10e3/uL Final     Sodium 10/19/2021 141  134 - 144 mmol/L Final     Potassium 10/19/2021 4.2  3.5 - 5.1 mmol/L Final     Chloride 10/19/2021 103  98 - 107 mmol/L Final     Carbon Dioxide (CO2) 10/19/2021 31  21 - 31 mmol/L Final     Anion Gap 10/19/2021 7  3 - 14 mmol/L Final     Urea Nitrogen 10/19/2021 20  7 - 25 mg/dL Final     Creatinine 10/19/2021 0.71  0.60 - 1.20 mg/dL Final     Calcium 10/19/2021 9.5  8.6 - 10.3 mg/dL Final     Glucose 10/19/2021 108* 70 - 105 mg/dL Final     GFR Estimate 10/19/2021 76  >60 mL/min/1.73m2 Final   Office Visit on 2021   Component Date Value Ref Range Status     Ventricular Rate 2021 66  BPM Final     Atrial Rate 2021 80  BPM Final     QRS Duration 2021 92  ms Final     QT 2021 380  ms Final     QTc 2021 398  ms Final     R AXIS 2021 -13  degrees Final     T Axis 2021 1  degrees Final     Interpretation ECG 2021    Final                    Value:Atrial fibrillation  Low voltage  QRS  Abnormal ECG  No previous ECGs available  Confirmed by MD INGRAM DANIEL ([id number]) on 9/30/2021 6:30:07 PM          Review of systems: Negative except that which was noted in the HPI.    Physical examination:  /64 (BP Location: Right arm, Patient Position: Sitting, Cuff Size: Adult Regular)   Pulse 83   Temp 98  F (36.7  C) (Temporal)   Resp 18   Ht 1.524 m (5')   Wt 57.2 kg (126 lb)   SpO2 100%   BMI 24.61 kg/m      GENERAL APPEARANCE: healthy, alert and no distress  HEENT: no icterus, no xanthelasmas, normal pupil size and reaction, no cyanosis.  NECK: no adenopathy, no asymmetry, masses.  CHEST: lungs clear to auscultation - no rales, rhonchi or wheezes, no use of accessory muscles, no retractions, respirations are unlabored, normal respiratory rate  CARDIOVASCULAR: regular rhythm, normal S1 with physiologic split S2, no S3 or S4 and no murmur, click or rub  EXTREMITIES: no clubbing, cyanosis or edema  NEURO: alert and oriented normal speech, and affect  VASC: No vascular bruits heard.  SKIN: no ecchymoses, no rashes.    Total time spent on day of visit, including review of tests, obtaining/reviewing separately obtained history, ordering medications/tests/procedures, communicating with PCP/consultants, and documenting in electronic medical record: 30 minutes.           Thank you for allowing me to participate in the care of your patient. Please do not hesitate to contact me if you have any questions.     Sharath Ingram, DO

## 2022-11-15 ENCOUNTER — OFFICE VISIT (OUTPATIENT)
Dept: CARDIOLOGY | Facility: OTHER | Age: 87
End: 2022-11-15
Attending: INTERNAL MEDICINE
Payer: MEDICARE

## 2022-11-15 ENCOUNTER — MYC MEDICAL ADVICE (OUTPATIENT)
Dept: CARDIOLOGY | Facility: OTHER | Age: 87
End: 2022-11-15

## 2022-11-15 VITALS
RESPIRATION RATE: 18 BRPM | OXYGEN SATURATION: 100 % | HEIGHT: 60 IN | TEMPERATURE: 98 F | HEART RATE: 83 BPM | BODY MASS INDEX: 24.74 KG/M2 | WEIGHT: 126 LBS | DIASTOLIC BLOOD PRESSURE: 64 MMHG | SYSTOLIC BLOOD PRESSURE: 108 MMHG

## 2022-11-15 DIAGNOSIS — Z51.81 ENCOUNTER FOR MONITORING DIGOXIN THERAPY: ICD-10-CM

## 2022-11-15 DIAGNOSIS — I07.1 MODERATE TRICUSPID REGURGITATION: ICD-10-CM

## 2022-11-15 DIAGNOSIS — I50.22 CHRONIC SYSTOLIC HEART FAILURE (H): Primary | ICD-10-CM

## 2022-11-15 DIAGNOSIS — Z79.899 ENCOUNTER FOR MONITORING DIGOXIN THERAPY: ICD-10-CM

## 2022-11-15 DIAGNOSIS — I71.21 ANEURYSM OF ASCENDING AORTA WITHOUT RUPTURE (H): ICD-10-CM

## 2022-11-15 DIAGNOSIS — I51.7 ATRIAL ENLARGEMENT, BILATERAL: ICD-10-CM

## 2022-11-15 DIAGNOSIS — E55.9 VITAMIN D DEFICIENCY: ICD-10-CM

## 2022-11-15 DIAGNOSIS — R73.9 HYPERGLYCEMIA: ICD-10-CM

## 2022-11-15 DIAGNOSIS — Z13.220 LIPID SCREENING: ICD-10-CM

## 2022-11-15 DIAGNOSIS — I48.11 LONGSTANDING PERSISTENT ATRIAL FIBRILLATION (H): ICD-10-CM

## 2022-11-15 LAB
ALBUMIN SERPL BCG-MCNC: 4 G/DL (ref 3.5–5.2)
ALP SERPL-CCNC: 91 U/L (ref 35–104)
ALT SERPL W P-5'-P-CCNC: 9 U/L (ref 10–35)
ANION GAP SERPL CALCULATED.3IONS-SCNC: 9 MMOL/L (ref 7–15)
AST SERPL W P-5'-P-CCNC: 19 U/L (ref 10–35)
ATRIAL RATE - MUSE: 75 BPM
BILIRUB SERPL-MCNC: 0.3 MG/DL
BUN SERPL-MCNC: 18.1 MG/DL (ref 8–23)
CALCIUM SERPL-MCNC: 9.6 MG/DL (ref 8.8–10.2)
CHLORIDE SERPL-SCNC: 103 MMOL/L (ref 98–107)
CHOLEST SERPL-MCNC: 182 MG/DL
CREAT SERPL-MCNC: 0.73 MG/DL (ref 0.51–0.95)
DEPRECATED HCO3 PLAS-SCNC: 28 MMOL/L (ref 22–29)
DIASTOLIC BLOOD PRESSURE - MUSE: NORMAL MMHG
DIGOXIN SERPL-MCNC: 0.4 NG/ML (ref 0.6–2)
ERYTHROCYTE [DISTWIDTH] IN BLOOD BY AUTOMATED COUNT: 15.6 % (ref 10–15)
GFR SERPL CREATININE-BSD FRML MDRD: 78 ML/MIN/1.73M2
GLUCOSE SERPL-MCNC: 108 MG/DL (ref 70–99)
HBA1C MFR BLD: 5.9 % (ref 4–6.2)
HCT VFR BLD AUTO: 36.5 % (ref 35–47)
HDLC SERPL-MCNC: 54 MG/DL
HGB BLD-MCNC: 11.6 G/DL (ref 11.7–15.7)
INTERPRETATION ECG - MUSE: NORMAL
LDLC SERPL CALC-MCNC: 112 MG/DL
MAGNESIUM SERPL-MCNC: 2.1 MG/DL (ref 1.7–2.3)
MCH RBC QN AUTO: 28.6 PG (ref 26.5–33)
MCHC RBC AUTO-ENTMCNC: 31.8 G/DL (ref 31.5–36.5)
MCV RBC AUTO: 90 FL (ref 78–100)
NONHDLC SERPL-MCNC: 128 MG/DL
NT-PROBNP SERPL-MCNC: 2427 PG/ML (ref 0–1800)
P AXIS - MUSE: NORMAL DEGREES
PLATELET # BLD AUTO: 214 10E3/UL (ref 150–450)
POTASSIUM SERPL-SCNC: 4.4 MMOL/L (ref 3.4–5.3)
PR INTERVAL - MUSE: NORMAL MS
PROT SERPL-MCNC: 6.9 G/DL (ref 6.4–8.3)
QRS DURATION - MUSE: 92 MS
QT - MUSE: 382 MS
QTC - MUSE: 451 MS
R AXIS - MUSE: 12 DEGREES
RBC # BLD AUTO: 4.05 10E6/UL (ref 3.8–5.2)
SODIUM SERPL-SCNC: 140 MMOL/L (ref 136–145)
SYSTOLIC BLOOD PRESSURE - MUSE: NORMAL MMHG
T AXIS - MUSE: 28 DEGREES
TRIGL SERPL-MCNC: 81 MG/DL
TSH SERPL DL<=0.005 MIU/L-ACNC: 0.46 UIU/ML (ref 0.3–4.2)
VENTRICULAR RATE- MUSE: 84 BPM
WBC # BLD AUTO: 5.5 10E3/UL (ref 4–11)

## 2022-11-15 PROCEDURE — 83880 ASSAY OF NATRIURETIC PEPTIDE: CPT | Mod: ZL | Performed by: INTERNAL MEDICINE

## 2022-11-15 PROCEDURE — 80162 ASSAY OF DIGOXIN TOTAL: CPT | Mod: ZL | Performed by: INTERNAL MEDICINE

## 2022-11-15 PROCEDURE — 84443 ASSAY THYROID STIM HORMONE: CPT | Mod: ZL | Performed by: INTERNAL MEDICINE

## 2022-11-15 PROCEDURE — 99214 OFFICE O/P EST MOD 30 MIN: CPT | Performed by: INTERNAL MEDICINE

## 2022-11-15 PROCEDURE — 80053 COMPREHEN METABOLIC PANEL: CPT | Mod: ZL | Performed by: INTERNAL MEDICINE

## 2022-11-15 PROCEDURE — 83036 HEMOGLOBIN GLYCOSYLATED A1C: CPT | Mod: ZL | Performed by: INTERNAL MEDICINE

## 2022-11-15 PROCEDURE — G0463 HOSPITAL OUTPT CLINIC VISIT: HCPCS | Mod: 25

## 2022-11-15 PROCEDURE — 93010 ELECTROCARDIOGRAM REPORT: CPT | Performed by: INTERNAL MEDICINE

## 2022-11-15 PROCEDURE — 82306 VITAMIN D 25 HYDROXY: CPT | Mod: ZL | Performed by: INTERNAL MEDICINE

## 2022-11-15 PROCEDURE — 80061 LIPID PANEL: CPT | Mod: ZL | Performed by: INTERNAL MEDICINE

## 2022-11-15 PROCEDURE — 85027 COMPLETE CBC AUTOMATED: CPT | Mod: ZL | Performed by: INTERNAL MEDICINE

## 2022-11-15 PROCEDURE — 93005 ELECTROCARDIOGRAM TRACING: CPT | Performed by: INTERNAL MEDICINE

## 2022-11-15 PROCEDURE — 36415 COLL VENOUS BLD VENIPUNCTURE: CPT | Mod: ZL | Performed by: INTERNAL MEDICINE

## 2022-11-15 PROCEDURE — 83735 ASSAY OF MAGNESIUM: CPT | Mod: ZL | Performed by: INTERNAL MEDICINE

## 2022-11-15 ASSESSMENT — PAIN SCALES - GENERAL: PAINLEVEL: NO PAIN (0)

## 2022-11-15 NOTE — PATIENT INSTRUCTIONS
You were seen by Sharath Ingram, DO     1. Echocardiogram has been ordered to be done in 1 year. You will be called to schedule this.  You will receive instructions for testing at that time.  You will be contacted with results.          You will follow up with [facility] in 1 year after your echocardiogram, sooner if needed.     Please call the cardiology office with problems, questions, or concerns at [phone].    If you experience chest pain, chest pressure, chest tightness, shortness of breath, fainting, lightheadedness, nausea, vomiting, or other concerning symptoms, please report to the Emergency Department or call 911. These symptoms may be emergent, and best treated in the Emergency Department.     Cardiology Nurses  MINOR Masters, LIGIAN  GUILHERME Mena  [facility] ([address])  [phone]

## 2022-11-15 NOTE — NURSING NOTE
Patient comes in for annual visit.  Shanika Madrigal LPN ....................11/15/2022   1:51 PM  Chief Complaint   Patient presents with     Follow Up     annual       Initial /64 (BP Location: Right arm, Patient Position: Sitting, Cuff Size: Adult Regular)   Pulse 83   Temp 98  F (36.7  C) (Temporal)   Resp 18   Ht 1.524 m (5')   Wt 57.2 kg (126 lb)   SpO2 100%   BMI 24.61 kg/m   Estimated body mass index is 24.61 kg/m  as calculated from the following:    Height as of this encounter: 1.524 m (5').    Weight as of this encounter: 57.2 kg (126 lb).  Meds Reconciled: complete  Pt is not on Aspirin  Pt is not on a Statin  PHQ and/or MARVIN reviewed. Pt referred to PCP/MH Provider as appropriate.    Shanika Madrigal LPN    FOOD SECURITY SCREENING QUESTIONS  Hunger Vital Signs:  Within the past 12 months we worried whether our food would run out before we got money to buy more. Never  Within the past 12 months the food we bought just didn't last and we didn't have money to get more. Never  Shanika Madrigal LPN 11/15/2022 1:51 PM

## 2022-11-16 LAB — DEPRECATED CALCIDIOL+CALCIFEROL SERPL-MC: 13 UG/L (ref 20–75)

## 2023-01-08 DIAGNOSIS — I48.91 ATRIAL FIBRILLATION WITH RVR (H): ICD-10-CM

## 2023-01-08 DIAGNOSIS — I50.22 CHRONIC SYSTOLIC HEART FAILURE (H): ICD-10-CM

## 2023-01-08 DIAGNOSIS — I71.21 ASCENDING AORTIC ANEURYSM (H): ICD-10-CM

## 2023-01-08 DIAGNOSIS — I48.20 CHRONIC ATRIAL FIBRILLATION (H): ICD-10-CM

## 2023-01-11 RX ORDER — METOPROLOL SUCCINATE 25 MG/1
25 TABLET, EXTENDED RELEASE ORAL AT BEDTIME
Qty: 90 TABLET | Refills: 3 | Status: SHIPPED | OUTPATIENT
Start: 2023-01-11 | End: 2024-01-18

## 2023-01-11 RX ORDER — METOPROLOL SUCCINATE 100 MG/1
100 TABLET, EXTENDED RELEASE ORAL AT BEDTIME
Qty: 90 TABLET | Refills: 3 | Status: SHIPPED | OUTPATIENT
Start: 2023-01-11 | End: 2024-01-18

## 2023-01-11 RX ORDER — LISINOPRIL 5 MG/1
5 TABLET ORAL DAILY
Qty: 90 TABLET | Refills: 3 | Status: SHIPPED | OUTPATIENT
Start: 2023-01-11 | End: 2024-01-18

## 2023-01-11 RX ORDER — DIGOXIN 125 MCG
TABLET ORAL
Qty: 90 TABLET | Refills: 3 | Status: SHIPPED | OUTPATIENT
Start: 2023-01-11 | End: 2024-01-18

## 2023-01-11 NOTE — TELEPHONE ENCOUNTER
"Encounter also routed to [facility] to schedule an annual visit. Sydni Herron RN on 1/11/2023 at 8:06 AM    metoprolol succinate ER (TOPROL XL)   Last Prescription Date: 1/17/22  Last Qty/Refills: 90 / 3    digoxin (LANOXIN)   Last Prescription Date: 1/17/22  Last Qty/Refills: 90 / 3    lisinopril (ZESTRIL)   Last Prescription Date: 1/17/22  Last Qty/Refills: 90 / 3    metoprolol succinate ER (TOPROL XL)   Last Prescription Date: 1/17/22  Last Qty/Refills: 90 / 3    Last Office Visit: 12/27/21 Tofte (staple removal)  Future Office Visit: none     Requested Prescriptions   Pending Prescriptions Disp Refills     metoprolol succinate ER (TOPROL XL) 25 MG 24 hr tablet [Pharmacy Med Name: metoprolol succinate ER 25 mg tablet,extended release 24 hr] 90 tablet 3     Sig: Take 1 tablet (25 mg) by mouth At Bedtime To be taken with 100 mg tablet to equal 125 mg daily.       Beta-Blockers Protocol Failed - 1/8/2023  8:04 AM        Failed - Recent (12 mo) or future (30 days) visit within the authorizing provider's specialty     Patient has had an office visit with the authorizing provider or a provider within the authorizing providers department within the previous 12 mos or has a future within next 30 days. See \"Patient Info\" tab in inbasket, or \"Choose Columns\" in Meds & Orders section of the refill encounter.              Passed - Blood pressure under 140/90 in past 12 months     BP Readings from Last 3 Encounters:   11/15/22 108/64   12/27/21 110/70   11/22/21 108/68                 Passed - Patient is age 6 or older        Passed - Medication is active on med list           digoxin (LANOXIN) 125 MCG tablet [Pharmacy Med Name: digoxin 125 mcg (0.125 mg) tablet] 90 tablet 3     Sig: Take 1 tablet (125 mcg) by mouth daily       Cardiac Glycoside Agents Protocol Failed - 1/8/2023  8:04 AM        Failed - Normal digoxin level on file in past 12 mos     Recent Labs   Lab Test 11/15/22  1432   DIGOXIN 0.4*             " "Failed - Recent (6 mo) or future (30 days) visit within the authorizing provider's specialty     Patient had office visit in the last 6 months or has a visit in the next 30 days with authorizing provider or within the authorizing provider's specialty.  See \"Patient Info\" tab in inbasket, or \"Choose Columns\" in Meds & Orders section of the refill encounter.            Passed - Medication is active on med list        Passed - Patient is 18 years of age or older        Passed - Patient is not pregnant        Passed - Normal serum creatinine on file in past 12 mos     Recent Labs   Lab Test 11/15/22  1432   CR 0.73       Ok to refill medication if creatinine is low          Passed - No positive pregnancy test on file in past 12 mos           lisinopril (ZESTRIL) 5 MG tablet [Pharmacy Med Name: lisinopril 5 mg tablet] 90 tablet 3     Sig: Take 1 tablet (5 mg) by mouth daily       ACE Inhibitors (Including Combos) Protocol Failed - 1/8/2023  8:04 AM        Failed - Recent (12 mo) or future (30 days) visit within the authorizing provider's specialty     Patient has had an office visit with the authorizing provider or a provider within the authorizing providers department within the previous 12 mos or has a future within next 30 days. See \"Patient Info\" tab in inbasket, or \"Choose Columns\" in Meds & Orders section of the refill encounter.              Passed - Blood pressure under 140/90 in past 12 months     BP Readings from Last 3 Encounters:   11/15/22 108/64   12/27/21 110/70   11/22/21 108/68                 Passed - Medication is active on med list        Passed - Patient is age 18 or older        Passed - No active pregnancy on record        Passed - Normal serum creatinine on file in past 12 months     Recent Labs   Lab Test 11/15/22  1432   CR 0.73       Ok to refill medication if creatinine is low          Passed - Normal serum potassium on file in past 12 months     Recent Labs   Lab Test 11/15/22  1432 " "  POTASSIUM 4.4             Passed - No positive pregnancy test within past 12 months           metoprolol succinate ER (TOPROL XL) 100 MG 24 hr tablet [Pharmacy Med Name: metoprolol succinate  mg tablet,extended release 24 hr] 90 tablet 3     Sig: Take 1 tablet (100 mg) by mouth At Bedtime To be taken with 25 mg tablet to equal 125 mg daily.       Beta-Blockers Protocol Failed - 1/8/2023  8:04 AM        Failed - Recent (12 mo) or future (30 days) visit within the authorizing provider's specialty     Patient has had an office visit with the authorizing provider or a provider within the authorizing providers department within the previous 12 mos or has a future within next 30 days. See \"Patient Info\" tab in inbasket, or \"Choose Columns\" in Meds & Orders section of the refill encounter.              Passed - Blood pressure under 140/90 in past 12 months     BP Readings from Last 3 Encounters:   11/15/22 108/64   12/27/21 110/70   11/22/21 108/68                 Passed - Patient is age 6 or older        Passed - Medication is active on med list               "

## 2023-02-13 ENCOUNTER — OFFICE VISIT (OUTPATIENT)
Dept: FAMILY MEDICINE | Facility: OTHER | Age: 88
End: 2023-02-13
Attending: FAMILY MEDICINE
Payer: COMMERCIAL

## 2023-02-13 VITALS
HEART RATE: 84 BPM | BODY MASS INDEX: 24.26 KG/M2 | TEMPERATURE: 98.1 F | SYSTOLIC BLOOD PRESSURE: 104 MMHG | DIASTOLIC BLOOD PRESSURE: 68 MMHG | WEIGHT: 123.6 LBS | HEIGHT: 60 IN | RESPIRATION RATE: 18 BRPM | OXYGEN SATURATION: 98 %

## 2023-02-13 DIAGNOSIS — I50.22 CHRONIC SYSTOLIC HEART FAILURE (H): ICD-10-CM

## 2023-02-13 DIAGNOSIS — Z00.00 ENCOUNTER FOR MEDICARE ANNUAL WELLNESS EXAM: Primary | ICD-10-CM

## 2023-02-13 DIAGNOSIS — I48.11 LONGSTANDING PERSISTENT ATRIAL FIBRILLATION (H): ICD-10-CM

## 2023-02-13 PROCEDURE — 99397 PER PM REEVAL EST PAT 65+ YR: CPT | Performed by: FAMILY MEDICINE

## 2023-02-13 PROCEDURE — 91312 COVID-19 VACCINE BIVALENT BOOSTER 12+ (PFIZER): CPT

## 2023-02-13 PROCEDURE — 0124A COVID-19 VACCINE BIVALENT BOOSTER 12+ (PFIZER): CPT

## 2023-02-13 ASSESSMENT — ENCOUNTER SYMPTOMS
CONSTIPATION: 0
MYALGIAS: 0
NAUSEA: 0
HEADACHES: 0
PALPITATIONS: 0
DYSURIA: 0
CHILLS: 0
DIZZINESS: 0
JOINT SWELLING: 0
SORE THROAT: 0
ARTHRALGIAS: 1
HEARTBURN: 0
NERVOUS/ANXIOUS: 0
SHORTNESS OF BREATH: 0
HEMATOCHEZIA: 0
ABDOMINAL PAIN: 0
WEAKNESS: 0
DIARRHEA: 0
PARESTHESIAS: 0
FEVER: 0
EYE PAIN: 0
COUGH: 0
BREAST MASS: 0
HEMATURIA: 0
FREQUENCY: 0

## 2023-02-13 ASSESSMENT — PAIN SCALES - GENERAL: PAINLEVEL: NO PAIN (0)

## 2023-02-13 ASSESSMENT — ACTIVITIES OF DAILY LIVING (ADL): CURRENT_FUNCTION: NO ASSISTANCE NEEDED

## 2023-02-13 NOTE — PROGRESS NOTES
"SUBJECTIVE:   Merlyn is a 89 year old who presents for Preventive Visit.    Patient has been advised of split billing requirements and indicates understanding: Yes  Are you in the first 12 months of your Medicare coverage?  No    She has a history of thoracic aortic aneurysm which has been monitored by cardiology, last imaging was at 5.1 cm.  She continues with atrial fibrillation and has been tolerating her blood thinner as well.  She is been eating and drinking well.  No urinary or bowel changes.  She otherwise has no new concerns today.    Healthy Habits:     In general, how would you rate your overall health?  Good    Frequency of exercise:  2-3 days/week    Duration of exercise:  15-30 minutes    Do you usually eat at least 4 servings of fruit and vegetables a day, include whole grains    & fiber and avoid regularly eating high fat or \"junk\" foods?  No    Taking medications regularly:  Yes    Medication side effects:  None    Ability to successfully perform activities of daily living:  No assistance needed    Home Safety:  No safety concerns identified    Hearing Impairment:  No hearing concerns    In the past 6 months, have you been bothered by leaking of urine?  No    In general, how would you rate your overall mental or emotional health?  Good      PHQ-2 Total Score: 0    Additional concerns today:  No      Have you ever done Advance Care Planning? (For example, a Health Directive, POLST, or a discussion with a medical provider or your loved ones about your wishes): Yes, advance care planning is on file.       Fall risk  Fallen 2 or more times in the past year?: Yes  Any fall with injury in the past year?: No    Cognitive Screening   1) Repeat 3 items (Leader, Season, Table)    2) Clock draw: NORMAL  3) 3 item recall: recalls 2 objects  Results: NORMAL clock, 1-2 items recalled: COGNITIVE IMPAIRMENT LESS LIKELY    Mini-CogTM Copyright S [employer]. Licensed by the author for use in [facility]; " reprinted with permission (soob@.[facility]). All rights reserved.      Do you have sleep apnea, excessive snoring or daytime drowsiness?: no    Reviewed and updated as needed this visit by clinical staff   Tobacco  Allergies  Meds              Reviewed and updated as needed this visit by Provider                 Social History     Tobacco Use     Smoking status: Never     Smokeless tobacco: Never   Substance Use Topics     Alcohol use: Yes     Alcohol/week: 5.0 standard drinks     Comment: slick daily         Alcohol Use 2/13/2023   Prescreen: >3 drinks/day or >7 drinks/week? No       Current providers sharing in care for this patient include:   Patient Care Team:  Sharath Dinero MD as PCP - General (Family Practice)  Sharath Ingram DO as Assigned Heart and Vascular Provider  Sharath Dinero MD as Assigned PCP  Clement Morales MD as Assigned Musculoskeletal Provider    The following health maintenance items are reviewed in Epic and correct as of today:  Health Maintenance   Topic Date Due     ZOSTER IMMUNIZATION (1 of 2) Never done     DTAP/TDAP/TD IMMUNIZATION (2 - Td or Tdap) 07/13/2010     COVID-19 Vaccine (4 - Booster for Pfizer series) 01/17/2022     INFLUENZA VACCINE (1) 09/01/2022     MEDICARE ANNUAL WELLNESS VISIT  02/13/2024     FALL RISK ASSESSMENT  02/13/2024     ADVANCE CARE PLANNING  06/02/2026     PHQ-2 (once per calendar year)  Completed     Pneumococcal Vaccine: 65+ Years  Completed     IPV IMMUNIZATION  Aged Out     MENINGITIS IMMUNIZATION  Aged Out     Labs reviewed in EPIC    Pertinent mammograms are reviewed under the imaging tab.    Review of Systems   Constitutional: Negative for chills and fever.   HENT: Negative for congestion, ear pain, hearing loss and sore throat.    Eyes: Negative for pain and visual disturbance.   Respiratory: Negative for cough and shortness of breath.    Cardiovascular: Negative for chest pain, palpitations and peripheral edema.   Gastrointestinal: Negative for  abdominal pain, constipation, diarrhea, heartburn, hematochezia and nausea.   Breasts:  Negative for tenderness, breast mass and discharge.   Genitourinary: Positive for urgency. Negative for dysuria, frequency, genital sores, hematuria, pelvic pain, vaginal bleeding and vaginal discharge.   Musculoskeletal: Positive for arthralgias. Negative for joint swelling and myalgias.   Skin: Negative for rash.   Neurological: Negative for dizziness, weakness, headaches and paresthesias.   Psychiatric/Behavioral: Negative for mood changes. The patient is not nervous/anxious.          OBJECTIVE:   /68   Pulse 84   Temp 98.1  F (36.7  C)   Resp 18   Ht 1.524 m (5')   Wt 56.1 kg (123 lb 9.6 oz)   SpO2 98%   BMI 24.14 kg/m   Estimated body mass index is 24.14 kg/m  as calculated from the following:    Height as of this encounter: 1.524 m (5').    Weight as of this encounter: 56.1 kg (123 lb 9.6 oz).  Physical Exam  GENERAL: healthy, alert and no distress  EYES: Eyes grossly normal to inspection, PERRL and conjunctivae and sclerae normal  HENT: ear canals and TM's normal, nose and mouth without ulcers or lesions  NECK: no adenopathy, no asymmetry, masses, or scars and thyroid normal to palpation  RESP: lungs clear to auscultation - no rales, rhonchi or wheezes  BREAST: normal without masses, tenderness or nipple discharge and no palpable axillary masses or adenopathy  CV: regular rate and rhythm, normal S1 S2, no S3 or S4, no murmur, click or rub, no peripheral edema and peripheral pulses strong  ABDOMEN: Soft, nontender.  She has a left lower quadrant hernia which is easily reducible.  Nontender.  MS: no gross musculoskeletal defects noted, no edema  SKIN: no suspicious lesions or rashes  NEURO: Normal strength and tone, mentation intact and speech normal  PSYCH: mentation appears normal, affect normal/bright    ASSESSMENT / PLAN:       ICD-10-CM    1. Encounter for Medicare annual wellness exam  Z00.00       2.  Chronic systolic heart failure (H)  I50.22       3. Longstanding persistent atrial fibrillation (H)  I48.11         Reviewed labs from November which are stable, repeat again later this year.    Continue with thoracic aortic aneurysm monitoring.    We will update COVID-vaccine booster today.    Patient has been advised of split billing requirements and indicates understanding: Yes      COUNSELING:  Reviewed preventive health counseling, as reflected in patient instructions       Regular exercise       Healthy diet/nutrition       Vision screening        She reports that she has never smoked. She has never used smokeless tobacco.      Appropriate preventive services were discussed with this patient, including applicable screening as appropriate for cardiovascular disease, diabetes, osteopenia/osteoporosis, and glaucoma.  As appropriate for age/gender, discussed screening for colorectal cancer, prostate cancer, breast cancer, and cervical cancer. Checklist reviewing preventive services available has been given to the patient.    Reviewed patients plan of care and provided an AVS. The Basic Care Plan (routine screening as documented in Health Maintenance) for Merlyn meets the Care Plan requirement. This Care Plan has been established and reviewed with the Patient.          Sharath Dinero MD  [facility] AND [facility]    Identified Health Risks:

## 2023-02-13 NOTE — PATIENT INSTRUCTIONS
Patient Education   Personalized Prevention Plan  You are due for the preventive services outlined below.  Your care team is available to assist you in scheduling these services.  If you have already completed any of these items, please share that information with your care team to update in your medical record.  Health Maintenance Due   Topic Date Due     Zoster (Shingles) Vaccine (1 of 2) Never done     Diptheria Tetanus Pertussis (DTAP/TDAP/TD) Vaccine (2 - Td or Tdap) 07/13/2010     COVID-19 Vaccine (4 - Booster for Pfizer series) 01/17/2022     Flu Vaccine (1) 09/01/2022       Preventing Falls at Home  A person can fall for many reasons. Older adults may fall because reaction time slows down as we age. Your muscles and joints may get stiff, weak, or less flexible because of illness, medicines, or a physical condition.   Other health problems that make falls more likely include:     Arthritis    Dizziness or lightheadedness when you stand up (orthostatic hypotension)    History of a stroke    Dizziness    Anemia    Certain medicines taken for mental illness or to control blood pressure.    Problems with balance or gait    Bladder or urinary problems    History of falling    Changes in vision (vision impairment)    Changes in thinking skills and memory (cognitive impairment)  Injuries from a fall can include serious injuries such as broken bones, dislocated joints, internal bleeding and cuts. Injuries like these can limit your independence.   Prevention tips  To help prevent falls and fall-related injuries, follow the tips below.    Floors  To make floors safer:     Put nonskid pads under area rugs.    Remove small rugs.    Replace worn floor coverings.    Tack carpets firmly to each step on carpeted stairs. Put nonskid strips on the edges of uncarpeted stairs.    Keep floors and stairs free of clutter and cords.    Arrange furniture so there are clear pathways.    Clean up any spills right  away.  Bathrooms    To make bathrooms safer:     Install grab bars in the tub or shower.    Apply nonskid strips or put a nonskid rubber mat in the tub or shower.    Sit on a bath chair to bathe.    Use bathmats with nonskid backing.  Lighting  To improve visibility in your home:      Keep a flashlight in each room. Or put a lamp next to the bed within easy reach.    Put nightlights in the bedrooms, hallways, kitchen, and bathrooms.    Make sure all stairways have good lighting.    Take your time when going up and down stairs.    Put handrails on both sides of stairs and in walkways for more support. To prevent injury to your wrist or arm, don t use handrails to pull yourself up.    Install grab bars to pull yourself up.    Move or rearrange items that you use often. This will make them easier to find or reach.    Look at your home to find any safety hazards. Especially look at doorways, walkways, and the driveway. Remove or repair any safety problems that you find.  Other changes to make    Look around to find any safety hazards. Look closely at doorways, walkways, and the driveway. Remove or repair any safety problems that you find.    Wear shoes that fit well.    Take your time when going up and down stairs.    Put handrails on both sides of stairs and in walkways for more support. To prevent injury to your wrist or arm, don t use handrails to pull yourself up.    Install grab bars wherever needed to pull yourself up.    Arrange items that you use often. This will make them easier to find or reach.    [employer] last reviewed this educational content on 3/1/2020    4302-0095 [employer]. All rights reserved. This information is not intended as a substitute for professional medical care. Always follow your healthcare professional's instructions.

## 2023-05-10 ENCOUNTER — NURSE TRIAGE (OUTPATIENT)
Dept: FAMILY MEDICINE | Facility: OTHER | Age: 88
End: 2023-05-10
Payer: COMMERCIAL

## 2023-05-10 ENCOUNTER — OFFICE VISIT (OUTPATIENT)
Dept: FAMILY MEDICINE | Facility: OTHER | Age: 88
End: 2023-05-10
Payer: COMMERCIAL

## 2023-05-10 VITALS
TEMPERATURE: 98.6 F | HEART RATE: 90 BPM | BODY MASS INDEX: 23.09 KG/M2 | DIASTOLIC BLOOD PRESSURE: 58 MMHG | WEIGHT: 122.3 LBS | RESPIRATION RATE: 16 BRPM | OXYGEN SATURATION: 98 % | SYSTOLIC BLOOD PRESSURE: 100 MMHG | HEIGHT: 61 IN

## 2023-05-10 DIAGNOSIS — K04.7 DENTAL INFECTION: Primary | ICD-10-CM

## 2023-05-10 PROCEDURE — G0463 HOSPITAL OUTPT CLINIC VISIT: HCPCS

## 2023-05-10 PROCEDURE — 99213 OFFICE O/P EST LOW 20 MIN: CPT

## 2023-05-10 ASSESSMENT — PAIN SCALES - GENERAL: PAINLEVEL: NO PAIN (0)

## 2023-05-10 NOTE — TELEPHONE ENCOUNTER
Pt states that she is having tooth pain and would like to get a call back.  Please call    Sharif Wu on 5/10/2023 at 2:00 PM

## 2023-05-10 NOTE — TELEPHONE ENCOUNTER
"S-(situation): tooth pain    B-(background): States she has called around to several dentists, but no one will get her in in an emergency basis due to not having a primary dentist. States they are able to get her in in June    A-(assessment): States sharp pain in tooth area. States area is swollen as well. Does not think it is draining at this time. Does not appear to be an injury to tooth or a broken tooth.    R-(recommendations): Per protocol, see in clinic. Patient was given information in regards to [facility] and states \"that sounds great\". Patient states she will come in today to be evaluated    Corinne R Thayer, RN on 5/10/2023 at 2:38 PM    Reason for Disposition    Face is swollen    Additional Information    Negative: Pale cold skin and very weak (can't stand)    Negative: Similar pain previously and it was from 'heart attack'    Negative: Similar pain previously and it was from 'angina' and not relieved by nitroglycerin    Negative: Sounds like a life-threatening emergency to the triager    Negative: Chest pain    Negative: Toothache followed tooth injury    Protocols used: TOOTHACHE-A-[place]    "

## 2023-05-10 NOTE — NURSING NOTE
Chief Complaint   Patient presents with     Dental Pain     Patient presents to the clinic for tooth pain started a couple days ago right side on the bottom. Ibuprofen was effective. Gargled with vinegar and salt.   Susan East LPN       FOOD SECURITY SCREENING QUESTIONS:    The next two questions are to help us understand your food security.  If you are feeling you need any assistance in this area, we have resources available to support you today.    Hunger Vital Signs:  Within the past 12 months we worried whether our food would run out before we got money to buy more. Never  Within the past 12 months the food we bought just didn't last and we didn't have money to get more. Never  Susan East LPN,LPN on 5/10/2023 at 3:17 PM   Food Insecurity: Not on file

## 2023-05-10 NOTE — PROGRESS NOTES
ASSESSMENT/PLAN:    I have reviewed the nursing notes.  I have reviewed the findings, diagnosis, plan and need for follow up with the patient.    1. Dental infection  - amoxicillin-clavulanate (AUGMENTIN) 875-125 MG tablet; Take 1 tablet by mouth 2 times daily for 7 days  Dispense: 14 tablet; Refill: 0    Physical exam and symptoms consistent with possible dental infection.  We will treat patient with Augmentin twice a day for 7 days.  Advised patient to take medication with food to reduce the risk of GI upset.  Advised that she can take Tylenol and ibuprofen as needed for pain along with continuing with her salt water gargles.  Advised patient that she should try and get into see her dentist as soon as able.    Discussed warning signs/symptoms indicative of need to f/u    Follow up if symptoms persist or worsen or concerns    I explained my diagnostic considerations and recommendations to the patient, who voiced understanding and agreement with the treatment plan. All questions were answered. We discussed potential side effects of any prescribed or recommended therapies, as well as expectations for response to treatments.    PO Whitaker CNP  5/10/2023  3:39 PM    HPI:    Merlyn Escamilla is a 89 year old female  who presents to [facility] today for concerns of dental pain    dental pain x 3 days    Symptoms:  Location: lower 3rd molar on the right  Extraoral: pain on palpation  Intraoral: pain with palpation  Jaw pain: No  Any masses or occlusion: No  Dysphagia: No  Facial swelling: No  Discharge/pus: No  Sinus pain or pressure: No  Fevers, chills, signs of infection: YES - felt feverish  Recent infections: No  Recent antibiotic treatment/course: No    Last dental visit: over 6 years ago  Any prior dental problems: No    Tx Tried: ibuprofen    PCP: Dr. Dinero    Past Medical History:   Diagnosis Date     Carpal tunnel syndrome of left wrist     10/4/2017     Carpal tunnel syndrome of right wrist      10/4/2017     Deep phlebothrombosis during pregnancy     No Comments Provided     Osteoarthritis     Knee     Other specified postprocedural states     10/4/2017     Other specified postprocedural states     1/31/2018     Presence of artificial knee joint     10/3/2014     Past Surgical History:   Procedure Laterality Date     APPENDECTOMY OPEN      No Comments Provided     CARPAL TUNNEL RELEASE RT/LT Left 01/31/2018 01/31/2018,[id number],OTHER,Left     CARPAL TUNNEL RELEASE RT/LT Right 10/04/2017     COLONOSCOPY  09/2014    Arteriovenous malformations of right, transverse and splenic flexure     ENDOSCOPIC STRIPPING VEIN(S)      No Comments Provided     HYSTERECTOMY TOTAL ABDOMINAL  06/1987    with squamous metaplasia and leiomoma     ZZC TOTAL KNEE ARTHROPLASTY Right 10/01/2014    Dr Domingo     Social History     Tobacco Use     Smoking status: Never     Smokeless tobacco: Never   Vaping Use     Vaping status: Never Used   Substance Use Topics     Alcohol use: Yes     Alcohol/week: 5.0 standard drinks of alcohol     Comment: slick daily     Current Outpatient Medications   Medication Sig Dispense Refill     digoxin (LANOXIN) 125 MCG tablet Take 1 tablet (125 mcg) by mouth daily 90 tablet 3     furosemide (LASIX) 20 MG tablet Take 2 tablets (40 mg) by mouth daily 180 tablet 3     lisinopril (ZESTRIL) 5 MG tablet Take 1 tablet (5 mg) by mouth daily 90 tablet 3     metoprolol succinate ER (TOPROL XL) 100 MG 24 hr tablet Take 1 tablet (100 mg) by mouth At Bedtime To be taken with 25 mg tablet to equal 125 mg daily. 90 tablet 3     metoprolol succinate ER (TOPROL XL) 25 MG 24 hr tablet Take 1 tablet (25 mg) by mouth At Bedtime To be taken with 100 mg tablet to equal 125 mg daily. 90 tablet 3     XARELTO ANTICOAGULANT 15 MG TABS tablet Take 1 tablet (15 mg) by mouth daily (with dinner) 90 tablet 3     Multiple Vitamin (MULTI-VITAMINS) TABS Take 1 tablet by mouth daily (Patient not taking: Reported on 5/10/2023)    "    Allergies   Allergen Reactions     Morphine      Other reaction(s): Muscle Weakness     Phenylbutazone Unknown     Past medical history, past surgical history, current medications and allergies reviewed and accurate to the best of my knowledge.      ROS:  Refer to HPI    /58 (BP Location: Left arm, Patient Position: Sitting, Cuff Size: Adult Regular)   Pulse 90   Temp 98.6  F (37  C) (Tympanic)   Resp 16   Ht 1.537 m (5' 0.5\")   Wt 55.5 kg (122 lb 4.8 oz)   SpO2 98%   BMI 23.49 kg/m      EXAM:  General Appearance: Well appearing 89 year old female, appropriate appearance for age. No acute distress   Ears: Left TM intact, translucent with bony landmarks appreciated, no erythema, no effusion, no bulging, no purulence.  Right TM intact, translucent with bony landmarks appreciated, no erythema, no effusion, no bulging, no purulence.  Left auditory canal clear.  Right auditory canal clear.  Normal external ears, non tender.  Eyes: conjunctivae normal without erythema or irritation, corneas clear, no drainage or crusting, no eyelid swelling, pupils equal   Oropharynx: moist mucous membranes, posterior pharynx without erythema, tonsils symmetric and 1+, no erythema, no exudates or petechiae, no post nasal drip seen, no trismus, voice clear.  Tenderness with palpation of right lower third molar.  No purulent drainage noted.  No swelling.  Sinuses:  No sinus tenderness upon palpation of the frontal or maxillary sinuses  Nose:  Bilateral nares: no erythema, no edema, no drainage or congestion   Neck: supple without adenopathy  Respiratory: normal chest wall and respirations.  Normal effort. No increased work of breathing.  No cough appreciated.  Cardiac: RRR   Musculoskeletal:  Equal movement of bilateral upper extremities.  Equal movement of bilateral lower extremities.  Normal gait.    Dermatological: no rashes noted of exposed skin  Neuro: Alert and oriented to person, place, and time.    Psychological: " normal affect, alert, oriented, and pleasant.

## 2023-07-03 DIAGNOSIS — I48.11 LONGSTANDING PERSISTENT ATRIAL FIBRILLATION (H): ICD-10-CM

## 2023-07-03 DIAGNOSIS — I48.20 CHRONIC ATRIAL FIBRILLATION (H): ICD-10-CM

## 2023-07-03 DIAGNOSIS — I50.22 CHRONIC SYSTOLIC HEART FAILURE (H): ICD-10-CM

## 2023-07-03 RX ORDER — FUROSEMIDE 20 MG
40 TABLET ORAL DAILY
Qty: 180 TABLET | Refills: 3 | Status: ON HOLD | OUTPATIENT
Start: 2023-07-03 | End: 2024-05-14

## 2023-07-03 NOTE — TELEPHONE ENCOUNTER
Pharmacy sent Rx request for the following:      Requested Prescriptions   Pending Prescriptions Disp Refills     rivaroxaban ANTICOAGULANT (XARELTO ANTICOAGULANT) 15 MG TABS tablet 90 tablet 3     Sig: Take 1 tablet (15 mg) by mouth daily (with dinner)       Direct Oral Anticoagulant Agents Failed - 7/3/2023  9:01 AM        Failed - Creatinine Clearance greater than 50 ml/min on file in past 3 mos     No lab results found.          Failed - Serum creatinine less than or equal to 1.4 on file in past 3 mos     Recent Labs   Lab Test 11/15/22  1432   CR 0.73       Ok to refill medication if creatinine is low          Failed - Recent (6 mo) or future (30 days) visit within the authorizing provider's specialty        Passed - Normal Platelets on file in past 12 months     Recent Labs   Lab Test 11/15/22  1432                  Passed - Medication is active on med list        Passed - Patient is 18 years of age or older        Passed - No active pregnancy on record        Passed - No positive pregnancy test within past 12 months             Last Prescription Date:   4/14/2023  Last Fill Qty/Refills:         90, R-3  Last Office Visit:              11/15/2020 with Sharath Ingram DO   Future Office visit:           11/16/2023 with Sharath Ingram,       Please review and advise. Thank you.     Josee Carbajal LPN on 7/3/2023 at 12:39 PM

## 2023-10-30 ENCOUNTER — TRANSFERRED RECORDS (OUTPATIENT)
Dept: HEALTH INFORMATION MANAGEMENT | Facility: OTHER | Age: 88
End: 2023-10-30
Payer: COMMERCIAL

## 2023-10-30 ENCOUNTER — MYC MEDICAL ADVICE (OUTPATIENT)
Dept: CARDIOLOGY | Facility: OTHER | Age: 88
End: 2023-10-30
Payer: COMMERCIAL

## 2023-11-13 ENCOUNTER — TELEPHONE (OUTPATIENT)
Dept: CARDIOLOGY | Facility: OTHER | Age: 88
End: 2023-11-13
Payer: COMMERCIAL

## 2023-11-13 NOTE — TELEPHONE ENCOUNTER
Call center called because an appt with Dr. Horner had to be extended. She will now miss her 11/16/23 appt with Juan Jose. Wondering if we could get her in around 3 instead. Please advise.  Jared Hare on 11/13/2023 at 1:25 PM

## 2023-11-13 NOTE — TELEPHONE ENCOUNTER
"Per Jared: \"disregard the telephone note i sent. Evidently the patient called back and wants to keep her appt time with caitlyn and cancelled the other one \"  "

## 2023-11-16 ENCOUNTER — OFFICE VISIT (OUTPATIENT)
Dept: CARDIOLOGY | Facility: OTHER | Age: 88
End: 2023-11-16
Attending: INTERNAL MEDICINE
Payer: MEDICARE

## 2023-11-16 VITALS
SYSTOLIC BLOOD PRESSURE: 110 MMHG | WEIGHT: 121 LBS | DIASTOLIC BLOOD PRESSURE: 60 MMHG | HEIGHT: 60 IN | BODY MASS INDEX: 23.75 KG/M2 | HEART RATE: 82 BPM | TEMPERATURE: 97.3 F | RESPIRATION RATE: 18 BRPM | OXYGEN SATURATION: 98 %

## 2023-11-16 DIAGNOSIS — I48.20 CHRONIC ATRIAL FIBRILLATION (H): Primary | ICD-10-CM

## 2023-11-16 DIAGNOSIS — I71.21 ANEURYSM OF ASCENDING AORTA WITHOUT RUPTURE (H): ICD-10-CM

## 2023-11-16 DIAGNOSIS — Z13.220 SCREENING FOR HYPERLIPIDEMIA: ICD-10-CM

## 2023-11-16 DIAGNOSIS — R73.9 HYPERGLYCEMIA: ICD-10-CM

## 2023-11-16 DIAGNOSIS — I48.11 LONGSTANDING PERSISTENT ATRIAL FIBRILLATION (H): ICD-10-CM

## 2023-11-16 DIAGNOSIS — Z51.81 ENCOUNTER FOR MONITORING DIGOXIN THERAPY: ICD-10-CM

## 2023-11-16 DIAGNOSIS — Z79.899 ENCOUNTER FOR MONITORING DIGOXIN THERAPY: ICD-10-CM

## 2023-11-16 DIAGNOSIS — I51.7 ATRIAL ENLARGEMENT, BILATERAL: ICD-10-CM

## 2023-11-16 DIAGNOSIS — I50.22 CHRONIC SYSTOLIC HEART FAILURE (H): ICD-10-CM

## 2023-11-16 DIAGNOSIS — I07.1 MODERATE TRICUSPID REGURGITATION: ICD-10-CM

## 2023-11-16 LAB
ALBUMIN SERPL BCG-MCNC: 3.8 G/DL (ref 3.5–5.2)
ALP SERPL-CCNC: 123 U/L (ref 40–150)
ALT SERPL W P-5'-P-CCNC: 7 U/L (ref 0–50)
ANION GAP SERPL CALCULATED.3IONS-SCNC: 12 MMOL/L (ref 7–15)
AST SERPL W P-5'-P-CCNC: 20 U/L (ref 0–45)
ATRIAL RATE - MUSE: NORMAL BPM
BILIRUB SERPL-MCNC: 0.5 MG/DL
BUN SERPL-MCNC: 17.2 MG/DL (ref 8–23)
CALCIUM SERPL-MCNC: 9.8 MG/DL (ref 8.2–9.6)
CHLORIDE SERPL-SCNC: 101 MMOL/L (ref 98–107)
CHOLEST SERPL-MCNC: 151 MG/DL
CREAT SERPL-MCNC: 0.74 MG/DL (ref 0.51–0.95)
DEPRECATED HCO3 PLAS-SCNC: 27 MMOL/L (ref 22–29)
DIASTOLIC BLOOD PRESSURE - MUSE: NORMAL MMHG
DIGOXIN SERPL-MCNC: 0.6 NG/ML (ref 0.6–2)
EGFRCR SERPLBLD CKD-EPI 2021: 76 ML/MIN/1.73M2
ERYTHROCYTE [DISTWIDTH] IN BLOOD BY AUTOMATED COUNT: 15.2 % (ref 10–15)
GLUCOSE SERPL-MCNC: 101 MG/DL (ref 70–99)
HBA1C MFR BLD: 6.1 % (ref 4–6.2)
HCT VFR BLD AUTO: 31.7 % (ref 35–47)
HDLC SERPL-MCNC: 51 MG/DL
HGB BLD-MCNC: 9.8 G/DL (ref 11.7–15.7)
HOLD SPECIMEN: NORMAL
INTERPRETATION ECG - MUSE: NORMAL
LDLC SERPL CALC-MCNC: 89 MG/DL
MAGNESIUM SERPL-MCNC: 2.3 MG/DL (ref 1.7–2.3)
MCH RBC QN AUTO: 26 PG (ref 26.5–33)
MCHC RBC AUTO-ENTMCNC: 30.9 G/DL (ref 31.5–36.5)
MCV RBC AUTO: 84 FL (ref 78–100)
NONHDLC SERPL-MCNC: 100 MG/DL
NT-PROBNP SERPL-MCNC: 3714 PG/ML (ref 0–1800)
P AXIS - MUSE: NORMAL DEGREES
PLATELET # BLD AUTO: 292 10E3/UL (ref 150–450)
POTASSIUM SERPL-SCNC: 4.3 MMOL/L (ref 3.4–5.3)
PR INTERVAL - MUSE: NORMAL MS
PROT SERPL-MCNC: 7.3 G/DL (ref 6.4–8.3)
QRS DURATION - MUSE: 94 MS
QT - MUSE: 418 MS
QTC - MUSE: 427 MS
R AXIS - MUSE: -20 DEGREES
RBC # BLD AUTO: 3.77 10E6/UL (ref 3.8–5.2)
SODIUM SERPL-SCNC: 140 MMOL/L (ref 135–145)
SYSTOLIC BLOOD PRESSURE - MUSE: NORMAL MMHG
T AXIS - MUSE: 4 DEGREES
TRIGL SERPL-MCNC: 53 MG/DL
TSH SERPL DL<=0.005 MIU/L-ACNC: 0.74 UIU/ML (ref 0.3–4.2)
VENTRICULAR RATE- MUSE: 63 BPM
WBC # BLD AUTO: 4.3 10E3/UL (ref 4–11)

## 2023-11-16 PROCEDURE — 80053 COMPREHEN METABOLIC PANEL: CPT | Mod: ZL | Performed by: INTERNAL MEDICINE

## 2023-11-16 PROCEDURE — 83880 ASSAY OF NATRIURETIC PEPTIDE: CPT | Mod: ZL | Performed by: INTERNAL MEDICINE

## 2023-11-16 PROCEDURE — 85027 COMPLETE CBC AUTOMATED: CPT | Mod: ZL | Performed by: INTERNAL MEDICINE

## 2023-11-16 PROCEDURE — 84443 ASSAY THYROID STIM HORMONE: CPT | Mod: ZL | Performed by: INTERNAL MEDICINE

## 2023-11-16 PROCEDURE — 99213 OFFICE O/P EST LOW 20 MIN: CPT | Performed by: INTERNAL MEDICINE

## 2023-11-16 PROCEDURE — G0463 HOSPITAL OUTPT CLINIC VISIT: HCPCS

## 2023-11-16 PROCEDURE — 80162 ASSAY OF DIGOXIN TOTAL: CPT | Mod: ZL | Performed by: INTERNAL MEDICINE

## 2023-11-16 PROCEDURE — 93005 ELECTROCARDIOGRAM TRACING: CPT | Performed by: INTERNAL MEDICINE

## 2023-11-16 PROCEDURE — G0463 HOSPITAL OUTPT CLINIC VISIT: HCPCS | Mod: 25

## 2023-11-16 PROCEDURE — 93010 ELECTROCARDIOGRAM REPORT: CPT | Performed by: INTERNAL MEDICINE

## 2023-11-16 PROCEDURE — 36415 COLL VENOUS BLD VENIPUNCTURE: CPT | Mod: ZL | Performed by: INTERNAL MEDICINE

## 2023-11-16 PROCEDURE — 80061 LIPID PANEL: CPT | Mod: ZL | Performed by: INTERNAL MEDICINE

## 2023-11-16 PROCEDURE — 83735 ASSAY OF MAGNESIUM: CPT | Mod: ZL | Performed by: INTERNAL MEDICINE

## 2023-11-16 PROCEDURE — 83036 HEMOGLOBIN GLYCOSYLATED A1C: CPT | Mod: ZL | Performed by: INTERNAL MEDICINE

## 2023-11-16 ASSESSMENT — PAIN SCALES - GENERAL: PAINLEVEL: MILD PAIN (3)

## 2023-11-16 NOTE — PROGRESS NOTES
[facility] HE[facility]   CARDIOLOGY PROGRESS NOTE     Chief Complaint   Patient presents with    Follow Up     annual          Diagnosis:  1.  TAAA   -5.1 cm on 10/19/2021 and 10/18/22.   -5.1 cm on 5/1/20.  2.  Thoracic aortic aneurysm.  3.  CHF.  -55-60% on 10/18/22.   -50-55% on 10/19/21  -45-50% on 4/28/20.   4.  Atrial fib, new on 7/20/18.  -digoxin, metoprolol, and Xarelto.   -[id number] score of 3.  5.  Right lung nodule.  6.  TR-mild/moderate on 10/18/22.  7.  Severe PARMINDER on 10/19/2021.    Assessment/Plan:    1.  TAAA: Stable at 5.1 cm in 10/18/2022.  Previously, measured at 4.6 cm on 8/20/2018 and at 5.1 cm on 5/1/2020.  Had ordered an echocardiogram to be completed in 1 year which would have been just prior to this appointment but was not completed.  As a result, we will plan for an echocardiogram and CTA of the chest to assess the diameter of this vessel.  It looks like over last few years, his aneurysm has been stable in size.  2.  A-fib: Diagnosed on 7/20/2018. Currently on metoprolol, digoxin, and Xarelto.  Will check CBC and dig level today. [id number] score of 3.  Getting a point for female gender and x2 for age.  Continue on digoxin, metoprolol and Xarelto.  3.  CHF: Resolved.  Most recent echocardiogram on 10/18/2022 showed an EF of 55-60%.  EF of 40-45% on 4/28/2020.  EF of 50-55% in 10/19/2021.  Minimal swelling today.  We will plan for echocardiogram  4.  Labs today.  Labs include digoxin level, TSH, BNP, magnesium, A1c, lipid, CBC, and CMP.  5.  Follow-up in 1 year after completion echocardiogram.  In the next few weeks she will have an echocardiogram and CTA of coronaries.  If that she is found have an ascending aortic aneurysm at 5.5 cm or greater, plan for referral to surgery.        Interval history:  See above.    HPI:    Mrs. Escamilla is being seen by cardiology for an ascending aortic aneurysm.  It was 5.1 cm on 5/1/2020.  Previously, is 4.6 cm on 8/20/2018.      She has been followed for  an ascending aortic aneurysm.  She is concerned about left hip pain and arthritis.  Her hip needs to be replaced.  She has had injections previously.  She saw Dr. Morales in February, 2021.  It was felt to try injections then potentially consider replacement in future.  Apparently, the injection worked for a while but her pain is severe.  It has been a big problem for her over the last year.  She has been told that her ascending aortic aneurysm needs to be evaluated/managed prior to consideration of hip surgery.  She states she has seen CT surgery in [place] and been turned down for intervention of this vessel.  She denies any chest pain or chest tightness which would suggest dissection.  She is due for repeat echocardiogram and CTA which was recommended on 9/30/2021.  We discussed symptoms of a dissection and she was told to call an ambulance and go emergently to the ER if they should arise.  If her aneurysm is found to be 5.5 cm or greater, will consider a referral to the [facility].     She has a history of atrial fibrillation.  I believe he was diagnosed in 7/29/2018.  Creatinine clearance on 9/30/2021 was 44.  She should be on Xarelto 15 mg daily.  She has not had any bleeding or bruising issues.  She does not get any palpitations or fluttering in her chest.  She has not had any significant bleeding or bruising.  [id number] score is 3 getting x2 points for age and x1 for gender.     Left hip arthritis.  She is getting injection on 10/1/2021 with Dr. Corbin who is an interventional radiologist.  She is seeing orthopedics on 10/4/2021 in [place].  She was offered referral to be seen locally but will keep her current appointments.      Relevant testing:  ECHO on 10/18/22:  Left ventricular function is decreased. The ejection fraction is 50-55%  (borderline).  Global right ventricular function is normal.  Moderate tricuspid insufficiency and mild mitral insufficiency is present.  Moderate dilatation of  the aorta is present. Ascending aorta 5.1 cm.  IVC diameter and respiratory changes fall into an intermediate range  suggesting an RA pressure of 8 mmHg.  No pericardial effusion is present.    ECHO on 10/19/21:  Left ventricular function is decreased. The ejection fraction is 50-55% (borderline).  Global right ventricular function is normal.  Moderate tricuspid insufficiency and mild mitral insufficiency is present.  Moderate dilatation of the aorta is present. Ascending aorta 5.1 cm.  IVC diameter and respiratory changes fall into an intermediate range suggesting an RA pressure of 8 mmHg.  No pericardial effusion is present.    CTA chest on 10/19/21:  Dilation of the mid ascending aorta to 5.1 x 4.8 cm,  previously 5.1 x 4.7 and 4.6 x 4.5 cm in 2018.  Cardiomegaly with progressive biatrial dilation when compared to 2018.    CTA chest on 5/1/20:  Dilation of the mid ascending aorta to 5.1 x 4.7 cm, previously 4.6 x 4.5 cm.     CTA chest on 8/29/18:  Dilation of the mid ascending aorta to 4.6 x 4.5 cm.     ECHO on 4/28/20:  Left ventricular size is normal. Mildly (EF 45-50%) reduced left ventricular  function is present. Mild diffuse hypokinesis is present.  Right ventricular function, chamber size, wall motion, and thickness are normal.  Mild to moderate mitral insufficiency is present. At least moderate tricuspid  insufficiency is present.  Dilation of the inferior vena cava is present with normal respiratory variation in diameter. No pericardial effusion is present.  Ascending aorta 5.1 cm.  Compared to prior study (6/3/2019), ascending aorta is more dilated (5.1cm).  vs. 4.8 cm). Otherwise no significant change.     Stress test on 9/14/21:  1. No evidence of stress-induced ischemia.  2. Ejection fraction is 49%.     Zio patch on 8/30/18:  The patient was monitored for 13 days, 2 hours.   Minimum HR was 56, average HR was 91, maximum HR was 165.   There were 2 triggered events.   During the patient triggered  events, the rhythm was atrial fibrillation, ventricular ectopic beats.   There were 0 supraventricular ectopic beats.   There were rare ventricular ectopic beats, longest consisted of 2 beats.   Atrial fibrillation present 100%.   x1 pause lasting 3.4 seconds at 9:17 AM on 9/11/18.        ICD-10-CM    1. Chronic atrial fibrillation (H)  I48.20 EKG 12-lead, tracing only            Past Medical History:   Diagnosis Date    Carpal tunnel syndrome of left wrist     10/4/2017    Carpal tunnel syndrome of right wrist     10/4/2017    Deep phlebothrombosis during pregnancy     No Comments Provided    Osteoarthritis     Knee    Other specified postprocedural states     10/4/2017    Other specified postprocedural states     1/31/2018    Presence of artificial knee joint     10/3/2014       Past Surgical History:   Procedure Laterality Date    APPENDECTOMY OPEN      No Comments Provided    CARPAL TUNNEL RELEASE RT/LT Left 01/31/2018 01/31/2018,[id number],OTHER,Left    CARPAL TUNNEL RELEASE RT/LT Right 10/04/2017    COLONOSCOPY  09/2014    Arteriovenous malformations of right, transverse and splenic flexure    ENDOSCOPIC STRIPPING VEIN(S)      No Comments Provided    HYSTERECTOMY TOTAL ABDOMINAL  06/1987    with squamous metaplasia and leiomoma    Z TOTAL KNEE ARTHROPLASTY Right 10/01/2014    Dr Domingo       Allergies   Allergen Reactions    Morphine      Other reaction(s): Muscle Weakness    Phenylbutazone Unknown       Current Outpatient Medications   Medication Sig Dispense Refill    digoxin (LANOXIN) 125 MCG tablet Take 1 tablet (125 mcg) by mouth daily 90 tablet 3    furosemide (LASIX) 20 MG tablet Take 2 tablets (40 mg) by mouth daily 180 tablet 3    lisinopril (ZESTRIL) 5 MG tablet Take 1 tablet (5 mg) by mouth daily 90 tablet 3    metoprolol succinate ER (TOPROL XL) 100 MG 24 hr tablet Take 1 tablet (100 mg) by mouth At Bedtime To be taken with 25 mg tablet to equal 125 mg daily. 90 tablet 3    metoprolol succinate  ER (TOPROL XL) 25 MG 24 hr tablet Take 1 tablet (25 mg) by mouth At Bedtime To be taken with 100 mg tablet to equal 125 mg daily. 90 tablet 3    rivaroxaban ANTICOAGULANT (XARELTO ANTICOAGULANT) 15 MG TABS tablet Take 1 tablet (15 mg) by mouth daily (with dinner) 90 tablet 3    Multiple Vitamin (MULTI-VITAMINS) TABS Take 1 tablet by mouth daily (Patient not taking: Reported on 5/10/2023)         Social History     Socioeconomic History    Marital status:      Spouse name: Not on file    Number of children: Not on file    Years of education: Not on file    Highest education level: Not on file   Occupational History    Not on file   Tobacco Use    Smoking status: Never    Smokeless tobacco: Never   Vaping Use    Vaping Use: Never used   Substance and Sexual Activity    Alcohol use: Yes     Alcohol/week: 5.0 standard drinks of alcohol     Comment: slick daily    Drug use: No     Comment: Drug use: No    Sexual activity: Not Currently   Other Topics Concern    Parent/sibling w/ CABG, MI or angioplasty before 65F 55M? Not Asked   Social History Narrative    Lives alone.      2017.     3 daughters, 1 in [facility], 1 in [place] and 1 near [place]. Lost 1 daughter at age 2 years.    Has 1 living sister, fraternal twin. All other siblings are .      Social Determinants of Health     Financial Resource Strain: Low Risk  (11/10/2023)    Financial Resource Strain     Within the past 12 months, have you or your family members you live with been unable to get utilities (heat, electricity) when it was really needed?: No   Food Insecurity: Low Risk  (11/10/2023)    Food Insecurity     Within the past 12 months, did you worry that your food would run out before you got money to buy more?: No     Within the past 12 months, did the food you bought just not last and you didn t have money to get more?: No   Transportation Needs: Low Risk  (11/10/2023)    Transportation Needs     Within the past 12 months,  has lack of transportation kept you from medical appointments, getting your medicines, non-medical meetings or appointments, work, or from getting things that you need?: No   Physical Activity: Not on file   Stress: Not on file   Social Connections: Not on file   Interpersonal Safety: Low Risk  (11/16/2023)    Interpersonal Safety     Do you feel physically and emotionally safe where you currently live?: Yes     Within the past 12 months, have you been hit, slapped, kicked or otherwise physically hurt by someone?: No     Within the past 12 months, have you been humiliated or emotionally abused in other ways by your partner or ex-partner?: No   Housing Stability: Low Risk  (11/10/2023)    Housing Stability     Do you have housing? : Yes     Are you worried about losing your housing?: No       LAB RESULTS:   Lab on 10/19/2021   Component Date Value Ref Range Status    Digoxin 10/19/2021 0.5    ug/L Final    WBC Count 10/19/2021 6.8  4.0 - 11.0 10e3/uL Final    RBC Count 10/19/2021 4.36  3.80 - 5.20 10e6/uL Final    Hemoglobin 10/19/2021 12.5  11.7 - 15.7 g/dL Final    Hematocrit 10/19/2021 39.9  35.0 - 47.0 % Final    MCV 10/19/2021 92  78 - 100 fL Final    MCH 10/19/2021 28.7  26.5 - 33.0 pg Final    MCHC 10/19/2021 31.3* 31.5 - 36.5 g/dL Final    RDW 10/19/2021 15.5* 10.0 - 15.0 % Final    Platelet Count 10/19/2021 228  150 - 450 10e3/uL Final    Sodium 10/19/2021 141  134 - 144 mmol/L Final    Potassium 10/19/2021 4.2  3.5 - 5.1 mmol/L Final    Chloride 10/19/2021 103  98 - 107 mmol/L Final    Carbon Dioxide (CO2) 10/19/2021 31  21 - 31 mmol/L Final    Anion Gap 10/19/2021 7  3 - 14 mmol/L Final    Urea Nitrogen 10/19/2021 20  7 - 25 mg/dL Final    Creatinine 10/19/2021 0.71  0.60 - 1.20 mg/dL Final    Calcium 10/19/2021 9.5  8.6 - 10.3 mg/dL Final    Glucose 10/19/2021 108* 70 - 105 mg/dL Final    GFR Estimate 10/19/2021 76  >60 mL/min/1.73m2 Final   Office Visit on 09/30/2021   Component Date Value Ref Range  "Status    Ventricular Rate 09/30/2021 66  BPM Final    Atrial Rate 09/30/2021 80  BPM Final    QRS Duration 09/30/2021 92  ms Final    QT 09/30/2021 380  ms Final    QTc 09/30/2021 398  ms Final    R AXIS 09/30/2021 -13  degrees Final    T Axis 09/30/2021 1  degrees Final    Interpretation ECG 09/30/2021    Final                    Value:Atrial fibrillation  Low voltage QRS  Abnormal ECG  No previous ECGs available  Confirmed by MD INGRAM DANIEL ([id number]) on 9/30/2021 6:30:07 PM          Review of systems: Negative except that which was noted in the HPI.    Physical examination:  /60 (BP Location: Right arm, Patient Position: Sitting, Cuff Size: Adult Regular)   Pulse 82   Temp 97.3  F (36.3  C) (Temporal)   Resp 18   Ht 1.53 m (5' 0.24\")   Wt 54.9 kg (121 lb)   SpO2 98%   BMI 23.45 kg/m      GENERAL APPEARANCE: healthy, alert and no distress  NECK: no adenopathy, no asymmetry, masses.  CHEST: lungs clear to auscultation  CARDIOVASCULAR: regular rhythm, normal S1 with physiologic split S2, no S3 or S4 and no murmur, click or rub  EXTREMITIES: no clubbing, cyanosis or edema      Total time spent on day of visit, including review of tests, obtaining/reviewing separately obtained history, ordering medications/tests/procedures, communicating with PCP/consultants, and documenting in electronic medical record: 20 minutes.           Thank you for allowing me to participate in the care of your patient. Please do not hesitate to contact me if you have any questions.     Sharath Ingram, DO          "

## 2023-11-16 NOTE — NURSING NOTE
"Patient comes in for annual visit.    Chief Complaint   Patient presents with    Follow Up     annual       Initial /60 (BP Location: Right arm, Patient Position: Sitting, Cuff Size: Adult Regular)   Pulse 82   Temp 97.3  F (36.3  C) (Temporal)   Resp 18   Ht 1.53 m (5' 0.24\")   Wt 54.9 kg (121 lb)   SpO2 98%   BMI 23.45 kg/m   Estimated body mass index is 23.45 kg/m  as calculated from the following:    Height as of this encounter: 1.53 m (5' 0.24\").    Weight as of this encounter: 54.9 kg (121 lb).  Meds Reconciled: complete  Pt is not on Aspirin  Pt is not on a Statin  PHQ and/or MARVIN reviewed. Pt referred to PCP/MH Provider as appropriate.    Shanika Madrigal LPN      "

## 2023-12-06 ENCOUNTER — OFFICE VISIT (OUTPATIENT)
Dept: FAMILY MEDICINE | Facility: OTHER | Age: 88
End: 2023-12-06
Attending: FAMILY MEDICINE
Payer: COMMERCIAL

## 2023-12-06 VITALS
DIASTOLIC BLOOD PRESSURE: 58 MMHG | TEMPERATURE: 98 F | BODY MASS INDEX: 23.2 KG/M2 | RESPIRATION RATE: 20 BRPM | OXYGEN SATURATION: 96 % | HEART RATE: 71 BPM | WEIGHT: 118.2 LBS | HEIGHT: 60 IN | SYSTOLIC BLOOD PRESSURE: 98 MMHG

## 2023-12-06 DIAGNOSIS — I50.22 CHRONIC SYSTOLIC HEART FAILURE (H): ICD-10-CM

## 2023-12-06 DIAGNOSIS — Z01.818 PRE-OP EXAM: Primary | ICD-10-CM

## 2023-12-06 DIAGNOSIS — I71.20 THORACIC AORTIC ANEURYSM WITHOUT RUPTURE, UNSPECIFIED PART (H): ICD-10-CM

## 2023-12-06 DIAGNOSIS — M16.11 PRIMARY OSTEOARTHRITIS OF RIGHT HIP: ICD-10-CM

## 2023-12-06 DIAGNOSIS — I71.21 ANEURYSM OF ASCENDING AORTA WITHOUT RUPTURE (H): ICD-10-CM

## 2023-12-06 PROBLEM — M16.10 PRIMARY OSTEOARTHRITIS OF HIP: Status: ACTIVE | Noted: 2018-01-25

## 2023-12-06 LAB
ANION GAP SERPL CALCULATED.3IONS-SCNC: 12 MMOL/L (ref 7–15)
BASOPHILS # BLD AUTO: 0.1 10E3/UL (ref 0–0.2)
BASOPHILS NFR BLD AUTO: 1 %
BUN SERPL-MCNC: 19.4 MG/DL (ref 8–23)
CALCIUM SERPL-MCNC: 9.7 MG/DL (ref 8.2–9.6)
CHLORIDE SERPL-SCNC: 98 MMOL/L (ref 98–107)
CREAT SERPL-MCNC: 0.75 MG/DL (ref 0.51–0.95)
DEPRECATED HCO3 PLAS-SCNC: 26 MMOL/L (ref 22–29)
EGFRCR SERPLBLD CKD-EPI 2021: 75 ML/MIN/1.73M2
EOSINOPHIL # BLD AUTO: 0.1 10E3/UL (ref 0–0.7)
EOSINOPHIL NFR BLD AUTO: 1 %
ERYTHROCYTE [DISTWIDTH] IN BLOOD BY AUTOMATED COUNT: 15 % (ref 10–15)
GLUCOSE SERPL-MCNC: 95 MG/DL (ref 70–99)
HCT VFR BLD AUTO: 31.4 % (ref 35–47)
HGB BLD-MCNC: 9.7 G/DL (ref 11.7–15.7)
IMM GRANULOCYTES # BLD: 0 10E3/UL
IMM GRANULOCYTES NFR BLD: 0 %
LYMPHOCYTES # BLD AUTO: 0.9 10E3/UL (ref 0.8–5.3)
LYMPHOCYTES NFR BLD AUTO: 18 %
MCH RBC QN AUTO: 25.6 PG (ref 26.5–33)
MCHC RBC AUTO-ENTMCNC: 30.9 G/DL (ref 31.5–36.5)
MCV RBC AUTO: 83 FL (ref 78–100)
MONOCYTES # BLD AUTO: 0.6 10E3/UL (ref 0–1.3)
MONOCYTES NFR BLD AUTO: 11 %
NEUTROPHILS # BLD AUTO: 3.4 10E3/UL (ref 1.6–8.3)
NEUTROPHILS NFR BLD AUTO: 69 %
NRBC # BLD AUTO: 0 10E3/UL
NRBC BLD AUTO-RTO: 0 /100
PLATELET # BLD AUTO: 295 10E3/UL (ref 150–450)
POTASSIUM SERPL-SCNC: 4.8 MMOL/L (ref 3.4–5.3)
RBC # BLD AUTO: 3.79 10E6/UL (ref 3.8–5.2)
SODIUM SERPL-SCNC: 136 MMOL/L (ref 135–145)
WBC # BLD AUTO: 5 10E3/UL (ref 4–11)

## 2023-12-06 PROCEDURE — 85025 COMPLETE CBC W/AUTO DIFF WBC: CPT | Mod: ZL | Performed by: FAMILY MEDICINE

## 2023-12-06 PROCEDURE — G0463 HOSPITAL OUTPT CLINIC VISIT: HCPCS

## 2023-12-06 PROCEDURE — 80048 BASIC METABOLIC PNL TOTAL CA: CPT | Mod: ZL | Performed by: FAMILY MEDICINE

## 2023-12-06 PROCEDURE — 36415 COLL VENOUS BLD VENIPUNCTURE: CPT | Mod: ZL | Performed by: FAMILY MEDICINE

## 2023-12-06 PROCEDURE — 99215 OFFICE O/P EST HI 40 MIN: CPT | Performed by: FAMILY MEDICINE

## 2023-12-06 ASSESSMENT — PAIN SCALES - GENERAL: PAINLEVEL: SEVERE PAIN (6)

## 2023-12-06 NOTE — NURSING NOTE
Patient here for preop for total right hip replacement with  in [place] at [facility] Medication Reconciliation: complete.    Tere Patino LPN  12/6/2023 1:09 PM

## 2023-12-06 NOTE — PROGRESS NOTES
[facility] AND [facility]  [address]  [place] [address]  Phone: [phone]  Fax: [phone]  Primary Provider: Sharath Dinero  Pre-op Performing Provider: ALBERT VILLEGAS    PREOPERATIVE EVALUATION:  Today's date: 12/6/2023    Merlyn is a 89 year old, presenting for the following:  Pre-Op Exam (Right  hip replacement  [facility] )        Surgical Information:  Surgery/Procedure: total right hip replacement   Surgery Location: [facility]   Surgeon:    Surgery Date: 01/02/2024  Time of Surgery: TBD  Where patient plans to recover: At home with family  Fax number for surgical facility: [phone]    Assessment & Plan     The proposed surgical procedure is considered INTERMEDIATE risk.    Pre-op exam    - Basic Metabolic Panel; Future  - CBC and Differential; Future  - Basic Metabolic Panel  - CBC and Differential    Primary osteoarthritis of right hip      Aneurysm of ascending aorta without rupture (H24)  Patient has a history of ascending aortic aneurysm well.  She is scheduled for a CTA of the chest and echocardiogram but has not completed it yet.  Discussed with cardiology and orthopedics.  She should have full prior to proceeding with surgery.  Patient was informed.    Chronic systolic heart failure (H)  Weight per echocardiogram    Thoracic aortic aneurysm without rupture, unspecified part (H24)  Imaging studies in process      Possible Sleep Apnea:         - No identified additional risk factors other than previously addressed    Antiplatelet or Anticoagulation Medication Instructions:   - apixaban (Eliquis), edoxaban (Savaysa), rivaroxaban (Xarelto): Bleeding risk is moderate or high for this procedure AND CrCl  (>=) 50 mL/min. HOLD 2 days before surgery.     Additional Medication Instructions:  Patient will also hold her lisinopril 24 hours prior to surgery.  She will take metoprolol day of surgery    RECOMMENDATION:  APPROVAL GIVEN to proceed with  proposed procedure pending review of diagnostic evaluation.            Subjective       HPI related to upcoming procedure: Patient arrives here for preop.  She is scheduled to undergo right hip replacement January 2.  Patient has a long history of hip pain.  Left one was done about 2 years ago.  She states that she has pain every day.  Movement twisting produces quite a bit of discomfort and pain.  She currently is in wheelchair.        12/3/2023    10:03 AM   Preop Questions   1. Have you ever had a heart attack or stroke? No   2. Have you ever had surgery on your heart or blood vessels, such as a stent placement, a coronary artery bypass, or surgery on an artery in your head, neck, heart, or legs? No   3. Do you have chest pain with activity? No   4. Do you have a history of  heart failure? No   5. Do you currently have a cold, bronchitis or symptoms of other infection? No   6. Do you have a cough, shortness of breath, or wheezing? No   7. Do you or anyone in your family have previous history of blood clots? YES - dvt with delivery   8. Do you or does anyone in your family have a serious bleeding problem such as prolonged bleeding following surgeries or cuts? No   9. Have you ever had problems with anemia or been told to take iron pills? No   10. Have you had any abnormal blood loss such as black, tarry or bloody stools, or abnormal vaginal bleeding? No   11. Have you ever had a blood transfusion? No   12. Are you willing to have a blood transfusion if it is medically needed before, during, or after your surgery? Yes   13. Have you or any of your relatives ever had problems with anesthesia? No   14. Do you have sleep apnea, excessive snoring or daytime drowsiness? UNKNOWN -    15. Do you have any artifical heart valves or other implanted medical devices like a pacemaker, defibrillator, or continuous glucose monitor? No   16. Do you have artificial joints? YES - lt hip rt knee   17. Are you allergic to latex? No        Health Care Directive:  Patient has a Health Care Directive on file      Preoperative Review of :   reviewed - no record of controlled substances prescribed.          Review of Systems  CONSTITUTIONAL: NEGATIVE for fever, chills, change in weight  INTEGUMENTARY/SKIN: NEGATIVE for worrisome rashes, moles or lesions  EYES: NEGATIVE for vision changes or irritation  ENT/MOUTH: NEGATIVE for ear, mouth and throat problems  RESP: NEGATIVE for significant cough or SOB  CV: NEGATIVE for chest pain, palpitations or peripheral edema  GI: NEGATIVE for nausea, abdominal pain, heartburn, or change in bowel habits  : NEGATIVE for frequency, dysuria, or hematuria  NEURO: NEGATIVE for weakness, dizziness or paresthesias  ENDOCRINE: NEGATIVE for temperature intolerance, skin/hair changes  HEME: NEGATIVE for bleeding problems  PSYCHIATRIC: NEGATIVE for changes in mood or affect    Patient Active Problem List    Diagnosis Date Noted    Encounter for monitoring digoxin therapy 11/15/2022     Priority: Medium    Atrial enlargement, bilateral 11/09/2021     Priority: Medium    Nodule of right lung 09/30/2021     Priority: Medium    Moderate tricuspid regurgitation 09/30/2021     Priority: Medium    Chronic systolic heart failure (H) 09/26/2018     Priority: Medium    Aneurysm of ascending aorta without rupture (H24) 08/20/2018     Priority: Medium    Thoracic aortic aneurysm without rupture (H24) 08/09/2018     Priority: Medium    Slow transit constipation 07/31/2018     Priority: Medium    Renal cyst, right 07/31/2018     Priority: Medium     Large, 9.5cm.  Noted incidentally on CT scan.  Urology recommends no further follow up.      Longstanding persistent atrial fibrillation (H) 07/30/2018     Priority: Medium    Primary osteoarthritis of both first carpometacarpal joints 05/22/2018     Priority: Medium    Primary osteoarthritis of both hands 05/22/2018     Priority: Medium    History of colonic polyps 01/25/2018      Priority: Medium    Osteoarthrosis 01/25/2018     Priority: Medium     Overview:   Knee      History of carpal tunnel surgery of left wrist 10/04/2017     Priority: Medium    S/P total knee arthroplasty 10/03/2014     Priority: Medium    AVM (arteriovenous malformation) of colon 10/01/2014     Priority: Medium      Past Medical History:   Diagnosis Date    Carpal tunnel syndrome of left wrist     10/4/2017    Carpal tunnel syndrome of right wrist     10/4/2017    Deep phlebothrombosis during pregnancy     No Comments Provided    Osteoarthritis     Knee    Other specified postprocedural states     10/4/2017    Other specified postprocedural states     1/31/2018    Presence of artificial knee joint     10/3/2014     Past Surgical History:   Procedure Laterality Date    APPENDECTOMY OPEN      No Comments Provided    CARPAL TUNNEL RELEASE RT/LT Left 01/31/2018 01/31/2018,[id number],OTHER,Left    CARPAL TUNNEL RELEASE RT/LT Right 10/04/2017    COLONOSCOPY  09/2014    Arteriovenous malformations of right, transverse and splenic flexure    ENDOSCOPIC STRIPPING VEIN(S)      No Comments Provided    HYSTERECTOMY TOTAL ABDOMINAL  06/1987    with squamous metaplasia and leiomoma    ZZC TOTAL KNEE ARTHROPLASTY Right 10/01/2014    Dr Domingo     Current Outpatient Medications   Medication Sig Dispense Refill    digoxin (LANOXIN) 125 MCG tablet Take 1 tablet (125 mcg) by mouth daily 90 tablet 3    furosemide (LASIX) 20 MG tablet Take 2 tablets (40 mg) by mouth daily 180 tablet 3    lisinopril (ZESTRIL) 5 MG tablet Take 1 tablet (5 mg) by mouth daily 90 tablet 3    metoprolol succinate ER (TOPROL XL) 100 MG 24 hr tablet Take 1 tablet (100 mg) by mouth At Bedtime To be taken with 25 mg tablet to equal 125 mg daily. 90 tablet 3    metoprolol succinate ER (TOPROL XL) 25 MG 24 hr tablet Take 1 tablet (25 mg) by mouth At Bedtime To be taken with 100 mg tablet to equal 125 mg daily. 90 tablet 3    rivaroxaban ANTICOAGULANT (XARELTO  ANTICOAGULANT) 15 MG TABS tablet Take 1 tablet (15 mg) by mouth daily (with dinner) 90 tablet 3    Multiple Vitamin (MULTI-VITAMINS) TABS Take 1 tablet by mouth daily         Allergies   Allergen Reactions    Morphine      Other reaction(s): Muscle Weakness    Phenylbutazone Unknown        Social History     Tobacco Use    Smoking status: Never    Smokeless tobacco: Never   Substance Use Topics    Alcohol use: Yes     Alcohol/week: 5.0 standard drinks of alcohol     Comment: slick daily     Family History   Problem Relation Age of Onset    Heart Disease Mother         Heart Disease    Heart Disease Father         Heart Disease    Heart Disease Brother     Heart Disease Brother     Heart Disease Brother     Heart Disease Brother     Brain Cancer Sister     Heart Disease Sister      History   Drug Use No     Comment: Drug use: No         Objective     BP 98/58   Pulse 71   Temp 98  F (36.7  C)   Resp 20   Ht 1.524 m (5')   Wt 53.6 kg (118 lb 3.2 oz)   SpO2 96%   BMI 23.08 kg/m    Results for orders placed or performed in visit on 12/06/23   Basic Metabolic Panel     Status: Abnormal   Result Value Ref Range    Sodium 136 135 - 145 mmol/L    Potassium 4.8 3.4 - 5.3 mmol/L    Chloride 98 98 - 107 mmol/L    Carbon Dioxide (CO2) 26 22 - 29 mmol/L    Anion Gap 12 7 - 15 mmol/L    Urea Nitrogen 19.4 8.0 - 23.0 mg/dL    Creatinine 0.75 0.51 - 0.95 mg/dL    GFR Estimate 75 >60 mL/min/1.73m2    Calcium 9.7 (H) 8.2 - 9.6 mg/dL    Glucose 95 70 - 99 mg/dL   CBC with platelets and differential     Status: Abnormal   Result Value Ref Range    WBC Count 5.0 4.0 - 11.0 10e3/uL    RBC Count 3.79 (L) 3.80 - 5.20 10e6/uL    Hemoglobin 9.7 (L) 11.7 - 15.7 g/dL    Hematocrit 31.4 (L) 35.0 - 47.0 %    MCV 83 78 - 100 fL    MCH 25.6 (L) 26.5 - 33.0 pg    MCHC 30.9 (L) 31.5 - 36.5 g/dL    RDW 15.0 10.0 - 15.0 %    Platelet Count 295 150 - 450 10e3/uL    % Neutrophils 69 %    % Lymphocytes 18 %    % Monocytes 11 %    % Eosinophils 1  %    % Basophils 1 %    % Immature Granulocytes 0 %    NRBCs per 100 WBC 0 <1 /100    Absolute Neutrophils 3.4 1.6 - 8.3 10e3/uL    Absolute Lymphocytes 0.9 0.8 - 5.3 10e3/uL    Absolute Monocytes 0.6 0.0 - 1.3 10e3/uL    Absolute Eosinophils 0.1 0.0 - 0.7 10e3/uL    Absolute Basophils 0.1 0.0 - 0.2 10e3/uL    Absolute Immature Granulocytes 0.0 <=0.4 10e3/uL    Absolute NRBCs 0.0 10e3/uL   CBC and Differential     Status: Abnormal    Narrative    The following orders were created for panel order CBC and Differential.  Procedure                               Abnormality         Status                     ---------                               -----------         ------                     CBC with platelets and d...[[id number]]  Abnormal            Final result                 Please view results for these tests on the individual orders.             Physical Exam     EYES: EOMI, PERRL     HENT: ear canals and TM's normal and nose and mouth without ulcers or lesions     NECK: no adenopathy, no asymmetry, masses, or scars and thyroid normal to palpation     RESP: lungs clear to auscultation - no rales, rhonchi or wheezes     CV: Irregular rate and rhythm.  1 out of 6 systolic murmur.     ABDOMEN:  soft, nontender, no HSM or masses and bowel sounds normal     MS: extremities normal-loss of internal/external rotation of the right hip.  Associated with pain discomfort.     SKIN: no suspicious lesions or rashes     NEURO: Normal strength and tone, sensory exam grossly normal, mentation intact and speech normal     PSYCH: mentation appears normal. and affect normal/bright     LYMPHATICS: No cervical adenopathy    Recent Labs   Lab Test 11/16/23  1533 11/15/22  1432   HGB 9.8* 11.6*    214    140   POTASSIUM 4.3 4.4   CR 0.74 0.73   A1C 6.1 5.9        Diagnostics:     EKG: atrial fibrillation, rate 63, no LVH by voltage criteria    Revised Cardiac Risk Index (RCRI):  The patient has the following serious  cardiovascular risks for perioperative complications:   - No serious cardiac risks = 0 points     RCRI Interpretation: 0 points: Class I (very low risk - 0.4% complication rate)         Signed Electronically by: Jaime Horner MD  Copy of this evaluation report is provided to requesting physician.

## 2023-12-07 ENCOUNTER — TELEPHONE (OUTPATIENT)
Dept: FAMILY MEDICINE | Facility: OTHER | Age: 88
End: 2023-12-07
Payer: COMMERCIAL

## 2023-12-07 NOTE — TELEPHONE ENCOUNTER
"Primary number was for Daughter Flor-no signed consent for this daughter.    \"Work\" number is patients home number-this was starred to become the primary number.    Patient notified that she needs to complete a stress test and CTA prior to upcoming surgery in order to have cardiac clearance.  Patient reports that she will inform her daughters at this time.      Trina Daniel LPN 12/7/2023 10:17 AM    "

## 2023-12-10 ENCOUNTER — MYC MEDICAL ADVICE (OUTPATIENT)
Dept: CARDIOLOGY | Facility: OTHER | Age: 88
End: 2023-12-10
Payer: COMMERCIAL

## 2023-12-12 ENCOUNTER — MYC MEDICAL ADVICE (OUTPATIENT)
Dept: FAMILY MEDICINE | Facility: OTHER | Age: 88
End: 2023-12-12
Payer: COMMERCIAL

## 2023-12-18 ENCOUNTER — TELEPHONE (OUTPATIENT)
Dept: CARDIOLOGY | Facility: OTHER | Age: 88
End: 2023-12-18
Payer: COMMERCIAL

## 2023-12-18 DIAGNOSIS — Z51.81 ENCOUNTER FOR MONITORING DIGOXIN THERAPY: ICD-10-CM

## 2023-12-18 DIAGNOSIS — Z96.641 S/P TOTAL RIGHT HIP ARTHROPLASTY: Primary | ICD-10-CM

## 2023-12-18 DIAGNOSIS — Z79.899 ENCOUNTER FOR MONITORING DIGOXIN THERAPY: ICD-10-CM

## 2023-12-18 DIAGNOSIS — I50.22 CHRONIC SYSTOLIC HEART FAILURE (H): Primary | ICD-10-CM

## 2023-12-18 DIAGNOSIS — D64.9 ANEMIA, UNSPECIFIED TYPE: ICD-10-CM

## 2023-12-18 DIAGNOSIS — I07.1 MODERATE TRICUSPID REGURGITATION: ICD-10-CM

## 2023-12-18 NOTE — TELEPHONE ENCOUNTER
Patient verified .  Reminder call for CTA test with instructions given.  Patient reports that surgeon office called re:  her upcoming hip surgery that it is probably cancelled due to last hemoglobin check was low on 23.  She does not have an appointment yet for another hemoglobin check but writer will check with  / Dr. Horner to see if she when she needs it rechecked.   Patient verbalized understanding.

## 2023-12-18 NOTE — TELEPHONE ENCOUNTER
ARSENIO with our direct number given. [phone] to return our call if any questions.   Instructions given on pts private voicemail.

## 2023-12-19 ENCOUNTER — MYC MEDICAL ADVICE (OUTPATIENT)
Dept: CARDIOLOGY | Facility: OTHER | Age: 88
End: 2023-12-19

## 2023-12-19 ENCOUNTER — LAB (OUTPATIENT)
Dept: LAB | Facility: OTHER | Age: 88
End: 2023-12-19
Payer: MEDICARE

## 2023-12-19 ENCOUNTER — HOSPITAL ENCOUNTER (OUTPATIENT)
Dept: CT IMAGING | Facility: OTHER | Age: 88
Discharge: HOME | End: 2023-12-19
Attending: INTERNAL MEDICINE
Payer: MEDICARE

## 2023-12-19 ENCOUNTER — HOSPITAL ENCOUNTER (OUTPATIENT)
Dept: CARDIOLOGY | Facility: OTHER | Age: 88
Discharge: HOME | End: 2023-12-19
Attending: INTERNAL MEDICINE
Payer: MEDICARE

## 2023-12-19 VITALS
SYSTOLIC BLOOD PRESSURE: 113 MMHG | WEIGHT: 118 LBS | HEIGHT: 60 IN | RESPIRATION RATE: 20 BRPM | DIASTOLIC BLOOD PRESSURE: 69 MMHG | BODY MASS INDEX: 23.16 KG/M2 | OXYGEN SATURATION: 100 % | HEART RATE: 57 BPM

## 2023-12-19 DIAGNOSIS — I51.7 ATRIAL ENLARGEMENT, BILATERAL: ICD-10-CM

## 2023-12-19 DIAGNOSIS — D64.9 ANEMIA, UNSPECIFIED TYPE: ICD-10-CM

## 2023-12-19 DIAGNOSIS — I50.22 CHRONIC SYSTOLIC HEART FAILURE (H): ICD-10-CM

## 2023-12-19 DIAGNOSIS — Z51.81 ENCOUNTER FOR MONITORING DIGOXIN THERAPY: ICD-10-CM

## 2023-12-19 DIAGNOSIS — I71.21 ANEURYSM OF ASCENDING AORTA WITHOUT RUPTURE (H): ICD-10-CM

## 2023-12-19 DIAGNOSIS — C90.00 MULTIPLE MYELOMA, REMISSION STATUS UNSPECIFIED (H): ICD-10-CM

## 2023-12-19 DIAGNOSIS — I07.1 MODERATE TRICUSPID REGURGITATION: ICD-10-CM

## 2023-12-19 DIAGNOSIS — Z79.899 ENCOUNTER FOR MONITORING DIGOXIN THERAPY: ICD-10-CM

## 2023-12-19 LAB
ANION GAP SERPL CALCULATED.3IONS-SCNC: 11 MMOL/L (ref 7–15)
BUN SERPL-MCNC: 15.2 MG/DL (ref 8–23)
CALCIUM SERPL-MCNC: 9.4 MG/DL (ref 8.2–9.6)
CHLORIDE SERPL-SCNC: 103 MMOL/L (ref 98–107)
CREAT SERPL-MCNC: 0.72 MG/DL (ref 0.51–0.95)
DEPRECATED HCO3 PLAS-SCNC: 25 MMOL/L (ref 22–29)
EGFRCR SERPLBLD CKD-EPI 2021: 79 ML/MIN/1.73M2
ERYTHROCYTE [DISTWIDTH] IN BLOOD BY AUTOMATED COUNT: 14.7 % (ref 10–15)
GLUCOSE SERPL-MCNC: 95 MG/DL (ref 70–99)
HCT VFR BLD AUTO: 29.9 % (ref 35–47)
HGB BLD-MCNC: 9.3 G/DL (ref 11.7–15.7)
LVEF ECHO: NORMAL
MCH RBC QN AUTO: 25.5 PG (ref 26.5–33)
MCHC RBC AUTO-ENTMCNC: 31.1 G/DL (ref 31.5–36.5)
MCV RBC AUTO: 82 FL (ref 78–100)
NT-PROBNP SERPL-MCNC: 3588 PG/ML (ref 0–1800)
PLATELET # BLD AUTO: 307 10E3/UL (ref 150–450)
POTASSIUM SERPL-SCNC: 4.2 MMOL/L (ref 3.4–5.3)
RBC # BLD AUTO: 3.65 10E6/UL (ref 3.8–5.2)
SODIUM SERPL-SCNC: 139 MMOL/L (ref 135–145)
WBC # BLD AUTO: 5.2 10E3/UL (ref 4–11)

## 2023-12-19 PROCEDURE — 82310 ASSAY OF CALCIUM: CPT | Mod: ZL

## 2023-12-19 PROCEDURE — 76377 3D RENDER W/INTRP POSTPROCES: CPT

## 2023-12-19 PROCEDURE — G1010 CDSM STANSON: HCPCS

## 2023-12-19 PROCEDURE — 36415 COLL VENOUS BLD VENIPUNCTURE: CPT | Mod: ZL

## 2023-12-19 PROCEDURE — 85027 COMPLETE CBC AUTOMATED: CPT | Mod: ZL

## 2023-12-19 PROCEDURE — 93306 TTE W/DOPPLER COMPLETE: CPT | Mod: 26 | Performed by: INTERNAL MEDICINE

## 2023-12-19 PROCEDURE — 83880 ASSAY OF NATRIURETIC PEPTIDE: CPT | Mod: ZL

## 2023-12-19 PROCEDURE — 93306 TTE W/DOPPLER COMPLETE: CPT

## 2023-12-19 PROCEDURE — 250N000011 HC RX IP 250 OP 636: Performed by: INTERNAL MEDICINE

## 2023-12-19 RX ORDER — IOPAMIDOL 755 MG/ML
70 INJECTION, SOLUTION INTRAVASCULAR ONCE
Status: COMPLETED | OUTPATIENT
Start: 2023-12-19 | End: 2023-12-19

## 2023-12-19 RX ADMIN — IOPAMIDOL 70 ML: 755 INJECTION, SOLUTION INTRAVENOUS at 12:23

## 2023-12-19 NOTE — TELEPHONE ENCOUNTER
Needs to be seen in clinic to evaluate and treat her anemia.  Certainly has a lot of comorbidties going into an elective procedure.  Will ultimately need cardiology to sign off her preop.Numerous studies from today have not been resulted.   Closure 3 Information: This tab is for additional flaps and grafts above and beyond our usual structured repairs.  Please note if you enter information here it will not currently bill and you will need to add the billing information manually.

## 2023-12-19 NOTE — TELEPHONE ENCOUNTER
I would recommend the patient follow-up with her primary.  Sounds like she is having anemia and pain.  This is something I would forward to her primary.     Cliff Ingram

## 2023-12-19 NOTE — PROGRESS NOTES
1125 Patient arrived for a CTA via wheellchair. They were connected to a cardiac monitor and vital signs were obtained. The patient's heart rate was 57 . Medications and allergies were reviewed. . The procedure was explained, and the patient was instructed to rest and relax, as much as possible. A saline lock was established. The patient's heart rate 57. The patient was offered the restroom, and then they were wheeled to CT in a wheelchair.  The cardiac monitor was placed there, and the test was completed. The patient was given one bottle of water, and they were told to  Drink at least 3 other additional glasses of water today, per post contrast instructions.  The patient was instructed that the ordering MD will call with results in one to two days with test results. .  The patient was taken to echo following her CT and the IV was discontinued per protocol. The patient was then discharged in stable condition via wheelchair accompanied by her friend

## 2023-12-19 NOTE — DISCHARGE INSTRUCTIONS
IV Contrast- Discharge Instructions After Your CT Scan      The IV contrast you received today will be filtered from your bloodstream by your kidneys during the next 24 hours and pass from the body in urine.  You will not be aware of this process and your urine will not change in color.  To help this process you should drink at least 4 additional glasses of water or juice today.  This reduces stress on your kidneys.    Most contrast reactions are immediate.  Should you develop symptoms of concern after discharge, contact the department at the number below.  After hours you should contact your personal physician.  If you develop breathing distress or wheezing, call 911.

## 2023-12-21 ENCOUNTER — TELEPHONE (OUTPATIENT)
Dept: FAMILY MEDICINE | Facility: OTHER | Age: 88
End: 2023-12-21
Payer: COMMERCIAL

## 2023-12-21 NOTE — TELEPHONE ENCOUNTER
Patients daughter Maricarmen wants to speak to [facility] about her mothers care, Maricarmen is in [place] and would like to get everyone on the same page with her care. She is an hour behind our time in [place]. Please call today .       Justine Bueno on 12/21/2023 at 8:51 AM

## 2023-12-22 ENCOUNTER — TELEPHONE (OUTPATIENT)
Dept: FAMILY MEDICINE | Facility: OTHER | Age: 88
End: 2023-12-22
Payer: COMMERCIAL

## 2023-12-22 ENCOUNTER — MYC MEDICAL ADVICE (OUTPATIENT)
Dept: FAMILY MEDICINE | Facility: OTHER | Age: 88
End: 2023-12-22
Payer: COMMERCIAL

## 2023-12-22 NOTE — TELEPHONE ENCOUNTER
After verifying patient's name and date of birth, patient was could not remember when her next appointment was with [facility].  Patient was given appointment date and time    Mitzy Lechuga LPN....12/22/2023 3:05 PM

## 2023-12-27 NOTE — TELEPHONE ENCOUNTER
Chart reviewed.  She has recently seen cardiology and I agree that the aortic aneurysm needs to be addressed prior to any elective hip surgery.  Ultimately will need cardiology clearance prior to any surgery so would continue to work through them as Dr. Ingram has a good planned outlined in his note.  She has a follow up with [facility] on January 4.  Can review at that time.

## 2024-01-04 ENCOUNTER — OFFICE VISIT (OUTPATIENT)
Dept: FAMILY MEDICINE | Facility: OTHER | Age: 89
End: 2024-01-04
Attending: NURSE PRACTITIONER
Payer: COMMERCIAL

## 2024-01-04 ENCOUNTER — MYC MEDICAL ADVICE (OUTPATIENT)
Dept: FAMILY MEDICINE | Facility: OTHER | Age: 89
End: 2024-01-04

## 2024-01-04 VITALS
OXYGEN SATURATION: 97 % | HEART RATE: 72 BPM | HEIGHT: 60 IN | WEIGHT: 119.2 LBS | SYSTOLIC BLOOD PRESSURE: 110 MMHG | BODY MASS INDEX: 23.4 KG/M2 | DIASTOLIC BLOOD PRESSURE: 56 MMHG | TEMPERATURE: 97.8 F | RESPIRATION RATE: 20 BRPM

## 2024-01-04 DIAGNOSIS — D64.9 LOW HEMOGLOBIN: Primary | ICD-10-CM

## 2024-01-04 DIAGNOSIS — K55.20 AVM (ARTERIOVENOUS MALFORMATION) OF COLON: ICD-10-CM

## 2024-01-04 DIAGNOSIS — I71.21 ANEURYSM OF ASCENDING AORTA WITHOUT RUPTURE (H): ICD-10-CM

## 2024-01-04 DIAGNOSIS — M16.11 PRIMARY OSTEOARTHRITIS OF RIGHT HIP: ICD-10-CM

## 2024-01-04 DIAGNOSIS — D50.9 IRON DEFICIENCY ANEMIA, UNSPECIFIED IRON DEFICIENCY ANEMIA TYPE: Primary | ICD-10-CM

## 2024-01-04 LAB
BASOPHILS # BLD AUTO: 0.1 10E3/UL (ref 0–0.2)
BASOPHILS NFR BLD AUTO: 1 %
EOSINOPHIL # BLD AUTO: 0.1 10E3/UL (ref 0–0.7)
EOSINOPHIL NFR BLD AUTO: 1 %
ERYTHROCYTE [DISTWIDTH] IN BLOOD BY AUTOMATED COUNT: 15.1 % (ref 10–15)
FERRITIN SERPL-MCNC: 324 NG/ML (ref 11–328)
HCT VFR BLD AUTO: 30.3 % (ref 35–47)
HGB BLD-MCNC: 9.5 G/DL (ref 11.7–15.7)
IMM GRANULOCYTES # BLD: 0 10E3/UL
IMM GRANULOCYTES NFR BLD: 0 %
IRON BINDING CAPACITY (ROCHE): 274 UG/DL (ref 240–430)
IRON SATN MFR SERPL: 9 % (ref 15–46)
IRON SERPL-MCNC: 24 UG/DL (ref 37–145)
LYMPHOCYTES # BLD AUTO: 0.8 10E3/UL (ref 0.8–5.3)
LYMPHOCYTES NFR BLD AUTO: 16 %
MCH RBC QN AUTO: 25.4 PG (ref 26.5–33)
MCHC RBC AUTO-ENTMCNC: 31.4 G/DL (ref 31.5–36.5)
MCV RBC AUTO: 81 FL (ref 78–100)
MONOCYTES # BLD AUTO: 0.5 10E3/UL (ref 0–1.3)
MONOCYTES NFR BLD AUTO: 10 %
NEUTROPHILS # BLD AUTO: 3.4 10E3/UL (ref 1.6–8.3)
NEUTROPHILS NFR BLD AUTO: 72 %
NRBC # BLD AUTO: 0 10E3/UL
NRBC BLD AUTO-RTO: 0 /100
PLATELET # BLD AUTO: 296 10E3/UL (ref 150–450)
RBC # BLD AUTO: 3.74 10E6/UL (ref 3.8–5.2)
WBC # BLD AUTO: 4.8 10E3/UL (ref 4–11)

## 2024-01-04 PROCEDURE — 85025 COMPLETE CBC W/AUTO DIFF WBC: CPT | Mod: ZL | Performed by: NURSE PRACTITIONER

## 2024-01-04 PROCEDURE — 36415 COLL VENOUS BLD VENIPUNCTURE: CPT | Mod: ZL | Performed by: NURSE PRACTITIONER

## 2024-01-04 PROCEDURE — 83540 ASSAY OF IRON: CPT | Mod: ZL | Performed by: NURSE PRACTITIONER

## 2024-01-04 PROCEDURE — 83550 IRON BINDING TEST: CPT | Mod: ZL | Performed by: NURSE PRACTITIONER

## 2024-01-04 PROCEDURE — 82728 ASSAY OF FERRITIN: CPT | Mod: ZL | Performed by: NURSE PRACTITIONER

## 2024-01-04 PROCEDURE — 99214 OFFICE O/P EST MOD 30 MIN: CPT | Performed by: NURSE PRACTITIONER

## 2024-01-04 PROCEDURE — G0463 HOSPITAL OUTPT CLINIC VISIT: HCPCS

## 2024-01-04 RX ORDER — FERROUS SULFATE 325(65) MG
325 TABLET ORAL
Status: ON HOLD | COMMUNITY
End: 2024-05-14

## 2024-01-04 RX ORDER — CHOLECALCIFEROL (VITAMIN D3) 50 MCG
1 TABLET ORAL DAILY
Status: ON HOLD | COMMUNITY
End: 2024-05-14

## 2024-01-04 ASSESSMENT — PAIN SCALES - GENERAL: PAINLEVEL: SEVERE PAIN (6)

## 2024-01-04 NOTE — TELEPHONE ENCOUNTER
The iron levels are low as well as the hemoglobin. I am worried you may be losing blood. Given your history of colon concerns, I would recommend consideration of a colonoscopy/EGD. If this is something you want to pursue, I can place the orders. This would likely not be done until after your heart follow up.        PO Bettencourt CNP on 1/4/2024 at 2:43 PM       Patient update on [facility].    Darby Camp RN on 1/4/2024 at 4:16 PM

## 2024-01-04 NOTE — NURSING NOTE
Patient presents today for follow up on labs.    Medication Reconciliation Complete    Dunia Kathleen LPN  1/4/2024 1:09 PM

## 2024-01-04 NOTE — PROGRESS NOTES
Assessment & Plan   Problem List Items Addressed This Visit          Digestive    AVM (arteriovenous malformation) of colon       Circulatory    Aneurysm of ascending aorta without rupture (H24)     Other Visit Diagnoses       Iron deficiency anemia, unspecified iron deficiency anemia type    -  Primary    Relevant Medications    ferrous sulfate (FEROSUL) 325 (65 Fe) MG tablet    Other Relevant Orders    Iron & Iron Binding Capacity (Completed)    CBC and Differential (Completed)    Ferritin (Completed)        Iron deficiency anemia, worried about slow blood loss although hemoglobin has been stable over the past month.  Given history of AVM of colon, this may be the cause.  Have her continue with iron supplementation and consider EGD/colonoscopy.  Also plan to follow-up with cardiology regarding aneurysm as planned.    Review of the result(s) of each unique test - ferritin, CBC, iron  Ordering of each unique test        No follow-ups on file.    PO Bettencourt [facility]    Mickey Zuleta is a 89 year old, presenting for the following health issues:  RECHECK      History of Present Illness       Heart Failure:  She presents for follow up of heart failure. She is not experiencing shortness of breath at night, with rest or with activity  She is not experiencing any lower extremity edema.   She denies orthopenea and is not coughing at night. Patient is not checking weight daily. She has recently had a None.  She has no side effects from medications.  She has has a medical visit for heart failure 1 time since the last visit.    Reason for visit:  Lab work questions low iron leavels and other low leavels  wanting to get cleared for a hip replacement    She eats 2-3 servings of fruits and vegetables daily.She consumes 0 sweetened beverage(s) daily.She exercises with enough effort to increase her heart rate 9 or less minutes per day.  She exercises with enough effort to increase her  heart rate 3 or less days per week.   She is taking medications regularly.     Last Echo: Echo result w/o MOPS: Interpretation SummaryThe patient's rhythm is atrial fibrillation.Left ventricular function is decreased. The ejection fraction is 50-55%(borderline).Global right ventricular function is normal.Moderate mitral and tricuspid insufficiency present.The right ventricular systolic pressure is approximated at 42 mmHg. Pulmonaryhypertension is present.Estimated mean right atrial pressure is normal.No pericardial effusion is present.Ascending aorta dilatation is present, measures 5.2 cm.    She comes in the clinic today for follow-up on anemia.  She had a preop physical and was noted to have low hemoglobin.  She has been doing cardiac workup due to aortic aneurysm, has follow-up in 1 week regarding this.  Hemoglobin has been low, she states she is taking iron supplementation and eating iron rich foods.  She denies any dark or black stools, no signs of bleeding or bruising easily.  She is not having any shortness of breath or chest pain.  She does take Xarelto for atrial fibrillation.  Last colonoscopy was in 2014.  Colonoscopy in 2014 showed nonbleeding AV malformation, this was felt to be the culprit of her anemia at that time.  She does have cardiology scheduled for further evaluation and management of her aneurysm.  It appears that this needs to be taking care of and anemia sorted out prior to hip surgery.  {    Review of Systems   See above      Objective    /56   Pulse 72   Temp 97.8  F (36.6  C)   Resp 20   Ht 1.524 m (5')   Wt 54.1 kg (119 lb 3.2 oz)   SpO2 97%   BMI 23.28 kg/m    Body mass index is 23.28 kg/m .  Physical Exam   GENERAL: healthy, alert and no distress  EYES: Eyes grossly normal to inspection  RESP: lungs clear to auscultation - no rales, rhonchi or wheezes  CV: regular rate and rhythm, normal S1 S2  ABDOMEN: soft, nontender  NEURO: Normal strength and tone, mentation intact and  speech normal  PSYCH: mentation appears normal, affect normal/bright    Results for orders placed or performed in visit on 01/04/24 (from the past 24 hour(s))   Iron & Iron Binding Capacity   Result Value Ref Range    Iron 24 (L) 37 - 145 ug/dL    Iron Binding Capacity 274 240 - 430 ug/dL    Iron Sat Index 9 (L) 15 - 46 %   CBC and Differential    Narrative    The following orders were created for panel order CBC and Differential.  Procedure                               Abnormality         Status                     ---------                               -----------         ------                     CBC with platelets and d...[[id number]]  Abnormal            Final result                 Please view results for these tests on the individual orders.   Ferritin   Result Value Ref Range    Ferritin 324 11 - 328 ng/mL   CBC with platelets and differential   Result Value Ref Range    WBC Count 4.8 4.0 - 11.0 10e3/uL    RBC Count 3.74 (L) 3.80 - 5.20 10e6/uL    Hemoglobin 9.5 (L) 11.7 - 15.7 g/dL    Hematocrit 30.3 (L) 35.0 - 47.0 %    MCV 81 78 - 100 fL    MCH 25.4 (L) 26.5 - 33.0 pg    MCHC 31.4 (L) 31.5 - 36.5 g/dL    RDW 15.1 (H) 10.0 - 15.0 %    Platelet Count 296 150 - 450 10e3/uL    % Neutrophils 72 %    % Lymphocytes 16 %    % Monocytes 10 %    % Eosinophils 1 %    % Basophils 1 %    % Immature Granulocytes 0 %    NRBCs per 100 WBC 0 <1 /100    Absolute Neutrophils 3.4 1.6 - 8.3 10e3/uL    Absolute Lymphocytes 0.8 0.8 - 5.3 10e3/uL    Absolute Monocytes 0.5 0.0 - 1.3 10e3/uL    Absolute Eosinophils 0.1 0.0 - 0.7 10e3/uL    Absolute Basophils 0.1 0.0 - 0.2 10e3/uL    Absolute Immature Granulocytes 0.0 <=0.4 10e3/uL    Absolute NRBCs 0.0 10e3/uL

## 2024-01-05 NOTE — TELEPHONE ENCOUNTER
Routing Comment form [facility]:    I signed the order for occult stool. Should I put in the order for scopes? We did not discuss her pain, only the anemia. I asked if she had any other concerns and she did not express this. I am happy to address further-what is she using currently for pain management? Love Hager, APRN CNP on 1/5/2024 at 10:19 AM       [facility]    Darby Camp RN on 1/5/2024 at 10:39 AM

## 2024-01-08 NOTE — TELEPHONE ENCOUNTER
Right hip pain; waiting on elective hip replacement.     At OV with [facility] on 1/4/23:      /56   Pulse 72   Temp 97.8  F (36.6  C)   Resp 20   Ht 1.524 m (5')  Wt 54.1 kg (119 lb 3.2 oz)  SpO2 97%   BMI 23.28 kg/m  BSA 1.51 m   Pain Sc Severe Pain (6)    Currently taking Tylenol for pain and requesting something stronger.     Trev'ing up Tramadol and sending to [facility] for review tomorrow.      Patient update on [facility].    Darby Camp RN on 1/8/2024 at 4:08 PM

## 2024-01-08 NOTE — TELEPHONE ENCOUNTER
Added to appointment notes for 1/15:    Please call Daughter Tereza at [phone] during appointment.     Thank You,  Erwin BLANDON RN

## 2024-01-09 RX ORDER — TRAMADOL HYDROCHLORIDE 50 MG/1
25-50 TABLET ORAL EVERY 6 HOURS PRN
Qty: 60 TABLET | Refills: 1 | Status: SHIPPED | OUTPATIENT
Start: 2024-01-09 | End: 2024-02-05

## 2024-01-15 ENCOUNTER — LAB (OUTPATIENT)
Dept: LAB | Facility: OTHER | Age: 89
End: 2024-01-15
Payer: MEDICARE

## 2024-01-15 ENCOUNTER — TELEPHONE (OUTPATIENT)
Dept: CARDIOLOGY | Facility: CLINIC | Age: 89
End: 2024-01-15

## 2024-01-15 DIAGNOSIS — I71.20 THORACIC AORTIC ANEURYSM WITHOUT RUPTURE, UNSPECIFIED PART (H): ICD-10-CM

## 2024-01-15 DIAGNOSIS — I71.21 ANEURYSM OF ASCENDING AORTA WITHOUT RUPTURE (H): Primary | ICD-10-CM

## 2024-01-15 DIAGNOSIS — D64.9 LOW HEMOGLOBIN: ICD-10-CM

## 2024-01-15 LAB — HEMOCCULT STL QL: NEGATIVE

## 2024-01-15 PROCEDURE — 82272 OCCULT BLD FECES 1-3 TESTS: CPT | Mod: ZL

## 2024-01-15 NOTE — TELEPHONE ENCOUNTER
Per Katy RN with Dr. Newman, patient should be cancelled with Dr. Newman for today's telemed visit at this time.  Katy will discuss with Dr. Newman but will likely recommend patient see Dr. Wilkinson instead.  Dr. Ingram notified of this via staff message in case he wants to place referral for Dr. Wilkinson instead.  Sonam Quiroz RN......January 15, 2024...12:11 PM

## 2024-01-16 ENCOUNTER — TELEPHONE (OUTPATIENT)
Dept: CARDIOLOGY | Facility: OTHER | Age: 89
End: 2024-01-16
Payer: COMMERCIAL

## 2024-01-16 DIAGNOSIS — I71.21 ASCENDING AORTIC ANEURYSM (H): ICD-10-CM

## 2024-01-16 DIAGNOSIS — I50.22 CHRONIC SYSTOLIC HEART FAILURE (H): ICD-10-CM

## 2024-01-16 DIAGNOSIS — I48.20 CHRONIC ATRIAL FIBRILLATION (H): ICD-10-CM

## 2024-01-16 DIAGNOSIS — I48.91 ATRIAL FIBRILLATION WITH RVR (H): ICD-10-CM

## 2024-01-16 NOTE — TELEPHONE ENCOUNTER
Per MINOR Burns:  Spoke with Dr. Newman and he agreed that she should see Dr. Wilkinson!     Thank you,   Katy       Patient notified of this and that Sharath Ingram DO placed a referral.  She should await a phone call from Dr. Wilkinson's office to schedule.  Sonam Quiroz RN......January 16, 2024...10:17 AM

## 2024-01-16 NOTE — TELEPHONE ENCOUNTER
Patients daughter Flor Abdullahi called and would like a call back from Dr Ingram or his nurse . Flor has a question if Juan Jose is going too reschedule the surgery appointment that was cancelled on 01/15/24 and she has multiple questions or concerns about the patient . Please call her at [phone]    Melissa Lopez on 1/16/2024 at 2:29 PM

## 2024-01-16 NOTE — TELEPHONE ENCOUNTER
Flor verified patient's full name and date of birth.  Consent to communicate on file.  She is wondering if the patient could see someone out of [place] instead of Dr. Wilkinson.  After further discussion Flor will wait for her mom to get scheduled with Dr. Wilkinson as this will most likely be a video visit so she won't have to travel.  She will call back if she has any further questions.  Sonam Quiroz RN......January 16, 2024...3:07 PM

## 2024-01-18 RX ORDER — METOPROLOL SUCCINATE 25 MG/1
TABLET, EXTENDED RELEASE ORAL
Qty: 90 TABLET | Refills: 0 | Status: SHIPPED | OUTPATIENT
Start: 2024-01-18 | End: 2024-04-15

## 2024-01-18 RX ORDER — DIGOXIN 125 MCG
TABLET ORAL
Qty: 90 TABLET | Refills: 0 | Status: SHIPPED | OUTPATIENT
Start: 2024-01-18 | End: 2024-04-15

## 2024-01-18 RX ORDER — METOPROLOL SUCCINATE 100 MG/1
TABLET, EXTENDED RELEASE ORAL
Qty: 90 TABLET | Refills: 0 | Status: SHIPPED | OUTPATIENT
Start: 2024-01-18 | End: 2024-04-15

## 2024-01-18 RX ORDER — LISINOPRIL 5 MG/1
5 TABLET ORAL DAILY
Qty: 90 TABLET | Refills: 0 | Status: SHIPPED | OUTPATIENT
Start: 2024-01-18 | End: 2024-04-15

## 2024-01-18 NOTE — TELEPHONE ENCOUNTER
[facility] sent Rx request for the following:      Requested Prescriptions   Pending Prescriptions Disp Refills    metoprolol succinate ER (TOPROL XL) 25 MG 24 hr tablet [Pharmacy Med Name: metoprolol succinate ER 25 mg tablet,extended release 24 hr] 90 tablet 3     Sig: TAKE 1 TABLET BY MOUTH AT BEDTIME WITH 100 MG TABLET. TOTAL DAILY DOSE = 125 MG.   Last Prescription Date:   1/11/23  Last Fill Qty/Refills:         90, R-3        metoprolol succinate ER (TOPROL XL) 100 MG 24 hr tablet [Pharmacy Med Name: metoprolol succinate  mg tablet,extended release 24 hr] 90 tablet 3     Sig: TAKE 1 TABLET BY MOUTH AT BEDTIME WITH 25 MG TABLET. TOTAL DAILY DOSE = 125 MG.   Last Prescription Date:   1/11/23  Last Fill Qty/Refills:         90, R-3        lisinopril (ZESTRIL) 5 MG tablet [Pharmacy Med Name: lisinopril 5 mg tablet] 90 tablet 3     Sig: Take 1 tablet (5 mg) by mouth daily   Last Prescription Date:   1/11/23  Last Fill Qty/Refills:         90, R-3      ACE Inhibitors (Including Combos) Protocol Failed - 1/18/2024  3:23 PM        Failed - Has GFR on file in past 12 months and most recent value is normal       digoxin (LANOXIN) 125 MCG tablet [Pharmacy Med Name: digoxin 125 mcg (0.125 mg) tablet] 90 tablet 3     Sig: Take 1 tablet (125 mcg) by mouth daily   Last Prescription Date:   1/11/23  Last Fill Qty/Refills:         90, R-3      Last Office Visit:              1/4/24   Future Office visit:           None    Unable to complete prescription refill per RN Medication Refill Policy.     Routing to covering provider for refill consideration, as PCP/provider is out of clinic >48 hours or Pt is completely out of medication and provider is out of the clinic today.    Saumya Urbina RN .............. 1/18/2024  3:27 PM

## 2024-01-25 ENCOUNTER — MYC MEDICAL ADVICE (OUTPATIENT)
Dept: FAMILY MEDICINE | Facility: OTHER | Age: 89
End: 2024-01-25
Payer: COMMERCIAL

## 2024-01-25 ENCOUNTER — TELEPHONE (OUTPATIENT)
Dept: CARDIOLOGY | Facility: OTHER | Age: 89
End: 2024-01-25
Payer: COMMERCIAL

## 2024-01-25 ENCOUNTER — TELEPHONE (OUTPATIENT)
Dept: FAMILY MEDICINE | Facility: OTHER | Age: 89
End: 2024-01-25
Payer: COMMERCIAL

## 2024-01-25 DIAGNOSIS — D50.9 IRON DEFICIENCY ANEMIA, UNSPECIFIED IRON DEFICIENCY ANEMIA TYPE: Primary | ICD-10-CM

## 2024-01-25 NOTE — TELEPHONE ENCOUNTER
Please call the patients daughter.   She would like to talk about lab orders her mother needs.  She definitely needs Iron checked.   The patient does have a surgery coming up. Please call soon.        Mayuri Martinez on 1/25/2024 at 9:33 AM

## 2024-01-25 NOTE — TELEPHONE ENCOUNTER
Sharath Ingram, DO  [facility]  She has an apt with Dr. Wilkinson on 2/13/23. Future care and decision as to when she will have her next echo will likely be determined at that time. Generally, they are done every 6-12 months.    Dr. Ingram      Flor verified her mom's full name and date of birth and notified of above information.  Consent to communicate on file.  She will make sure to ask Dr. Wilkinson when the next echocardiogram is due.  Sonam Quiroz RN......January 25, 2024...10:30 AM

## 2024-01-26 NOTE — TELEPHONE ENCOUNTER
Left message to call back....................  1/26/2024   11:08 AM  Elaine Sinha LPN ....................1/26/2024  11:08 AM  Ext [phone]

## 2024-01-26 NOTE — TELEPHONE ENCOUNTER
Daughter called back. She said that her last iron and hemoglobin levels were low. She is taking an iron tablet and eating foods with iron. Daughter is wondering if anything else can be done to help. Should they make an appointment? Or just lab work? Elaine Sinha LPN ....................1/26/2024  12:16 PM

## 2024-01-29 NOTE — TELEPHONE ENCOUNTER
Spoke with the daughter who is scheduling an appt for labs and a follow up with Dr Dinero for this coming Monday.    Moira Page LPN on 1/29/2024 at 3:34 PM

## 2024-01-29 NOTE — TELEPHONE ENCOUNTER
Attempted to call the pt's daughter.  There was no answer. No message was left.    Moira Page LPN on 1/29/2024 at 9:45 AM     Products Recommended: Recommend SPF 50 or greater for face, 30 or greater for body\\nRecommend mineral sunscreen in all cases (active ingredients zinc oxide and/or titanium dioxide)\\nBrands: Neutrogena, Tizo, La Roche Posay, Elta MD Detail Level: Simple General Sunscreen Counseling: I recommended a broad spectrum sunscreen with at least SPF of 30, but higher is better.  I explained that SPF 30 sunscreens block approximately 97 percent of the sun's harmful rays in vitro, but in practice a higher SPF offers more protection from sun exposure as most people don't use the density of application to get the number on the body.  Sunscreens should be applied at least 15 minutes prior to expected sun exposure and then every 2 hours after that as long as sun exposure continues. If swimming or exercising sunscreen should be reapplied more frequently.  One ounce, or 30 fluid ounces (the equivalent of a shot glass full of sunscreen), is adequate to protect the skin not covered by a bathing suit. I also recommended a lip balm with a sunscreen as well. Sun protective clothing (UPF50+) can be used in lieu of sunscreen but must be worn the entire time you are exposed to the sun's rays.

## 2024-01-29 NOTE — TELEPHONE ENCOUNTER
She should have her labs rechecked followed by a clinic visit that same day to review lab results, discuss what is going on with her aneurysm etc.  Labs ordered.

## 2024-01-30 NOTE — TELEPHONE ENCOUNTER
Reached out to  who cancelled Dr. Nidia lópez.   She said:    Hello, Yes per Dr. Newman's office patient needed to get in with Dr. Wilkinson.  patients Daughter was aware of the change.     Message from 1/4/24 Ferritin Test from Love Hager:     The iron levels are low as well as the hemoglobin. I am worried you may be losing blood. Given your history of colon concerns, I would recommend consideration of a colonoscopy/EGD. If this is something you want to pursue, I can place the orders. This would likely not be done until after your heart follow up.        Love Hager, PO CNP on 1/4/2024 at 2:43 PM       Then did an occult blood stool test on 1/15/24: Negative.     [facility]    Darby Camp RN on 1/30/2024 at 11:49 AM

## 2024-02-05 ENCOUNTER — LAB (OUTPATIENT)
Dept: LAB | Facility: OTHER | Age: 89
End: 2024-02-05
Attending: FAMILY MEDICINE
Payer: COMMERCIAL

## 2024-02-05 ENCOUNTER — OFFICE VISIT (OUTPATIENT)
Dept: FAMILY MEDICINE | Facility: OTHER | Age: 89
End: 2024-02-05
Attending: FAMILY MEDICINE
Payer: MEDICARE

## 2024-02-05 VITALS
RESPIRATION RATE: 18 BRPM | TEMPERATURE: 97.1 F | OXYGEN SATURATION: 97 % | HEART RATE: 51 BPM | BODY MASS INDEX: 24.11 KG/M2 | HEIGHT: 60 IN | DIASTOLIC BLOOD PRESSURE: 66 MMHG | WEIGHT: 122.8 LBS | SYSTOLIC BLOOD PRESSURE: 100 MMHG

## 2024-02-05 DIAGNOSIS — I48.11 LONGSTANDING PERSISTENT ATRIAL FIBRILLATION (H): ICD-10-CM

## 2024-02-05 DIAGNOSIS — D64.9 ANEMIA, UNSPECIFIED TYPE: ICD-10-CM

## 2024-02-05 DIAGNOSIS — M16.11 PRIMARY OSTEOARTHRITIS OF RIGHT HIP: ICD-10-CM

## 2024-02-05 DIAGNOSIS — D50.9 IRON DEFICIENCY ANEMIA, UNSPECIFIED IRON DEFICIENCY ANEMIA TYPE: ICD-10-CM

## 2024-02-05 DIAGNOSIS — I71.21 ANEURYSM OF ASCENDING AORTA WITHOUT RUPTURE (H): Primary | ICD-10-CM

## 2024-02-05 LAB
BASOPHILS # BLD AUTO: 0.1 10E3/UL (ref 0–0.2)
BASOPHILS NFR BLD AUTO: 1 %
EOSINOPHIL # BLD AUTO: 0.1 10E3/UL (ref 0–0.7)
EOSINOPHIL NFR BLD AUTO: 2 %
ERYTHROCYTE [DISTWIDTH] IN BLOOD BY AUTOMATED COUNT: 17.6 % (ref 10–15)
HCT VFR BLD AUTO: 30.9 % (ref 35–47)
HGB BLD-MCNC: 9.7 G/DL (ref 11.7–15.7)
IMM GRANULOCYTES # BLD: 0 10E3/UL
IMM GRANULOCYTES NFR BLD: 0 %
LYMPHOCYTES # BLD AUTO: 0.7 10E3/UL (ref 0.8–5.3)
LYMPHOCYTES NFR BLD AUTO: 13 %
MCH RBC QN AUTO: 25.7 PG (ref 26.5–33)
MCHC RBC AUTO-ENTMCNC: 31.4 G/DL (ref 31.5–36.5)
MCV RBC AUTO: 82 FL (ref 78–100)
MONOCYTES # BLD AUTO: 0.6 10E3/UL (ref 0–1.3)
MONOCYTES NFR BLD AUTO: 11 %
NEUTROPHILS # BLD AUTO: 3.7 10E3/UL (ref 1.6–8.3)
NEUTROPHILS NFR BLD AUTO: 73 %
NRBC # BLD AUTO: 0 10E3/UL
NRBC BLD AUTO-RTO: 0 /100
PLATELET # BLD AUTO: 264 10E3/UL (ref 150–450)
RBC # BLD AUTO: 3.78 10E6/UL (ref 3.8–5.2)
WBC # BLD AUTO: 5 10E3/UL (ref 4–11)

## 2024-02-05 PROCEDURE — 36415 COLL VENOUS BLD VENIPUNCTURE: CPT | Mod: ZL

## 2024-02-05 PROCEDURE — 85025 COMPLETE CBC W/AUTO DIFF WBC: CPT | Mod: ZL

## 2024-02-05 PROCEDURE — G0463 HOSPITAL OUTPT CLINIC VISIT: HCPCS | Performed by: FAMILY MEDICINE

## 2024-02-05 PROCEDURE — 99214 OFFICE O/P EST MOD 30 MIN: CPT | Performed by: FAMILY MEDICINE

## 2024-02-05 PROCEDURE — 84466 ASSAY OF TRANSFERRIN: CPT | Mod: ZL

## 2024-02-05 RX ORDER — TRAMADOL HYDROCHLORIDE 50 MG/1
25-50 TABLET ORAL EVERY 6 HOURS PRN
Qty: 60 TABLET | Refills: 5 | Status: ON HOLD | OUTPATIENT
Start: 2024-02-05 | End: 2024-05-14

## 2024-02-05 ASSESSMENT — PATIENT HEALTH QUESTIONNAIRE - PHQ9
10. IF YOU CHECKED OFF ANY PROBLEMS, HOW DIFFICULT HAVE THESE PROBLEMS MADE IT FOR YOU TO DO YOUR WORK, TAKE CARE OF THINGS AT HOME, OR GET ALONG WITH OTHER PEOPLE: NOT DIFFICULT AT ALL
SUM OF ALL RESPONSES TO PHQ QUESTIONS 1-9: 1
SUM OF ALL RESPONSES TO PHQ QUESTIONS 1-9: 1

## 2024-02-05 ASSESSMENT — PAIN SCALES - GENERAL: PAINLEVEL: SEVERE PAIN (7)

## 2024-02-05 NOTE — PROGRESS NOTES
SUBJECTIVE:  Merlyn Escamilla is a 89 year old female here for follow-up.  She has a history of right hip pain.  She is status post left total hip arthroplasty.  She is hoping to get her hip replaced however this has been complicated by an aortic aneurysm measuring 5.2 cm as well as anemia, likely iron deficiency or secondary to AVMs seen in her colon a previously.  Recently she has been using tramadol for pain control with good relief.  She is having no side effects and tolerating this overall well.  She has had no chest pain, shortness of breath.  She uses a cane for ambulation and is sitting in a wheelchair today.    Allergies:  Allergies   Allergen Reactions    Morphine      Other reaction(s): Muscle Weakness    Phenylbutazone Unknown       ROS:    As above otherwise ROS is unremarkable.    OBJECTIVE:  /66 (BP Location: Left arm, Patient Position: Sitting, Cuff Size: Adult Regular)   Pulse 51   Temp 97.1  F (36.2  C) (Temporal)   Resp 18   Ht 1.524 m (5')   Wt 55.7 kg (122 lb 12.8 oz)   SpO2 97%   Breastfeeding No   BMI 23.98 kg/m      EXAM:  General Appearance: Pleasant, alert, appropriate appearance for age. No acute distress  Head: Normal. Normocephalic, atraumatic.  Cardiovascular: Irregularly irregular without murmurs.  Musculoskeletal: No edema.    ASSESSEMENT AND PLAN:    1. Aneurysm of ascending aorta without rupture (H24)    2. Primary osteoarthritis of right hip    3. Longstanding persistent atrial fibrillation (H)    4. Anemia, unspecified type      Reviewed recent visits from cardiology.  She has a planned cardiothoracic consultation next week.  At that time we will need guidance in regards to her aortic aneurysm.  If it is recommended that this be repaired preoperatively she will proceed in that direction.    If it is decided that this does not require repair I would appreciate cardiology's guidance in regards to preoperative, perioperative and postoperative management to reduce her risk  of complications.    In regards to her iron deficiency anemia she will increase iron to twice daily as she is tolerating this well.  I do not think she would benefit from colonoscopy given her age.  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    Until she has definitive treatment for her hip we will continue with tramadol for pain relief.  If it does not appear that she will be having surgery for hip could also consider intra-articular steroid injection.    Cliff Dinero MD  Family Medicine

## 2024-02-05 NOTE — NURSING NOTE
Chief Complaint   Patient presents with    RECHECK     F/U - labs       Initial /66 (BP Location: Left arm, Patient Position: Sitting, Cuff Size: Adult Regular)   Pulse 51   Temp 97.1  F (36.2  C) (Temporal)   Resp 18   Ht 1.524 m (5')   Wt 55.7 kg (122 lb 12.8 oz)   SpO2 97%   Breastfeeding No   BMI 23.98 kg/m   Estimated body mass index is 23.98 kg/m  as calculated from the following:    Height as of this encounter: 1.524 m (5').    Weight as of this encounter: 55.7 kg (122 lb 12.8 oz).  Medication Review: complete    The next two questions are to help us understand your food security.  If you are feeling you need any assistance in this area, we have resources available to support you today.          1/2/2024   SDOH- Food Insecurity   Within the past 12 months, did you worry that your food would run out before you got money to buy more? N   Within the past 12 months, did the food you bought just not last and you didn t have money to get more? N     Health Care Directive:  Patient has a Health Care Directive on file      Carol Mancilla LPN

## 2024-02-06 LAB — TRANSFERRIN SERPL-MCNC: 259 MG/DL (ref 200–360)

## 2024-02-13 ENCOUNTER — VIRTUAL VISIT (OUTPATIENT)
Dept: CARDIOLOGY | Facility: CLINIC | Age: 89
End: 2024-02-13
Payer: COMMERCIAL

## 2024-02-13 VITALS — WEIGHT: 120 LBS | HEIGHT: 61 IN | BODY MASS INDEX: 22.66 KG/M2

## 2024-02-13 DIAGNOSIS — I71.21 ANEURYSM OF ASCENDING AORTA WITHOUT RUPTURE (H): Primary | ICD-10-CM

## 2024-02-13 PROCEDURE — 99204 OFFICE O/P NEW MOD 45 MIN: CPT | Mod: 95 | Performed by: INTERNAL MEDICINE

## 2024-02-13 NOTE — LETTER
"2024    Sharath Dinero MD  [address] [facility] [place] [address]    RE: Merlyn A Andi       Dear Colleague,     I had the pleasure of seeing Merlyn DIAZ Andi in the [facility].           Vascular Cardiology Consultation      Merlyn is a 89 year old who is being evaluated via a billable video visit.      She is here for evaluation of aortic aneurysm in the setting of upcoming hip surgery. She follows local cardiology in [place] and has been getting excellent care for her mild heart failure (HF mildly reduced EF) and has had no symptoms suggesting of CHF exacerbation as of late. She remains on low dose lasix and her blood pressure is well controlled on metoprolol and lisinopril.     Her aortic aneurysm dates back many years and back in 2018 it was noted to be 4.6 cm by CTA, which increased in size to 5.1 cm by CT in  and has been stable ever since on yearly surveillance imaging. It was determined several years ago given her age she was high surgical risk for open heart surgery and thus conservative surveillance approach was considered. She has no chest pain or sob or difficulty swallowing from her aneurysm.     Family history reviewed:   Father  suddenly at 63 years old.   Mother  73 yrs old suddenly.   No autopsies. Was deemed \"heart attacks\"  No other known aneurysms in the family but unclear if screened.    Non smoker.     Blood pressure well controlled. On metoprolol     Left hip surgery was done successfully two years ago without problems.    ASSESSMENT/PLAN:     1. 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 The subtypes of this include syndromic (like MFS, LDS, EDS) or non syndromic (sporadic or familial type). Although degenerative is also possible given her age but she lacks typical risk factors of smoking, htn. Elective repair per new ACC/AHA aortic guidelines is recommended at 5.0 cm of the root and/or ascending aorta in most " cases of hereditary TAD, unless high risk features are present (high risk gene type, rapid growth, high risk family features, for example). Although the patient has concerning family history of sudden death which may include aortic dissection or rupture (versus true myocardial infarction), given her age, STS risk is prohibitive. An aortic repair carries a high risk of morbidity and mortality for her and thus surveillance and conservative approach is warranted. With good blood pressure control she has demonstrated excellent stability since 2020.     I recommend she proceeds with hip surgery with no need for additional cardiac testing or any aortic repair considerations. She tolerated well her prior hip surgery without any concern for acute aortic event two years ago.     Recommend excellent BP and pain control perioperatively. She can continue undergoing care with local cardiology in [place] and I'd be happy to see her at any time in the future.     Moira Wilkinson MD [facility]     High complexity       Current Outpatient Medications   Medication    digoxin (LANOXIN) 125 MCG tablet    ferrous sulfate (FEROSUL) 325 (65 Fe) MG tablet    furosemide (LASIX) 20 MG tablet    lisinopril (ZESTRIL) 5 MG tablet    metoprolol succinate ER (TOPROL XL) 100 MG 24 hr tablet    metoprolol succinate ER (TOPROL XL) 25 MG 24 hr tablet    rivaroxaban ANTICOAGULANT (XARELTO ANTICOAGULANT) 15 MG TABS tablet    traMADol (ULTRAM) 50 MG tablet    vitamin D3 (CHOLECALCIFEROL) 50 mcg (2000 units) tablet     No current facility-administered medications for this visit.     Limited video physical   General:  no apparent distress, normal body habitus, sitting upright.  ENT/Mouth:  membranes moist, no nasal discharge.  Normal head shape, no apparent injury or laceration.  Eyes:  no scleral icterus, normal conjunctivae.  No observed jaundice.  Neck:  no apparent neck swelling.   Chest/Lungs:  No breathing difficulty while  speaking.  No audible wheezing.  No cough during conversation.  Cardiovascular:  No obviously elevated jugular venous pressure.  No apparent edema bilaterally in LE.   Abdomen:  no obvious abdominal distention.   Extremities:  no apparent cyanosis.  Skin:  no xanthelasma.  No facial lacerations.  Neurologic:  Normal arm motion bilateral, no tremors.    Psychiatric:  Alert and oriented x3, calm demeanor        How would you like to obtain your AVS? MyChart  If the video visit is dropped, the invitation should be resent by: Text to cell phone: [phone]  Will anyone else be joining your video visit? No      Vitals - Patient Reported  Weight (Patient Reported): 54.4 kg (120 lb)    MARY LOU Knight    Video-Visit Details    Type of service:  Video Visit DOXOravel  Video TIME: 11:15-11:45 AM  Originating Location (pt. Location): Home    Distant Location (provider location):  On-site  Platform used for Video Visit: Kir[employer]      Thank you for allowing me to participate in the care of your patient.      Sincerely,     Moira Wilkinson MD     [facility]  cc:   No referring provider defined for this encounter.

## 2024-02-13 NOTE — PROGRESS NOTES
"         Vascular Cardiology Consultation      Merlyn is a 89 year old who is being evaluated via a billable video visit.      She is here for evaluation of aortic aneurysm in the setting of upcoming hip surgery. She follows local cardiology in [place] and has been getting excellent care for her mild heart failure (HF mildly reduced EF) and has had no symptoms suggesting of CHF exacerbation as of late. She remains on low dose lasix and her blood pressure is well controlled on metoprolol and lisinopril.     Her aortic aneurysm dates back many years and back in 2018 it was noted to be 4.6 cm by CTA, which increased in size to 5.1 cm by CT in 2020 and has been stable ever since on yearly surveillance imaging. It was determined several years ago given her age she was high surgical risk for open heart surgery and thus conservative surveillance approach was considered. She has no chest pain or sob or difficulty swallowing from her aneurysm.     Family history reviewed:   Father  suddenly at 63 years old.   Mother  73 yrs old suddenly.   No autopsies. Was deemed \"heart attacks\"  No other known aneurysms in the family but unclear if screened.    Non smoker.     Blood pressure well controlled. On metoprolol     Left hip surgery was done successfully two years ago without problems.    ASSESSMENT/PLAN:     1. 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 The subtypes of this include syndromic (like MFS, LDS, EDS) or non syndromic (sporadic or familial type). Although degenerative is also possible given her age but she lacks typical risk factors of smoking, htn. Elective repair per new ACC/AHA aortic guidelines is recommended at 5.0 cm of the root and/or ascending aorta in most cases of hereditary TAD, unless high risk features are present (high risk gene type, rapid growth, high risk family features, for example). Although the patient has concerning family history of sudden death " which may include aortic dissection or rupture (versus true myocardial infarction), given her age, STS risk is prohibitive. An aortic repair carries a high risk of morbidity and mortality for her and thus surveillance and conservative approach is warranted. With good blood pressure control she has demonstrated excellent stability since 2020.     I recommend she proceeds with hip surgery with no need for additional cardiac testing or any aortic repair considerations. She tolerated well her prior hip surgery without any concern for acute aortic event two years ago.     Recommend excellent BP and pain control perioperatively. She can continue undergoing care with local cardiology in [place] and I'd be happy to see her at any time in the future.     Moira Wilkinson MD MSC  [facility]     High complexity       Current Outpatient Medications   Medication    digoxin (LANOXIN) 125 MCG tablet    ferrous sulfate (FEROSUL) 325 (65 Fe) MG tablet    furosemide (LASIX) 20 MG tablet    lisinopril (ZESTRIL) 5 MG tablet    metoprolol succinate ER (TOPROL XL) 100 MG 24 hr tablet    metoprolol succinate ER (TOPROL XL) 25 MG 24 hr tablet    rivaroxaban ANTICOAGULANT (XARELTO ANTICOAGULANT) 15 MG TABS tablet    traMADol (ULTRAM) 50 MG tablet    vitamin D3 (CHOLECALCIFEROL) 50 mcg (2000 units) tablet     No current facility-administered medications for this visit.     Limited video physical   General:  no apparent distress, normal body habitus, sitting upright.  ENT/Mouth:  membranes moist, no nasal discharge.  Normal head shape, no apparent injury or laceration.  Eyes:  no scleral icterus, normal conjunctivae.  No observed jaundice.  Neck:  no apparent neck swelling.   Chest/Lungs:  No breathing difficulty while speaking.  No audible wheezing.  No cough during conversation.  Cardiovascular:  No obviously elevated jugular venous pressure.  No apparent edema bilaterally in LE.   Abdomen:  no obvious abdominal distention.    Extremities:  no apparent cyanosis.  Skin:  no xanthelasma.  No facial lacerations.  Neurologic:  Normal arm motion bilateral, no tremors.    Psychiatric:  Alert and oriented x3, calm demeanor        How would you like to obtain your AVS? MyChart  If the video visit is dropped, the invitation should be resent by: Text to cell phone: [phone]  Will anyone else be joining your video visit? No      Vitals - Patient Reported  Weight (Patient Reported): 54.4 kg (120 lb)    MARY LOU Knight    Video-Visit Details    Type of service:  Video Visit Useful at Night  Video TIME: 11:15-11:45 AM  Originating Location (pt. Location): Home    Distant Location (provider location):  On-site  Platform used for Video Visit: [employer]

## 2024-02-15 ENCOUNTER — MYC MEDICAL ADVICE (OUTPATIENT)
Dept: FAMILY MEDICINE | Facility: OTHER | Age: 89
End: 2024-02-15
Payer: COMMERCIAL

## 2024-02-15 DIAGNOSIS — M16.11 PRIMARY OSTEOARTHRITIS OF RIGHT HIP: Primary | ICD-10-CM

## 2024-02-16 NOTE — TELEPHONE ENCOUNTER
"Got a call from Tereza.       Home Health Care at [facility]. Waiting on PT orders.      Called [facility].  They need a Home Care Order.  Patient is not yet a patient with them.      LOV with Bar 2/5/24.      Darby Camp RN on 2/16/2024 at 8:58 AM    [facility] called Tereza (daughter) and asked about her Hgb for surgery.  She has pre-op physical with Love Hager on 2/27.    Tereza states \"the heart surgeons are fine with her Hgb.  We don't know why she's dropping a bit but they said it's fine\".  Patient's other sister Flor will be there at pre-op.     Darby Camp RN on 2/16/2024 at 9:11 AM    "

## 2024-02-16 NOTE — TELEPHONE ENCOUNTER
Routing Comment from myself to [facility]:    Willing to sign this Home Care referral with Soular out or does this have to wait for his return on Monday?  Darby CABRERA RN ext [phone]     Routing Comment from [facility]:    Should wait if it can, whoever orders it is also obligated to do the face to face certification.     Patient update on [facility].    Darby Camp RN on 2/16/2024 at 3:37 PM

## 2024-02-19 NOTE — TELEPHONE ENCOUNTER
Called [facility] to have pre-op with Love Hager on 2/27 changed to normal OV to discuss:   Follow up with blood work and talk about an Iron infusion.    [facility] on [facility].    Darby Camp RN on 2/19/2024 at 12:46 PM

## 2024-02-20 ENCOUNTER — TELEPHONE (OUTPATIENT)
Dept: FAMILY MEDICINE | Facility: OTHER | Age: 89
End: 2024-02-20
Payer: COMMERCIAL

## 2024-02-20 PROCEDURE — G0180 MD CERTIFICATION HHA PATIENT: HCPCS | Performed by: FAMILY MEDICINE

## 2024-02-20 NOTE — TELEPHONE ENCOUNTER
S-(situation): Low heart rate and med reconciliation     B-(background): New admission to home care for PT/OT. SN added for above reason.     A-(assessment): At SOC visit today patient's HR was 50 with exertion when checked on the oximeter. Listened to AP and HR was 46 at rest. Checked again using the oximeter and HR was 40 at rest. She has no symptoms and was not aware that it was that low. She is taking Toprol  mg at HS.     Medications were reconciled in the home and [facility] med list does not have the following meds that client is takin) Voltaren gel 1% QID PRN to affected areas for pain relief  2) Biofreeze 4% topical gel. 1 application to affected areas BID PRN for pain relief  3) Arnicare topical gel. Apply 1 application to affected areas TID for pain relief  4) Acetaminophen Arthritis strength 650 mg tablet take 2 tabs Q 8 hrs PRN    R-(recommendations): Any changes to medications or other recommendations?     The HR parameters in our system flag when a patient's pulse is less than 60 or greater than 100. Okay to change Merlyn's lower parameter to 50 if this is her normal pulse and she has no symptoms?    Thank you    Landy Guillory RN on 2024 at 4:32 PM

## 2024-02-20 NOTE — TELEPHONE ENCOUNTER
The occupational therapy evaluation and treatment that was ordered was completed on 2/20/24    Request for additional Home Care Occupational Therapy orders as follows:        Continuation frequency =   ___1___  x week x  ___3___ week(s)    Effective date = 2/26/24          Intervention include:    ADL skills training  Education - home safety  Instruction - home exercise program  Therapeutic exercise  Equipment Training      For therapist: Claudia Maddox OTR/L    The physical therapy evaluation and treatment that was ordered was completed on 2/20/24      Request for additional Home Care Physical Therapy orders as follows:      Continuation frequency =   ___1___  x week x  ____1___ week(s)    Effective date = 2/21/24      Continuation frequency =   ___2___  x week x  ____4___ week(s)    Effective date = 2/26/24      Interventions include:      Muscle strengthening exercises  Transfer Training  Education - home safety  Instruction - home exercise program  Therapeutic exercise  Pain assessment & management  Home safety assessment                Therapist: Ceasar Velazquez PT    Please respond with .HOMECAREAGREE, if you are in agreement. Please sign with your comments and signature, if you are not in agreement.

## 2024-02-24 ENCOUNTER — MYC MEDICAL ADVICE (OUTPATIENT)
Dept: FAMILY MEDICINE | Facility: OTHER | Age: 89
End: 2024-02-24
Payer: COMMERCIAL

## 2024-02-25 ENCOUNTER — MYC MEDICAL ADVICE (OUTPATIENT)
Dept: FAMILY MEDICINE | Facility: OTHER | Age: 89
End: 2024-02-25
Payer: COMMERCIAL

## 2024-02-26 NOTE — TELEPHONE ENCOUNTER
"She can stop using tramadol.  Unfortunately any other \"pain medication\" would likely have more side effects than what she is experiencing.  Would recommend to extract Tylenol 3 times daily.  She can also use Aleve up to twice daily with food as needed.  I see that she has an appointment tomorrow and can discuss further options at that time.  "

## 2024-02-26 NOTE — TELEPHONE ENCOUNTER
1/4/24  OV with Love Hager for iron deficiency anemia  2/5/2024  OV with Soular for AAA.  Tramadol ordered.       Darby Camp RN on 2/26/2024 at 8:50 AM

## 2024-02-27 ENCOUNTER — OFFICE VISIT (OUTPATIENT)
Dept: FAMILY MEDICINE | Facility: OTHER | Age: 89
End: 2024-02-27
Attending: NURSE PRACTITIONER
Payer: COMMERCIAL

## 2024-02-27 VITALS
OXYGEN SATURATION: 95 % | BODY MASS INDEX: 22.58 KG/M2 | SYSTOLIC BLOOD PRESSURE: 122 MMHG | RESPIRATION RATE: 20 BRPM | HEART RATE: 56 BPM | TEMPERATURE: 99 F | DIASTOLIC BLOOD PRESSURE: 68 MMHG | WEIGHT: 119.6 LBS | HEIGHT: 61 IN

## 2024-02-27 DIAGNOSIS — D64.9 ANEMIA, UNSPECIFIED TYPE: ICD-10-CM

## 2024-02-27 DIAGNOSIS — M16.11 PRIMARY OSTEOARTHRITIS OF RIGHT HIP: Primary | ICD-10-CM

## 2024-02-27 DIAGNOSIS — I50.22 CHRONIC SYSTOLIC HEART FAILURE (H): ICD-10-CM

## 2024-02-27 DIAGNOSIS — I48.11 LONGSTANDING PERSISTENT ATRIAL FIBRILLATION (H): ICD-10-CM

## 2024-02-27 DIAGNOSIS — K55.20 AVM (ARTERIOVENOUS MALFORMATION) OF COLON: ICD-10-CM

## 2024-02-27 PROCEDURE — G0463 HOSPITAL OUTPT CLINIC VISIT: HCPCS

## 2024-02-27 PROCEDURE — 99214 OFFICE O/P EST MOD 30 MIN: CPT | Performed by: NURSE PRACTITIONER

## 2024-02-27 ASSESSMENT — PAIN SCALES - GENERAL: PAINLEVEL: EXTREME PAIN (8)

## 2024-02-27 NOTE — NURSING NOTE
Patient presents today for follow up on low iron.    Medication Reconciliation Complete    Dunia Kathleen LPN  2/27/2024 2:50 PM

## 2024-02-27 NOTE — PROGRESS NOTES
Assessment & Plan   Problem List Items Addressed This Visit          Digestive    AVM (arteriovenous malformation) of colon       Circulatory    Longstanding persistent atrial fibrillation (H)    Chronic systolic heart failure (H)       Musculoskeletal and Integumentary    Primary osteoarthritis of hip - Primary     Other Visit Diagnoses       Anemia, unspecified type        Relevant Medications    omeprazole (PRILOSEC) 20 MG DR capsule    Other Relevant Orders    Adult Oncology/Hematology  Referral        Last colonoscopy showed known AVM of colon, concerns that she may be having slow blood loss from this although recent Hemoccult was negative.  Continues on Xarelto for atrial fibrillation and stable, chronic systolic heart failure currently stable and managed by primary care provider.    Arthritis of right hip with chronic pain.  Recommend that she take Tylenol arthritis 1 to 2 tablets in the morning every day, if she has pain in the afternoon may take another 1 to 2 tablets in the afternoon and then take tramadol at bedtime.  She is tolerating medication well without side effects.  I suspect that the above regimen will help with her pain and she is not currently treating her pain thoroughly.    For her anemia, it is unclear what is actually causing her anemia.  I recommend increasing ferrous sulfate to 1 tablet twice daily, start omeprazole and referral to hematology was placed to discuss this further.                 No follow-ups on file.      Mickey Zuleta is a 89 year old, presenting for the following health issues:  RECHECK    History of Present Illness       Reason for visit:  Low iron and to discuss pain medication tramadol side effects    She eats 0-1 servings of fruits and vegetables daily.She consumes 0 sweetened beverage(s) daily.She exercises with enough effort to increase her heart rate 9 or less minutes per day.  She exercises with enough effort to increase her heart rate 3 or less  "days per week.   She is taking medications regularly.     She comes in the clinic today with her daughter to discuss her iron deficiency anemia.  She is having chronic right hip pain and wanting to get her right hip surgery completed.  Unfortunately, surgeon will not complete this until her hemoglobin is 11 or greater.  Over the past 3 months her hemoglobins have been anywhere from 9.3-9.8.  3 weeks ago hemoglobin was 9.7.  Transferrin at 259, occult stools were completed January 15 and negative.  Previous iron panel showed iron at 24, iron saturation at 9.  She is currently taking ferrous sulfate 1 tablet daily with vitamin C.  It was recommended that she increase to 2 tablets daily, home care nurse stated she should not be taking that much so she never increase this.  She denies any dark or black stools, no heartburn or reflux.  No constipation or diarrhea.  She is on Xarelto daily.      For pain management she is taking tramadol at bedtime which has been helpful with sleep.  She previously was taking Tylenol arthritis for pain management, stopped when she started the tramadol so she is not taking anything throughout the day.          Review of Systems  See above      Objective    /68   Pulse 56   Temp 99  F (37.2  C)   Resp 20   Ht 1.549 m (5' 1\")   Wt 54.3 kg (119 lb 9.6 oz)   SpO2 95%   BMI 22.60 kg/m    Body mass index is 22.6 kg/m .  Physical Exam   GENERAL: alert and no distress  EYES: Eyes grossly normal to inspection  RESP: lungs clear to auscultation - no rales, rhonchi or wheezes  CV: regular rate and rhythm, normal S1 S2  PSYCH: mentation appears normal, affect normal/bright            Signed Electronically by: PO Bettencourt CNP    "

## 2024-02-29 ENCOUNTER — TELEPHONE (OUTPATIENT)
Dept: FAMILY MEDICINE | Facility: OTHER | Age: 89
End: 2024-02-29
Payer: COMMERCIAL

## 2024-02-29 ENCOUNTER — MEDICAL CORRESPONDENCE (OUTPATIENT)
Dept: HEALTH INFORMATION MANAGEMENT | Facility: OTHER | Age: 89
End: 2024-02-29
Payer: COMMERCIAL

## 2024-02-29 NOTE — TELEPHONE ENCOUNTER
Patient calling in to speak with DMO. She states she has questions about her blood.  Josee Linda on 2/29/2024 at 10:16 AM

## 2024-02-29 NOTE — TELEPHONE ENCOUNTER
"Patient reports that she had a bowel movement yesterday and she noticed blood came out of her rectum after her bowel movement. Patient states,\"that she wears a pad throughout the day and night which she also noticed had a little blood on it today and yesterday\". She has not had much since then but wanted to let us know.    Dunia Kathleen, LPN on 2/29/2024 at 12:04 PM      "

## 2024-02-29 NOTE — TELEPHONE ENCOUNTER
Hematology referral is in place and pending.  May need to consider colonoscopy given rectal bleeding.  We opted not to move forward with this previously because occult stools were negative. I would like to see what hematology recommends. PO Bettencourt CNP on 2/29/2024 at 12:11 PM

## 2024-03-04 ENCOUNTER — PATIENT OUTREACH (OUTPATIENT)
Dept: ONCOLOGY | Facility: OTHER | Age: 89
End: 2024-03-04
Payer: COMMERCIAL

## 2024-03-04 NOTE — PROGRESS NOTES
Oncology/Hematology Care Coordination - Referral Review    Referred by:  Love Hager    Diagnosis:  anemia-Last colonoscopy showed known AVM of colon, concerns that she may be having slow blood loss from this although recent Hemoccult was negative. Continues on Xarelto for atrial fibrillation and stable, chronic systolic heart failure currently stable and managed by primary care provider.     Most recent Imaging:      Most recent Lab:  2/5/24    Surgery/Biopsy:  last colonoscopy 9/2014.    Pathology:  [facility]    Outside Records:      Message was sent to Dr. Briseno on 2/29/24 to see if she wanted repeat colonoscopy prior to being seen.    Clarissa Hester RN on 3/4/2024 at 10:49 AM

## 2024-03-05 ENCOUNTER — TELEPHONE (OUTPATIENT)
Dept: FAMILY MEDICINE | Facility: OTHER | Age: 89
End: 2024-03-05
Payer: COMMERCIAL

## 2024-03-05 DIAGNOSIS — M16.11 PRIMARY OSTEOARTHRITIS OF RIGHT HIP: Primary | ICD-10-CM

## 2024-03-05 NOTE — TELEPHONE ENCOUNTER
Sharath Dinero MD reviewed and completed the following home care or hospice form(s) for Home Care on 2-20-24   This covers the certification period effective 2-20-24 to 4-19-24.  Trina Daniel LPN on 3/5/2024 at 11:35 AM

## 2024-03-12 ENCOUNTER — MEDICAL CORRESPONDENCE (OUTPATIENT)
Dept: HEALTH INFORMATION MANAGEMENT | Facility: OTHER | Age: 89
End: 2024-03-12
Payer: COMMERCIAL

## 2024-03-28 LAB
DAT POLY: NEGATIVE
SPECIMEN EXPIRATION DATE: NORMAL

## 2024-03-29 ENCOUNTER — ONCOLOGY VISIT (OUTPATIENT)
Dept: ONCOLOGY | Facility: OTHER | Age: 89
End: 2024-03-29
Attending: NURSE PRACTITIONER
Payer: MEDICARE

## 2024-03-29 VITALS
HEART RATE: 82 BPM | BODY MASS INDEX: 21.83 KG/M2 | SYSTOLIC BLOOD PRESSURE: 126 MMHG | OXYGEN SATURATION: 96 % | HEIGHT: 61 IN | WEIGHT: 115.6 LBS | TEMPERATURE: 98.2 F | DIASTOLIC BLOOD PRESSURE: 80 MMHG | RESPIRATION RATE: 20 BRPM

## 2024-03-29 DIAGNOSIS — D64.9 ANEMIA, UNSPECIFIED TYPE: ICD-10-CM

## 2024-03-29 LAB
ALBUMIN SERPL BCG-MCNC: 3.7 G/DL (ref 3.5–5.2)
ALP SERPL-CCNC: 106 U/L (ref 40–150)
ALT SERPL W P-5'-P-CCNC: 8 U/L (ref 0–50)
ANION GAP SERPL CALCULATED.3IONS-SCNC: 13 MMOL/L (ref 7–15)
AST SERPL W P-5'-P-CCNC: 17 U/L (ref 0–45)
BASOPHILS # BLD AUTO: 0.1 10E3/UL (ref 0–0.2)
BASOPHILS NFR BLD AUTO: 1 %
BILIRUB SERPL-MCNC: 0.6 MG/DL
BUN SERPL-MCNC: 19.5 MG/DL (ref 8–23)
CALCIUM SERPL-MCNC: 9.5 MG/DL (ref 8.2–9.6)
CHLORIDE SERPL-SCNC: 101 MMOL/L (ref 98–107)
CREAT SERPL-MCNC: 0.72 MG/DL (ref 0.51–0.95)
DEPRECATED HCO3 PLAS-SCNC: 26 MMOL/L (ref 22–29)
EGFRCR SERPLBLD CKD-EPI 2021: 79 ML/MIN/1.73M2
EOSINOPHIL # BLD AUTO: 0 10E3/UL (ref 0–0.7)
EOSINOPHIL NFR BLD AUTO: 1 %
ERYTHROCYTE [DISTWIDTH] IN BLOOD BY AUTOMATED COUNT: 17.2 % (ref 10–15)
FERRITIN SERPL-MCNC: 250 NG/ML (ref 11–328)
FOLATE SERPL-MCNC: 9.1 NG/ML (ref 4.6–34.8)
GLUCOSE SERPL-MCNC: 146 MG/DL (ref 70–99)
HCT VFR BLD AUTO: 33.9 % (ref 35–47)
HGB BLD-MCNC: 10.6 G/DL (ref 11.7–15.7)
IMM GRANULOCYTES # BLD: 0 10E3/UL
IMM GRANULOCYTES NFR BLD: 0 %
IRON BINDING CAPACITY (ROCHE): 306 UG/DL (ref 240–430)
IRON SATN MFR SERPL: 5 % (ref 15–46)
IRON SERPL-MCNC: 16 UG/DL (ref 37–145)
LDH SERPL L TO P-CCNC: 216 U/L (ref 0–250)
LYMPHOCYTES # BLD AUTO: 0.8 10E3/UL (ref 0.8–5.3)
LYMPHOCYTES NFR BLD AUTO: 13 %
MCH RBC QN AUTO: 25.4 PG (ref 26.5–33)
MCHC RBC AUTO-ENTMCNC: 31.3 G/DL (ref 31.5–36.5)
MCV RBC AUTO: 81 FL (ref 78–100)
MONOCYTES # BLD AUTO: 0.4 10E3/UL (ref 0–1.3)
MONOCYTES NFR BLD AUTO: 6 %
NEUTROPHILS # BLD AUTO: 4.6 10E3/UL (ref 1.6–8.3)
NEUTROPHILS NFR BLD AUTO: 79 %
NRBC # BLD AUTO: 0 10E3/UL
NRBC BLD AUTO-RTO: 0 /100
PLATELET # BLD AUTO: 316 10E3/UL (ref 150–450)
POTASSIUM SERPL-SCNC: 3 MMOL/L (ref 3.4–5.3)
PROT SERPL-MCNC: 7 G/DL (ref 6.4–8.3)
RBC # BLD AUTO: 4.17 10E6/UL (ref 3.8–5.2)
RETICS # AUTO: 0.03 10E6/UL (ref 0.03–0.1)
RETICS/RBC NFR AUTO: 0.8 % (ref 0.5–2)
SODIUM SERPL-SCNC: 140 MMOL/L (ref 135–145)
VIT B12 SERPL-MCNC: 363 PG/ML (ref 232–1245)
WBC # BLD AUTO: 5.8 10E3/UL (ref 4–11)

## 2024-03-29 PROCEDURE — 84165 PROTEIN E-PHORESIS SERUM: CPT | Mod: ZL | Performed by: STUDENT IN AN ORGANIZED HEALTH CARE EDUCATION/TRAINING PROGRAM

## 2024-03-29 PROCEDURE — 82728 ASSAY OF FERRITIN: CPT | Mod: ZL | Performed by: INTERNAL MEDICINE

## 2024-03-29 PROCEDURE — 84155 ASSAY OF PROTEIN SERUM: CPT | Mod: ZL | Performed by: INTERNAL MEDICINE

## 2024-03-29 PROCEDURE — 85045 AUTOMATED RETICULOCYTE COUNT: CPT | Mod: ZL | Performed by: INTERNAL MEDICINE

## 2024-03-29 PROCEDURE — 84165 PROTEIN E-PHORESIS SERUM: CPT | Mod: 26 | Performed by: STUDENT IN AN ORGANIZED HEALTH CARE EDUCATION/TRAINING PROGRAM

## 2024-03-29 PROCEDURE — 83550 IRON BINDING TEST: CPT | Mod: ZL | Performed by: INTERNAL MEDICINE

## 2024-03-29 PROCEDURE — 82746 ASSAY OF FOLIC ACID SERUM: CPT | Mod: ZL | Performed by: INTERNAL MEDICINE

## 2024-03-29 PROCEDURE — 86334 IMMUNOFIX E-PHORESIS SERUM: CPT | Mod: ZL | Performed by: STUDENT IN AN ORGANIZED HEALTH CARE EDUCATION/TRAINING PROGRAM

## 2024-03-29 PROCEDURE — G0463 HOSPITAL OUTPT CLINIC VISIT: HCPCS

## 2024-03-29 PROCEDURE — 82607 VITAMIN B-12: CPT | Mod: ZL | Performed by: INTERNAL MEDICINE

## 2024-03-29 PROCEDURE — 86334 IMMUNOFIX E-PHORESIS SERUM: CPT | Mod: 26 | Performed by: STUDENT IN AN ORGANIZED HEALTH CARE EDUCATION/TRAINING PROGRAM

## 2024-03-29 PROCEDURE — 83615 LACTATE (LD) (LDH) ENZYME: CPT | Mod: ZL | Performed by: INTERNAL MEDICINE

## 2024-03-29 PROCEDURE — 83540 ASSAY OF IRON: CPT | Mod: ZL | Performed by: INTERNAL MEDICINE

## 2024-03-29 PROCEDURE — 83521 IG LIGHT CHAINS FREE EACH: CPT | Mod: ZL | Performed by: INTERNAL MEDICINE

## 2024-03-29 PROCEDURE — 83010 ASSAY OF HAPTOGLOBIN QUANT: CPT | Mod: ZL | Performed by: INTERNAL MEDICINE

## 2024-03-29 PROCEDURE — 85004 AUTOMATED DIFF WBC COUNT: CPT | Mod: ZL | Performed by: INTERNAL MEDICINE

## 2024-03-29 PROCEDURE — 99205 OFFICE O/P NEW HI 60 MIN: CPT | Performed by: INTERNAL MEDICINE

## 2024-03-29 PROCEDURE — 36415 COLL VENOUS BLD VENIPUNCTURE: CPT | Mod: ZL | Performed by: INTERNAL MEDICINE

## 2024-03-29 PROCEDURE — 80053 COMPREHEN METABOLIC PANEL: CPT | Mod: ZL | Performed by: INTERNAL MEDICINE

## 2024-03-29 PROCEDURE — 86880 COOMBS TEST DIRECT: CPT | Performed by: INTERNAL MEDICINE

## 2024-03-29 ASSESSMENT — PAIN SCALES - GENERAL: PAINLEVEL: SEVERE PAIN (6)

## 2024-03-29 NOTE — NURSING NOTE
"Oncology Rooming Note    March 29, 2024 2:53 PM   Merlyn Escamilla is a 89 year old female who presents for:    Chief Complaint   Patient presents with    Oncology Clinic Visit     Consult, anemia unspecified      Initial Vitals: /80   Pulse 82   Temp 98.2  F (36.8  C) (Tympanic)   Resp 20   Ht 1.549 m (5' 1\")   Wt 52.4 kg (115 lb 9.6 oz)   SpO2 96%   BMI 21.84 kg/m   Estimated body mass index is 21.84 kg/m  as calculated from the following:    Height as of this encounter: 1.549 m (5' 1\").    Weight as of this encounter: 52.4 kg (115 lb 9.6 oz). Body surface area is 1.5 meters squared.  Severe Pain (6) Comment: Data Unavailable   No LMP recorded. Patient has had a hysterectomy.  Allergies reviewed: Yes  Medications reviewed: Yes    Medications: MEDICATION REFILLS NEEDED TODAY. Provider was notified.  Pharmacy name entered into [employer]: [facility] - [place], [place] - [address]    Frailty Screening:   Is the patient here for a new oncology consult visit in cancer care? 2. No      Clinical concerns: none  .       Elaine Sinha, GUILHERME              "

## 2024-03-30 LAB — TOTAL PROTEIN SERUM FOR ELP: 6.3 G/DL (ref 6.4–8.3)

## 2024-04-01 LAB
ALBUMIN SERPL ELPH-MCNC: 3.3 G/DL (ref 3.7–5.1)
ALPHA1 GLOB SERPL ELPH-MCNC: 0.6 G/DL (ref 0.2–0.4)
ALPHA2 GLOB SERPL ELPH-MCNC: 0.9 G/DL (ref 0.5–0.9)
B-GLOBULIN SERPL ELPH-MCNC: 0.8 G/DL (ref 0.6–1)
GAMMA GLOB SERPL ELPH-MCNC: 0.8 G/DL (ref 0.7–1.6)
KAPPA LC FREE SER-MCNC: 133.92 MG/DL (ref 0.33–1.94)
KAPPA LC FREE/LAMBDA FREE SER NEPH: 56.99 {RATIO} (ref 0.26–1.65)
LAMBDA LC FREE SERPL-MCNC: 2.35 MG/DL (ref 0.57–2.63)
M PROTEIN SERPL ELPH-MCNC: 0.2 G/DL
PROT PATTERN SERPL ELPH-IMP: ABNORMAL
PROT PATTERN SERPL IFE-IMP: NORMAL

## 2024-04-03 NOTE — PROGRESS NOTES
HEMATOLOGY CONSULT NOTE  Mar 29, 2024    Reason for consult: Anemia    HISTORY OF PRESENT ILLNESS  Merlyn Escamilla is a 89 year old female with PMH as stated below who is seen in the hematology clinic.    Her history in short is as follows:    Review of labs show a hemoglobin of 9.8 with an MCV of 84 on 11/16/2023.  Prior to that was found to have a hemoglobin of 11.6 with an MCV of 90 on 11/15/2022.    She overall has been in good health.  Has a history of right hip pain and groin pain.  She has been recommended a hip replacement however states her hemoglobin was found to be too low for surgery.    She had a colonoscopy done in 2014 at that time was found to have colonic AVMs.  She denies any blood in her stools or blood in her urine.  She did have 1 episode of blood in the toilet bowl in November 2023.  No repeat episodes following that.    She does state that her energy level and appetite are not what they were previously but she has not noticed a drastic change rather a slow decline.  Denies any significant weight loss.  She denies any chest pain, shortness of breath or dizziness or worsening fatigue.    REVIEW OF SYSTEMS  A 12-point ROS negative except as in HPI      Current Outpatient Medications   Medication Sig Dispense Refill    digoxin (LANOXIN) 125 MCG tablet Take 1 tablet (125 mcg) by mouth daily 90 tablet 0    ferrous sulfate (FEROSUL) 325 (65 Fe) MG tablet Take 325 mg by mouth daily (with breakfast) Patient unsure how many mg she is taking.      furosemide (LASIX) 20 MG tablet Take 2 tablets (40 mg) by mouth daily 180 tablet 3    lisinopril (ZESTRIL) 5 MG tablet Take 1 tablet (5 mg) by mouth daily 90 tablet 0    metoprolol succinate ER (TOPROL XL) 100 MG 24 hr tablet TAKE 1 TABLET BY MOUTH AT BEDTIME WITH 25 MG TABLET. TOTAL DAILY DOSE = 125 MG. 90 tablet 0    metoprolol succinate ER (TOPROL XL) 25 MG 24 hr tablet TAKE 1 TABLET BY MOUTH AT BEDTIME WITH 100 MG TABLET. TOTAL DAILY DOSE = 125 MG. 90 tablet  0    omeprazole (PRILOSEC) 20 MG DR capsule Take 1 capsule (20 mg) by mouth daily 90 capsule 1    rivaroxaban ANTICOAGULANT (XARELTO ANTICOAGULANT) 15 MG TABS tablet Take 1 tablet (15 mg) by mouth daily (with dinner) 90 tablet 3    traMADol (ULTRAM) 50 MG tablet Take 0.5-1 tablets (25-50 mg) by mouth every 6 hours as needed for severe pain 60 tablet 5    vitamin D3 (CHOLECALCIFEROL) 50 mcg (2000 units) tablet Take 1 tablet by mouth daily Patient unsure of how many units.         Allergies   Allergen Reactions    Morphine      Other reaction(s): Muscle Weakness    Phenylbutazone Unknown     Immunization History   Administered Date(s) Administered    COVID-19 Bivalent 12+ (Pfizer) 02/13/2023    COVID-19 MONOVALENT 12+ (Pfizer) 02/26/2021, 03/19/2021, 11/22/2021    Influenza (High Dose) 3 valent vaccine 10/03/2014    Pneumo Conj 13-V (2010&after) 09/21/2017    Pneumococcal 23 valent 11/05/2018    TD,PF 7+ (Tenivac) 07/13/2000    Td (Adult), Adsorbed 07/13/2000       Past Medical History:   Diagnosis Date    Carpal tunnel syndrome of left wrist     10/4/2017    Carpal tunnel syndrome of right wrist     10/4/2017    Deep phlebothrombosis during pregnancy     No Comments Provided    Osteoarthritis     Knee    Other specified postprocedural states     10/4/2017    Other specified postprocedural states     1/31/2018    Presence of artificial knee joint     10/3/2014       Past Surgical History:   Procedure Laterality Date    APPENDECTOMY OPEN      No Comments Provided    CARPAL TUNNEL RELEASE RT/LT Left 01/31/2018 01/31/2018,[id number],OTHER,Left    CARPAL TUNNEL RELEASE RT/LT Right 10/04/2017    COLONOSCOPY  09/2014    Arteriovenous malformations of right, transverse and splenic flexure    ENDOSCOPIC STRIPPING VEIN(S)      No Comments Provided    HYSTERECTOMY TOTAL ABDOMINAL  06/1987    with squamous metaplasia and leiomoma    ZZC TOTAL KNEE ARTHROPLASTY Right 10/01/2014    Dr Domingo       SOCIAL HISTORY  History   Smoking  "Status    Never   Smokeless Tobacco    Never    Social History    Substance and Sexual Activity      Alcohol use: Yes        Alcohol/week: 5.0 standard drinks of alcohol        Comment: slick daily     History   Drug Use No     Comment: Drug use: No       FAMILY HISTORY  Family History   Problem Relation Age of Onset    Heart Disease Mother         Heart Disease    Heart Disease Father         Heart Disease    Heart Disease Brother     Heart Disease Brother     Heart Disease Brother     Heart Disease Brother     Brain Cancer Sister     Heart Disease Sister        PHYSICAL EXAMINATION  /80   Pulse 82   Temp 98.2  F (36.8  C) (Tympanic)   Resp 20   Ht 1.549 m (5' 1\")   Wt 52.4 kg (115 lb 9.6 oz)   SpO2 96%   BMI 21.84 kg/m    Wt Readings from Last 2 Encounters:   03/29/24 52.4 kg (115 lb 9.6 oz)   02/27/24 54.3 kg (119 lb 9.6 oz)     Physical Exam  Constitutional:       Appearance: Normal appearance.   Pulmonary:      Effort: Pulmonary effort is normal.   Neurological:      General: No focal deficit present.      Mental Status: She is alert and oriented to person, place, and time.   Psychiatric:         Mood and Affect: Mood normal.         Behavior: Behavior normal.         ASSESSMENT AND PLAN    1.  Anemia:    Normocytic anemia.  Appears to have developed this anemia in November 2023.  Her hemoglobin was 9.8 on 11/16/2023.  Her hemoglobin has fluctuated in that range.  More recently it was 9.7 on 2/5/2024.  She denies any bleeding other than 1 episode in November 2023 when she had blood in the toilet bowl.  Has a colonoscopy last in 2014 when she was found to have colonic AVMs.  Denies any dark-colored stools currently.    Other workup done shows a ferritin of 324 on 1/4/2024 and an iron saturation of 9.  Therefore she is not really iron deficient.  Discussed today that I would recommend doing a basic workup and checking repeat iron panel, vitamin B12, folic acid, LDH, haptoglobin, reticulocyte count " and JESSICA.  Will check SPEP with SHERIF and kappa lambda light chain.  Will also send a peripheral smear.    We discussed the possibility that this could be bleeding therefore could recommend colonoscopy however will wait for results.  I also discussed that she may need a bone marrow down the line if the testing is nondiagnostic or if there are any concerns on the test ordered.  She is agreeable.    Total time spent on the patient on day of encounter was 60 minutes doing chart review, review of test results, interpretation of results, patient visit and documentation.       Pretty Briseno MD

## 2024-04-04 LAB
HAPTOGLOB SERPL-MCNC: 219 MG/DL (ref 30–200)
PATH REPORT.FINAL DX SPEC: NORMAL

## 2024-04-05 ENCOUNTER — PATIENT OUTREACH (OUTPATIENT)
Dept: ONCOLOGY | Facility: OTHER | Age: 89
End: 2024-04-05
Payer: COMMERCIAL

## 2024-04-05 ENCOUNTER — DOCUMENTATION ONLY (OUTPATIENT)
Dept: ONCOLOGY | Facility: OTHER | Age: 89
End: 2024-04-05

## 2024-04-05 DIAGNOSIS — R76.8 ELEVATED SERUM IMMUNOGLOBULIN FREE LIGHT CHAINS: Primary | ICD-10-CM

## 2024-04-05 NOTE — PROGRESS NOTES
Called patient and informed her that BMB is scheduled 4/11/24 and she will need pre op which someone will be calling her to schedule. She states understanding.  Clarissa Hester RN...........4/5/2024 1:02 PM

## 2024-04-05 NOTE — PROGRESS NOTES
Review of labs show elevated kappa lambda light chain ratio at 56.99.  She was also found to have a monoclonal protein of 0.2 on electrophoresis.  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  In light of the anemia this is concerning for underlying myeloma.  Discussed I would recommend a bone marrow biopsy.  She is agreeable.  Will arrange

## 2024-04-08 ENCOUNTER — DOCUMENTATION ONLY (OUTPATIENT)
Dept: ONCOLOGY | Facility: OTHER | Age: 89
End: 2024-04-08
Payer: COMMERCIAL

## 2024-04-08 DIAGNOSIS — D64.9 ANEMIA, UNSPECIFIED TYPE: Primary | ICD-10-CM

## 2024-04-08 NOTE — PROGRESS NOTES
Patient coming for lab only on 4/11. Note says [facility] labs. Please place orders.  Thanks, lab

## 2024-04-09 ENCOUNTER — PATIENT OUTREACH (OUTPATIENT)
Dept: ONCOLOGY | Facility: OTHER | Age: 89
End: 2024-04-09

## 2024-04-09 ENCOUNTER — OFFICE VISIT (OUTPATIENT)
Dept: FAMILY MEDICINE | Facility: OTHER | Age: 89
End: 2024-04-09
Attending: NURSE PRACTITIONER
Payer: COMMERCIAL

## 2024-04-09 VITALS
TEMPERATURE: 98.9 F | DIASTOLIC BLOOD PRESSURE: 66 MMHG | OXYGEN SATURATION: 99 % | WEIGHT: 116 LBS | HEIGHT: 61 IN | RESPIRATION RATE: 16 BRPM | HEART RATE: 58 BPM | BODY MASS INDEX: 21.9 KG/M2 | SYSTOLIC BLOOD PRESSURE: 124 MMHG

## 2024-04-09 DIAGNOSIS — I51.7 ATRIAL ENLARGEMENT, BILATERAL: ICD-10-CM

## 2024-04-09 DIAGNOSIS — Z01.818 PREOP GENERAL PHYSICAL EXAM: Primary | ICD-10-CM

## 2024-04-09 DIAGNOSIS — I48.11 LONGSTANDING PERSISTENT ATRIAL FIBRILLATION (H): ICD-10-CM

## 2024-04-09 DIAGNOSIS — D64.9 ANEMIA, UNSPECIFIED TYPE: ICD-10-CM

## 2024-04-09 DIAGNOSIS — I50.22 CHRONIC SYSTOLIC HEART FAILURE (H): ICD-10-CM

## 2024-04-09 LAB
ANION GAP SERPL CALCULATED.3IONS-SCNC: 10 MMOL/L (ref 7–15)
BASOPHILS # BLD AUTO: 0.1 10E3/UL (ref 0–0.2)
BASOPHILS NFR BLD AUTO: 1 %
BUN SERPL-MCNC: 17.1 MG/DL (ref 8–23)
CALCIUM SERPL-MCNC: 9.1 MG/DL (ref 8.2–9.6)
CHLORIDE SERPL-SCNC: 105 MMOL/L (ref 98–107)
CREAT SERPL-MCNC: 0.7 MG/DL (ref 0.51–0.95)
DEPRECATED HCO3 PLAS-SCNC: 24 MMOL/L (ref 22–29)
EGFRCR SERPLBLD CKD-EPI 2021: 82 ML/MIN/1.73M2
EOSINOPHIL # BLD AUTO: 0.1 10E3/UL (ref 0–0.7)
EOSINOPHIL NFR BLD AUTO: 1 %
ERYTHROCYTE [DISTWIDTH] IN BLOOD BY AUTOMATED COUNT: 16.8 % (ref 10–15)
GLUCOSE SERPL-MCNC: 92 MG/DL (ref 70–99)
HCT VFR BLD AUTO: 31.1 % (ref 35–47)
HGB BLD-MCNC: 9.5 G/DL (ref 11.7–15.7)
IMM GRANULOCYTES # BLD: 0 10E3/UL
IMM GRANULOCYTES NFR BLD: 0 %
LYMPHOCYTES # BLD AUTO: 0.5 10E3/UL (ref 0.8–5.3)
LYMPHOCYTES NFR BLD AUTO: 12 %
MCH RBC QN AUTO: 25.3 PG (ref 26.5–33)
MCHC RBC AUTO-ENTMCNC: 30.5 G/DL (ref 31.5–36.5)
MCV RBC AUTO: 83 FL (ref 78–100)
MONOCYTES # BLD AUTO: 0.5 10E3/UL (ref 0–1.3)
MONOCYTES NFR BLD AUTO: 12 %
NEUTROPHILS # BLD AUTO: 3.1 10E3/UL (ref 1.6–8.3)
NEUTROPHILS NFR BLD AUTO: 74 %
NRBC # BLD AUTO: 0 10E3/UL
NRBC BLD AUTO-RTO: 0 /100
PLATELET # BLD AUTO: 259 10E3/UL (ref 150–450)
POTASSIUM SERPL-SCNC: 3.9 MMOL/L (ref 3.4–5.3)
RBC # BLD AUTO: 3.75 10E6/UL (ref 3.8–5.2)
SODIUM SERPL-SCNC: 139 MMOL/L (ref 135–145)
WBC # BLD AUTO: 4.2 10E3/UL (ref 4–11)

## 2024-04-09 PROCEDURE — 36415 COLL VENOUS BLD VENIPUNCTURE: CPT | Mod: ZL | Performed by: NURSE PRACTITIONER

## 2024-04-09 PROCEDURE — 80048 BASIC METABOLIC PNL TOTAL CA: CPT | Mod: ZL | Performed by: NURSE PRACTITIONER

## 2024-04-09 PROCEDURE — 85025 COMPLETE CBC W/AUTO DIFF WBC: CPT | Mod: ZL | Performed by: NURSE PRACTITIONER

## 2024-04-09 PROCEDURE — 99214 OFFICE O/P EST MOD 30 MIN: CPT | Performed by: NURSE PRACTITIONER

## 2024-04-09 PROCEDURE — G0463 HOSPITAL OUTPT CLINIC VISIT: HCPCS

## 2024-04-09 ASSESSMENT — PAIN SCALES - GENERAL: PAINLEVEL: SEVERE PAIN (7)

## 2024-04-09 NOTE — NURSING NOTE
"Chief Complaint   Patient presents with    Pre-Op Exam       Initial /66   Pulse 58   Temp 98.9  F (37.2  C) (Tympanic)   Resp 16   Ht 1.549 m (5' 1\")   Wt 52.6 kg (116 lb)   LMP 06/01/1987 (Approximate)   SpO2 99%   Breastfeeding No   BMI 21.92 kg/m   Estimated body mass index is 21.92 kg/m  as calculated from the following:    Height as of this encounter: 1.549 m (5' 1\").    Weight as of this encounter: 52.6 kg (116 lb).  Medication Review: complete    The next two questions are to help us understand your food security.  If you are feeling you need any assistance in this area, we have resources available to support you today.          1/2/2024   SDOH- Food Insecurity   Within the past 12 months, did you worry that your food would run out before you got money to buy more? N   Within the past 12 months, did the food you bought just not last and you didn t have money to get more? N         Health Care Directive:  Patient has a Health Care Directive on file      Rody Abdul CMA      "

## 2024-04-09 NOTE — Clinical Note
Surgery/Procedure: Bone marrow biopsy  Surgery Location: [facility]  Surgeon: Dr. Price  Surgery Date: 4/11/24 Time of Surgery: TBD Where patient plans to recover: At home with family Fax number for surgical facility: Note does not need to be faxed, will be available electronically in Epic.

## 2024-04-09 NOTE — PATIENT INSTRUCTIONS
Preparing for Your Surgery  Getting started  A nurse will call you to review your health history and instructions. They will give you an arrival time based on your scheduled surgery time. Please be ready to share:  Your doctor's clinic name and phone number  Your medical, surgical, and anesthesia history  A list of allergies and sensitivities  A list of medicines, including herbal treatments and over-the-counter drugs  Whether the patient has a legal guardian (ask how to send us the papers in advance)  Please tell us if you're pregnant--or if there's any chance you might be pregnant. Some surgeries may injure a fetus (unborn baby), so they require a pregnancy test. Surgeries that are safe for a fetus don't always need a test, and you can choose whether to have one.   If you have a child who's having surgery, please ask for a copy of Preparing for Your Child's Surgery.    Preparing for surgery  Within 10 to 30 days of surgery: Have a pre-op exam (sometimes called an H&P, or History and Physical). This can be done at a clinic or pre-operative center.  If you're having a , you may not need this exam. Talk to your care team.  At your pre-op exam, talk to your care team about all medicines you take. If you need to stop any medicines before surgery, ask when to start taking them again.  We do this for your safety. Many medicines can make you bleed too much during surgery. Some change how well surgery (anesthesia) drugs work.  Call your insurance company to let them know you're having surgery. (If you don't have insurance, call [phone].)  Call your clinic if there's any change in your health. This includes signs of a cold or flu (sore throat, runny nose, cough, rash, fever). It also includes a scrape or scratch near the surgery site.  If you have questions on the day of surgery, call your hospital or surgery center.  Eating and drinking guidelines  For your safety: Unless your surgeon tells you otherwise,  follow the guidelines below.  Eat and drink as usual until 8 hours before you arrive for surgery. After that, no food or milk.  Drink clear liquids until 2 hours before you arrive. These are liquids you can see through, like water, Gatorade, and Propel Water. They also include plain black coffee and tea (no cream or milk), candy, and breath mints. You can spit out gum when you arrive.  If you drink alcohol: Stop drinking it the night before surgery.  If your care team tells you to take medicine on the morning of surgery, it's okay to take it with a sip of water.  Preventing infection  Shower or bathe the night before and morning of your surgery. Follow the instructions your clinic gave you. (If no instructions, use regular soap.)  Don't shave or clip hair near your surgery site. We'll remove the hair if needed.  Don't smoke or vape the morning of surgery. You may chew nicotine gum up to 2 hours before surgery. A nicotine patch is okay.  Note: Some surgeries require you to completely quit smoking and nicotine. Check with your surgeon.  Your care team will make every effort to keep you safe from infection. We will:  Clean our hands often with soap and water (or an alcohol-based hand rub).  Clean the skin at your surgery site with a special soap that kills germs.  Give you a special gown to keep you warm. (Cold raises the risk of infection.)  Wear special hair covers, masks, gowns and gloves during surgery.  Give antibiotic medicine, if prescribed. Not all surgeries need antibiotics.  What to bring on the day of surgery  Photo ID and insurance card  Copy of your health care directive, if you have one  Glasses and hearing aids (bring cases)  You can't wear contacts during surgery  Inhaler and eye drops, if you use them (tell us about these when you arrive)  CPAP machine or breathing device, if you use them  A few personal items, if spending the night  If you have . . .  A pacemaker, ICD (cardiac defibrillator) or other  implant: Bring the ID card.  An implanted stimulator: Bring the remote control.  A legal guardian: Bring a copy of the certified (court-stamped) guardianship papers.  Please remove any jewelry, including body piercings. Leave jewelry and other valuables at home.  If you're going home the day of surgery  You must have a responsible adult drive you home. They should stay with you overnight as well.  If you don't have someone to stay with you, and you aren't safe to go home alone, we may keep you overnight. Insurance often won't pay for this.  After surgery  If it's hard to control your pain or you need more pain medicine, please call your surgeon's office.  Questions?   If you have any questions for your care team, list them here: _________________________________________________________________________________________________________________________________________________________________________ ____________________________________ ____________________________________ ____________________________________  For informational purposes only. Not to replace the advice of your health care provider. Copyright   2003, 2019 [facility] [employer] [facility]. All rights reserved. Clinically reviewed by Charity Cole MD. [employer] 899316 - REV 12/22.    How to Take Your Medication Before Surgery  - Take all of your medications before surgery except as noted below  - HOLD (do not take) your LASIX (FUROSEMIDE) on the morning of surgery.   - HOLD (do not take) Aspirin for 7 days  - HOLD (do not take) LISINOPRIL (hold for 12 hours prior to surgery), Do not take XARELTO (blood thinner) on Wednesday or on Thursday.   - STOP taking all vitamins and herbal supplements 14 days before surgery.

## 2024-04-09 NOTE — PROGRESS NOTES
Preoperative Evaluation  [facility]  [address]  [place] [address]  Phone: [phone]  Fax: [phone]  Primary Provider: Sharath Dinero  Pre-op Performing Provider: SARAH SIMONS  Apr 9, 2024       Merlyn is a 89 year old, presenting for the following:  Pre-Op Exam        4/9/2024     9:30 AM   Additional Questions   Roomed by ALETHEA Fine   Accompanied by Daughter     Surgical Information  Surgery/Procedure: Bone marrow biopsy   Surgery Location: [facility]   Surgeon: Dr. Price   Surgery Date: 4/11/24  Time of Surgery: TBD  Where patient plans to recover: At home with family  Fax number for surgical facility: Note does not need to be faxed, will be available electronically in Epic.    Assessment & Plan     The proposed surgical procedure is considered LOW risk.    Problem List Items Addressed This Visit       Longstanding persistent atrial fibrillation (H)    Chronic systolic heart failure (H)    Atrial enlargement, bilateral     Other Visit Diagnoses       Preop general physical exam    -  Primary    Relevant Orders    Basic Metabolic Panel (Completed)    Anemia, unspecified type              1. Preop general physical exam  2. Anemia, unspecified type  - CBC with Platelets & Differential  Normal BMP; stable hemoglobin but anemic at 9.5    3. Longstanding persistent atrial fibrillation (H)  4. Chronic systolic heart failure (H)  5. Atrial enlargement, bilateral  -furosemide  -lisinopril   -metoprolol   -xarelto   -digoxin  Vitals today are stable; cardiac condition is unchanged. No symptoms are present. Last echocardiogram from December of 2023 showed ejection fraction 50-55% (borderline), moderate mitral and tricuspid insufficiency present. Compared to the prior study from 2022 LV function is slightly reduced.      - No identified additional risk factors other than previously addressed    Antiplatelet or Anticoagulation Medication Instructions   - apixaban (Eliquis),  edoxaban (Savaysa), rivaroxaban (Xarelto): Bleeding risk is moderate or high for this procedure AND CrCl  (>=) 50 mL/min. HOLD 2 days before surgery.     Additional Medication Instructions   - ACE/ARB: HOLD on day of surgery (minimum 11 hours for general anesthesia).   - Beta Blockers: Continue taking on the day of surgery.   - Diuretics: May continue due to heart failure.    Recommendation  APPROVAL GIVEN to proceed with proposed procedure, without further diagnostic evaluation.    Ordering of each unique test  -bmp and cbc   NORMAL bmp today; hemoglobin is low at 9.5 but stable.       Subjective     HPI related to upcoming procedure: Merlyn tells me she has seen Dr. Briseno for some anemia and now will be undergoing a bone marrow biopsy. The reason for this, in her opinion is because they need to see if there is something more going on before she attempts a total hip replacement which she strongly desires. She has been told needs to hold off until we get more information about anemia. No associated symptoms with the anemia. No shortness of breath, lightheadedness, dizziness, etc.  No recent illness or fevers. She feels well today. She is here with her daughter.             4/6/2024     9:35 AM   Preop Questions   1. Have you ever had a heart attack or stroke? No   2. Have you ever had surgery on your heart or blood vessels, such as a stent placement, a coronary artery bypass, or surgery on an artery in your head, neck, heart, or legs? No   3. Do you have chest pain with activity? No   4. Do you have a history of  heart failure? Chronic CHF   5. Do you currently have a cold, bronchitis or symptoms of other infection? No   6. Do you have a cough, shortness of breath, or wheezing? No   7. Do you or anyone in your family have previous history of blood clots? YES - 65 years ago DVT after childbirth    8. Do you or does anyone in your family have a serious bleeding problem such as prolonged bleeding following surgeries or  cuts? No   9. Have you ever had problems with anemia or been told to take iron pills? YES - currently anemia; indication for bone marrow biopsy    10. Have you had any abnormal blood loss such as black, tarry or bloody stools, or abnormal vaginal bleeding? No   11. Have you ever had a blood transfusion? No   12. Are you willing to have a blood transfusion if it is medically needed before, during, or after your surgery? Yes   13. Have you or any of your relatives ever had problems with anesthesia? No   14. Do you have sleep apnea, excessive snoring or daytime drowsiness? No   14a. Do you have a CPAP machine? No   15. Do you have any artifical heart valves or other implanted medical devices like a pacemaker, defibrillator, or continuous glucose monitor? No   16. Do you have artificial joints? YES - left hip and right knee    17. Are you allergic to latex? No       Health Care Directive  Patient has a Health Care Directive on file    Preoperative Review of    reviewed - controlled substances reflected in medication list.   Ultram - on PDMP         Patient Active Problem List    Diagnosis Date Noted    Encounter for monitoring digoxin therapy 11/15/2022     Priority: Medium    Atrial enlargement, bilateral 11/09/2021     Priority: Medium    Nodule of right lung 09/30/2021     Priority: Medium    Moderate tricuspid regurgitation 09/30/2021     Priority: Medium    Chronic systolic heart failure (H) 09/26/2018     Priority: Medium    Aneurysm of ascending aorta without rupture (H24) 08/20/2018     Priority: Medium    Thoracic aortic aneurysm without rupture (H24) 08/09/2018     Priority: Medium    Slow transit constipation 07/31/2018     Priority: Medium    Renal cyst, right 07/31/2018     Priority: Medium     Large, 9.5cm.  Noted incidentally on CT scan.  Urology recommends no further follow up.      Longstanding persistent atrial fibrillation (H) 07/30/2018     Priority: Medium    Primary osteoarthritis of both  first carpometacarpal joints 05/22/2018     Priority: Medium    Primary osteoarthritis of both hands 05/22/2018     Priority: Medium    History of colonic polyps 01/25/2018     Priority: Medium    Primary osteoarthritis of hip 01/25/2018     Priority: Medium     Overview:   Knee      History of carpal tunnel surgery of left wrist 10/04/2017     Priority: Medium    S/P total knee arthroplasty 10/03/2014     Priority: Medium    AVM (arteriovenous malformation) of colon 10/01/2014     Priority: Medium      Past Medical History:   Diagnosis Date    Carpal tunnel syndrome of left wrist     10/4/2017    Carpal tunnel syndrome of right wrist     10/4/2017    Deep phlebothrombosis during pregnancy     No Comments Provided    Osteoarthritis     Knee    Other specified postprocedural states     10/4/2017    Other specified postprocedural states     1/31/2018    Presence of artificial knee joint     10/3/2014     Past Surgical History:   Procedure Laterality Date    APPENDECTOMY OPEN      No Comments Provided    CARPAL TUNNEL RELEASE RT/LT Left 01/31/2018 01/31/2018,[id number],OTHER,Left    CARPAL TUNNEL RELEASE RT/LT Right 10/04/2017    COLONOSCOPY  09/2014    Arteriovenous malformations of right, transverse and splenic flexure    ENDOSCOPIC STRIPPING VEIN(S)      No Comments Provided    HYSTERECTOMY TOTAL ABDOMINAL  06/1987    with squamous metaplasia and leiomoma    ZZC TOTAL KNEE ARTHROPLASTY Right 10/01/2014    Dr Domingo     Current Outpatient Medications   Medication Sig Dispense Refill    digoxin (LANOXIN) 125 MCG tablet Take 1 tablet (125 mcg) by mouth daily 90 tablet 0    ferrous sulfate (FEROSUL) 325 (65 Fe) MG tablet Take 325 mg by mouth daily (with breakfast) Patient unsure how many mg she is taking.      furosemide (LASIX) 20 MG tablet Take 2 tablets (40 mg) by mouth daily 180 tablet 3    lisinopril (ZESTRIL) 5 MG tablet Take 1 tablet (5 mg) by mouth daily 90 tablet 0    metoprolol succinate ER (TOPROL XL) 100 MG  "24 hr tablet TAKE 1 TABLET BY MOUTH AT BEDTIME WITH 25 MG TABLET. TOTAL DAILY DOSE = 125 MG. 90 tablet 0    metoprolol succinate ER (TOPROL XL) 25 MG 24 hr tablet TAKE 1 TABLET BY MOUTH AT BEDTIME WITH 100 MG TABLET. TOTAL DAILY DOSE = 125 MG. 90 tablet 0    omeprazole (PRILOSEC) 20 MG DR capsule Take 1 capsule (20 mg) by mouth daily 90 capsule 1    rivaroxaban ANTICOAGULANT (XARELTO ANTICOAGULANT) 15 MG TABS tablet Take 1 tablet (15 mg) by mouth daily (with dinner) 90 tablet 3    traMADol (ULTRAM) 50 MG tablet Take 0.5-1 tablets (25-50 mg) by mouth every 6 hours as needed for severe pain 60 tablet 5    vitamin D3 (CHOLECALCIFEROL) 50 mcg (2000 units) tablet Take 1 tablet by mouth daily Patient unsure of how many units.         Allergies   Allergen Reactions    Morphine      Other reaction(s): Muscle Weakness    Phenylbutazone Unknown        Social History     Tobacco Use    Smoking status: Never     Passive exposure: Never    Smokeless tobacco: Never   Substance Use Topics    Alcohol use: Yes     Alcohol/week: 5.0 standard drinks of alcohol     Comment: slick daily       History   Drug Use No     Comment: Drug use: No         Review of Systems    Review of Systems  Constitutional, neuro, ENT, endocrine, pulmonary, cardiac, gastrointestinal, genitourinary, musculoskeletal, integument and psychiatric systems are negative, except as otherwise noted.    Objective    /66   Pulse 58   Temp 98.9  F (37.2  C) (Tympanic)   Resp 16   Ht 1.549 m (5' 1\")   Wt 52.6 kg (116 lb)   LMP 06/01/1987 (Approximate)   SpO2 99%   Breastfeeding No   BMI 21.92 kg/m     Estimated body mass index is 21.92 kg/m  as calculated from the following:    Height as of this encounter: 1.549 m (5' 1\").    Weight as of this encounter: 52.6 kg (116 lb).  Physical Exam  Vitals and nursing note reviewed.   Constitutional:       General: She is not in acute distress.     Appearance: Normal appearance. She is not toxic-appearing.   HENT:     "  Mouth/Throat:      Mouth: Mucous membranes are moist.   Eyes:      Extraocular Movements: Extraocular movements intact.      Pupils: Pupils are equal, round, and reactive to light.   Cardiovascular:      Rate and Rhythm: Normal rate and regular rhythm.      Heart sounds: Normal heart sounds. No murmur heard.     No gallop.   Pulmonary:      Effort: Pulmonary effort is normal. No respiratory distress.      Breath sounds: Normal breath sounds. No wheezing.   Abdominal:      General: Abdomen is flat. Bowel sounds are normal. There is no distension.      Palpations: Abdomen is soft.      Tenderness: There is no abdominal tenderness.   Musculoskeletal:         General: Normal range of motion.      Cervical back: Normal range of motion.      Right lower leg: No edema.      Left lower leg: No edema.   Skin:     General: Skin is warm and dry.      Coloration: Skin is not jaundiced or pale.      Findings: No bruising or rash.   Neurological:      General: No focal deficit present.      Mental Status: She is alert and oriented to person, place, and time.   Psychiatric:         Mood and Affect: Mood normal.       Recent Labs   Lab Test 03/29/24  1524 02/05/24  0918 01/04/24  1328 12/19/23  1146 12/06/23  1346 11/16/23  1533 11/15/22  1432   HGB 10.6* 9.7*   < > 9.3*   < > 9.8* 11.6*    264   < > 307   < > 292 214     --   --  139   < > 140 140   POTASSIUM 3.0*  --   --  4.2   < > 4.3 4.4   CR 0.72  --   --  0.72   < > 0.74 0.73   A1C  --   --   --   --   --  6.1 5.9    < > = values in this interval not displayed.        Diagnostics  Recent Results (from the past 24 hour(s))   Basic Metabolic Panel    Collection Time: 04/09/24 10:14 AM   Result Value Ref Range    Sodium 139 135 - 145 mmol/L    Potassium 3.9 3.4 - 5.3 mmol/L    Chloride 105 98 - 107 mmol/L    Carbon Dioxide (CO2) 24 22 - 29 mmol/L    Anion Gap 10 7 - 15 mmol/L    Urea Nitrogen 17.1 8.0 - 23.0 mg/dL    Creatinine 0.70 0.51 - 0.95 mg/dL    GFR  Estimate 82 >60 mL/min/1.73m2    Calcium 9.1 8.2 - 9.6 mg/dL    Glucose 92 70 - 99 mg/dL   CBC with platelets and differential    Collection Time: 04/09/24 10:14 AM   Result Value Ref Range    WBC Count 4.2 4.0 - 11.0 10e3/uL    RBC Count 3.75 (L) 3.80 - 5.20 10e6/uL    Hemoglobin 9.5 (L) 11.7 - 15.7 g/dL    Hematocrit 31.1 (L) 35.0 - 47.0 %    MCV 83 78 - 100 fL    MCH 25.3 (L) 26.5 - 33.0 pg    MCHC 30.5 (L) 31.5 - 36.5 g/dL    RDW 16.8 (H) 10.0 - 15.0 %    Platelet Count 259 150 - 450 10e3/uL    % Neutrophils 74 %    % Lymphocytes 12 %    % Monocytes 12 %    % Eosinophils 1 %    % Basophils 1 %    % Immature Granulocytes 0 %    NRBCs per 100 WBC 0 <1 /100    Absolute Neutrophils 3.1 1.6 - 8.3 10e3/uL    Absolute Lymphocytes 0.5 (L) 0.8 - 5.3 10e3/uL    Absolute Monocytes 0.5 0.0 - 1.3 10e3/uL    Absolute Eosinophils 0.1 0.0 - 0.7 10e3/uL    Absolute Basophils 0.1 0.0 - 0.2 10e3/uL    Absolute Immature Granulocytes 0.0 <=0.4 10e3/uL    Absolute NRBCs 0.0 10e3/uL      Last EKG from November 2023 showed atrial fibrillation, PVCs had resolved as compared to the prior EKG.  She is asymptomatic this time and EKG is not indicated today.    Revised Cardiac Risk Index (RCRI)  The patient has the following serious cardiovascular risks for perioperative complications:   - Coronary Artery Disease (MI, positive stress test, angina, Qs on EKG) = 1 point   - Congestive Heart Failure (pulmonary edema, PND, s3 lila, CXR with pulmonary congestion, basilar rales) = 1 point     RCRI Interpretation: 2 points: Class III (moderate risk - 6.6% complication rate)     Estimated Functional Capacity: Performs 4 METS exercise without symptoms (e.g., light housework, stairs, 4 mph walk, 7 mph bike, slow step dance)         Signed Electronically by: Rody Bernabe NP  Copy of this evaluation report is provided to requesting physician.

## 2024-04-10 ENCOUNTER — ANESTHESIA EVENT (OUTPATIENT)
Dept: SURGERY | Facility: OTHER | Age: 89
End: 2024-04-10
Payer: MEDICARE

## 2024-04-11 ENCOUNTER — ANESTHESIA (OUTPATIENT)
Dept: SURGERY | Facility: OTHER | Age: 89
End: 2024-04-11
Payer: MEDICARE

## 2024-04-11 ENCOUNTER — HOSPITAL ENCOUNTER (OUTPATIENT)
Facility: OTHER | Age: 89
Discharge: HOME | End: 2024-04-11
Attending: PATHOLOGY | Admitting: PATHOLOGY
Payer: MEDICARE

## 2024-04-11 VITALS
OXYGEN SATURATION: 99 % | TEMPERATURE: 96.4 F | WEIGHT: 116 LBS | HEIGHT: 61 IN | DIASTOLIC BLOOD PRESSURE: 78 MMHG | HEART RATE: 84 BPM | SYSTOLIC BLOOD PRESSURE: 133 MMHG | BODY MASS INDEX: 21.9 KG/M2

## 2024-04-11 DIAGNOSIS — D64.9 ANEMIA, UNSPECIFIED TYPE: Primary | ICD-10-CM

## 2024-04-11 LAB
BASOPHILS # BLD AUTO: 0 10E3/UL (ref 0–0.2)
BASOPHILS NFR BLD AUTO: 1 %
EOSINOPHIL # BLD AUTO: 0 10E3/UL (ref 0–0.7)
EOSINOPHIL NFR BLD AUTO: 0 %
ERYTHROCYTE [DISTWIDTH] IN BLOOD BY AUTOMATED COUNT: 16.8 % (ref 10–15)
HCT VFR BLD AUTO: 30.4 % (ref 35–47)
HGB BLD-MCNC: 9.6 G/DL (ref 11.7–15.7)
IMM GRANULOCYTES # BLD: 0 10E3/UL
IMM GRANULOCYTES NFR BLD: 0 %
LYMPHOCYTES # BLD AUTO: 0.6 10E3/UL (ref 0.8–5.3)
LYMPHOCYTES NFR BLD AUTO: 14 %
MCH RBC QN AUTO: 25.8 PG (ref 26.5–33)
MCHC RBC AUTO-ENTMCNC: 31.6 G/DL (ref 31.5–36.5)
MCV RBC AUTO: 82 FL (ref 78–100)
MONOCYTES # BLD AUTO: 0.3 10E3/UL (ref 0–1.3)
MONOCYTES NFR BLD AUTO: 8 %
NEUTROPHILS # BLD AUTO: 3.5 10E3/UL (ref 1.6–8.3)
NEUTROPHILS NFR BLD AUTO: 78 %
NRBC # BLD AUTO: 0 10E3/UL
NRBC BLD AUTO-RTO: 0 /100
PLATELET # BLD AUTO: 268 10E3/UL (ref 150–450)
RBC # BLD AUTO: 3.72 10E6/UL (ref 3.8–5.2)
WBC # BLD AUTO: 4.5 10E3/UL (ref 4–11)

## 2024-04-11 PROCEDURE — 99100 ANES PT EXTEME AGE<1 YR&>70: CPT

## 2024-04-11 PROCEDURE — 88311 DECALCIFY TISSUE: CPT

## 2024-04-11 PROCEDURE — 258N000003 HC RX IP 258 OP 636: Performed by: NURSE ANESTHETIST, CERTIFIED REGISTERED

## 2024-04-11 PROCEDURE — 88360 TUMOR IMMUNOHISTOCHEM/MANUAL: CPT

## 2024-04-11 PROCEDURE — 38222 DX BONE MARROW BX & ASPIR: CPT

## 2024-04-11 PROCEDURE — 85045 AUTOMATED RETICULOCYTE COUNT: CPT

## 2024-04-11 PROCEDURE — 710N000012 HC RECOVERY PHASE 2, PER MINUTE: Performed by: PATHOLOGY

## 2024-04-11 PROCEDURE — 85025 COMPLETE CBC W/AUTO DIFF WBC: CPT | Performed by: INTERNAL MEDICINE

## 2024-04-11 PROCEDURE — 88184 FLOWCYTOMETRY/ TC 1 MARKER: CPT

## 2024-04-11 PROCEDURE — 88275 CYTOGENETICS 100-300: CPT

## 2024-04-11 PROCEDURE — 370N000017 HC ANESTHESIA TECHNICAL FEE, PER MIN: Performed by: PATHOLOGY

## 2024-04-11 PROCEDURE — 88185 FLOWCYTOMETRY/TC ADD-ON: CPT

## 2024-04-11 PROCEDURE — 85025 COMPLETE CBC W/AUTO DIFF WBC: CPT | Mod: 91

## 2024-04-11 PROCEDURE — 88305 TISSUE EXAM BY PATHOLOGIST: CPT

## 2024-04-11 PROCEDURE — 88237 TISSUE CULTURE BONE MARROW: CPT

## 2024-04-11 PROCEDURE — 88271 CYTOGENETICS DNA PROBE: CPT

## 2024-04-11 PROCEDURE — 250N000011 HC RX IP 250 OP 636

## 2024-04-11 PROCEDURE — 36415 COLL VENOUS BLD VENIPUNCTURE: CPT | Performed by: PATHOLOGY

## 2024-04-11 PROCEDURE — 999N000141 HC STATISTIC PRE-PROCEDURE NURSING ASSESSMENT: Performed by: PATHOLOGY

## 2024-04-11 PROCEDURE — 360N000075 HC SURGERY LEVEL 2, PER MIN: Performed by: PATHOLOGY

## 2024-04-11 PROCEDURE — 250N000009 HC RX 250

## 2024-04-11 PROCEDURE — 88285 CHROMOSOME COUNT ADDITIONAL: CPT

## 2024-04-11 PROCEDURE — 36415 COLL VENOUS BLD VENIPUNCTURE: CPT | Performed by: INTERNAL MEDICINE

## 2024-04-11 PROCEDURE — 88272 CYTOGENETICS 3-5: CPT

## 2024-04-11 PROCEDURE — 88313 SPECIAL STAINS GROUP 2: CPT

## 2024-04-11 PROCEDURE — 88264 CHROMOSOME ANALYSIS 20-25: CPT

## 2024-04-11 PROCEDURE — 88299 UNLISTED CYTOGENETIC STUDY: CPT

## 2024-04-11 RX ORDER — PROPOFOL 10 MG/ML
INJECTION, EMULSION INTRAVENOUS CONTINUOUS PRN
Status: DISCONTINUED | OUTPATIENT
Start: 2024-04-11 | End: 2024-04-11

## 2024-04-11 RX ORDER — PROPOFOL 10 MG/ML
INJECTION, EMULSION INTRAVENOUS PRN
Status: DISCONTINUED | OUTPATIENT
Start: 2024-04-11 | End: 2024-04-11

## 2024-04-11 RX ORDER — LIDOCAINE 40 MG/G
CREAM TOPICAL
Status: DISCONTINUED | OUTPATIENT
Start: 2024-04-11 | End: 2024-04-11 | Stop reason: HOSPADM

## 2024-04-11 RX ORDER — ONDANSETRON 2 MG/ML
4 INJECTION INTRAMUSCULAR; INTRAVENOUS EVERY 30 MIN PRN
Status: DISCONTINUED | OUTPATIENT
Start: 2024-04-11 | End: 2024-04-11 | Stop reason: HOSPADM

## 2024-04-11 RX ORDER — NALOXONE HYDROCHLORIDE 0.4 MG/ML
0.1 INJECTION, SOLUTION INTRAMUSCULAR; INTRAVENOUS; SUBCUTANEOUS
Status: DISCONTINUED | OUTPATIENT
Start: 2024-04-11 | End: 2024-04-11 | Stop reason: HOSPADM

## 2024-04-11 RX ORDER — SODIUM CHLORIDE, SODIUM LACTATE, POTASSIUM CHLORIDE, CALCIUM CHLORIDE 600; 310; 30; 20 MG/100ML; MG/100ML; MG/100ML; MG/100ML
INJECTION, SOLUTION INTRAVENOUS CONTINUOUS
Status: DISCONTINUED | OUTPATIENT
Start: 2024-04-11 | End: 2024-04-11 | Stop reason: HOSPADM

## 2024-04-11 RX ORDER — LIDOCAINE HYDROCHLORIDE 10 MG/ML
8-10 INJECTION, SOLUTION EPIDURAL; INFILTRATION; INTRACAUDAL; PERINEURAL
Status: DISCONTINUED | OUTPATIENT
Start: 2024-04-11 | End: 2024-04-11 | Stop reason: HOSPADM

## 2024-04-11 RX ORDER — FENTANYL CITRATE 50 UG/ML
50 INJECTION, SOLUTION INTRAMUSCULAR; INTRAVENOUS EVERY 5 MIN PRN
Status: DISCONTINUED | OUTPATIENT
Start: 2024-04-11 | End: 2024-04-11 | Stop reason: HOSPADM

## 2024-04-11 RX ORDER — LIDOCAINE HYDROCHLORIDE 20 MG/ML
INJECTION, SOLUTION INFILTRATION; PERINEURAL PRN
Status: DISCONTINUED | OUTPATIENT
Start: 2024-04-11 | End: 2024-04-11

## 2024-04-11 RX ORDER — FENTANYL CITRATE 50 UG/ML
25 INJECTION, SOLUTION INTRAMUSCULAR; INTRAVENOUS EVERY 5 MIN PRN
Status: DISCONTINUED | OUTPATIENT
Start: 2024-04-11 | End: 2024-04-11 | Stop reason: HOSPADM

## 2024-04-11 RX ORDER — ONDANSETRON 4 MG/1
4 TABLET, ORALLY DISINTEGRATING ORAL EVERY 30 MIN PRN
Status: DISCONTINUED | OUTPATIENT
Start: 2024-04-11 | End: 2024-04-11 | Stop reason: HOSPADM

## 2024-04-11 RX ADMIN — PROPOFOL 20 MG: 10 INJECTION, EMULSION INTRAVENOUS at 12:27

## 2024-04-11 RX ADMIN — PROPOFOL 80 MCG/KG/MIN: 10 INJECTION, EMULSION INTRAVENOUS at 12:19

## 2024-04-11 RX ADMIN — LIDOCAINE HYDROCHLORIDE 80 MG: 20 INJECTION, SOLUTION INFILTRATION; PERINEURAL at 12:18

## 2024-04-11 RX ADMIN — PROPOFOL 10 MG: 10 INJECTION, EMULSION INTRAVENOUS at 12:37

## 2024-04-11 RX ADMIN — PROPOFOL 20 MG: 10 INJECTION, EMULSION INTRAVENOUS at 12:22

## 2024-04-11 RX ADMIN — PROPOFOL 20 MG: 10 INJECTION, EMULSION INTRAVENOUS at 12:18

## 2024-04-11 RX ADMIN — PROPOFOL 20 MG: 10 INJECTION, EMULSION INTRAVENOUS at 12:21

## 2024-04-11 RX ADMIN — SODIUM CHLORIDE, POTASSIUM CHLORIDE, SODIUM LACTATE AND CALCIUM CHLORIDE 100 ML/HR: 600; 310; 30; 20 INJECTION, SOLUTION INTRAVENOUS at 11:25

## 2024-04-11 ASSESSMENT — ACTIVITIES OF DAILY LIVING (ADL)
ADLS_ACUITY_SCORE: 38

## 2024-04-11 ASSESSMENT — ENCOUNTER SYMPTOMS: DYSRHYTHMIAS: 1

## 2024-04-11 NOTE — ANESTHESIA PREPROCEDURE EVALUATION
Anesthesia Pre-Procedure Evaluation    Patient: Merlyn Escamilla   MRN: [id number] : 1933        Procedure : Procedure(s):  Bone marrow biopsy, bone specimen, needle/trocar          Past Medical History:   Diagnosis Date    Carpal tunnel syndrome of left wrist     10/4/2017    Carpal tunnel syndrome of right wrist     10/4/2017    Deep phlebothrombosis during pregnancy     No Comments Provided    Osteoarthritis     Knee    Other specified postprocedural states     10/4/2017    Other specified postprocedural states     2018    Presence of artificial knee joint     10/3/2014      Past Surgical History:   Procedure Laterality Date    APPENDECTOMY OPEN      No Comments Provided    CARPAL TUNNEL RELEASE RT/LT Left 2018,[id number],OTHER,Left    CARPAL TUNNEL RELEASE RT/LT Right 10/04/2017    COLONOSCOPY  2014    Arteriovenous malformations of right, transverse and splenic flexure    ENDOSCOPIC STRIPPING VEIN(S)      No Comments Provided    HYSTERECTOMY TOTAL ABDOMINAL  1987    with squamous metaplasia and leiomoma    ZZC TOTAL KNEE ARTHROPLASTY Right 10/01/2014    Dr Domingo      Allergies   Allergen Reactions    Morphine      Other reaction(s): Muscle Weakness    Phenylbutazone Unknown      Social History     Tobacco Use    Smoking status: Never     Passive exposure: Never    Smokeless tobacco: Never   Substance Use Topics    Alcohol use: Yes     Alcohol/week: 5.0 standard drinks of alcohol     Comment: slick daily      Wt Readings from Last 1 Encounters:   24 52.6 kg (116 lb)        Anesthesia Evaluation   Pt has had prior anesthetic. Type: MAC and General.    No history of anesthetic complications       ROS/MED HX  ENT/Pulmonary:  - neg pulmonary ROS     Neurologic: Comment: Hx of AVM      Cardiovascular:     (+)  - -   -  - -   Taking blood thinners Pt has received instructions: Instructions Given to patient: Last dose of xaralto 2023. CHF etiology: chronic systolic heart  failure Last EF: 50-55% date: 12/19/2023               dysrhythmias, a-fib,        Previous cardiac testing   Echo: Date: 12/19/2023 Results:  Procedure  Echocardiogram with two-dimensional, color and spectral Doppler performed. The  patient's rhythm is atrial fibrillation.  ______________________________________________________________________________  Interpretation Summary  The patient's rhythm is atrial fibrillation.  Left ventricular function is decreased. The ejection fraction is 50-55%  (borderline).  Global right ventricular function is normal.  Moderate mitral and tricuspid insufficiency present.  The right ventricular systolic pressure is approximated at 42 mmHg. Pulmonary  hypertension is present.  Estimated mean right atrial pressure is normal.  No pericardial effusion is present.  Ascending aorta dilatation is present, measures 5.2 cm.  ______________________________________________________________________________  Left Ventricle  Left ventricular size is normal. Left ventricular wall thickness is normal.  Left ventricular function is decreased. The ejection fraction is 50-55%  (borderline).     Right Ventricle  The right ventricle is normal size. Global right ventricular function is  normal.     Atria  Severe biatrial enlargement is present.     Mitral Valve  Mild mitral annular calcification is present. Moderate mitral insufficiency is  present.     Aortic Valve  The aortic valve is tricuspid. Aortic valve sclerosis is present.     Tricuspid Valve  The right ventricular systolic pressure is approximated at 38.8 mmHg plus the  right atrial pressure. Pulmonary hypertension is present.     Pulmonic Valve  On Doppler interrogation, there is no significant stenosis or regurgitation.     Vessels  Ascending Aorta dilatation is present. Ascending aorta 5.2 cm. Estimated mean  right atrial pressure is normal.     Pericardium  No pericardial effusion is present.     Compared to Previous Study  This study was  compared with the study from 10.2022 . LV function is slightly  reduced and valvular regurgitation has slightly worsened.    Narrative & Impression  PROCEDURE:  CTA CHEST WITH CONTRAST.     HISTORY:  Aneurysm of ascending aorta without rupture (H24)     TECHNIQUE:  Initial pre-contrast  and localizer images were  obtained.  Contrast enhanced helical CT aortic angiography was then  performed.  Routine transaxial and post-processed (multiplanar and/or  MIP) reformations were obtained.     COMPARISON:  10/19/21, 5/1/2020, 8/29/2018     MEDS/CONTRAST: 70 ml isovue 370     AORTIC CTA FINDINGS: No aortic dissection or penetrating  atherosclerotic ulcer is identified. The major arch vessels are  patent, with tortuosity suggesting long-standing hypertension.  Scattered mild calcific atherosclerotic plaque is present. The heart  is enlarged, with extensive atrial dilatation. The main pulmonary  artery is within normal limits in size.      Measurements of the thoracic aorta are as follows:   Sinotubular junction 3.2 x 2.5 cm, previously 2.9 x 2.2 cm  Sinus of Valsalva 3.7 x 3.7 x 3.4 cm, previously 3.7 x 3.6 x 3.5 cm   Mid ascending aorta 5.3 x 5.0 cm, previously 5.1 x 4.8 cm, 4.6 x 4.5  cm in 2018   Proximal aortic arch 4.3 x 3.6 cm  Distal aortic arch 3.7 x 3.3 cm  Descending thoracic aorta 3.9 x 3.8 cm     OTHER FINDINGS:       No abnormal thoracic adenopathy is identified. Limited views of the  upper abdomen reveal no adrenal mass. The pancreatic duct is upper  normal in caliber, unchanged when compared to 2018.      The pleura is without thickening or effusions.  The central airways  are patent. Mild atelectasis and scarring are present in the right  lower lobe. Subpleural 4 mm nodules at the right lung apex and in the  lingula are unchanged. No new pulmonary mass is seen.     No suspicious osseous lesion is identified.                                                                      IMPRESSION:  Progressive  aneurysmal dilatation of the mid ascending  aorta to 5.3 x 5.0 cm, previously 5.1 x 4.8 cm, 4.6 x 4.5 cm in 2018.     CARISSA CARLTON MD            Stress Test:  Date: Results:    ECG Reviewed:  Date: 11/16/2023 Results:  63 bpm  Atrial fibrillation  Low voltage QRS  Left axis deviation  When compared with ECG of 15-NOV-2022 13:58,  Premature ventricular complexes are no longer Present  Confirmed by MD VERDUGO DANIEL ([id number]) on 11/16/2023 4:46:58 PM      Cath:  Date: Results:      METS/Exercise Tolerance: 4 - Raking leaves, gardening    Hematologic:     (+) History of blood clots,    pt is anticoagulated,           Musculoskeletal:   (+)  arthritis,             GI/Hepatic:       Renal/Genitourinary:     (+) renal disease, type: CRI,            Endo:  - neg endo ROS     Psychiatric/Substance Use:  - neg psychiatric ROS     Infectious Disease:  - neg infectious disease ROS     Malignancy:       Other:  - neg other ROS   (-) Any chance pregnant       Physical Exam    Airway        Mallampati: II   TM distance: > 3 FB   Neck ROM: full   Mouth opening: > 3 cm    Respiratory Devices and Support         Dental       (+) Minor Abnormalities - some fillings, tiny chips      Cardiovascular   cardiovascular exam normal       Rhythm and rate: irregular and normal     Pulmonary   pulmonary exam normal        breath sounds clear to auscultation           OUTSIDE LABS:  CBC:   Lab Results   Component Value Date    WBC 4.2 04/09/2024    WBC 5.8 03/29/2024    HGB 9.5 (L) 04/09/2024    HGB 10.6 (L) 03/29/2024    HCT 31.1 (L) 04/09/2024    HCT 33.9 (L) 03/29/2024     04/09/2024     03/29/2024     BMP:   Lab Results   Component Value Date     04/09/2024     03/29/2024    POTASSIUM 3.9 04/09/2024    POTASSIUM 3.0 (L) 03/29/2024    CHLORIDE 105 04/09/2024    CHLORIDE 101 03/29/2024    CO2 24 04/09/2024    CO2 26 03/29/2024    BUN 17.1 04/09/2024    BUN 19.5 03/29/2024    CR 0.70 04/09/2024    CR 0.72  "03/29/2024    GLC 92 04/09/2024     (H) 03/29/2024     COAGS: No results found for: \"PTT\", \"INR\", \"FIBR\"  POC: No results found for: \"BGM\", \"HCG\", \"HCGS\"  HEPATIC:   Lab Results   Component Value Date    ALBUMIN 3.7 03/29/2024    PROTTOTAL 7.0 03/29/2024    ALT 8 03/29/2024    AST 17 03/29/2024    ALKPHOS 106 03/29/2024    BILITOTAL 0.6 03/29/2024     OTHER:   Lab Results   Component Value Date    A1C 6.1 11/16/2023    ALEXSANDER 9.1 04/09/2024    MAG 2.3 11/16/2023    LIPASE 22 07/29/2018    TSH 0.74 11/16/2023       Anesthesia Plan    ASA Status:  3    NPO Status:  NPO Appropriate    Anesthesia Type: MAC.     - Reason for MAC: straight local not clinically adequate   Induction: Intravenous, Propofol.   Maintenance: TIVA.        Consents    Anesthesia Plan(s) and associated risks, benefits, and realistic alternatives discussed. Questions answered and patient/representative(s) expressed understanding.     - Discussed: Risks, Benefits and Alternatives for BOTH SEDATION and the PROCEDURE were discussed     - Discussed with:  Patient, Spouse      - Specific Concerns: Aspiration.     - Extended Intubation/Ventilatory Support Discussed: No.      - Patient is DNR/DNI Status: No     Use of blood products discussed: No .     Postoperative Care            Comments:               PO Sam CRNA    I have reviewed the pertinent notes and labs in the chart from the past 30 days and (re)examined the patient.  Any updates or changes from those notes are reflected in this note.    # Hypokalemia: Lowest K = 3 mmol/L in last 30 days, will replace as needed         # Drug Induced Coagulation Defect: home medication list includes an anticoagulant medication       "

## 2024-04-11 NOTE — OR NURSING
Patient has been discharged to home at 1405 via w/c accompanied by Ann GAXIOLA    Written discharge instructions were provided to patient.  Prescriptions were none.  Patient states their pain is none, and denies any nausea or dizziness upon discharge.    Patient and adult caring for them verbalize understanding of discharge instructions including no driving until tomorrow and no longer taking narcotic pain medications - no operating mechanical equipment and no making any important decisions.They understand reason for discharge, and necessary follow-up appointments.

## 2024-04-11 NOTE — ANESTHESIA POSTPROCEDURE EVALUATION
Patient: Merlyn Escamilla    Procedure: Procedure(s):  Bone marrow biopsy, bone specimen, needle/trocar       Anesthesia Type:  MAC    Note:  Disposition: Outpatient   Postop Pain Control: Uneventful            Sign Out: Well controlled pain   PONV: No   Neuro/Psych: Uneventful            Sign Out: Acceptable/Baseline neuro status   Airway/Respiratory: Uneventful            Sign Out: Acceptable/Baseline resp. status   CV/Hemodynamics: Uneventful            Sign Out: Acceptable CV status; No obvious hypovolemia; No obvious fluid overload   Other NRE: NONE   DID A NON-ROUTINE EVENT OCCUR? No           Last vitals:  Vitals Value Taken Time   /78 04/11/24 1315   Temp 96.4  F (35.8  C) 04/11/24 1252   Pulse 81 04/11/24 1315   Resp     SpO2 98 % 04/11/24 1328   Vitals shown include unfiled device data.    Electronically Signed By: PO RUSSELL CRNA  April 11, 2024  1:29 PM

## 2024-04-11 NOTE — ANESTHESIA CARE TRANSFER NOTE
Patient: Merlyn Escamilla    Procedure: Procedure(s):  Bone marrow biopsy, bone specimen, needle/trocar       Diagnosis: Anemia [D64.9]  Diagnosis Additional Information: No value filed.    Anesthesia Type:   MAC     Note:    Oropharynx: oropharynx clear of all foreign objects  Level of Consciousness: awake  Oxygen Supplementation: face mask  Level of Supplemental Oxygen (L/min / FiO2): 6  Independent Airway: airway patency satisfactory and stable  Dentition: dentition unchanged  Vital Signs Stable: post-procedure vital signs reviewed and stable  Report to RN Given: handoff report given  Patient transferred to: Phase II    Handoff Report: Identifed the Patient, Identified the Reponsible Provider, Reviewed the pertinent medical history, Discussed the surgical course, Reviewed Intra-OP anesthesia mangement and issues during anesthesia, Set expectations for post-procedure period and Allowed opportunity for questions and acknowledgement of understanding      Vitals:  Vitals Value Taken Time   BP     Temp     Pulse     Resp     SpO2         Electronically Signed By: PO Hewitt CRNA  April 11, 2024  12:47 PM

## 2024-04-11 NOTE — DISCHARGE INSTRUCTIONS
[facility]  Adult Discharge Orders & Instructions      For 24 hours after surgery:  Get plenty of rest.  A responsible adult must stay with you for at least 24 hours after you leave the hospital.   You may feel lightheaded.  IF so, sit for a few minutes before standing.  Have someone help you get up.   You may have a slight fever. Call the doctor if your fever is over 101 F (38.3 C) (taken under the tongue) or lasts longer than 24 hours.  You may have a dry mouth, a sore throat, muscle aches or trouble sleeping.  These should go away after 24 hours.  Do not make important or legal decisions.  6.   Do not drive or use heavy equipment.  If you have weakness or tingling, don't drive or use heavy equipment until this feeling goes away.                                                                                                                                                                         To contact a doctor, call    [phone]______________

## 2024-04-11 NOTE — OR NURSING
Lab staff present during procedure to collect, document and transport specimens to lab.      3mL Lidocaine 1% from bone marrow kit injected by  at 12:30 PM.

## 2024-04-11 NOTE — PROCEDURES
Bone Marrow Biopsy Procedure Note    Indication: Anemia    Following discussion of the risks and benefits of the procedure, signing of the informed consent, and Pause for the Cause, left bone marrow aspirate and biopsy were performed at the left iliac crest under conscious sedation. 1% lidocaine, 3 ml, was infiltrated at the site for local anesthesia and pain control. Good samples were obtained. The procedure was well tolerated and the patient was discharged home after an interval of observation.      Merlyn will follow up with Dr. Briseno as scheduled or sooner if needed for pain, bleeding or signs/symptoms of infection at the site.    Thank you for the opportunity to participate in Merlyn's care.    Carlos Salcido M.D.

## 2024-04-12 DIAGNOSIS — I50.22 CHRONIC SYSTOLIC HEART FAILURE (H): ICD-10-CM

## 2024-04-12 DIAGNOSIS — I48.91 ATRIAL FIBRILLATION WITH RVR (H): ICD-10-CM

## 2024-04-12 DIAGNOSIS — I71.21 ASCENDING AORTIC ANEURYSM (H): ICD-10-CM

## 2024-04-12 DIAGNOSIS — I48.20 CHRONIC ATRIAL FIBRILLATION (H): ICD-10-CM

## 2024-04-15 RX ORDER — LISINOPRIL 5 MG/1
5 TABLET ORAL DAILY
Qty: 90 TABLET | Refills: 3 | Status: ON HOLD | OUTPATIENT
Start: 2024-04-15 | End: 2024-05-14

## 2024-04-15 RX ORDER — METOPROLOL SUCCINATE 100 MG/1
TABLET, EXTENDED RELEASE ORAL
Qty: 90 TABLET | Refills: 3 | Status: ON HOLD | OUTPATIENT
Start: 2024-04-15 | End: 2024-05-14

## 2024-04-15 RX ORDER — METOPROLOL SUCCINATE 25 MG/1
TABLET, EXTENDED RELEASE ORAL
Qty: 90 TABLET | Refills: 3 | Status: ON HOLD | OUTPATIENT
Start: 2024-04-15 | End: 2024-05-14

## 2024-04-15 RX ORDER — DIGOXIN 125 MCG
TABLET ORAL
Qty: 90 TABLET | Refills: 1 | Status: ON HOLD | OUTPATIENT
Start: 2024-04-15 | End: 2024-05-14

## 2024-04-15 NOTE — TELEPHONE ENCOUNTER
[facility] sent Rx request for the following:      Requested Prescriptions   Pending Prescriptions Disp Refills    metoprolol succinate ER (TOPROL XL) 100 MG 24 hr tablet [Pharmacy Med Name: metoprolol succinate  mg tablet,extended release 24 hr] 90 tablet 0     Sig: TAKE 1 TABLET BY MOUTH AT BEDTIME WITH 25 MG TABLET. TOTAL DAILY DOSE = 125 MG.   Last Prescription Date:   1/18/24  Last Fill Qty/Refills:         90, R-0       lisinopril (ZESTRIL) 5 MG tablet [Pharmacy Med Name: lisinopril 5 mg tablet] 90 tablet 0     Sig: Take 1 tablet (5 mg) by mouth daily   Last Prescription Date:   1/18/24  Last Fill Qty/Refills:         90, R-0       digoxin (LANOXIN) 125 MCG tablet [Pharmacy Med Name: digoxin 125 mcg (0.125 mg) tablet] 90 tablet 0     Sig: Take 1 tablet (125 mcg) by mouth daily   Last Prescription Date:   1/18/24  Last Fill Qty/Refills:         90, R-0       metoprolol succinate ER (TOPROL XL) 25 MG 24 hr tablet [Pharmacy Med Name: metoprolol succinate ER 25 mg tablet,extended release 24 hr] 90 tablet 0     Sig: TAKE 1 TABLET BY MOUTH AT BEDTIME WITH 100 MG TABLET. TOTAL DAILY DOSE = 125 MG.   Last Prescription Date:   1/18/24  Last Fill Qty/Refills:         90, R-0      Last Office Visit:              4/9/24   Future Office visit:           None    Prescription refilled per RN Medication Refill Policy.................... Saumya Urbina RN ....................  4/15/2024   11:33 AM

## 2024-04-19 ENCOUNTER — PATIENT OUTREACH (OUTPATIENT)
Dept: ONCOLOGY | Facility: OTHER | Age: 89
End: 2024-04-19
Payer: COMMERCIAL

## 2024-04-19 ENCOUNTER — ONCOLOGY VISIT (OUTPATIENT)
Dept: ONCOLOGY | Facility: OTHER | Age: 89
End: 2024-04-19
Attending: INTERNAL MEDICINE
Payer: COMMERCIAL

## 2024-04-19 VITALS
WEIGHT: 112 LBS | DIASTOLIC BLOOD PRESSURE: 60 MMHG | RESPIRATION RATE: 14 BRPM | TEMPERATURE: 98.3 F | HEART RATE: 59 BPM | SYSTOLIC BLOOD PRESSURE: 106 MMHG | BODY MASS INDEX: 21.16 KG/M2

## 2024-04-19 DIAGNOSIS — D64.9 ANEMIA, UNSPECIFIED TYPE: ICD-10-CM

## 2024-04-19 DIAGNOSIS — C90.00 MULTIPLE MYELOMA, REMISSION STATUS UNSPECIFIED (H): Primary | ICD-10-CM

## 2024-04-19 PROCEDURE — G0463 HOSPITAL OUTPT CLINIC VISIT: HCPCS

## 2024-04-19 PROCEDURE — 99215 OFFICE O/P EST HI 40 MIN: CPT | Performed by: INTERNAL MEDICINE

## 2024-04-19 RX ORDER — SULFAMETHOXAZOLE/TRIMETHOPRIM 800-160 MG
1 TABLET ORAL DAILY
Qty: 30 TABLET | Refills: 0 | Status: ON HOLD | OUTPATIENT
Start: 2024-04-19 | End: 2024-05-14

## 2024-04-19 ASSESSMENT — PAIN SCALES - GENERAL: PAINLEVEL: EXTREME PAIN (8)

## 2024-04-19 NOTE — NURSING NOTE
"Oncology Rooming Note    April 19, 2024 3:10 PM   Merlyn Escamilla is a 89 year old female who presents for:    Chief Complaint   Patient presents with    Oncology Clinic Visit     anemia     Initial Vitals: /60   Pulse 59   Temp 98.3  F (36.8  C)   Resp 14   Wt 50.8 kg (112 lb)   LMP 06/01/1987 (Approximate)   BMI 21.16 kg/m   Estimated body mass index is 21.16 kg/m  as calculated from the following:    Height as of 4/11/24: 1.549 m (5' 1\").    Weight as of this encounter: 50.8 kg (112 lb). Body surface area is 1.48 meters squared.  Extreme Pain (8) Comment: Data Unavailable   Patient's last menstrual period was 06/01/1987 (approximate).  Allergies reviewed: Yes  Medications reviewed: Yes    Medications: Medication refills not needed today.  Pharmacy name entered into [email or web address]: [facility] - [place], [place] - [address]      Clarissa Hester RN              "

## 2024-04-19 NOTE — PROGRESS NOTES
HEMATOLOGY FOLLOW UP NOTE  Apr 19, 2024    Reason for follow up: Multiple Myeloma    HISTORY OF PRESENT ILLNESS  Merlyn Escamilla is a 89 year old female with PMH as stated below who is seen in the hematology clinic.    Her history in short is as follows:    Review of labs show a hemoglobin of 9.8 with an MCV of 84 on 11/16/2023.  Prior to that was found to have a hemoglobin of 11.6 with an MCV of 90 on 11/15/2022.    She overall has been in good health.  Has a history of right hip pain and groin pain.  She has been recommended a hip replacement however states her hemoglobin was found to be too low for surgery.    She had a colonoscopy done in 2014 at that time was found to have colonic AVMs.  She denies any blood in her stools or blood in her urine.  She did have 1 episode of blood in the toilet bowl in November 2023.  No repeat episodes following that.    Work up done for anemia showed elevated serum free light chains at 56.99 on 3/29/2024. M protein was 0.2. As a result she underwent bone marrow biopsy with aspiration.     4/11/2024: Bone marrow biopsy and aspiration: Bone marrow involvement with clonal plasma cells-10%. Bone marrow cellularity 50%.   Chromosome and FISH is pending    Interim history    Ms. Escamilla is doing well. Denies any abdominal pain, nausea, vomiting or diarrhea. Denies any pain anywhere. Denies any fever or chills. No recent infection.     REVIEW OF SYSTEMS  A 12-point ROS negative except as in HPI      Current Outpatient Medications   Medication Sig Dispense Refill    digoxin (LANOXIN) 125 MCG tablet Take 1 tablet (125 mcg) by mouth daily 90 tablet 1    ferrous sulfate (FEROSUL) 325 (65 Fe) MG tablet Take 325 mg by mouth daily (with breakfast) Patient unsure how many mg she is taking.      furosemide (LASIX) 20 MG tablet Take 2 tablets (40 mg) by mouth daily 180 tablet 3    lisinopril (ZESTRIL) 5 MG tablet Take 1 tablet (5 mg) by mouth daily 90 tablet 3    metoprolol succinate ER (TOPROL XL)  100 MG 24 hr tablet TAKE 1 TABLET BY MOUTH AT BEDTIME WITH 25 MG TABLET. TOTAL DAILY DOSE = 125 MG. 90 tablet 3    metoprolol succinate ER (TOPROL XL) 25 MG 24 hr tablet TAKE 1 TABLET BY MOUTH AT BEDTIME WITH 100 MG TABLET. TOTAL DAILY DOSE = 125 MG. 90 tablet 3    omeprazole (PRILOSEC) 20 MG DR capsule Take 1 capsule (20 mg) by mouth daily 90 capsule 1    rivaroxaban ANTICOAGULANT (XARELTO ANTICOAGULANT) 15 MG TABS tablet Take 1 tablet (15 mg) by mouth daily (with dinner) 90 tablet 3    traMADol (ULTRAM) 50 MG tablet Take 0.5-1 tablets (25-50 mg) by mouth every 6 hours as needed for severe pain 60 tablet 5    vitamin D3 (CHOLECALCIFEROL) 50 mcg (2000 units) tablet Take 1 tablet by mouth daily Patient unsure of how many units.         Allergies   Allergen Reactions    Morphine      Other reaction(s): Muscle Weakness    Phenylbutazone Unknown     Immunization History   Administered Date(s) Administered    COVID-19 Bivalent 12+ (Pfizer) 02/13/2023    COVID-19 MONOVALENT 12+ (Pfizer) 02/26/2021, 03/19/2021, 11/22/2021    Influenza (High Dose) 3 valent vaccine 10/03/2014    Pneumo Conj 13-V (2010&after) 09/21/2017    Pneumococcal 23 valent 11/05/2018    TD,PF 7+ (Tenivac) 07/13/2000    Td (Adult), Adsorbed 07/13/2000       Past Medical History:   Diagnosis Date    Carpal tunnel syndrome of left wrist     10/4/2017    Carpal tunnel syndrome of right wrist     10/4/2017    Deep phlebothrombosis during pregnancy     No Comments Provided    Osteoarthritis     Knee    Other specified postprocedural states     10/4/2017    Other specified postprocedural states     1/31/2018    Presence of artificial knee joint     10/3/2014       Past Surgical History:   Procedure Laterality Date    APPENDECTOMY OPEN      No Comments Provided    BONE MARROW BIOPSY, BONE SPECIMEN, NEEDLE/TROCAR Left 4/11/2024    Procedure: Bone marrow biopsy, bone specimen, needle/trocar;  Surgeon: Carlos Salcido MD;  Location: [facility] OR    CARPAL TUNNEL  RELEASE RT/LT Left 01/31/2018 01/31/2018,[id number],OTHER,Left    CARPAL TUNNEL RELEASE RT/LT Right 10/04/2017    COLONOSCOPY  09/2014    Arteriovenous malformations of right, transverse and splenic flexure    ENDOSCOPIC STRIPPING VEIN(S)      No Comments Provided    HYSTERECTOMY TOTAL ABDOMINAL  06/1987    with squamous metaplasia and leiomoma    ZZC TOTAL KNEE ARTHROPLASTY Right 10/01/2014    Dr Domingo       SOCIAL HISTORY  History   Smoking Status    Never   Smokeless Tobacco    Never    Social History    Substance and Sexual Activity      Alcohol use: Yes        Alcohol/week: 5.0 standard drinks of alcohol        Comment: slick daily     History   Drug Use No     Comment: Drug use: No       FAMILY HISTORY  Family History   Problem Relation Age of Onset    Heart Disease Mother         Heart Disease    Heart Disease Father         Heart Disease    Heart Disease Brother     Heart Disease Brother     Heart Disease Brother     Heart Disease Brother     Brain Cancer Sister     Heart Disease Sister        PHYSICAL EXAMINATION  LMP 06/01/1987 (Approximate)   Wt Readings from Last 2 Encounters:   04/11/24 52.6 kg (116 lb)   04/09/24 52.6 kg (116 lb)     Physical Exam  Constitutional:       Appearance: Normal appearance.   Pulmonary:      Effort: Pulmonary effort is normal.   Neurological:      General: No focal deficit present.      Mental Status: She is alert and oriented to person, place, and time.   Psychiatric:         Mood and Affect: Mood normal.         Behavior: Behavior normal.     Laboratory and Imaging:     Latest Reference Range & Units 03/29/24 15:24   WBC 4.0 - 11.0 10e3/uL 5.8   Hemoglobin 11.7 - 15.7 g/dL 10.6 (L)   Hematocrit 35.0 - 47.0 % 33.9 (L)   Platelet Count 150 - 450 10e3/uL 316   (L): Data is abnormally low    ASSESSMENT AND PLAN    1.  Multiple Myeloma.     Initially presented with anemia. Workup revealed elevated light chains and therefore underwent bone marrow biopsy which showed 10% plasma  cells. She also has hemoglobin<10,  therefore meets criteria for multiple myeloma.     Discussed diagnosis and recommendations for treatment. FISH and Chromosome Analysis is pending.     Ms. Escamilla has good performance status. She is limited with pain in her right hip. She lives alone and travelling to appointments may be difficult.     Discussed option of an oral regimen. Given her age I would recommend 2 drug regimen. I would recommend treatment with Revlimid 10 mg day 1-14 of 21 day cycle and Dexamethasone 20 mg every week. She is already of xarelto for cardiac reasons. This is also be useful for prophylaxis against VTE. Will also start her on daily Bactrim DS for PJP prophylaxis. I also discussed will get a PET scan to evaluate for bone involvement. Discuss Bone directed therapy based on findings.     Will arrange for weekly CBCD, CMP to be checked via home care.     Side effects of treatment which include but not limited to skin changes, low blood counts, infections, DVT, peptic ulcer, PJP infection were discussed.     Follow up in 3 weeks via phone. Will see her in clinic in 5 weeks with labs.     Total time spent on the patient on day of encounter was 45 minutes doing chart review, review of test results, interpretation of results, patient visit and documentation.       Pretty Briseno MD

## 2024-04-19 NOTE — PATIENT INSTRUCTIONS
Follow up in 3 weeks via phone. Weekly labs via home care. Follow up in clinic in 5 weeks with CBCD and CMP. PET scan now.

## 2024-04-19 NOTE — PROGRESS NOTES
Merlyn seen Dr. Briseno today. Home care orders were placed with orders to draw weekly labs. Will follow up with patient next week.  Clarissa Hester RN...........4/19/2024 3:42 PM

## 2024-04-22 ENCOUNTER — TELEPHONE (OUTPATIENT)
Dept: ONCOLOGY | Facility: OTHER | Age: 89
End: 2024-04-22
Payer: COMMERCIAL

## 2024-04-22 ENCOUNTER — TELEPHONE (OUTPATIENT)
Dept: FAMILY MEDICINE | Facility: OTHER | Age: 89
End: 2024-04-22
Payer: COMMERCIAL

## 2024-04-22 ENCOUNTER — TELEPHONE (OUTPATIENT)
Dept: ONCOLOGY | Facility: OTHER | Age: 89
End: 2024-04-22

## 2024-04-22 ENCOUNTER — DOCUMENTATION ONLY (OUTPATIENT)
Dept: ONCOLOGY | Facility: OTHER | Age: 89
End: 2024-04-22
Payer: COMMERCIAL

## 2024-04-22 DIAGNOSIS — C90.00 MULTIPLE MYELOMA, REMISSION STATUS UNSPECIFIED (H): Primary | ICD-10-CM

## 2024-04-22 RX ORDER — DEXAMETHASONE 4 MG/1
20 TABLET ORAL WEEKLY
Qty: 40 TABLET | Refills: 0 | Status: SHIPPED | OUTPATIENT
Start: 2024-04-23 | End: 2024-05-13

## 2024-04-22 RX ORDER — PROCHLORPERAZINE MALEATE 10 MG
10 TABLET ORAL EVERY 6 HOURS PRN
Qty: 30 TABLET | Refills: 2 | Status: ON HOLD | OUTPATIENT
Start: 2024-04-22 | End: 2024-05-14

## 2024-04-22 RX ORDER — LENALIDOMIDE 10 MG/1
10 CAPSULE ORAL DAILY
Qty: 14 CAPSULE | Refills: 0 | Status: SHIPPED | OUTPATIENT
Start: 2024-04-23 | End: 2024-05-13

## 2024-04-22 NOTE — TELEPHONE ENCOUNTER
The patient had an appointment with oncology and the daughter has questions before she starts medication.

## 2024-04-22 NOTE — TELEPHONE ENCOUNTER
PA Initiation    Medication: REVLIMID 10 MG PO CAPS  Ref.#  [id number]  Insurance Company: Medicare Blue - Phone [phone] Fax [phone]  Pharmacy Filling the Rx: [facility] - [place], [place] - [address]  Filling Pharmacy Phone:    Filling Pharmacy Fax:    Start Date: 4/22/2024

## 2024-04-22 NOTE — TELEPHONE ENCOUNTER
JELLY APPROVED    Medication: REVLIMID 10 MG PO CAPS  Amount: $ 12,000  [facility] Name: [facility] Phone: [phone]  [facility] Fax:   Member ID: [id number]  BIN: [id number]  PCN: [id number]  Group: [id number]  Foundation Effective Date: 3/23/2024  Foundation Expiration Date: 3/22/2025  Additional Information:   Patient Notified: yes

## 2024-04-22 NOTE — TELEPHONE ENCOUNTER
"Oral Chemotherapy Monitoring Program    Primary Oncologist: Dr. Briseno  Primary Oncology Clinic: [facility]  Cancer Diagnosis: MM    Drug: Revlimid  Start Date: TBD  Expected duration of therapy: Until disease progression or unacceptable toxicity    Drug Interaction Assessment: Category C Interaction with Digoxin - Monitor serum digoxin concentrations and for signs and symptoms of digoxin toxicity in patients receiving concomitant lenalidomide.     Lab Monitoring Plan  Weekly per Dr. Briseno (via home health)    Subjective/Objective:  Merlyn Escamilla is a 89 year old female contacted by phone for an initial visit for oral chemotherapy education.          4/22/2024    12:00 PM 4/22/2024     1:00 PM   ORAL CHEMOTHERAPY   Assessment Type Initial Work up New Teach   Diagnosis Code Multiple Myeloma Multiple Myeloma   Providers Finn Briseno   Clinic Name/Location [facility]   Drug Name Revlimid (lenalidomide) Revlimid (lenalidomide)   Dose 10 mg 10 mg   Current Schedule Daily Daily   Cycle Details 2 weeks on, 1 week off 2 weeks on, 1 week off       Vitals:  BP:   BP Readings from Last 1 Encounters:   04/19/24 106/60     Wt Readings from Last 1 Encounters:   04/19/24 50.8 kg (112 lb)     Estimated body surface area is 1.48 meters squared as calculated from the following:    Height as of 4/11/24: 1.549 m (5' 1\").    Weight as of 4/19/24: 50.8 kg (112 lb).    Labs:  _  Result Component Current Result Ref Range   Sodium 139 (4/9/2024) 135 - 145 mmol/L     _  Result Component Current Result Ref Range   Potassium 3.9 (4/9/2024) 3.4 - 5.3 mmol/L     _  Result Component Current Result Ref Range   Calcium 9.1 (4/9/2024) 8.2 - 9.6 mg/dL     No results found for Mag within last 30 days.     No results found for Phos within last 30 days.     _  Result Component Current Result Ref Range   Albumin 3.7 (3/29/2024) 3.5 - 5.2 g/dL     _  Result Component Current Result Ref Range   Urea Nitrogen 17.1 (4/9/2024) 8.0 - 23.0 mg/dL "     _  Result Component Current Result Ref Range   Creatinine 0.70 (4/9/2024) 0.51 - 0.95 mg/dL       _  Result Component Current Result Ref Range   AST 17 (3/29/2024) 0 - 45 U/L     _  Result Component Current Result Ref Range   ALT 8 (3/29/2024) 0 - 50 U/L     _  Result Component Current Result Ref Range   Bilirubin Total 0.6 (3/29/2024) <=1.2 mg/dL       _  Result Component Current Result Ref Range   WBC Count 4.5 (4/11/2024) 4.0 - 11.0 10e3/uL     _  Result Component Current Result Ref Range   Hemoglobin 9.6 (L) (4/11/2024) 11.7 - 15.7 g/dL     _  Result Component Current Result Ref Range   Platelet Count 268 (4/11/2024) 150 - 450 10e3/uL     No results found for ANC within last 30 days.         Assessment:  Patient is appropriate to start therapy.    Plan:  Basic chemotherapy teaching was reviewed with the patient including indication, start date of therapy, dose, administration, adverse effects, missed doses, food and drug interactions, monitoring, side effect management, office contact information, and safe handling. Written materials were provided and all questions answered.    Follow-Up:  5/2 1 week assessment     Milton Dick, PharmD, BCOP  Oncology Pharmacy Manager  [facility]  [phone]

## 2024-04-22 NOTE — TELEPHONE ENCOUNTER
Patient daughter is calling regarding new medication, lenleidomide oral capsules, bexamethasone oral tablet and daughter states that she was going to come in for appointment but due to transportation she will have a home health aid coming to the house. Daughter wants to double check with physician before patient begins taking new medication to make sure this is appropriate.   Lyla Hager LPN on 4/22/2024 at 9:22 AM

## 2024-04-22 NOTE — TELEPHONE ENCOUNTER
Oral Chemotherapy Monitoring Program   Medication: Revlimid   Rx: 10mg PO daily on days 1 through 14 of 21 day cycle (as of 4/22/24)  Auth #: [id number]  Provider: Finn  Risk Category: Adult Female  Routine survey questions reviewed.   Rx to be Escribed to SP, [facility].    Elizabeth TREJO, CPhT  Pharmacy Liaison [facility]  [email or web address]  Ph: [phone]  Fax: [phone]

## 2024-04-22 NOTE — PROGRESS NOTES
Unfortunately, home care can not accept a referral from a specialty provider, she will have to see her PCP, or another provider who will order and follow her for home care. Medicare will not cover lab only home care visits.     Yessica Nichole LPN on 4/22/2024 at 2:06 PM

## 2024-04-22 NOTE — TELEPHONE ENCOUNTER
Prior Authorization Approval    Medication: REVLIMID 10 MG PO CAPS  Authorization Effective Date: 1/23/2024  Authorization Expiration Date: 4/22/2025  Approved Dose/Quantity: 21/28 days  Reference #: [id number]   Insurance Company: Medicare Blue - Phone [phone] Fax [phone]  Expected CoPay: $  3,115.42  CoPay Card Available:      Financial Assistance Needed: will check with patient   [facility] - [place], [place] - [address]  Pharmacy Notified: yes  Patient Notified: yes

## 2024-04-23 ENCOUNTER — TELEPHONE (OUTPATIENT)
Dept: ONCOLOGY | Facility: OTHER | Age: 89
End: 2024-04-23
Payer: COMMERCIAL

## 2024-04-23 NOTE — TELEPHONE ENCOUNTER
Just to clarify, home care can stay in for as long as needed and/or  is stable on the medication, then can transition to outpatient lab work. We can monitor for side effects of the new medication and do lab work. She will need a new visit/face to face stating what the need for home care is, the visit from Feb with Dr. Dinero does not state that. This can be done in a virtual/video visit as well if she has someone to assist her with this. Please let me know if you have any more questions, my number is [phone]. Thank you.     Yessica Nichole LPN on 4/23/2024 at 4:43 PM

## 2024-04-23 NOTE — PROGRESS NOTES
I spoke with Tereza, patients daughter and it sounds like she will start a new medication and willl need monitoring for side effects. Home Care can do lab work and assessment after starting the new medication. I will message Dr. Dinero to see if he can addend his face to face from her last visit and if Dr. Briseno can follow as well and order lab work for our nurses to draw.     Yessica Nichole, LPN on 4/23/2024 at 10:49 AM

## 2024-04-23 NOTE — TELEPHONE ENCOUNTER
"Call from daughter, Tereza Couch with multiple questions.  Patient is very confused and the daughter who came with her \"didn't hear anything after cancer.\"  Wonder the prognosis with the medication and without as well.  She did not see this information in the note so not sure if it was discussed or not.  Requests a call from Dr Briseno.  Tereza will be available anytime.    "

## 2024-04-23 NOTE — TELEPHONE ENCOUNTER
I spoke with Vicky EM, the home care nurse that has worked with this patient in the past and addressed the order from Dr Briseno.  Per Medicare, orders need to come from a face to face visit with a primary care provider.  They will not accept the order from Dr Briseno.  In addition to that, they cannot provide a lab draw only service.  The fact that she is starting a new medication and needs her reaction and or vitals followed does qualify her for a total of 4 weeks.  After that, she will need to come to the clinic for them.

## 2024-04-24 ENCOUNTER — VIRTUAL VISIT (OUTPATIENT)
Dept: ONCOLOGY | Facility: OTHER | Age: 89
End: 2024-04-24
Attending: INTERNAL MEDICINE
Payer: COMMERCIAL

## 2024-04-24 DIAGNOSIS — C90.00 MULTIPLE MYELOMA, REMISSION STATUS UNSPECIFIED (H): Primary | ICD-10-CM

## 2024-04-24 PROCEDURE — 99443 PR PHYSICIAN TELEPHONE EVALUATION 21-30 MIN: CPT | Mod: 93 | Performed by: INTERNAL MEDICINE

## 2024-04-24 ASSESSMENT — PAIN SCALES - GENERAL: PAINLEVEL: SEVERE PAIN (7)

## 2024-04-24 NOTE — PROGRESS NOTES
HEMATOLOGY FOLLOW UP NOTE  Apr 24, 2024    Reason for follow up: Multiple Myeloma    Originating Location (pt. Location): Home      Distant Location (provider location: Clinic        HISTORY OF PRESENT ILLNESS  Merlyn Escamilla is a 89 year old female with PMH as stated below who I spoke to over the phone.     Her history in short is as follows:    Review of labs show a hemoglobin of 9.8 with an MCV of 84 on 11/16/2023.  Prior to that was found to have a hemoglobin of 11.6 with an MCV of 90 on 11/15/2022.    She overall has been in good health.  Has a history of right hip pain and groin pain.  She has been recommended a hip replacement however states her hemoglobin was found to be too low for surgery.    She had a colonoscopy done in 2014 at that time was found to have colonic AVMs.  She denies any blood in her stools or blood in her urine.  She did have 1 episode of blood in the toilet bowl in November 2023.  No repeat episodes following that.    Work up done for anemia showed elevated serum free light chains at 56.99 on 3/29/2024. M protein was 0.2. As a result she underwent bone marrow biopsy with aspiration.     4/11/2024: Bone marrow biopsy and aspiration: Bone marrow involvement with clonal plasma cells-10%. Bone marrow cellularity 50%.   Chromosome and FISH is pending    Interim history    Ms. Escamilla is doing well. Denies any abdominal pain, nausea, vomiting or diarrhea. Denies any pain anywhere. Denies any fever or chills. No recent infection.     REVIEW OF SYSTEMS  A 12-point ROS negative except as in HPI      Current Outpatient Medications   Medication Sig Dispense Refill    digoxin (LANOXIN) 125 MCG tablet Take 1 tablet (125 mcg) by mouth daily 90 tablet 1    ferrous sulfate (FEROSUL) 325 (65 Fe) MG tablet Take 325 mg by mouth daily (with breakfast) Patient unsure how many mg she is taking.      furosemide (LASIX) 20 MG tablet Take 2 tablets (40 mg) by mouth daily 180 tablet 3    lisinopril (ZESTRIL) 5 MG  tablet Take 1 tablet (5 mg) by mouth daily 90 tablet 3    metoprolol succinate ER (TOPROL XL) 100 MG 24 hr tablet TAKE 1 TABLET BY MOUTH AT BEDTIME WITH 25 MG TABLET. TOTAL DAILY DOSE = 125 MG. 90 tablet 3    metoprolol succinate ER (TOPROL XL) 25 MG 24 hr tablet TAKE 1 TABLET BY MOUTH AT BEDTIME WITH 100 MG TABLET. TOTAL DAILY DOSE = 125 MG. 90 tablet 3    rivaroxaban ANTICOAGULANT (XARELTO ANTICOAGULANT) 15 MG TABS tablet Take 1 tablet (15 mg) by mouth daily (with dinner) 90 tablet 3    sulfamethoxazole-trimethoprim (BACTRIM DS) 800-160 MG tablet Take 1 tablet by mouth daily 30 tablet 0    traMADol (ULTRAM) 50 MG tablet Take 0.5-1 tablets (25-50 mg) by mouth every 6 hours as needed for severe pain 60 tablet 5    vitamin D3 (CHOLECALCIFEROL) 50 mcg (2000 units) tablet Take 1 tablet by mouth daily Patient unsure of how many units.      dexAMETHasone (DECADRON) 4 MG tablet Take 5 tablets (20 mg) by mouth once a week Once a week with food on Days 1,8,15. (Patient not taking: Reported on 4/24/2024) 40 tablet 0    LENalidomide (REVLIMID) 10 MG CAPS capsule Take 1 capsule (10 mg) by mouth daily for 14 days Take on Days 1 through 14 of 21-day cycle. (Patient not taking: Reported on 4/24/2024) 14 capsule 0    omeprazole (PRILOSEC) 20 MG DR capsule Take 1 capsule (20 mg) by mouth daily (Patient not taking: Reported on 4/24/2024) 90 capsule 1    prochlorperazine (COMPAZINE) 10 MG tablet Take 1 tablet (10 mg) by mouth every 6 hours as needed for nausea or vomiting (Patient not taking: Reported on 4/24/2024) 30 tablet 2       Allergies   Allergen Reactions    Morphine      Other reaction(s): Muscle Weakness    Phenylbutazone Unknown     Immunization History   Administered Date(s) Administered    COVID-19 Bivalent 12+ (Pfizer) 02/13/2023    COVID-19 MONOVALENT 12+ (Pfizer) 02/26/2021, 03/19/2021, 11/22/2021    Influenza (High Dose) 3 valent vaccine 10/03/2014    Pneumo Conj 13-V (2010&after) 09/21/2017    Pneumococcal 23 valent  11/05/2018    TD,PF 7+ (Tenivac) 07/13/2000    Td (Adult), Adsorbed 07/13/2000       Past Medical History:   Diagnosis Date    Carpal tunnel syndrome of left wrist     10/4/2017    Carpal tunnel syndrome of right wrist     10/4/2017    Deep phlebothrombosis during pregnancy     No Comments Provided    Osteoarthritis     Knee    Other specified postprocedural states     10/4/2017    Other specified postprocedural states     1/31/2018    Presence of artificial knee joint     10/3/2014       Past Surgical History:   Procedure Laterality Date    APPENDECTOMY OPEN      No Comments Provided    BONE MARROW BIOPSY, BONE SPECIMEN, NEEDLE/TROCAR Left 4/11/2024    Procedure: Bone marrow biopsy, bone specimen, needle/trocar;  Surgeon: Carlos Salcido MD;  Location: [facility] OR    CARPAL TUNNEL RELEASE RT/LT Left 01/31/2018 01/31/2018,[id number],OTHER,Left    CARPAL TUNNEL RELEASE RT/LT Right 10/04/2017    COLONOSCOPY  09/2014    Arteriovenous malformations of right, transverse and splenic flexure    ENDOSCOPIC STRIPPING VEIN(S)      No Comments Provided    HYSTERECTOMY TOTAL ABDOMINAL  06/1987    with squamous metaplasia and leiomoma    ZZC TOTAL KNEE ARTHROPLASTY Right 10/01/2014    Dr Domingo       SOCIAL HISTORY  History   Smoking Status    Never   Smokeless Tobacco    Never    Social History    Substance and Sexual Activity      Alcohol use: Yes        Alcohol/week: 5.0 standard drinks of alcohol        Comment: slick daily     History   Drug Use No     Comment: Drug use: No       FAMILY HISTORY  Family History   Problem Relation Age of Onset    Heart Disease Mother         Heart Disease    Heart Disease Father         Heart Disease    Heart Disease Brother     Heart Disease Brother     Heart Disease Brother     Heart Disease Brother     Brain Cancer Sister     Heart Disease Sister        PHYSICAL EXAMINATION  LMP 06/01/1987 (Approximate)   Wt Readings from Last 2 Encounters:   04/19/24 50.8 kg (112 lb)   04/11/24 52.6 kg (116  lb)       Laboratory and Imaging:   Latest Reference Range & Units 04/11/24 12:54   WBC 4.0 - 11.0 10e3/uL 4.5   Hemoglobin 11.7 - 15.7 g/dL 9.6 (L)   Hematocrit 35.0 - 47.0 % 30.4 (L)   Platelet Count 150 - 450 10e3/uL 268   (L): Data is abnormally low    ASSESSMENT AND PLAN    1.  Multiple Myeloma.     Initially presented with anemia. Workup revealed elevated light chains and therefore underwent bone marrow biopsy which showed 10% plasma cells. She also has hemoglobin<10,  therefore meets criteria for multiple myeloma.     Discussed diagnosis and recommendations for treatment. FISH and Chromosome Analysis is pending.     Ms. Escamilla has good performance status. She is limited with pain in her right hip. She lives alone and travelling to appointments may be difficult.     Discussed option of an oral regimen. Given her age I would recommend 2 drug regimen. I would recommend treatment with Revlimid 10 mg day 1-14 of 21 day cycle and Dexamethasone 20 mg every week. She is already of xarelto for cardiac reasons. This is also be useful for prophylaxis against VTE. Will also start her on daily Bactrim DS for PJP prophylaxis. I also discussed will get a PET scan to evaluate for bone involvement. Discuss Bone directed therapy based on findings.     I spoke to her again today and her daughter Tereza. We spoke again regarding her diagnosis, recommendations for treatment and natural history of the disease without treatment. We again went over the side effects and rationale for the treatment above.     Will arrange for weekly CBCD, CMP to be checked via home care and help with medication management.     Phone duration: 25 mins.     Total time spent on the patient on day of encounter was 48 minutes doing chart review, review of test results, interpretation of results, patient visit and documentation.       Pretty Briseno MD

## 2024-04-24 NOTE — NURSING NOTE
"Oncology Rooming Note   Telephone Visit     April 24, 2024 11:16 AM   Merlyn Escamilla is a 89 year old female who presents for:    Chief Complaint   Patient presents with    Oncology Clinic Visit     Follow up Multiple myeloma, remission status unspecified      Initial Vitals: LMP 06/01/1987 (Approximate)  Estimated body mass index is 21.16 kg/m  as calculated from the following:    Height as of 4/11/24: 1.549 m (5' 1\").    Weight as of 4/19/24: 50.8 kg (112 lb). There is no height or weight on file to calculate BSA.  Severe Pain (7) Comment: groin   Patient's last menstrual period was 06/01/1987 (approximate).  Allergies reviewed: Yes  Medications reviewed: Yes    Medications: Medication refills not needed today.  Pharmacy name entered into [employer]:    [facility] - [place], [place] - [address]  [facility] - [place], [place] - [address]    Frailty Screening:   Is the patient here for a new oncology consult visit in cancer care? 2. No      Clinical concerns: none       Evette Martinez LPN             "

## 2024-04-25 NOTE — TELEPHONE ENCOUNTER
A face to face has been scheduled with Dr. Dinero on Monday 4/29 at 1040 as a virtual visit. A home care nurse will assist her with this as well as admitting her to home care.  Writer has notified oncology nurse, Ana Maria, patients daughter Tereza and Dr. Briseno. Dr. Briseno states to start lab work approx. One week after patient starts the chemo medication.  She will have her nurse, Deborah, notify us of when and what labs to draw.  Home care will monitor blood work, and for any side effects following chemo med start for a few weeks.     Yessica Nichole LPN on 4/25/2024 at 1:48 PM

## 2024-04-29 ENCOUNTER — PATIENT OUTREACH (OUTPATIENT)
Dept: ONCOLOGY | Facility: OTHER | Age: 89
End: 2024-04-29

## 2024-04-29 ENCOUNTER — VIRTUAL VISIT (OUTPATIENT)
Dept: FAMILY MEDICINE | Facility: OTHER | Age: 89
End: 2024-04-29
Attending: FAMILY MEDICINE
Payer: COMMERCIAL

## 2024-04-29 DIAGNOSIS — I71.21 ANEURYSM OF ASCENDING AORTA WITHOUT RUPTURE (H): ICD-10-CM

## 2024-04-29 DIAGNOSIS — M16.11 PRIMARY OSTEOARTHRITIS OF RIGHT HIP: ICD-10-CM

## 2024-04-29 DIAGNOSIS — C90.00 MULTIPLE MYELOMA NOT HAVING ACHIEVED REMISSION (H): Primary | ICD-10-CM

## 2024-04-29 PROCEDURE — G0180 MD CERTIFICATION HHA PATIENT: HCPCS | Performed by: FAMILY MEDICINE

## 2024-04-29 PROCEDURE — 99214 OFFICE O/P EST MOD 30 MIN: CPT | Mod: 95 | Performed by: FAMILY MEDICINE

## 2024-04-29 NOTE — PROGRESS NOTES
Spoke with patient. She is somewhat confused, but states that she did start Revlimid today. Writer asked that home care nurse call to discuss what medications from oncology she started today as patient was unsure.    Clarissa Hester RN...........4/29/2024 12:19 PM

## 2024-04-29 NOTE — PROGRESS NOTES
Merlyn is a 89 year old who is being evaluated via a billable video visit.    How would you like to obtain your AVS? Mail a copy  If the video visit is dropped, the invitation should be resent by: Other e-mail:    Will anyone else be joining your video visit? No      Assessment & Plan     Multiple myeloma not having achieved remission (H)  Labs per hematology.  Once her labs are normalized could consider hip replacement as she has been cleared by cardiology.  - Home Care Referral    Primary osteoarthritis of right hip  Will also refer to home PT and OT to help with therapeutic exercise in preparation of potential hip replacement in the future  - Home Care Referral    Aneurysm of ascending aorta without rupture (H24)  Reviewed last cardiology note which recommends no repair.  They are comfortable with her proceeding with elective hip replacement in the future if needed.                Documentation of a Face-to-Face Physician Encounter April 29, 2024    Merlyn DIAZ Andi  8/22/1933  [id number]    I certify that this patient is under my care and that I, or a nurse practitioner or physician's assistant working with me, had a face-to-face encounter that meets the physician face-to-face encounter requirements with this patient on: 4/29/2024.    The encounter with the patient was in whole, or in part, for the following medical condition, which is the primary reason for home health care:  Encounter Diagnoses   Name Primary?    Multiple myeloma not having achieved remission (H) Yes    Primary osteoarthritis of right hip     Aneurysm of ascending aorta without rupture (H24)        I certify that, based on my findings, the following services are medically necessary home health services: Nursing, Occupational Therapy, and Physical Therapy    My clinical findings support the need for the above services because: Unable to leave home without family.  She will need weekly lab draws and would benefit from PT and OT given her end-stage  hip osteoarthritis.    Further, I certify that my clinical findings support that this patient is homebound (i.e. absences from home require considerable and taxing effort and are for medical reasons or [employer] services or infrequently or of short duration when for other reasons) because: Does not drive, unable to travel without the assistance of family members.      Physician signature ___________________________________   April 29, 2024  Physician name: Sharath Dinero MD    Fax ([phone]) or scan/email ([email or web address]) this completed document to [facility] within 24 hours of the referral date.  Questions: [phone]    No follow-ups on file.    Mickey Zuleta is a 89 year old, presenting for the following health issues:  Clinic Care Coordination - Face To Face      Video Start Time: 10:40 AM    HPI     She has a history of right hip osteoarthritis.  She would like to undergo replacement however she was found to have chronic anemia.  After having a bone marrow biopsy she was found to have multiple myeloma.  She has recently seen hematology who would like to start treatment.  She will need weekly blood draws and will get this accomplished under home care.  We are visiting today for face-to-face for home care.        Objective    Vitals - Patient Reported  Systolic (Patient Reported): 110 (recheck)  Diastolic (Patient Reported): 48  Weight (Patient Reported): 50.8 kg (112 lb) (patient reported)  SpO2 (Patient Reported): 99  Pulse (Patient Reported): 68  Pain Score: Extreme Pain (8)  Pain Loc: Hip        Physical Exam   GENERAL: alert and no distress  EYES: Eyes grossly normal to inspection.  No discharge or erythema, or obvious scleral/conjunctival abnormalities.  RESP: No audible wheeze, cough, or visible cyanosis.    SKIN: Visible skin clear. No significant rash, abnormal pigmentation or lesions.  NEURO: Cranial nerves grossly intact.  Mentation and speech appropriate for age.  PSYCH:  Appropriate affect, tone, and pace of words    No results found for any visits on 04/29/24.      Video-Visit Details    Type of service:  Video Visit   Video End Time:10:59 AM  Originating Location (pt. Location): Home    Distant Location (provider location):  On-site  Platform used for Video Visit: Dio  Signed Electronically by: Sharath Dinero MD

## 2024-04-29 NOTE — NURSING NOTE
Patient wishes to do video visit today with her nurse for face to face for homecare services.  Jacqueline Mann LPN.........................4/29/2024  10:39 AM

## 2024-04-30 LAB — PATH REPORT.FINAL DX SPEC: NORMAL

## 2024-05-01 ENCOUNTER — TELEPHONE (OUTPATIENT)
Dept: FAMILY MEDICINE | Facility: OTHER | Age: 89
End: 2024-05-01
Payer: COMMERCIAL

## 2024-05-01 NOTE — PROGRESS NOTES
She states that she is doing pretty good. She is cooking and cleaning and working on puzzles. She is taking the Revlimid and Bactrim as directed. Needs to have weekly labs drawn by homecare nurse. Will notify home care nurse that labs still need to be drawn weekly. Message sent to homecare nurse.  Clarissa Hester RN...........5/1/2024 11:25 AM     No

## 2024-05-01 NOTE — TELEPHONE ENCOUNTER
The physical therapy evaluation and treatment that was ordered was completed on 4/30/24      Request for additional Home Care Physical Therapy orders as follows:      Continuation frequency =   ___1___  x week x  ___4___ week(s)    Effective date = 5/1/24        Interventions include:    Gait Training  Muscle strengthening exercises  Balance training  Transfer Training  Education - home safety  Instruction - home exercise program  Therapeutic exercise  Pain assessment & management  Home safety assessment              Therapist: Sonam Briseno DPT    The occupational therapy evaluation and treatment that was ordered was completed on 4/30    Request for additional Home Care Occupational Therapy orders as follows:        Continuation frequency =   ___1___  x week x  ___1___ week(s)    Effective date = 5/1/24      Continuation frequency =   ___2___  x week x  ___3___ week(s)    Effective date = 5/6/24    Intervention include:    ADL skills training  Education - home safety  Therapeutic exercise  Instruction - caregiver training  Adaptive equipment           For therapist: Arpita Connor, OTR/L  Please respond with .[email or web address], if you are in agreement. Please sign with your comments and signature, if you are not in agreement.

## 2024-05-04 ENCOUNTER — HEALTH MAINTENANCE LETTER (OUTPATIENT)
Age: 89
End: 2024-05-04

## 2024-05-06 ENCOUNTER — TELEPHONE (OUTPATIENT)
Dept: ONCOLOGY | Facility: OTHER | Age: 89
End: 2024-05-06
Payer: COMMERCIAL

## 2024-05-06 LAB
ALBUMIN SERPL BCG-MCNC: 3.8 G/DL (ref 3.5–5.2)
ALP SERPL-CCNC: 108 U/L (ref 40–150)
ALT SERPL W P-5'-P-CCNC: 9 U/L (ref 0–50)
ANION GAP SERPL CALCULATED.3IONS-SCNC: 12 MMOL/L (ref 7–15)
AST SERPL W P-5'-P-CCNC: 15 U/L (ref 0–45)
BASOPHILS # BLD AUTO: 0 10E3/UL (ref 0–0.2)
BASOPHILS NFR BLD AUTO: 0 %
BILIRUB SERPL-MCNC: 0.6 MG/DL
BUN SERPL-MCNC: 17 MG/DL (ref 8–23)
CALCIUM SERPL-MCNC: 8.5 MG/DL (ref 8.2–9.6)
CHLORIDE SERPL-SCNC: 99 MMOL/L (ref 98–107)
CREAT SERPL-MCNC: 0.97 MG/DL (ref 0.51–0.95)
DEPRECATED HCO3 PLAS-SCNC: 26 MMOL/L (ref 22–29)
EGFRCR SERPLBLD CKD-EPI 2021: 55 ML/MIN/1.73M2
EOSINOPHIL # BLD AUTO: 0.3 10E3/UL (ref 0–0.7)
EOSINOPHIL NFR BLD AUTO: 5 %
ERYTHROCYTE [DISTWIDTH] IN BLOOD BY AUTOMATED COUNT: 16.5 % (ref 10–15)
GLUCOSE SERPL-MCNC: 115 MG/DL (ref 70–99)
HCT VFR BLD AUTO: 32.7 % (ref 35–47)
HGB BLD-MCNC: 10.1 G/DL (ref 11.7–15.7)
IMM GRANULOCYTES # BLD: 0 10E3/UL
IMM GRANULOCYTES NFR BLD: 1 %
LYMPHOCYTES # BLD AUTO: 0.7 10E3/UL (ref 0.8–5.3)
LYMPHOCYTES NFR BLD AUTO: 11 %
MCH RBC QN AUTO: 25.4 PG (ref 26.5–33)
MCHC RBC AUTO-ENTMCNC: 30.9 G/DL (ref 31.5–36.5)
MCV RBC AUTO: 82 FL (ref 78–100)
MONOCYTES # BLD AUTO: 0.4 10E3/UL (ref 0–1.3)
MONOCYTES NFR BLD AUTO: 6 %
NEUTROPHILS # BLD AUTO: 4.6 10E3/UL (ref 1.6–8.3)
NEUTROPHILS NFR BLD AUTO: 77 %
NRBC # BLD AUTO: 0 10E3/UL
NRBC BLD AUTO-RTO: 0 /100
PLATELET # BLD AUTO: 237 10E3/UL (ref 150–450)
POTASSIUM SERPL-SCNC: 4.1 MMOL/L (ref 3.4–5.3)
PROT SERPL-MCNC: 6 G/DL (ref 6.4–8.3)
RBC # BLD AUTO: 3.98 10E6/UL (ref 3.8–5.2)
SODIUM SERPL-SCNC: 137 MMOL/L (ref 135–145)
WBC # BLD AUTO: 6 10E3/UL (ref 4–11)

## 2024-05-06 PROCEDURE — 84450 TRANSFERASE (AST) (SGOT): CPT | Performed by: INTERNAL MEDICINE

## 2024-05-06 PROCEDURE — 85004 AUTOMATED DIFF WBC COUNT: CPT | Performed by: INTERNAL MEDICINE

## 2024-05-06 PROCEDURE — 36415 COLL VENOUS BLD VENIPUNCTURE: CPT | Performed by: INTERNAL MEDICINE

## 2024-05-06 NOTE — TELEPHONE ENCOUNTER
"Oral Chemotherapy Monitoring Program    Primary Oncologist: Dr. Briseno  Drug: Revlimid 10 mg daily x 14 days, 7 days off  Start Date: 4/29/2024    Subjective/Objective:  Merlyn Escamilla is a 89 year old female contacted by phone for a follow-up visit for oral chemotherapy.          4/22/2024    12:00 PM 4/22/2024     1:00 PM 5/6/2024     9:00 AM   ORAL CHEMOTHERAPY   Assessment Type Initial Work up New Teach Initial Follow up   Diagnosis Code Multiple Myeloma Multiple Myeloma Multiple Myeloma   Providers Finn Briseno   Clinic Name/Location [facility]   Drug Name Revlimid (lenalidomide) Revlimid (lenalidomide) Revlimid (lenalidomide)   Dose 10 mg 10 mg 10 mg   Current Schedule Daily Daily Daily   Cycle Details 2 weeks on, 1 week off 2 weeks on, 1 week off 2 weeks on, 1 week off   Start Date of Last Cycle   4/29/2024   Planned next cycle start date   5/20/2024   Doses missed in last 2 weeks   0   Adherence Assessment   Adherent   Adverse Effects   No AE identified during assessment   Home BPs   Not applicable   Any new drug interactions?   No   Is the dose as ordered appropriate for the patient?   Yes   Is the patient currently in pain?   No   Has the patient been assessed within the past 6 months for depression?   Yes   Has the patient missed any days of school, work, or other routine activity?   No   Since the last time we talked, have you been hospitalized or used the emergency room?   No       Vitals:  BP:   BP Readings from Last 1 Encounters:   04/19/24 106/60     Wt Readings from Last 1 Encounters:   04/19/24 50.8 kg (112 lb)     Estimated body surface area is 1.48 meters squared as calculated from the following:    Height as of 4/11/24: 1.549 m (5' 1\").    Weight as of 4/19/24: 50.8 kg (112 lb).    Labs:  _  Result Component Current Result Ref Range   Sodium 139 (4/9/2024) 135 - 145 mmol/L     _  Result Component Current Result Ref Range   Potassium 3.9 (4/9/2024) 3.4 - 5.3 mmol/L "     _  Result Component Current Result Ref Range   Calcium 9.1 (4/9/2024) 8.2 - 9.6 mg/dL     No results found for Mag within last 30 days.     No results found for Phos within last 30 days.     No results found for ALBUMIN within last 30 days.     _  Result Component Current Result Ref Range   Urea Nitrogen 17.1 (4/9/2024) 8.0 - 23.0 mg/dL     _  Result Component Current Result Ref Range   Creatinine 0.70 (4/9/2024) 0.51 - 0.95 mg/dL       No results found for AST within last 30 days.     No results found for ALT within last 30 days.     No results found for BILITOTAL within last 30 days.       _  Result Component Current Result Ref Range   WBC Count 4.5 (4/11/2024) 4.0 - 11.0 10e3/uL     _  Result Component Current Result Ref Range   Hemoglobin 9.6 (L) (4/11/2024) 11.7 - 15.7 g/dL     _  Result Component Current Result Ref Range   Platelet Count 268 (4/11/2024) 150 - 450 10e3/uL     No results found for ANC within last 30 days.         Assessment/Plan:  I spoke with patient today. Patient reports doing well on therapy. No issues or concerns reported. Patient is compliant with therapy and no missed doses. Future appointments have been confirmed with patient. We will continue to follow.      Follow-Up:  5/17 Appt with Finn + labs through home health      Milton Dick, PharmD, BCOP  Oncology Pharmacy Manager  [facility]  [phone]

## 2024-05-08 ENCOUNTER — TELEPHONE (OUTPATIENT)
Dept: FAMILY MEDICINE | Facility: OTHER | Age: 89
End: 2024-05-08
Payer: COMMERCIAL

## 2024-05-08 NOTE — TELEPHONE ENCOUNTER
"URGENT: per CMS best practice, response is requested within 24 hours.    Home Care regulation mandates that you are notified about drug discrepancies, interactions & contraindications. Response within a 24 hour timeframe is established by CMS as \"best practice\" for the delivery of home health care. Home Care is required to report if the 24 hour timeframe was met. The home health clinician will contact you again if this timeframe is not met or if the response does not address all concerns.       Situation:        The patient was admitted to [facility]. Upon admission, a med reconciliation was completed to identify any drug discrepancies, interactions or contraindications. Home Care's drug regime review has revealed significant medication issues.     You are being contacted for clarifying orders related to the medication issues.     Background:    Possible Severe Medication Interactions between these medications:    Prochlorperazine & Tramadol   Trimethoprim & lisinopril        Recommendations:    Please evaluate this information and indicate below whether or not changes are required. A copy of the patient's drug interaction/contraindications report is available upon request.       CLINIC NURSE - If changes are made, please update the Epic medication profile.     Lila Martinez RN on 5/8/2024 at 7:46 AM       "

## 2024-05-08 NOTE — TELEPHONE ENCOUNTER
Lab Request:     Due to possible interactions of patients new medications, she is at higher risk of digoxin toxicity (according to [facility] phamacist & interaction report).     Would you like more frequent Digoxin levels checked? If so, when to start and how often?   (Labs are already drawn on Mondays).     Thank you!    Lila Martinez RN on 5/8/2024 at 7:53 AM

## 2024-05-10 ENCOUNTER — TELEPHONE (OUTPATIENT)
Dept: FAMILY MEDICINE | Facility: OTHER | Age: 89
End: 2024-05-10
Payer: COMMERCIAL

## 2024-05-10 DIAGNOSIS — C90.00 MULTIPLE MYELOMA NOT HAVING ACHIEVED REMISSION (H): Primary | ICD-10-CM

## 2024-05-10 DIAGNOSIS — C90.00 MULTIPLE MYELOMA, REMISSION STATUS UNSPECIFIED (H): Primary | ICD-10-CM

## 2024-05-10 DIAGNOSIS — D64.9 ANEMIA, UNSPECIFIED TYPE: ICD-10-CM

## 2024-05-10 DIAGNOSIS — I48.91 ATRIAL FIBRILLATION WITH RVR (H): ICD-10-CM

## 2024-05-10 DIAGNOSIS — D63.0 ANEMIA IN NEOPLASTIC DISEASE: ICD-10-CM

## 2024-05-10 DIAGNOSIS — I50.22 CHRONIC SYSTOLIC HEART FAILURE (H): ICD-10-CM

## 2024-05-10 NOTE — TELEPHONE ENCOUNTER
Dr Dinero reviewed and completed the following home care form for Merlyn Escamilla on 5/13/24.  This covers the certification period effective 4/29/24 to 6/27/24.    Rody Abdul CMA on 5/10/2024 at 11:37 AM

## 2024-05-13 ENCOUNTER — HOSPITAL ENCOUNTER (INPATIENT)
Facility: OTHER | Age: 89
LOS: 1 days | Discharge: ANOTHER HOSPITAL | DRG: 683 | End: 2024-05-14
Attending: FAMILY MEDICINE | Admitting: INTERNAL MEDICINE
Payer: MEDICARE

## 2024-05-13 ENCOUNTER — TELEPHONE (OUTPATIENT)
Dept: ONCOLOGY | Facility: CLINIC | Age: 89
End: 2024-05-13

## 2024-05-13 ENCOUNTER — LAB REQUISITION (OUTPATIENT)
Dept: LAB | Facility: OTHER | Age: 89
DRG: 683 | End: 2024-05-13
Payer: MEDICARE

## 2024-05-13 ENCOUNTER — TELEPHONE (OUTPATIENT)
Dept: FAMILY MEDICINE | Facility: OTHER | Age: 89
End: 2024-05-13

## 2024-05-13 DIAGNOSIS — C90.00 MULTIPLE MYELOMA, REMISSION STATUS UNSPECIFIED (H): Primary | ICD-10-CM

## 2024-05-13 DIAGNOSIS — E87.1 HYPONATREMIA: ICD-10-CM

## 2024-05-13 DIAGNOSIS — Z51.81 ENCOUNTER FOR MONITORING DIGOXIN THERAPY: Primary | ICD-10-CM

## 2024-05-13 DIAGNOSIS — N17.9 ACUTE RENAL FAILURE, UNSPECIFIED ACUTE RENAL FAILURE TYPE (H): Primary | ICD-10-CM

## 2024-05-13 DIAGNOSIS — E87.5 HYPERKALEMIA: ICD-10-CM

## 2024-05-13 DIAGNOSIS — Z79.899 ENCOUNTER FOR MONITORING DIGOXIN THERAPY: Primary | ICD-10-CM

## 2024-05-13 LAB
ALBUMIN SERPL BCG-MCNC: 3.5 G/DL (ref 3.5–5.2)
ALP SERPL-CCNC: 105 U/L (ref 40–150)
ALT SERPL W P-5'-P-CCNC: 10 U/L (ref 0–50)
ANION GAP SERPL CALCULATED.3IONS-SCNC: 15 MMOL/L (ref 7–15)
AST SERPL W P-5'-P-CCNC: 14 U/L (ref 0–45)
BASOPHILS # BLD AUTO: 0 10E3/UL (ref 0–0.2)
BASOPHILS NFR BLD AUTO: 0 %
BILIRUB SERPL-MCNC: 0.5 MG/DL
BUN SERPL-MCNC: 76.5 MG/DL (ref 8–23)
CALCIUM SERPL-MCNC: 8.7 MG/DL (ref 8.2–9.6)
CHLORIDE SERPL-SCNC: 91 MMOL/L (ref 98–107)
CK SERPL-CCNC: 29 U/L (ref 26–192)
CREAT SERPL-MCNC: 4.99 MG/DL (ref 0.51–0.95)
DEPRECATED HCO3 PLAS-SCNC: 21 MMOL/L (ref 22–29)
DIGOXIN SERPL-MCNC: 1.6 NG/ML (ref 0.6–2)
EGFRCR SERPLBLD CKD-EPI 2021: 8 ML/MIN/1.73M2
EOSINOPHIL # BLD AUTO: 0.4 10E3/UL (ref 0–0.7)
EOSINOPHIL NFR BLD AUTO: 8 %
ERYTHROCYTE [DISTWIDTH] IN BLOOD BY AUTOMATED COUNT: 16.9 % (ref 10–15)
GLUCOSE SERPL-MCNC: 123 MG/DL (ref 70–99)
HCT VFR BLD AUTO: 32.7 % (ref 35–47)
HGB BLD-MCNC: 10.4 G/DL (ref 11.7–15.7)
IMM GRANULOCYTES # BLD: 0 10E3/UL
IMM GRANULOCYTES NFR BLD: 1 %
LYMPHOCYTES # BLD AUTO: 0.4 10E3/UL (ref 0.8–5.3)
LYMPHOCYTES NFR BLD AUTO: 8 %
MCH RBC QN AUTO: 25.4 PG (ref 26.5–33)
MCHC RBC AUTO-ENTMCNC: 31.8 G/DL (ref 31.5–36.5)
MCV RBC AUTO: 80 FL (ref 78–100)
MONOCYTES # BLD AUTO: 0.2 10E3/UL (ref 0–1.3)
MONOCYTES NFR BLD AUTO: 5 %
NEUTROPHILS # BLD AUTO: 4 10E3/UL (ref 1.6–8.3)
NEUTROPHILS NFR BLD AUTO: 78 %
NRBC # BLD AUTO: 0 10E3/UL
NRBC BLD AUTO-RTO: 0 /100
PLATELET # BLD AUTO: 155 10E3/UL (ref 150–450)
POTASSIUM SERPL-SCNC: 5.4 MMOL/L (ref 3.4–5.3)
POTASSIUM SERPL-SCNC: 6.1 MMOL/L (ref 3.4–5.3)
PROT SERPL-MCNC: 5.7 G/DL (ref 6.4–8.3)
RBC # BLD AUTO: 4.1 10E6/UL (ref 3.8–5.2)
SODIUM SERPL-SCNC: 127 MMOL/L (ref 135–145)
URATE SERPL-MCNC: 8.1 MG/DL (ref 2.4–5.7)
WBC # BLD AUTO: 5.2 10E3/UL (ref 4–11)

## 2024-05-13 PROCEDURE — 85025 COMPLETE CBC W/AUTO DIFF WBC: CPT | Performed by: FAMILY MEDICINE

## 2024-05-13 PROCEDURE — 258N000003 HC RX IP 258 OP 636: Performed by: FAMILY MEDICINE

## 2024-05-13 PROCEDURE — 84550 ASSAY OF BLOOD/URIC ACID: CPT | Performed by: INTERNAL MEDICINE

## 2024-05-13 PROCEDURE — 93005 ELECTROCARDIOGRAM TRACING: CPT

## 2024-05-13 PROCEDURE — 82550 ASSAY OF CK (CPK): CPT | Performed by: FAMILY MEDICINE

## 2024-05-13 PROCEDURE — 258N000003 HC RX IP 258 OP 636: Performed by: INTERNAL MEDICINE

## 2024-05-13 PROCEDURE — 80053 COMPREHEN METABOLIC PANEL: CPT | Performed by: FAMILY MEDICINE

## 2024-05-13 PROCEDURE — 120N000001 HC R&B MED SURG/OB

## 2024-05-13 PROCEDURE — 99285 EMERGENCY DEPT VISIT HI MDM: CPT | Performed by: FAMILY MEDICINE

## 2024-05-13 PROCEDURE — 96374 THER/PROPH/DIAG INJ IV PUSH: CPT

## 2024-05-13 PROCEDURE — 99285 EMERGENCY DEPT VISIT HI MDM: CPT | Mod: 25

## 2024-05-13 PROCEDURE — 99223 1ST HOSP IP/OBS HIGH 75: CPT | Mod: AI | Performed by: INTERNAL MEDICINE

## 2024-05-13 PROCEDURE — 80162 ASSAY OF DIGOXIN TOTAL: CPT | Performed by: FAMILY MEDICINE

## 2024-05-13 PROCEDURE — 96375 TX/PRO/DX INJ NEW DRUG ADDON: CPT

## 2024-05-13 PROCEDURE — 250N000011 HC RX IP 250 OP 636: Performed by: FAMILY MEDICINE

## 2024-05-13 PROCEDURE — 250N000013 HC RX MED GY IP 250 OP 250 PS 637: Performed by: INTERNAL MEDICINE

## 2024-05-13 PROCEDURE — 36415 COLL VENOUS BLD VENIPUNCTURE: CPT | Performed by: INTERNAL MEDICINE

## 2024-05-13 PROCEDURE — 84132 ASSAY OF SERUM POTASSIUM: CPT | Performed by: INTERNAL MEDICINE

## 2024-05-13 PROCEDURE — 93010 ELECTROCARDIOGRAM REPORT: CPT | Performed by: STUDENT IN AN ORGANIZED HEALTH CARE EDUCATION/TRAINING PROGRAM

## 2024-05-13 PROCEDURE — 250N000009 HC RX 250: Performed by: FAMILY MEDICINE

## 2024-05-13 PROCEDURE — 36415 COLL VENOUS BLD VENIPUNCTURE: CPT | Performed by: FAMILY MEDICINE

## 2024-05-13 PROCEDURE — 999N000157 HC STATISTIC RCP TIME EA 10 MIN

## 2024-05-13 RX ORDER — ALLOPURINOL 100 MG/1
100 TABLET ORAL DAILY
Status: DISCONTINUED | OUTPATIENT
Start: 2024-05-13 | End: 2024-05-14 | Stop reason: HOSPADM

## 2024-05-13 RX ORDER — SODIUM CHLORIDE 9 MG/ML
INJECTION, SOLUTION INTRAVENOUS CONTINUOUS
Status: DISCONTINUED | OUTPATIENT
Start: 2024-05-13 | End: 2024-05-14 | Stop reason: HOSPADM

## 2024-05-13 RX ORDER — ONDANSETRON 4 MG/1
4 TABLET, ORALLY DISINTEGRATING ORAL EVERY 6 HOURS PRN
Status: DISCONTINUED | OUTPATIENT
Start: 2024-05-13 | End: 2024-05-14 | Stop reason: HOSPADM

## 2024-05-13 RX ORDER — AMOXICILLIN 250 MG
1 CAPSULE ORAL 2 TIMES DAILY PRN
Status: DISCONTINUED | OUTPATIENT
Start: 2024-05-13 | End: 2024-05-14 | Stop reason: HOSPADM

## 2024-05-13 RX ORDER — SODIUM POLYSTYRENE SULFONATE 15 G/60ML
30 SUSPENSION ORAL; RECTAL ONCE
Status: DISCONTINUED | OUTPATIENT
Start: 2024-05-13 | End: 2024-05-13

## 2024-05-13 RX ORDER — ACETAMINOPHEN 325 MG/1
650 TABLET ORAL EVERY 4 HOURS PRN
Status: DISCONTINUED | OUTPATIENT
Start: 2024-05-13 | End: 2024-05-14 | Stop reason: HOSPADM

## 2024-05-13 RX ORDER — LENALIDOMIDE 10 MG/1
10 CAPSULE ORAL DAILY
Qty: 14 CAPSULE | Refills: 0 | Status: ON HOLD | OUTPATIENT
Start: 2024-05-20 | End: 2024-05-14

## 2024-05-13 RX ORDER — NALOXONE HYDROCHLORIDE 0.4 MG/ML
0.2 INJECTION, SOLUTION INTRAMUSCULAR; INTRAVENOUS; SUBCUTANEOUS
Status: DISCONTINUED | OUTPATIENT
Start: 2024-05-13 | End: 2024-05-14 | Stop reason: HOSPADM

## 2024-05-13 RX ORDER — CALCIUM GLUCONATE 94 MG/ML
0.5 INJECTION, SOLUTION INTRAVENOUS ONCE
Status: COMPLETED | OUTPATIENT
Start: 2024-05-13 | End: 2024-05-13

## 2024-05-13 RX ORDER — NALOXONE HYDROCHLORIDE 0.4 MG/ML
0.4 INJECTION, SOLUTION INTRAMUSCULAR; INTRAVENOUS; SUBCUTANEOUS
Status: DISCONTINUED | OUTPATIENT
Start: 2024-05-13 | End: 2024-05-14 | Stop reason: HOSPADM

## 2024-05-13 RX ORDER — LIDOCAINE 40 MG/G
CREAM TOPICAL
Status: DISCONTINUED | OUTPATIENT
Start: 2024-05-13 | End: 2024-05-14 | Stop reason: HOSPADM

## 2024-05-13 RX ORDER — AMOXICILLIN 250 MG
2 CAPSULE ORAL 2 TIMES DAILY PRN
Status: DISCONTINUED | OUTPATIENT
Start: 2024-05-13 | End: 2024-05-14 | Stop reason: HOSPADM

## 2024-05-13 RX ORDER — LANOLIN ALCOHOL/MO/W.PET/CERES
3 CREAM (GRAM) TOPICAL
Status: DISCONTINUED | OUTPATIENT
Start: 2024-05-13 | End: 2024-05-14

## 2024-05-13 RX ORDER — ALLOPURINOL 100 MG/1
200 TABLET ORAL DAILY
Status: DISCONTINUED | OUTPATIENT
Start: 2024-05-13 | End: 2024-05-13 | Stop reason: DRUGHIGH

## 2024-05-13 RX ORDER — DEXAMETHASONE 4 MG/1
20 TABLET ORAL WEEKLY
Qty: 40 TABLET | Refills: 0 | Status: ON HOLD | OUTPATIENT
Start: 2024-05-20 | End: 2024-05-14

## 2024-05-13 RX ORDER — ACETAMINOPHEN 650 MG/1
650 SUPPOSITORY RECTAL EVERY 4 HOURS PRN
Status: DISCONTINUED | OUTPATIENT
Start: 2024-05-13 | End: 2024-05-14 | Stop reason: HOSPADM

## 2024-05-13 RX ORDER — ALBUTEROL SULFATE 0.83 MG/ML
10 SOLUTION RESPIRATORY (INHALATION) ONCE
Status: COMPLETED | OUTPATIENT
Start: 2024-05-13 | End: 2024-05-13

## 2024-05-13 RX ORDER — ONDANSETRON 2 MG/ML
4 INJECTION INTRAMUSCULAR; INTRAVENOUS EVERY 6 HOURS PRN
Status: DISCONTINUED | OUTPATIENT
Start: 2024-05-13 | End: 2024-05-14 | Stop reason: HOSPADM

## 2024-05-13 RX ADMIN — CALCIUM GLUCONATE 0.5 G: 98 INJECTION, SOLUTION INTRAVENOUS at 16:59

## 2024-05-13 RX ADMIN — SODIUM CHLORIDE: 9 INJECTION, SOLUTION INTRAVENOUS at 18:47

## 2024-05-13 RX ADMIN — SODIUM CHLORIDE 500 ML: 9 INJECTION, SOLUTION INTRAVENOUS at 17:01

## 2024-05-13 RX ADMIN — ALBUTEROL SULFATE 10 MG: 2.5 SOLUTION RESPIRATORY (INHALATION) at 16:53

## 2024-05-13 RX ADMIN — SODIUM BICARBONATE 25 MEQ: 84 INJECTION, SOLUTION INTRAVENOUS at 17:00

## 2024-05-13 RX ADMIN — SODIUM CHLORIDE: 9 INJECTION, SOLUTION INTRAVENOUS at 22:50

## 2024-05-13 RX ADMIN — TRAMADOL HYDROCHLORIDE 25 MG: 50 TABLET, COATED ORAL at 20:51

## 2024-05-13 RX ADMIN — ALLOPURINOL 100 MG: 100 TABLET ORAL at 20:35

## 2024-05-13 ASSESSMENT — ACTIVITIES OF DAILY LIVING (ADL)
ADLS_ACUITY_SCORE: 37
ADLS_ACUITY_SCORE: 40
ADLS_ACUITY_SCORE: 35
ADLS_ACUITY_SCORE: 37
ADLS_ACUITY_SCORE: 40
ADLS_ACUITY_SCORE: 33
ADLS_ACUITY_SCORE: 33

## 2024-05-13 ASSESSMENT — COLUMBIA-SUICIDE SEVERITY RATING SCALE - C-SSRS
1. IN THE PAST MONTH, HAVE YOU WISHED YOU WERE DEAD OR WISHED YOU COULD GO TO SLEEP AND NOT WAKE UP?: NO
2. HAVE YOU ACTUALLY HAD ANY THOUGHTS OF KILLING YOURSELF IN THE PAST MONTH?: NO
6. HAVE YOU EVER DONE ANYTHING, STARTED TO DO ANYTHING, OR PREPARED TO DO ANYTHING TO END YOUR LIFE?: NO

## 2024-05-13 ASSESSMENT — ENCOUNTER SYMPTOMS
CHEST TIGHTNESS: 0
FEVER: 0
PALPITATIONS: 0
CHILLS: 0
ABDOMINAL PAIN: 0

## 2024-05-13 NOTE — TELEPHONE ENCOUNTER
Oral Chemotherapy Monitoring Program    Medication: Revlimid  Rx:  10 mg PO daily on days 1 -14 of 21 day cycle #14    Auth #: [id number]  Risk Category: Adult female NOT of reproductive capacity    Routine survey questions reviewed.    Thank you,    Ainsley Velasco  Oncology/Transplant Float [email or web address]  Phone:[phone]  Fax:[phone]

## 2024-05-13 NOTE — ED TRIAGE NOTES
Pt arrives via private vehicle with neighbor for c/o abnormal lab results. When pt was asked which ones, pt did not know, states she was told by her doctor to come in. Pt has been fatigued lately.      Triage Assessment (Adult)       Row Name 05/13/24 1622          Triage Assessment    Airway WDL WDL        Respiratory WDL    Respiratory WDL WDL        Skin Circulation/Temperature WDL    Skin Circulation/Temperature WDL WDL        Cardiac WDL    Cardiac WDL WDL        Peripheral/Neurovascular WDL    Peripheral Neurovascular WDL WDL        Cognitive/Neuro/Behavioral WDL    Cognitive/Neuro/Behavioral WDL WDL

## 2024-05-13 NOTE — H&P
[facility]    History and Physical - Hospitalist Service       Date of Admission:  5/13/2024    Assessment & Plan      Merlyn Escamilla is a 89 year old female admitted on 5/13/2024. She was admitted thru the ED with acute renal failure, severe.    Acute Renal Failure  - Creat 0.97 on 5/6, 4.99 5/13  - Denies NSAIDS, however is on ACE + Lasix, and just started on revelmir which has case reports of causing ARF  - Myeloma also associated with acute / chronic renal failure   - has notice decrease UO in the past several days, clinically dry on exam  Plan:  Place roche  Renal U/S  Normal Saline, follow labs  Stat Uric Acid  Recheck K at 2000, if elevated yet give kayexalate     Will hold all home meds at this time.        Diet:  Regular  DVT Prophylaxis: DOAC  Roche Catheter: Not present  Lines: None     Cardiac Monitoring: None  Code Status:  Full    Clinically Significant Risk Factors Present on Admission        # Hyperkalemia: Highest K = 6.1 mmol/L in last 2 days, will monitor as appropriate  # Hyponatremia: Lowest Na = 127 mmol/L in last 2 days, will monitor as appropriate       # Drug Induced Coagulation Defect: home medication list includes an anticoagulant medication   # Acute Kidney Injury, unspecified: based on a >150% or 0.3 mg/dL increase in last creatinine compared to past 90 day average, will monitor renal function  # Hypertension: Home medication list includes antihypertensive(s)                 Disposition Plan     Medically Ready for Discharge: Anticipated in 2-4 Days           Alcon Stewart MD  Hospitalist Service  [facility]  Securely message with [employer] (more info)  Text page via [employer] Paging/Directory     ______________________________________________________________________    Chief Complaint   Weakness / Abnormal Labs    History is obtained from the patient, electronic health record, emergency department physician, and patient's friend    History of Present  "Illness   Merlyn Escamilla is a 89 year old female who lives independently, just recently diagnosed with multiple myeloma and just started on revelemir.  She has essentially a normal BMP on 5/6, and then due to fatigue a BMP was checked today and she was told to come to the ED for \"abnormal labs\".  In the ED her Creat was 0.97, K 6.1, Na 127, CO2 91.  She is on a ACE and Lasix.  She does have some confusion that limits some history, however she does state that she has noticed a decrease in UO in the last several days and has felt dry, although she doesn't think she has decreased her fluid intake.       Past Medical History    Past Medical History:   Diagnosis Date    Carpal tunnel syndrome of left wrist     10/4/2017    Carpal tunnel syndrome of right wrist     10/4/2017    Deep phlebothrombosis during pregnancy     No Comments Provided    Osteoarthritis     Knee    Other specified postprocedural states     10/4/2017    Other specified postprocedural states     1/31/2018    Presence of artificial knee joint     10/3/2014       Past Surgical History   Past Surgical History:   Procedure Laterality Date    APPENDECTOMY OPEN      No Comments Provided    BONE MARROW BIOPSY, BONE SPECIMEN, NEEDLE/TROCAR Left 4/11/2024    Procedure: Bone marrow biopsy, bone specimen, needle/trocar;  Surgeon: Carlos Salcido MD;  Location: [facility] OR    CARPAL TUNNEL RELEASE RT/LT Left 01/31/2018 01/31/2018,[id number],OTHER,Left    CARPAL TUNNEL RELEASE RT/LT Right 10/04/2017    COLONOSCOPY  09/2014    Arteriovenous malformations of right, transverse and splenic flexure    ENDOSCOPIC STRIPPING VEIN(S)      No Comments Provided    HYSTERECTOMY TOTAL ABDOMINAL  06/1987    with squamous metaplasia and leiomoma    ZC TOTAL KNEE ARTHROPLASTY Right 10/01/2014    Dr Domingo       Prior to Admission Medications   Prior to Admission Medications   Prescriptions Last Dose Informant Patient Reported? Taking?   LENalidomide (REVLIMID) 10 MG CAPS capsule   No " No   Sig: Take 1 capsule (10 mg) by mouth daily for 14 days Take on Days 1 through 14 of 21-day cycle.   dexAMETHasone (DECADRON) 4 MG tablet   No No   Sig: Take 5 tablets (20 mg) by mouth once a week Once a week with food on Days 1,8,15.   digoxin (LANOXIN) 125 MCG tablet   No No   Sig: Take 1 tablet (125 mcg) by mouth daily   ferrous sulfate (FEROSUL) 325 (65 Fe) MG tablet   Yes No   Sig: Take 325 mg by mouth daily (with breakfast) Patient unsure how many mg she is taking.   furosemide (LASIX) 20 MG tablet   No No   Sig: Take 2 tablets (40 mg) by mouth daily   lisinopril (ZESTRIL) 5 MG tablet   No No   Sig: Take 1 tablet (5 mg) by mouth daily   metoprolol succinate ER (TOPROL XL) 100 MG 24 hr tablet   No No   Sig: TAKE 1 TABLET BY MOUTH AT BEDTIME WITH 25 MG TABLET. TOTAL DAILY DOSE = 125 MG.   metoprolol succinate ER (TOPROL XL) 25 MG 24 hr tablet   No No   Sig: TAKE 1 TABLET BY MOUTH AT BEDTIME WITH 100 MG TABLET. TOTAL DAILY DOSE = 125 MG.   omeprazole (PRILOSEC) 20 MG DR capsule   No No   Sig: Take 1 capsule (20 mg) by mouth daily   prochlorperazine (COMPAZINE) 10 MG tablet   No No   Sig: Take 1 tablet (10 mg) by mouth every 6 hours as needed for nausea or vomiting   Patient not taking: Reported on 4/29/2024   rivaroxaban ANTICOAGULANT (XARELTO ANTICOAGULANT) 15 MG TABS tablet   No No   Sig: Take 1 tablet (15 mg) by mouth daily (with dinner)   sulfamethoxazole-trimethoprim (BACTRIM DS) 800-160 MG tablet   No No   Sig: Take 1 tablet by mouth daily   traMADol (ULTRAM) 50 MG tablet   No No   Sig: Take 0.5-1 tablets (25-50 mg) by mouth every 6 hours as needed for severe pain   vitamin D3 (CHOLECALCIFEROL) 50 mcg (2000 units) tablet   Yes No   Sig: Take 1 tablet by mouth daily Patient unsure of how many units.      Facility-Administered Medications: None        Review of Systems    The 10 point Review of Systems is negative other than noted in the HPI or here.      Physical Exam   Vital Signs: Temp: 98  F (36.7  C)    BP: 104/61 Pulse: 69   Resp: 16 SpO2: 98 % O2 Device: None (Room air)    Weight: 110 lbs 0 oz    Constitutional: awake, alert, cooperative, no apparent distress, and appears stated age  Eyes: Lids and lashes normal, pupils equal, round and reactive to light, extra ocular muscles intact, sclera clear, conjunctiva normal.  Her mouth is very dry.  Hematologic / Lymphatic: no cervical lymphadenopathy  Respiratory: No increased work of breathing, good air exchange, clear to auscultation bilaterally, no crackles or wheezing  Cardiovascular: Normal apical impulse, regular rate and rhythm, normal S1 and S2, no S3 or S4, and no murmur noted. No JVD  GI: No scars, normal bowel sounds, soft, non-distended, non-tender, no masses palpated, no hepatosplenomegally  Skin: no bruising or bleeding, normal skin color, texture, turgor, and no redness, warmth, or swelling  Musculoskeletal: There is no redness, warmth, or swelling of the joints.  Full range of motion noted.  Motor strength is 5 out of 5 all extremities bilaterally.  Tone is normal.  Neuropsychiatric: General: normal, calm, and normal eye contact    Medical Decision Making       75 MINUTES SPENT BY ME on the date of service doing chart review, history, exam, documentation & further activities per the note.      Data     I have personally reviewed the following data over the past 24 hrs:    5.2  \   10.4 (L)   / 155     127 (L) 91 (L) 76.5 (H) /  123 (H)   6.1 (HH) 21 (L) 4.99 (H) \     ALT: 10 AST: 14 AP: 105 TBILI: 0.5   ALB: 3.5 TOT PROTEIN: 5.7 (L) LIPASE: N/A       Imaging results reviewed over the past 24 hrs:   No results found for this or any previous visit (from the past 24 hour(s)).

## 2024-05-13 NOTE — ED PROVIDER NOTES
History     Chief Complaint   Patient presents with    Abnormal Labs     HPI  Merlyn Escamilla is a 89 year old female who is sent in from clinic for elevated creatinine and acute kidney injury.  I discussed with Dr. Dinero.  Patient has had decreased oral intake recently.  Newly started on oral chemotherapy recently for multiple myeloma.  Patient does live at home independently.Patient is accompanied by her neighbor daughters are on the way apparently from out of town.      Reviewed nurses notes below, similar history is related to me.      Pt arrives via private vehicle with neighbor for c/o abnormal lab results. When pt was asked which ones, pt did not know, states she was told by her doctor to come in. Pt has been fatigued lately   Allergies:  Allergies   Allergen Reactions    Morphine      Other reaction(s): Muscle Weakness    Phenylbutazone Unknown       Problem List:    Patient Active Problem List    Diagnosis Date Noted    Hyperkalemia 05/13/2024     Priority: Medium    Hyponatremia 05/13/2024     Priority: Medium    Acute renal failure, unspecified acute renal failure type (H24) 05/13/2024     Priority: Medium    Multiple myeloma, remission status unspecified (H) 04/19/2024     Priority: Medium    Encounter for monitoring digoxin therapy 11/15/2022     Priority: Medium    Atrial enlargement, bilateral 11/09/2021     Priority: Medium    Nodule of right lung 09/30/2021     Priority: Medium    Moderate tricuspid regurgitation 09/30/2021     Priority: Medium    Chronic systolic heart failure (H) 09/26/2018     Priority: Medium    Aneurysm of ascending aorta without rupture (H24) 08/20/2018     Priority: Medium    Thoracic aortic aneurysm without rupture (H24) 08/09/2018     Priority: Medium    Slow transit constipation 07/31/2018     Priority: Medium    Renal cyst, right 07/31/2018     Priority: Medium     Large, 9.5cm.  Noted incidentally on CT scan.  Urology recommends no further follow up.      Longstanding  persistent atrial fibrillation (H) 07/30/2018     Priority: Medium    Primary osteoarthritis of both first carpometacarpal joints 05/22/2018     Priority: Medium    Primary osteoarthritis of both hands 05/22/2018     Priority: Medium    History of colonic polyps 01/25/2018     Priority: Medium    Primary osteoarthritis of hip 01/25/2018     Priority: Medium     Overview:   Knee      History of carpal tunnel surgery of left wrist 10/04/2017     Priority: Medium    S/P total knee arthroplasty 10/03/2014     Priority: Medium    AVM (arteriovenous malformation) of colon 10/01/2014     Priority: Medium        Past Medical History:    Past Medical History:   Diagnosis Date    Carpal tunnel syndrome of left wrist     Carpal tunnel syndrome of right wrist     Deep phlebothrombosis during pregnancy     Osteoarthritis     Other specified postprocedural states     Other specified postprocedural states     Presence of artificial knee joint        Past Surgical History:    Past Surgical History:   Procedure Laterality Date    APPENDECTOMY OPEN      No Comments Provided    BONE MARROW BIOPSY, BONE SPECIMEN, NEEDLE/TROCAR Left 4/11/2024    Procedure: Bone marrow biopsy, bone specimen, needle/trocar;  Surgeon: Carlos Salcido MD;  Location: [facility] OR    CARPAL TUNNEL RELEASE RT/LT Left 01/31/2018 01/31/2018,[id number],OTHER,Left    CARPAL TUNNEL RELEASE RT/LT Right 10/04/2017    COLONOSCOPY  09/2014    Arteriovenous malformations of right, transverse and splenic flexure    ENDOSCOPIC STRIPPING VEIN(S)      No Comments Provided    HYSTERECTOMY TOTAL ABDOMINAL  06/1987    with squamous metaplasia and leiomoma    ZZC TOTAL KNEE ARTHROPLASTY Right 10/01/2014    Dr Domingo       Family History:    Family History   Problem Relation Age of Onset    Heart Disease Mother         Heart Disease    Heart Disease Father         Heart Disease    Heart Disease Brother     Heart Disease Brother     Heart Disease Brother     Heart Disease Brother      "Brain Cancer Sister     Heart Disease Sister        Social History:  Marital Status:   [5]  Social History     Tobacco Use    Smoking status: Never     Passive exposure: Never    Smokeless tobacco: Never   Vaping Use    Vaping status: Never Used   Substance Use Topics    Alcohol use: Yes     Alcohol/week: 5.0 standard drinks of alcohol     Comment: slick daily    Drug use: No     Comment: Drug use: No        Medications:    [START ON 5/20/2024] dexAMETHasone (DECADRON) 4 MG tablet  digoxin (LANOXIN) 125 MCG tablet  ferrous sulfate (FEROSUL) 325 (65 Fe) MG tablet  furosemide (LASIX) 20 MG tablet  [START ON 5/20/2024] LENalidomide (REVLIMID) 10 MG CAPS capsule  lisinopril (ZESTRIL) 5 MG tablet  metoprolol succinate ER (TOPROL XL) 100 MG 24 hr tablet  metoprolol succinate ER (TOPROL XL) 25 MG 24 hr tablet  omeprazole (PRILOSEC) 20 MG DR capsule  prochlorperazine (COMPAZINE) 10 MG tablet  rivaroxaban ANTICOAGULANT (XARELTO ANTICOAGULANT) 15 MG TABS tablet  sulfamethoxazole-trimethoprim (BACTRIM DS) 800-160 MG tablet  traMADol (ULTRAM) 50 MG tablet  vitamin D3 (CHOLECALCIFEROL) 50 mcg (2000 units) tablet          Review of Systems   Constitutional:  Negative for chills and fever.   Respiratory:  Negative for chest tightness.    Cardiovascular:  Negative for chest pain and palpitations.   Gastrointestinal:  Negative for abdominal pain.       Physical Exam   BP: 104/61  Pulse: 69  Temp: 98  F (36.7  C)  Resp: 16  Height: 154.9 cm (5' 1\")  Weight: 49.9 kg (110 lb)  SpO2: 98 %      Physical Exam  Vitals and nursing note reviewed.   Constitutional:       General: She is not in acute distress.     Appearance: Normal appearance. She is not ill-appearing, toxic-appearing or diaphoretic.   Cardiovascular:      Rate and Rhythm: Normal rate and regular rhythm.   Skin:     Capillary Refill: Capillary refill takes 2 to 3 seconds.   Neurological:      General: No focal deficit present.      Mental Status: She is alert. "         EKG chronic A-fib rate 60    Results for orders placed or performed during the hospital encounter of 05/13/24 (from the past 24 hour(s))   CK total   Result Value Ref Range    CK 29 26 - 192 U/L       Medications   sodium chloride 0.9% BOLUS 500 mL (500 mLs Intravenous $New Bag 5/13/24 1701)   albuterol (PROVENTIL) neb solution 10 mg (10 mg Nebulization $Given 5/13/24 1653)   sodium bicarbonate 8.4 % injection 25 mEq (25 mEq Intravenous $Given 5/13/24 1700)   calcium gluconate 10 % injection 0.5 g (0 g Intravenous Stopped 5/13/24 1706)       Assessments & Plan (with Medical Decision Making)     I have reviewed the nursing notes.    I have reviewed the findings, diagnosis, plan and need for follow up with the patient.  Differential diagnosis of her acute kidney injury is broad and includes dehydration, obstruction, medication complication.  Discussed with Dr. Stewart, will admit here to [facility] to [facility] medical floor.  Patient and family verbalized understanding of plan and are in agreement.    Medical Decision Making  The patient's presentation was of moderate complexity (a chronic illness mild to moderate exacerbation, progression, or side effect of treatment).    The patient's evaluation involved:  discussion of management or test interpretation with another health professional (see separate area of note for details)    The patient's management necessitated moderate risk (prescription drug management including medications given in the ED).        New Prescriptions    No medications on file       Final diagnoses:   Acute renal failure, unspecified acute renal failure type (H24)   Hyperkalemia   Hyponatremia       5/13/2024   [facility]       Silverio Carlos MD  05/13/24 1801       Silverio Carlos MD  05/13/24 1812

## 2024-05-13 NOTE — PROVIDER NOTIFICATION
05/13/24 1838   Initial Information   Patient Belongings remains with patient   Patient Belongings Remaining with Patient clothing;vision aids;jewelry  (4 rings)   Did you bring any home meds/supplements to the hospital?  No     [facility] will make every effort per our policy to help keep your items safe while in the hospital.  If you choose to keep any items at the bedside, we cannot be held responsible for any items that are lost or broken.      List items sent to safe: none    I have reviewed my belongings list on admission and verify that it is correct.     Patient signature_______________________________  Date/Time_____________________    2nd Staff person if patient unable to sign __________________________  Date/Time ______________________      I have received all my belongings noted above at discharge.    Patient signature________________________________  Date/Time  __________________________

## 2024-05-14 ENCOUNTER — APPOINTMENT (OUTPATIENT)
Dept: ULTRASOUND IMAGING | Facility: OTHER | Age: 89
DRG: 683 | End: 2024-05-14
Attending: INTERNAL MEDICINE
Payer: MEDICARE

## 2024-05-14 VITALS
BODY MASS INDEX: 21.86 KG/M2 | OXYGEN SATURATION: 97 % | SYSTOLIC BLOOD PRESSURE: 102 MMHG | HEART RATE: 65 BPM | DIASTOLIC BLOOD PRESSURE: 54 MMHG | TEMPERATURE: 97 F | WEIGHT: 115.8 LBS | HEIGHT: 61 IN | RESPIRATION RATE: 16 BRPM

## 2024-05-14 LAB
ANION GAP SERPL CALCULATED.3IONS-SCNC: 17 MMOL/L (ref 7–15)
ATRIAL RATE - MUSE: 65 BPM
BUN SERPL-MCNC: 84.4 MG/DL (ref 8–23)
CALCIUM SERPL-MCNC: 8.3 MG/DL (ref 8.2–9.6)
CHLORIDE SERPL-SCNC: 94 MMOL/L (ref 98–107)
CREAT SERPL-MCNC: 5.23 MG/DL (ref 0.51–0.95)
DEPRECATED HCO3 PLAS-SCNC: 17 MMOL/L (ref 22–29)
DIASTOLIC BLOOD PRESSURE - MUSE: NORMAL MMHG
EGFRCR SERPLBLD CKD-EPI 2021: 7 ML/MIN/1.73M2
ERYTHROCYTE [DISTWIDTH] IN BLOOD BY AUTOMATED COUNT: 16.7 % (ref 10–15)
GLUCOSE SERPL-MCNC: 138 MG/DL (ref 70–99)
HCT VFR BLD AUTO: 26.7 % (ref 35–47)
HGB BLD-MCNC: 8.7 G/DL (ref 11.7–15.7)
HOLD SPECIMEN: NORMAL
HOLD SPECIMEN: NORMAL
INTERPRETATION ECG - MUSE: NORMAL
MAGNESIUM SERPL-MCNC: 2.4 MG/DL (ref 1.7–2.3)
MCH RBC QN AUTO: 25.2 PG (ref 26.5–33)
MCHC RBC AUTO-ENTMCNC: 32.6 G/DL (ref 31.5–36.5)
MCV RBC AUTO: 77 FL (ref 78–100)
P AXIS - MUSE: NORMAL DEGREES
PHOSPHATE SERPL-MCNC: 6.4 MG/DL (ref 2.5–4.5)
PLATELET # BLD AUTO: 123 10E3/UL (ref 150–450)
POTASSIUM SERPL-SCNC: 5.9 MMOL/L (ref 3.4–5.3)
PR INTERVAL - MUSE: NORMAL MS
QRS DURATION - MUSE: 108 MS
QT - MUSE: 412 MS
QTC - MUSE: 412 MS
R AXIS - MUSE: -14 DEGREES
RBC # BLD AUTO: 3.45 10E6/UL (ref 3.8–5.2)
SODIUM SERPL-SCNC: 128 MMOL/L (ref 135–145)
SYSTOLIC BLOOD PRESSURE - MUSE: NORMAL MMHG
T AXIS - MUSE: -6 DEGREES
VENTRICULAR RATE- MUSE: 60 BPM
WBC # BLD AUTO: 2.8 10E3/UL (ref 4–11)

## 2024-05-14 PROCEDURE — 250N000013 HC RX MED GY IP 250 OP 250 PS 637: Performed by: INTERNAL MEDICINE

## 2024-05-14 PROCEDURE — 76770 US EXAM ABDO BACK WALL COMP: CPT

## 2024-05-14 PROCEDURE — 84100 ASSAY OF PHOSPHORUS: CPT | Performed by: INTERNAL MEDICINE

## 2024-05-14 PROCEDURE — 258N000003 HC RX IP 258 OP 636: Performed by: INTERNAL MEDICINE

## 2024-05-14 PROCEDURE — 80048 BASIC METABOLIC PNL TOTAL CA: CPT | Performed by: INTERNAL MEDICINE

## 2024-05-14 PROCEDURE — 99239 HOSP IP/OBS DSCHRG MGMT >30: CPT | Performed by: INTERNAL MEDICINE

## 2024-05-14 PROCEDURE — 85027 COMPLETE CBC AUTOMATED: CPT | Performed by: INTERNAL MEDICINE

## 2024-05-14 PROCEDURE — 83735 ASSAY OF MAGNESIUM: CPT | Performed by: INTERNAL MEDICINE

## 2024-05-14 PROCEDURE — 250N000009 HC RX 250: Performed by: INTERNAL MEDICINE

## 2024-05-14 PROCEDURE — 36415 COLL VENOUS BLD VENIPUNCTURE: CPT | Performed by: INTERNAL MEDICINE

## 2024-05-14 RX ORDER — LANOLIN ALCOHOL/MO/W.PET/CERES
3 CREAM (GRAM) TOPICAL
Status: DISCONTINUED | OUTPATIENT
Start: 2024-05-14 | End: 2024-05-14 | Stop reason: HOSPADM

## 2024-05-14 RX ORDER — LANOLIN ALCOHOL/MO/W.PET/CERES
3 CREAM (GRAM) TOPICAL
Status: DISCONTINUED | OUTPATIENT
Start: 2024-05-14 | End: 2024-05-14

## 2024-05-14 RX ORDER — ALLOPURINOL 100 MG/1
100 TABLET ORAL DAILY
Status: SHIPPED
Start: 2024-05-15 | End: 2024-05-28

## 2024-05-14 RX ADMIN — Medication 3 MG: at 02:07

## 2024-05-14 RX ADMIN — SODIUM BICARBONATE 75 ML/HR: 84 INJECTION INTRAVENOUS at 10:32

## 2024-05-14 RX ADMIN — SODIUM ZIRCONIUM CYCLOSILICATE 10 G: 10 POWDER, FOR SUSPENSION ORAL at 07:46

## 2024-05-14 RX ADMIN — SODIUM CHLORIDE: 9 INJECTION, SOLUTION INTRAVENOUS at 07:58

## 2024-05-14 RX ADMIN — ALLOPURINOL 100 MG: 100 TABLET ORAL at 09:29

## 2024-05-14 RX ADMIN — ACETAMINOPHEN 650 MG: 325 TABLET, FILM COATED ORAL at 10:42

## 2024-05-14 RX ADMIN — TRAMADOL HYDROCHLORIDE 25 MG: 50 TABLET, COATED ORAL at 07:56

## 2024-05-14 ASSESSMENT — ACTIVITIES OF DAILY LIVING (ADL)
ADLS_ACUITY_SCORE: 37
ADLS_ACUITY_SCORE: 38
ADLS_ACUITY_SCORE: 37
ADLS_ACUITY_SCORE: 37
ADLS_ACUITY_SCORE: 38
ADLS_ACUITY_SCORE: 37

## 2024-05-14 NOTE — PROGRESS NOTES
"NS ADMISSION NOTE    Patient admitted to room 331 at approximately 1835 via wheel chair from emergency room. Patient was accompanied by transport tech.     Verbal SBAR report received from [place] prior to patient arrival.     Patient ambulated to bed with one assist. Patient alert and oriented X 3. The patient is not having any pain.  . Admission vital signs: Blood pressure 110/53, pulse 72, temperature 97.2  F (36.2  C), temperature source Tympanic, resp. rate 18, height 1.549 m (5' 1\"), weight 52.3 kg (115 lb 6.4 oz), last menstrual period 06/01/1987, SpO2 99%, not currently breastfeeding. Patient was oriented to plan of care, call light, bed controls, tv, telephone, bathroom, and visiting hours.     Risk Assessment    The following safety risks were identified during admission: fall. Yellow risk band applied: YES.     Skin Initial Assessment    This writer admitted this patient and completed a full skin assessment and Aman score in the Adult PCS flowsheet. Appropriate interventions initiated as needed.     Education    Patient has a [place] to Observation order: No  Observation education completed and documented: No      Jane Huff RN      "

## 2024-05-14 NOTE — DISCHARGE SUMMARY
[facility]  Hospitalist Discharge Summary      Date of Admission:  5/13/2024  Date of Discharge:  5/14/2024  Discharging Provider: Alcon Stewart MD  Discharge Service: Hospitalist Service    Discharge Diagnoses   Recent Diagnosis of Multiple Myeloma, Admitted with Acute Renal Failure       Follow-ups Needed After Discharge   Patient is being transferred to [facility]     Unresulted Labs Ordered in the Past 30 Days of this Admission       No orders found for last 31 day(s).            Discharge Disposition   Transferred to [facility]   Condition at discharge: Serious    Hospital Course      Merlyn Escamilla is a 89 year old female admitted on 5/13/2024. She was admitted thru the ED with acute renal failure, severe.    Acute Renal Failure  - Creat 0.97 on 5/6, 4.99 5/13, 5.23 on 5/14  - Denies NSAIDS, however is on ACE + Lasix, and just started on revelmir which has case reports of causing ARF.  Recently also placed on bacrim for PCP prophylaxis  - Renal U/S negative for obstruction  - UA still pending   - Uric Acid 8.1 > started on allopurinol 5/13  - K 5.9 this morning, given Lokemla 10 mg x 1  - today developing acidosis with increased AG  - I discussed with her oncologist, possible tumor lysis with MM kidney and recommended transfer to tertiary care center  - I discussed with hospitalist (Dr. Lujan) and Nephrologist at [facility] who agreed to accept  - recommended to start a bicarb gtt which has been ordered    Chronic A Fib  - rate controlled historically at admit with Dig and Metoprolol - both held at admit   - dig level 1.6 on 5/13  - on DOAC for stroke prevention - also held at admit    Multiple Myleoma  - just diagnosed the last week of April and started on revelmir and DEXA  - all counts worse today - will see oncology at receiving facility     Consultations This Hospital Stay   PHYSICAL THERAPY ADULT IP CONSULT  OCCUPATIONAL THERAPY ADULT IP CONSULT  SOCIAL WORK IP  CONSULT    Code Status   Full Code    Time Spent on this Encounter   I, Alcon Stewart MD, personally saw the patient today and spent greater than 30 minutes discharging this patient.       Alcon Stewart MD  [facility]  [address]  [place] [address]  Phone: [phone]  Fax: [phone]  ______________________________________________________________________    Physical Exam   Vital Signs: Temp: 97.2  F (36.2  C) Temp src: Tympanic BP: 119/53 Pulse: 65   Resp: 18 SpO2: 99 % O2 Device: None (Room air)    Weight: 115 lbs 12.8 oz  ----------------------------------------------------------------------------------------       Primary Care Physician   Sharath Dinero    Discharge Orders      Reason for your hospital stay    Acute Renal Failure, Multiple Myeloma     Full Code       Significant Results and Procedures   Results for orders placed or performed during the hospital encounter of 05/13/24   US Renal Complete Non-Vascular    Narrative    EXAMINATION: US RENAL COMPLETE NON-VASCULAR, 5/14/2024 8:46 AM     COMPARISON: CT chest abdomen pelvis 7/29/2019    HISTORY: Acute Renal Failure, rule out obstruction    FINDINGS:    Right kidney: Measures 12.6 cm in length. Parenchyma is of normal  thickness and echogenicity. No soft tissue mass. No hydronephrosis.  Exophytic renal cortical cyst measuring 5.3 x 3.3 x 2.6 cm. Trace  perinephric fluid.    Left kidney: Measures 11.4 cm in length. Parenchyma is of normal  thickness and echogenicity. No soft tissue mass. No hydronephrosis.  Trace perinephric fluid.     Bladder: Unremarkable.    Other: Trace fluid along the liver.      Impression    IMPRESSION:  1.  No acute abnormality of the kidneys, no hydronephrosis.  2.  Trace ascites and perinephric fluid.    SANIA HERNANDEZ MD         SYSTEM ID:  [id number]       Discharge Medications   Current Discharge Medication List        START taking these medications    Details   allopurinol (ZYLOPRIM) 100 MG  tablet Take 1 tablet (100 mg) by mouth daily    Associated Diagnoses: Acute renal failure, unspecified acute renal failure type (H24)      sodium bicarbonate 150 mEq in D5W 1,150 mL Inject 75 mL/hr into the vein continuous    Associated Diagnoses: Acute renal failure, unspecified acute renal failure type (H24)           STOP taking these medications       dexAMETHasone (DECADRON) 4 MG tablet Comments:   Reason for Stopping:         digoxin (LANOXIN) 125 MCG tablet Comments:   Reason for Stopping:         ferrous sulfate (FEROSUL) 325 (65 Fe) MG tablet Comments:   Reason for Stopping:         furosemide (LASIX) 20 MG tablet Comments:   Reason for Stopping:         LENalidomide (REVLIMID) 10 MG CAPS capsule Comments:   Reason for Stopping:         lisinopril (ZESTRIL) 5 MG tablet Comments:   Reason for Stopping:         metoprolol succinate ER (TOPROL XL) 100 MG 24 hr tablet Comments:   Reason for Stopping:         metoprolol succinate ER (TOPROL XL) 25 MG 24 hr tablet Comments:   Reason for Stopping:         omeprazole (PRILOSEC) 20 MG DR capsule Comments:   Reason for Stopping:         prochlorperazine (COMPAZINE) 10 MG tablet Comments:   Reason for Stopping:         rivaroxaban ANTICOAGULANT (XARELTO ANTICOAGULANT) 15 MG TABS tablet Comments:   Reason for Stopping:         sulfamethoxazole-trimethoprim (BACTRIM DS) 800-160 MG tablet Comments:   Reason for Stopping:         traMADol (ULTRAM) 50 MG tablet Comments:   Reason for Stopping:         vitamin D3 (CHOLECALCIFEROL) 50 mcg (2000 units) tablet Comments:   Reason for Stopping:             Allergies   Allergies   Allergen Reactions    Morphine      Other reaction(s): Muscle Weakness    Phenylbutazone Unknown

## 2024-05-14 NOTE — PROGRESS NOTES
05/14/24 1133   Output (mL)   Voided (mL) 200 mL   Urine Assessment   Bladder Scan Volume (mL) 22 ml  (PVR)     Patient able to void before transfer.  Patient does not have any urine retention at this time

## 2024-05-14 NOTE — PHARMACY - DISCHARGE MEDICATION RECONCILIATION AND EDUCATION
Pharmacy:  Discharge Counseling and Medication Reconciliation    Merlyn DIAZ Andi  [address] [place] [id number] [phone] (home)   89 year old female  PCP: Sharath Dinero    Allergies: Morphine and Phenylbutazone    Discharge Counseling:    Pharmacist did not meet with patient/family today as patient is discharging to a higher level of care- [facility]      Discharge Medication Reconciliation:    Thank you for the consult.    Evette Ayala RPH........May 14, 2024 10:15 AM

## 2024-05-14 NOTE — PROGRESS NOTES
:    Patient is being transferred to [facility] in [place]. Updated [facility].     No further needs from  at this time.     TARSHA Reyes on 5/14/2024 at 10:29 AM

## 2024-05-14 NOTE — PROGRESS NOTES
In chart providing direct patient care. Updated primary nurse.   Ayana McneilRN on 5/13/2024 at 10:59 PM

## 2024-05-14 NOTE — PROGRESS NOTES
Nurse to nurse completed with MINOR Medrano.  Patient will be transferred to [facility], oncology floor to room 1815.

## 2024-05-14 NOTE — PLAN OF CARE
Goal Outcome Evaluation:           Overall Patient Progress: no changeOverall Patient Progress: no change    Patient is alert and oriented.  Patient is has steady gait with cane and ambulating to bathroom.  States she has trouble starting to void and then is able to void.   Urine is maria luz in color.  Patient has pain in right side of groin which she states is chronic pain and she is waiting for hip replacement.  CMS intact to RLE.  Patient denies any shortness of breath with Sp02 >92% on room.  Patient VSS and afebrile.  Patient will be transferred to [facility] this afternoon once transportation is arranged for kidney function.  Bicarb drip started awaiting transfer.  Patient and daughter in agreement with transfer.

## 2024-05-14 NOTE — PROGRESS NOTES
NSG DISCHARGE NOTE    Patient discharged to [facility] oncology floor room 1815 at 12:04 PM via MEDS 1. Accompanied by other:EMS and staff. Discharge instructions reviewed with patient, opportunity offered to ask questions. Prescriptions - None ordered for discharge. All belongings sent with patient.    Family were notified and given room information.     Jane Huff RN

## 2024-05-14 NOTE — PLAN OF CARE
"A&O x4,afebrile,vss.lungs clear,remains on room air. Lower urine output, bladder scan 455 mls, pt able to void 350 mls. Urine cloudy yellow. Pain reported in right groin,PRN tramadol given with relief. Pt SBA with cane and gaitbelt.    /47   Pulse 65   Temp 97.6  F (36.4  C) (Tympanic)   Resp 18   Ht 1.549 m (5' 1\")   Wt 52.5 kg (115 lb 12.8 oz)   LMP 06/01/1987 (Approximate)   SpO2 95%   BMI 21.88 kg/m        Goal Outcome Evaluation:      Plan of Care Reviewed With: patient    Overall Patient Progress: no change         Problem: Adult Inpatient Plan of Care  Goal: Absence of Hospital-Acquired Illness or Injury  Intervention: Identify and Manage Fall Risk  Recent Flowsheet Documentation  Taken 5/14/2024 0200 by Beau Ramsey RN  Safety Promotion/Fall Prevention:   activity supervised   clutter free environment maintained   nonskid shoes/slippers when out of bed   room door open   safety round/check completed   supervised activity    Problem: Adult Inpatient Plan of Care  Goal: Absence of Hospital-Acquired Illness or Injury  Intervention: Prevent Skin Injury  Recent Flowsheet Documentation  Taken 5/14/2024 0200 by Beau Ramsey RN  Skin Protection:   adhesive use limited   incontinence pads utilized  Device Skin Pressure Protection:   absorbent pad utilized/changed   adhesive use limited  Taken 5/14/2024 0144 by Beau Ramsey RN  Body Position: position changed independently        Problem: Adult Inpatient Plan of Care  Goal: Absence of Hospital-Acquired Illness or Injury  Intervention: Prevent and Manage VTE (Venous Thromboembolism) Risk  Recent Flowsheet Documentation  Taken 5/14/2024 0200 by Beau Ramsey RN  VTE Prevention/Management: (no order) other (see comments)    Problem: Adult Inpatient Plan of Care  Goal: Optimal Comfort and Wellbeing  Intervention: Monitor Pain and Promote Comfort  Recent Flowsheet Documentation  Taken 5/13/2024 2045 by Beau Ramsey RN  Pain Management " Interventions: medication (see MAR)     Problem: Electrolyte Imbalance  Goal: Electrolyte Balance  Intervention: Monitor and Manage Electrolyte Imbalance  Recent Flowsheet Documentation  Taken 5/14/2024 0200 by Beau Ramsey, RN  Fluid/Electrolyte Management: fluids provided

## 2024-05-15 ENCOUNTER — PATIENT OUTREACH (OUTPATIENT)
Dept: FAMILY MEDICINE | Facility: OTHER | Age: 89
End: 2024-05-15
Payer: COMMERCIAL

## 2024-05-15 ENCOUNTER — ANCILLARY PROCEDURE (OUTPATIENT)
Dept: RADIOLOGY | Facility: CLINIC | Age: 89
End: 2024-05-15
Payer: COMMERCIAL

## 2024-05-15 NOTE — PROGRESS NOTES
MED/Surg called for US imaging to be sent to [facility]. Images sent and comfirmation of fax sent as well.

## 2024-05-16 ENCOUNTER — ANCILLARY PROCEDURE (OUTPATIENT)
Dept: RADIOLOGY | Facility: CLINIC | Age: 89
End: 2024-05-16
Payer: COMMERCIAL

## 2024-05-22 ENCOUNTER — HOSPITAL ENCOUNTER (OUTPATIENT)
Dept: PET IMAGING | Facility: OTHER | Age: 89
Discharge: HOME | End: 2024-05-22
Attending: INTERNAL MEDICINE | Admitting: INTERNAL MEDICINE
Payer: MEDICARE

## 2024-05-22 DIAGNOSIS — C90.00 MULTIPLE MYELOMA, REMISSION STATUS UNSPECIFIED (H): ICD-10-CM

## 2024-05-22 PROCEDURE — 78816 PET IMAGE W/CT FULL BODY: CPT | Mod: PI,MG

## 2024-05-22 PROCEDURE — A9552 F18 FDG: HCPCS | Performed by: INTERNAL MEDICINE

## 2024-05-22 PROCEDURE — 343N000001 HC RX 343: Performed by: INTERNAL MEDICINE

## 2024-05-22 RX ORDER — FLUDEOXYGLUCOSE F 18 200 MCI/ML
11.82 INJECTION, SOLUTION INTRAVENOUS ONCE
Status: COMPLETED | OUTPATIENT
Start: 2024-05-22 | End: 2024-05-22

## 2024-05-22 RX ADMIN — FLUDEOXYGLUCOSE F 18 11.82 MILLICURIE: 200 INJECTION, SOLUTION INTRAVENOUS at 16:11

## 2024-05-24 ENCOUNTER — TELEPHONE (OUTPATIENT)
Dept: FAMILY MEDICINE | Facility: OTHER | Age: 89
End: 2024-05-24
Payer: COMMERCIAL

## 2024-05-24 ENCOUNTER — MEDICAL CORRESPONDENCE (OUTPATIENT)
Dept: HEALTH INFORMATION MANAGEMENT | Facility: OTHER | Age: 89
End: 2024-05-24
Payer: COMMERCIAL

## 2024-05-24 NOTE — TELEPHONE ENCOUNTER
Request for additional Home Care Physical Therapy orders as follows:      Continuation frequency =   ___1___  x week x  ___5___ week(s)    Effective date = 5/27/24        Interventions include:    Gait Training  Muscle strengthening exercises  Balance training  Transfer Training  Education - home safety  Instruction - home exercise program  Therapeutic exercise  Pain assessment & management  Home safety assessment              Therapist: Sonam Briseno DPT    Please respond with .HOMECAREAGREE, if you are in agreement. Please sign with your comments and signature, if you are not in agreement.

## 2024-05-28 ENCOUNTER — OFFICE VISIT (OUTPATIENT)
Dept: PEDIATRICS | Facility: OTHER | Age: 89
End: 2024-05-28
Attending: INTERNAL MEDICINE
Payer: COMMERCIAL

## 2024-05-28 VITALS
HEIGHT: 61 IN | DIASTOLIC BLOOD PRESSURE: 80 MMHG | RESPIRATION RATE: 16 BRPM | BODY MASS INDEX: 22.52 KG/M2 | HEART RATE: 55 BPM | TEMPERATURE: 98.1 F | OXYGEN SATURATION: 98 % | WEIGHT: 119.3 LBS | SYSTOLIC BLOOD PRESSURE: 159 MMHG

## 2024-05-28 DIAGNOSIS — I71.20 THORACIC AORTIC ANEURYSM WITHOUT RUPTURE, UNSPECIFIED PART (H): ICD-10-CM

## 2024-05-28 DIAGNOSIS — Z99.2 ENCOUNTER FOR HEMODIALYSIS (H): ICD-10-CM

## 2024-05-28 DIAGNOSIS — Z66 DNR (DO NOT RESUSCITATE): ICD-10-CM

## 2024-05-28 DIAGNOSIS — N18.5 CHRONIC KIDNEY DISEASE, STAGE V (H): ICD-10-CM

## 2024-05-28 DIAGNOSIS — N25.81 SECONDARY HYPERPARATHYROIDISM OF RENAL ORIGIN (H): ICD-10-CM

## 2024-05-28 DIAGNOSIS — E44.0 MODERATE PROTEIN-CALORIE MALNUTRITION (H): ICD-10-CM

## 2024-05-28 DIAGNOSIS — Z86.718 PERSONAL HISTORY OF DVT (DEEP VEIN THROMBOSIS): ICD-10-CM

## 2024-05-28 DIAGNOSIS — M87.9 OSTEONECROSIS OF RIGHT HIP (H): ICD-10-CM

## 2024-05-28 DIAGNOSIS — C90.00 MULTIPLE MYELOMA NOT HAVING ACHIEVED REMISSION (H): ICD-10-CM

## 2024-05-28 DIAGNOSIS — M79.89 RIGHT LEG SWELLING: ICD-10-CM

## 2024-05-28 DIAGNOSIS — I20.9 ANGINA PECTORIS (H): ICD-10-CM

## 2024-05-28 DIAGNOSIS — Z09 HOSPITAL DISCHARGE FOLLOW-UP: Primary | ICD-10-CM

## 2024-05-28 DIAGNOSIS — I48.11 LONGSTANDING PERSISTENT ATRIAL FIBRILLATION (H): ICD-10-CM

## 2024-05-28 DIAGNOSIS — I50.33 ACUTE ON CHRONIC HEART FAILURE WITH PRESERVED EJECTION FRACTION (H): ICD-10-CM

## 2024-05-28 PROCEDURE — G0463 HOSPITAL OUTPT CLINIC VISIT: HCPCS | Performed by: INTERNAL MEDICINE

## 2024-05-28 PROCEDURE — 99496 TRANSJ CARE MGMT HIGH F2F 7D: CPT | Performed by: INTERNAL MEDICINE

## 2024-05-28 PROCEDURE — 99214 OFFICE O/P EST MOD 30 MIN: CPT | Performed by: INTERNAL MEDICINE

## 2024-05-28 RX ORDER — PROCHLORPERAZINE MALEATE 10 MG
10 TABLET ORAL EVERY 6 HOURS PRN
Status: ON HOLD | COMMUNITY
Start: 2024-04-22 | End: 2024-06-18

## 2024-05-28 RX ORDER — TRAMADOL HYDROCHLORIDE 50 MG/1
25-50 TABLET ORAL EVERY 6 HOURS PRN
COMMUNITY
Start: 2024-04-29 | End: 2024-06-20

## 2024-05-28 RX ORDER — DEXAMETHASONE 4 MG/1
20 TABLET ORAL
Status: ON HOLD | COMMUNITY
Start: 2024-04-22 | End: 2024-06-18

## 2024-05-28 RX ORDER — FERROUS SULFATE 325(65) MG
325 TABLET ORAL
COMMUNITY

## 2024-05-28 RX ORDER — BORTEZOMIB 3.5 MG/1
INJECTION, POWDER, LYOPHILIZED, FOR SOLUTION INTRAVENOUS; SUBCUTANEOUS
Status: ON HOLD | COMMUNITY
Start: 2024-05-28 | End: 2024-06-18

## 2024-05-28 ASSESSMENT — PAIN SCALES - GENERAL: PAINLEVEL: NO PAIN (0)

## 2024-05-28 NOTE — PROGRESS NOTES
Assessment & Plan   1. Hospital discharge follow-up  2. Acute on chronic heart failure with preserved ejection fraction (H)  3. Right leg swelling  4. Personal history of DVT (deep vein thrombosis)  5. Longstanding persistent atrial fibrillation (H)  Her right leg does appear to be more swollen than the left.  We discussed the possibility for DVT.  She has been on anticoagulation consistently without break.  We discussed the options for additional testing and the patient declined at this time.    6. Chronic kidney disease, stage V (H)  7. Encounter for hemodialysis (H24)  She continues on hemodialysis under the direction of Dr. Graham of nephrology.  - Adult Nephrology  Referral; Future    8. Moderate protein-calorie malnutrition (H24)  I recommend increasing her protein intake.  Likely significant contribution from multiple myeloma using up her stores.    9. Multiple myeloma not having achieved remission (H)  She follows with oncology.  We had lengthy discussion today with regards to goals of care including life-prolonging measures versus hospice.  She continues to request life-prolonging measures.  She expresses that she would like to be DNR/DNI and has a POLST form on her fridge.    10. Osteonecrosis of right hip (H)  She is a poor surgical candidate for general anesthesia    11. Secondary hyperparathyroidism of renal origin (H24)  She follows with nephrology    12. Angina pectoris (H24)  13. Thoracic aortic aneurysm without rupture, unspecified part (H24)  She follows with Dr. Ingram of cardiology    14. DNR (do not resuscitate)      Ms. Escamilla was recently hospitalized for heart failure with preserved ejection fraction, appears to be doing well since discharge.  Discharge summary and recommendations for outpatient provider are reviewed. Based on what occurred in the visit today:  Previous medication(s) were discontinued or altered? No  Previous medication(s) were suspended pending consultation? No  New  "medication(s) started? No     -- Medication reconciliation and management was performed today    Patient Instructions    -- Protein shakes between meals   -- Protein at every meal     -- Low salt diet, under 2000 mg/day   -- Fluid restrict, under 2000 mL/day aka 8.5 cups/day   -- Eat healthy protein   -- Learn about DASH Diet for dietary ways to reduce blood pressure  Google: [facility] DASH diet  [facility] site: https://www.nhlbi.nih.gov/health-topics/dash-eating-plan  PDF from [facility]: https://www.nhlbi.nih.gov/files/docs/public/heart/new_dash.pdf   -- Elevate feet above your hips for 20-30 minutes, 4 times per day   -- Compression stockings from morning to night   -- Work on 5-10% weight loss     -- Schedule a visit with Dr. Briseno (Oncology)      No follow-ups on file.    Signed, Mumtaz Dixon MD, FAAP, FACP  Internal Medicine & Pediatrics    Subjective   Merlyn Escamilla is a 89 year old female who presents for hospital discharge follow-up.    Hospital: [facility]  Date of discharge: 5/21/24  Date of post discharge contact: 5/24/24    Objective   Vitals: BP (!) 159/80   Pulse 55   Temp 98.1  F (36.7  C) (Tympanic)   Resp 16   Ht 1.537 m (5' 0.5\")   Wt 54.1 kg (119 lb 4.8 oz)   LMP 06/01/1987 (Approximate)   SpO2 98%   BMI 22.92 kg/m        Review and Analysis of Data   I personally reviewed the following:  External notes: Yes [facility]'s discharge summary  Results: Yes local and remote data  Use of an independent historian: Yes daughters  Independent review of a test performed by another physician: No  Discussion of management with another physician: No  High risk of morbidity from additional diagnostic testing and/or treatment.    Billing:   Type of Medical Decision Making Face-to-Face Visit within 7 Days Face-to-Face Visit within 14 days   Moderate Complexity [id number]   High Complexity [id number]         "

## 2024-05-28 NOTE — PATIENT INSTRUCTIONS
-- Protein shakes between meals   -- Protein at every meal     -- Low salt diet, under 2000 mg/day   -- Fluid restrict, under 2000 mL/day aka 8.5 cups/day   -- Eat healthy protein   -- Learn about DASH Diet for dietary ways to reduce blood pressure  Google: NIH DASH diet  [facility] site: https://www.nhlbi.nih.gov/health-topics/dash-eating-plan  PDF from [facility]: https://www.nhlbi.nih.gov/files/docs/public/heart/new_dash.pdf   -- Elevate feet above your hips for 20-30 minutes, 4 times per day   -- Compression stockings from morning to night   -- Work on 5-10% weight loss     -- Schedule a visit with Dr. Briseno (Oncology)

## 2024-05-28 NOTE — TELEPHONE ENCOUNTER
Returned call to patient; no answer.  Patient had appointment with Dr. Dixon today.    sinusitis

## 2024-05-28 NOTE — NURSING NOTE
"Chief Complaint   Patient presents with    RECHECK    Medication Follow-up       Initial LMP 06/01/1987 (Approximate)  Estimated body mass index is 21.88 kg/m  as calculated from the following:    Height as of 5/13/24: 1.549 m (5' 1\").    Weight as of 5/14/24: 52.5 kg (115 lb 12.8 oz).  Medication Review: complete    The next two questions are to help us understand your food security.  If you are feeling you need any assistance in this area, we have resources available to support you today.          1/2/2024   SDOH- Food Insecurity   Within the past 12 months, did you worry that your food would run out before you got money to buy more? N   Within the past 12 months, did the food you bought just not last and you didn t have money to get more? N         Health Care Directive:  Patient has a Health Care Directive on file      Chantelle Forrester CNA      "

## 2024-05-29 ENCOUNTER — TELEPHONE (OUTPATIENT)
Dept: ONCOLOGY | Facility: OTHER | Age: 89
End: 2024-05-29
Payer: COMMERCIAL

## 2024-05-29 NOTE — TELEPHONE ENCOUNTER
Medication Question:     Patient no longer taking the Revlimid. Should she continue the use of the dexamethasone?     Thank you,     Lila Martinez RN on 5/29/2024 at 1:09 PM

## 2024-05-29 NOTE — TELEPHONE ENCOUNTER
Left message to call back.  Daria Espino CMA([facility])..................5/29/2024   4:22 PM

## 2024-05-29 NOTE — TELEPHONE ENCOUNTER
Please see therapy plan.   Not sure how that works. Dr Briseno is not here until 6/7/24 (she will be in [place] on 6/4/24).   Patient was supposed to have a follow up but it was cancelled while she was hospitalized. Family has not called back to reschedule that appointment. She did just see her PCP for a hospital follow up and he also recommended a visit with oncology also.  Daria Espino CMA([facility])..................5/29/2024   2:06 PM

## 2024-05-29 NOTE — TELEPHONE ENCOUNTER
Please call.     Yes-- she can and should continue to take her Dexamethasone weekly even though not taking Revlimid.    Can we work on getting her on Finn's schedule? If she wants to continue treatment but can't be on Revlimid, she needs to see her oncologist to discuss alternative options.     PO Rodriguez CNP

## 2024-05-30 NOTE — TELEPHONE ENCOUNTER
I called and spoke with Daria last week regarding an appointment and that patient needed to be seen soon as a follow up from her hospital stay at [facility], which was the reason she missed her appointment. Revlamid was stopped in the hospital. She has also not had a follow up from her PET scan.     Yessica Nichole LPN on 5/30/2024 at 9:33 AM

## 2024-05-31 ENCOUNTER — PATIENT OUTREACH (OUTPATIENT)
Dept: ONCOLOGY | Facility: OTHER | Age: 89
End: 2024-05-31
Payer: COMMERCIAL

## 2024-05-31 NOTE — PROGRESS NOTES
[facility]: Transitions of Care Note  No chief complaint on file.      Most Recent Admission Date: 5/13/2024   Most Recent Admission Diagnosis: Hyperkalemia - E87.5  Hyponatremia - E87.1  Acute renal failure, unspecified acute renal failure type (H24) - N17.9     Most Recent Discharge Date: 5/14/2024   Most Recent Discharge Diagnosis: Acute renal failure, unspecified acute renal failure type (H24) - N17.9  Hyperkalemia - E87.5  Hyponatremia - E87.1     Transitions of Care Assessment              Cancer Care  RNCC Short Symptom Review:     Assessment completed with:: Children    General/Short Assessment  Does the patient have all their medications?: Yes  Is the patient experiencing any new or worsening symptoms?: No  Discussion with patient: Answered patient's questions and addressed concerns;Reviewed how and when to contact clinic;Reviewed patient's future appointments    Pain  Patient Reported Pain?: No    Patient Coping  Accepting    Clinic Utilization  Patient Aware of Next Appointment?: No, Reviewed with Patient ( will call to schedule follow up)    Other Patient Concerns  Other Patient Reported Concerns: No    Follow up Plan          Future Appointments   Date Time Provider Department Center   7/11/2024 10:40 AM Mumtaz Dixon MD [facility]       Outpatient Plan as outlined on AVS reviewed with patient.    For any urgent concerns, please contact our 24 hour clinic line:      Daughter feels she is doing much better. PET scan was done and she needs to see Dr. Finn SOTO. Will offer 6/14/24 and send message to Dr. Briseno if she would like to see her on 6/7 over the lunch.  She will be seeing nephrology linda Jo next week(6/5/24)    Clarissa Hester RN

## 2024-06-03 ENCOUNTER — MYC MEDICAL ADVICE (OUTPATIENT)
Dept: PEDIATRICS | Facility: OTHER | Age: 89
End: 2024-06-03
Payer: COMMERCIAL

## 2024-06-03 ENCOUNTER — TELEPHONE (OUTPATIENT)
Dept: PEDIATRICS | Facility: OTHER | Age: 89
End: 2024-06-03
Payer: COMMERCIAL

## 2024-06-04 RX ORDER — FUROSEMIDE 40 MG
40 TABLET ORAL DAILY
Status: ON HOLD | COMMUNITY
End: 2024-06-18

## 2024-06-04 NOTE — TELEPHONE ENCOUNTER
Currently prescribed Lasix 40 mg daily. Wondering if Merlyn can take 20 mg daily since she was up twice in the middle of the night going to the bathroom and that isn t normal for her.    Wondering if she can take OTC Magnesium pills?    Routing to provider to review and respond.  Erwin Mishra RN on 6/4/2024 at 1:34 PM

## 2024-06-06 ENCOUNTER — PATIENT OUTREACH (OUTPATIENT)
Dept: ONCOLOGY | Facility: OTHER | Age: 89
End: 2024-06-06
Payer: COMMERCIAL

## 2024-06-06 NOTE — PROGRESS NOTES
Spoke with patient she should not be taking lenolidomide or dex or bactrim until see sees dr Briseno and then we will re assess. .   Call to [facility] and relayed information that she should not be taking these medications until she is seen.  Clarissa Hester RN...........6/6/2024 2:24 PM

## 2024-06-07 ENCOUNTER — TELEPHONE (OUTPATIENT)
Dept: PEDIATRICS | Facility: OTHER | Age: 89
End: 2024-06-07
Payer: COMMERCIAL

## 2024-06-07 RX ORDER — CALCIUM CARBONATE/VITAMIN D3 500-10/5ML
1 LIQUID (ML) ORAL DAILY
Status: ON HOLD | COMMUNITY
End: 2024-06-20

## 2024-06-07 RX ORDER — POTASSIUM CHLORIDE 1500 MG/1
20 TABLET, EXTENDED RELEASE ORAL 2 TIMES DAILY
Status: ON HOLD | COMMUNITY
End: 2024-06-20

## 2024-06-07 RX ORDER — FUROSEMIDE 20 MG
20 TABLET ORAL DAILY
Status: ON HOLD | COMMUNITY
End: 2024-06-20

## 2024-06-07 RX ORDER — AMOXICILLIN 250 MG
1-2 CAPSULE ORAL 2 TIMES DAILY PRN
COMMUNITY

## 2024-06-07 NOTE — TELEPHONE ENCOUNTER
Noted. Please add to chart.    Signed, Mumtaz Dixon MD, FAAP, FACP  Internal Medicine & Pediatrics

## 2024-06-07 NOTE — TELEPHONE ENCOUNTER
Medication changes from Dr. Graham Nephrologist and 1 patient request:      Potassium 20mEq by mouth twice daily   Magnesium oxide 400mg once daily  Decrease furosemide to 20mg daily, restart 40mg if fluid retention occurs.    Patient would like to start taking Senna S as needed for constipation. 1-2 tabs 1-2 times daily. She will start with one a day at first. Is this okay?     Thank you!    Lila Martinez RN on 6/7/2024 at 11:09 AM

## 2024-06-13 ENCOUNTER — TELEPHONE (OUTPATIENT)
Dept: PEDIATRICS | Facility: OTHER | Age: 89
End: 2024-06-13

## 2024-06-13 NOTE — TELEPHONE ENCOUNTER
Request for Home Care Skilled Nursing orders as follows:        Continuation frequency =  _1_ X week X _2  weeks             Effective date= 6/20/24    Medication teaching.     Interventions include:    Assessment  Medication management  O2 sat monitoring (all disciplines as needed)  Patient/caregiver education  Fall risk: instruct on fall prevention strategies, home safety, assess environment Observe for signs and symptoms of depression, assess effectiveness of medication, if at risk  Instruct on pain relief measures and assess pain with each visit, if has pain. Assess effectiveness of medications.      Vital signs as follows:       check oxygen saturation - goal > 90%       check blood pressure - abnormal ranges = < 95/60 or > 140/90       check pulse - abnormal range < 60 or > 100       check respirations - abnormal range < 12 or > 20       check vital signs: temperature abnormal range > 100.4      Please respond with .HOMECAREAGREE, if you are in agreement. Please sign with your comments and signature, if you are not in agreement.      Thanks,     Amanda Cedeno RN on 6/13/2024 at 1:34 PM

## 2024-06-14 NOTE — TELEPHONE ENCOUNTER
I agree with the Home Care order requests below.  Mumtaz Dixon MD on 6/14/2024 at 8:21 AM    Signed, Mumtaz Dixon MD, FAAP, FACP  Internal Medicine & Pediatrics

## 2024-06-18 ENCOUNTER — HOSPITAL ENCOUNTER (INPATIENT)
Facility: OTHER | Age: 89
LOS: 2 days | Discharge: HOME HEALTH | DRG: 640 | End: 2024-06-20
Attending: FAMILY MEDICINE | Admitting: INTERNAL MEDICINE
Payer: MEDICARE

## 2024-06-18 ENCOUNTER — MEDICAL CORRESPONDENCE (OUTPATIENT)
Dept: HEALTH INFORMATION MANAGEMENT | Facility: OTHER | Age: 89
End: 2024-06-18

## 2024-06-18 DIAGNOSIS — E87.5 HYPERKALEMIA: ICD-10-CM

## 2024-06-18 DIAGNOSIS — E87.1 HYPONATREMIA: ICD-10-CM

## 2024-06-18 DIAGNOSIS — I50.33 ACUTE ON CHRONIC HEART FAILURE WITH PRESERVED EJECTION FRACTION (H): Primary | ICD-10-CM

## 2024-06-18 DIAGNOSIS — E86.0 DEHYDRATION: ICD-10-CM

## 2024-06-18 LAB
ALBUMIN UR-MCNC: 10 MG/DL
ANION GAP SERPL CALCULATED.3IONS-SCNC: 9 MMOL/L (ref 7–15)
APPEARANCE UR: CLEAR
BASOPHILS # BLD AUTO: 0 10E3/UL (ref 0–0.2)
BASOPHILS NFR BLD AUTO: 1 %
BILIRUB UR QL STRIP: NEGATIVE
BUN SERPL-MCNC: 54.9 MG/DL (ref 8–23)
CALCIUM SERPL-MCNC: 10.2 MG/DL (ref 8.2–9.6)
CHLORIDE SERPL-SCNC: 98 MMOL/L (ref 98–107)
COLOR UR AUTO: ABNORMAL
CREAT SERPL-MCNC: 1.65 MG/DL (ref 0.51–0.95)
CREAT SERPL-MCNC: 1.73 MG/DL (ref 0.51–0.95)
DEPRECATED HCO3 PLAS-SCNC: 21 MMOL/L (ref 22–29)
EGFRCR SERPLBLD CKD-EPI 2021: 28 ML/MIN/1.73M2
EGFRCR SERPLBLD CKD-EPI 2021: 29 ML/MIN/1.73M2
EOSINOPHIL # BLD AUTO: 0.1 10E3/UL (ref 0–0.7)
EOSINOPHIL NFR BLD AUTO: 1 %
ERYTHROCYTE [DISTWIDTH] IN BLOOD BY AUTOMATED COUNT: 23.1 % (ref 10–15)
GLUCOSE BLDC GLUCOMTR-MCNC: 119 MG/DL (ref 70–99)
GLUCOSE BLDC GLUCOMTR-MCNC: 189 MG/DL (ref 70–99)
GLUCOSE BLDC GLUCOMTR-MCNC: 205 MG/DL (ref 70–99)
GLUCOSE BLDC GLUCOMTR-MCNC: 208 MG/DL (ref 70–99)
GLUCOSE BLDC GLUCOMTR-MCNC: 238 MG/DL (ref 70–99)
GLUCOSE SERPL-MCNC: 116 MG/DL (ref 70–99)
GLUCOSE UR STRIP-MCNC: NEGATIVE MG/DL
HCT VFR BLD AUTO: 40 % (ref 35–47)
HGB BLD-MCNC: 12.5 G/DL (ref 11.7–15.7)
HGB UR QL STRIP: NEGATIVE
HOLD SPECIMEN: NORMAL
HYALINE CASTS: 4 /LPF
IMM GRANULOCYTES # BLD: 0 10E3/UL
IMM GRANULOCYTES NFR BLD: 0 %
KETONES UR STRIP-MCNC: NEGATIVE MG/DL
LEUKOCYTE ESTERASE UR QL STRIP: ABNORMAL
LYMPHOCYTES # BLD AUTO: 0.9 10E3/UL (ref 0.8–5.3)
LYMPHOCYTES NFR BLD AUTO: 11 %
MAGNESIUM SERPL-MCNC: 2.5 MG/DL (ref 1.7–2.3)
MCH RBC QN AUTO: 27.6 PG (ref 26.5–33)
MCHC RBC AUTO-ENTMCNC: 31.3 G/DL (ref 31.5–36.5)
MCV RBC AUTO: 88 FL (ref 78–100)
MONOCYTES # BLD AUTO: 0.6 10E3/UL (ref 0–1.3)
MONOCYTES NFR BLD AUTO: 7 %
MUCOUS THREADS #/AREA URNS LPF: PRESENT /LPF
NEUTROPHILS # BLD AUTO: 6.4 10E3/UL (ref 1.6–8.3)
NEUTROPHILS NFR BLD AUTO: 81 %
NITRATE UR QL: NEGATIVE
NRBC # BLD AUTO: 0 10E3/UL
NRBC BLD AUTO-RTO: 0 /100
PH UR STRIP: 5.5 [PH] (ref 5–9)
PLATELET # BLD AUTO: 221 10E3/UL (ref 150–450)
POTASSIUM SERPL-SCNC: 4.9 MMOL/L (ref 3.4–5.3)
POTASSIUM SERPL-SCNC: 5.1 MMOL/L (ref 3.4–5.3)
POTASSIUM SERPL-SCNC: 6.7 MMOL/L (ref 3.4–5.3)
RBC # BLD AUTO: 4.53 10E6/UL (ref 3.8–5.2)
RBC URINE: 1 /HPF
SODIUM SERPL-SCNC: 128 MMOL/L (ref 135–145)
SP GR UR STRIP: 1.01 (ref 1–1.03)
SQUAMOUS EPITHELIAL: 1 /HPF
UROBILINOGEN UR STRIP-MCNC: NORMAL MG/DL
WBC # BLD AUTO: 7.9 10E3/UL (ref 4–11)
WBC URINE: 5 /HPF

## 2024-06-18 PROCEDURE — 93005 ELECTROCARDIOGRAM TRACING: CPT | Performed by: FAMILY MEDICINE

## 2024-06-18 PROCEDURE — 85025 COMPLETE CBC W/AUTO DIFF WBC: CPT | Performed by: FAMILY MEDICINE

## 2024-06-18 PROCEDURE — 96361 HYDRATE IV INFUSION ADD-ON: CPT | Performed by: FAMILY MEDICINE

## 2024-06-18 PROCEDURE — 250N000011 HC RX IP 250 OP 636: Mod: JZ | Performed by: INTERNAL MEDICINE

## 2024-06-18 PROCEDURE — 96365 THER/PROPH/DIAG IV INF INIT: CPT | Performed by: FAMILY MEDICINE

## 2024-06-18 PROCEDURE — 81001 URINALYSIS AUTO W/SCOPE: CPT | Performed by: FAMILY MEDICINE

## 2024-06-18 PROCEDURE — 99291 CRITICAL CARE FIRST HOUR: CPT | Performed by: FAMILY MEDICINE

## 2024-06-18 PROCEDURE — 82962 GLUCOSE BLOOD TEST: CPT

## 2024-06-18 PROCEDURE — 94640 AIRWAY INHALATION TREATMENT: CPT | Mod: 76

## 2024-06-18 PROCEDURE — 258N000001 HC RX 258: Performed by: FAMILY MEDICINE

## 2024-06-18 PROCEDURE — 36415 COLL VENOUS BLD VENIPUNCTURE: CPT | Performed by: FAMILY MEDICINE

## 2024-06-18 PROCEDURE — 93010 ELECTROCARDIOGRAM REPORT: CPT | Performed by: INTERNAL MEDICINE

## 2024-06-18 PROCEDURE — 250N000009 HC RX 250: Performed by: FAMILY MEDICINE

## 2024-06-18 PROCEDURE — 83735 ASSAY OF MAGNESIUM: CPT | Performed by: FAMILY MEDICINE

## 2024-06-18 PROCEDURE — 250N000013 HC RX MED GY IP 250 OP 250 PS 637: Performed by: FAMILY MEDICINE

## 2024-06-18 PROCEDURE — 82565 ASSAY OF CREATININE: CPT | Performed by: INTERNAL MEDICINE

## 2024-06-18 PROCEDURE — 84132 ASSAY OF SERUM POTASSIUM: CPT | Performed by: FAMILY MEDICINE

## 2024-06-18 PROCEDURE — 250N000011 HC RX IP 250 OP 636: Mod: JZ | Performed by: FAMILY MEDICINE

## 2024-06-18 PROCEDURE — 258N000003 HC RX IP 258 OP 636: Mod: JZ | Performed by: FAMILY MEDICINE

## 2024-06-18 PROCEDURE — 999N000157 HC STATISTIC RCP TIME EA 10 MIN

## 2024-06-18 PROCEDURE — 82374 ASSAY BLOOD CARBON DIOXIDE: CPT | Performed by: FAMILY MEDICINE

## 2024-06-18 PROCEDURE — 99291 CRITICAL CARE FIRST HOUR: CPT | Mod: 25 | Performed by: FAMILY MEDICINE

## 2024-06-18 PROCEDURE — 99222 1ST HOSP IP/OBS MODERATE 55: CPT | Performed by: INTERNAL MEDICINE

## 2024-06-18 PROCEDURE — 120N000001 HC R&B MED SURG/OB

## 2024-06-18 PROCEDURE — 87086 URINE CULTURE/COLONY COUNT: CPT | Performed by: FAMILY MEDICINE

## 2024-06-18 PROCEDURE — 258N000003 HC RX IP 258 OP 636: Mod: JZ | Performed by: INTERNAL MEDICINE

## 2024-06-18 PROCEDURE — 250N000012 HC RX MED GY IP 250 OP 636 PS 637: Performed by: FAMILY MEDICINE

## 2024-06-18 RX ORDER — CALCIUM CARBONATE 500 MG/1
1000 TABLET, CHEWABLE ORAL 4 TIMES DAILY PRN
Status: DISCONTINUED | OUTPATIENT
Start: 2024-06-18 | End: 2024-06-20 | Stop reason: HOSPADM

## 2024-06-18 RX ORDER — ALBUTEROL SULFATE 0.83 MG/ML
10 SOLUTION RESPIRATORY (INHALATION) ONCE
Status: COMPLETED | OUTPATIENT
Start: 2024-06-18 | End: 2024-06-18

## 2024-06-18 RX ORDER — AMOXICILLIN 250 MG
1 CAPSULE ORAL 2 TIMES DAILY PRN
Status: DISCONTINUED | OUTPATIENT
Start: 2024-06-18 | End: 2024-06-20 | Stop reason: HOSPADM

## 2024-06-18 RX ORDER — ENOXAPARIN SODIUM 100 MG/ML
30 INJECTION SUBCUTANEOUS EVERY 24 HOURS
Status: DISCONTINUED | OUTPATIENT
Start: 2024-06-18 | End: 2024-06-18

## 2024-06-18 RX ORDER — SODIUM CHLORIDE, SODIUM LACTATE, POTASSIUM CHLORIDE, CALCIUM CHLORIDE 600; 310; 30; 20 MG/100ML; MG/100ML; MG/100ML; MG/100ML
INJECTION, SOLUTION INTRAVENOUS CONTINUOUS
Status: DISCONTINUED | OUTPATIENT
Start: 2024-06-18 | End: 2024-06-20

## 2024-06-18 RX ORDER — HEPARIN SODIUM 5000 [USP'U]/.5ML
5000 INJECTION, SOLUTION INTRAVENOUS; SUBCUTANEOUS EVERY 8 HOURS
Status: DISCONTINUED | OUTPATIENT
Start: 2024-06-18 | End: 2024-06-19

## 2024-06-18 RX ORDER — LIDOCAINE 40 MG/G
CREAM TOPICAL
Status: DISCONTINUED | OUTPATIENT
Start: 2024-06-18 | End: 2024-06-20 | Stop reason: HOSPADM

## 2024-06-18 RX ORDER — ONDANSETRON 4 MG/1
4 TABLET, ORALLY DISINTEGRATING ORAL EVERY 6 HOURS PRN
Status: DISCONTINUED | OUTPATIENT
Start: 2024-06-18 | End: 2024-06-20 | Stop reason: HOSPADM

## 2024-06-18 RX ORDER — METOPROLOL SUCCINATE 25 MG/1
1 TABLET, EXTENDED RELEASE ORAL DAILY
COMMUNITY
Start: 2024-05-28

## 2024-06-18 RX ORDER — MELATONIN 3 MG
1.5 TABLET ORAL
Status: DISCONTINUED | OUTPATIENT
Start: 2024-06-18 | End: 2024-06-19

## 2024-06-18 RX ORDER — NICOTINE POLACRILEX 4 MG
15-30 LOZENGE BUCCAL
Status: DISCONTINUED | OUTPATIENT
Start: 2024-06-18 | End: 2024-06-20 | Stop reason: HOSPADM

## 2024-06-18 RX ORDER — DIGOXIN 125 MCG
1 TABLET ORAL DAILY
COMMUNITY
Start: 2024-05-28

## 2024-06-18 RX ORDER — ACETAMINOPHEN 650 MG/1
650 SUPPOSITORY RECTAL EVERY 4 HOURS PRN
Status: DISCONTINUED | OUTPATIENT
Start: 2024-06-18 | End: 2024-06-20 | Stop reason: HOSPADM

## 2024-06-18 RX ORDER — AMOXICILLIN 250 MG
2 CAPSULE ORAL 2 TIMES DAILY PRN
Status: DISCONTINUED | OUTPATIENT
Start: 2024-06-18 | End: 2024-06-20 | Stop reason: HOSPADM

## 2024-06-18 RX ORDER — ACETAMINOPHEN 325 MG/1
650 TABLET ORAL EVERY 4 HOURS PRN
Status: DISCONTINUED | OUTPATIENT
Start: 2024-06-18 | End: 2024-06-20 | Stop reason: HOSPADM

## 2024-06-18 RX ORDER — DEXTROSE MONOHYDRATE 25 G/50ML
25 INJECTION, SOLUTION INTRAVENOUS ONCE
Status: COMPLETED | OUTPATIENT
Start: 2024-06-18 | End: 2024-06-18

## 2024-06-18 RX ORDER — DEXTROSE MONOHYDRATE 25 G/50ML
25-50 INJECTION, SOLUTION INTRAVENOUS
Status: DISCONTINUED | OUTPATIENT
Start: 2024-06-18 | End: 2024-06-20 | Stop reason: HOSPADM

## 2024-06-18 RX ORDER — ONDANSETRON 2 MG/ML
4 INJECTION INTRAMUSCULAR; INTRAVENOUS EVERY 6 HOURS PRN
Status: DISCONTINUED | OUTPATIENT
Start: 2024-06-18 | End: 2024-06-20 | Stop reason: HOSPADM

## 2024-06-18 RX ORDER — LISINOPRIL 5 MG/1
1 TABLET ORAL DAILY
Status: ON HOLD | COMMUNITY
Start: 2024-05-28 | End: 2024-06-20

## 2024-06-18 RX ORDER — METOPROLOL SUCCINATE 100 MG/1
1 TABLET, EXTENDED RELEASE ORAL DAILY
COMMUNITY
Start: 2024-05-28

## 2024-06-18 RX ADMIN — SODIUM ZIRCONIUM CYCLOSILICATE 10 G: 10 POWDER, FOR SUSPENSION ORAL at 17:33

## 2024-06-18 RX ADMIN — DEXTROSE MONOHYDRATE 300 ML: 10 INJECTION, SOLUTION INTRAVENOUS at 15:50

## 2024-06-18 RX ADMIN — CALCIUM GLUCONATE 1 G: 98 INJECTION, SOLUTION INTRAVENOUS at 15:25

## 2024-06-18 RX ADMIN — SODIUM CHLORIDE 500 ML: 9 INJECTION, SOLUTION INTRAVENOUS at 14:34

## 2024-06-18 RX ADMIN — SODIUM CHLORIDE, SODIUM LACTATE, POTASSIUM CHLORIDE, AND CALCIUM CHLORIDE: 600; 310; 30; 20 INJECTION, SOLUTION INTRAVENOUS at 22:23

## 2024-06-18 RX ADMIN — HEPARIN SODIUM 5000 UNITS: 10000 INJECTION, SOLUTION INTRAVENOUS; SUBCUTANEOUS at 22:25

## 2024-06-18 RX ADMIN — INSULIN HUMAN 5 UNITS: 100 INJECTION, SOLUTION PARENTERAL at 15:28

## 2024-06-18 RX ADMIN — SODIUM CHLORIDE 1000 ML: 9 INJECTION, SOLUTION INTRAVENOUS at 15:34

## 2024-06-18 RX ADMIN — ALBUTEROL SULFATE 10 MG: 2.5 SOLUTION RESPIRATORY (INHALATION) at 15:17

## 2024-06-18 RX ADMIN — DEXTROSE MONOHYDRATE 25 G: 25 INJECTION, SOLUTION INTRAVENOUS at 15:29

## 2024-06-18 ASSESSMENT — ACTIVITIES OF DAILY LIVING (ADL)
ADLS_ACUITY_SCORE: 40
ADLS_ACUITY_SCORE: 40
ADLS_ACUITY_SCORE: 36
ADLS_ACUITY_SCORE: 40
ADLS_ACUITY_SCORE: 43
ADLS_ACUITY_SCORE: 38
ADLS_ACUITY_SCORE: 40

## 2024-06-18 ASSESSMENT — COLUMBIA-SUICIDE SEVERITY RATING SCALE - C-SSRS
2. HAVE YOU ACTUALLY HAD ANY THOUGHTS OF KILLING YOURSELF IN THE PAST MONTH?: NO
6. HAVE YOU EVER DONE ANYTHING, STARTED TO DO ANYTHING, OR PREPARED TO DO ANYTHING TO END YOUR LIFE?: NO
1. IN THE PAST MONTH, HAVE YOU WISHED YOU WERE DEAD OR WISHED YOU COULD GO TO SLEEP AND NOT WAKE UP?: NO

## 2024-06-18 ASSESSMENT — ENCOUNTER SYMPTOMS: DIZZINESS: 1

## 2024-06-18 NOTE — ED TRIAGE NOTES
"ED Nursing Triage Note (General)   ________________________________    Merlyn Escamilla is a 89 year old Female that presents to triage via private vehicle with complaints of hypotension.  Patient states when going to the kitchen this morning she became dizzy and experienced a hot flash.  Patient states she leaned her head against the counter, however, denies LOC or falling.  Patient states sx have since resolved, however BP remains hypotensive on arrival. Daughter states med changes include starting potassium and magnesium while also decreasing her diuretic from 40 to 20mg. Patient was in [place] the end of May due to JUNIE from cancer medications.  Patient required dialysis at that time and has now stopped taking chemo. Daughter also states home nurse was concerned for UTI due to repetitive speech this morning as well as, \"lightning bolt episodes\", where daughter states she becomes excessively hot in the mornings and has to put her head on the counter.  Daughter states this has been persisting over the past week.   Significant symptoms had onset 2 hour(s) ago.  BP 94/63   Pulse 82   Temp 98.2  F (36.8  C) (Tympanic)   Resp 20   Ht 1.537 m (5' 0.5\")   Wt 54 kg (119 lb)   LMP 06/01/1987 (Approximate)   SpO2 99%   BMI 22.86 kg/m    Vital signs:  Temp: 98.2  F (36.8  C) Temp src: Tympanic BP: 94/63 Pulse: 82   Resp: 20 SpO2: 99 %     Height: 153.7 cm (5' 0.5\") Weight: 54 kg (119 lb)  Estimated body mass index is 22.86 kg/m  as calculated from the following:    Height as of this encounter: 1.537 m (5' 0.5\").    Weight as of this encounter: 54 kg (119 lb).  PRE HOSPITAL PRIOR LIVING SITUATION Alone      Triage Assessment (Adult)       Row Name 06/18/24 5210          Triage Assessment    Airway WDL WDL        Respiratory WDL    Respiratory WDL WDL        Skin Circulation/Temperature WDL    Skin Circulation/Temperature WDL WDL        Cardiac WDL    Cardiac WDL WDL     Cardiac Rhythm NSR        Peripheral/Neurovascular " WDL    Peripheral Neurovascular WDL WDL     Capillary Refill, General less than/equal to 3 secs        Cognitive/Neuro/Behavioral WDL    Cognitive/Neuro/Behavioral WDL WDL        [place] Coma Scale    Best Eye Response 4-->(E4) spontaneous     Best Motor Response 6-->(M6) obeys commands     Best Verbal Response 5-->(V5) oriented     [place] Coma Scale Score 15

## 2024-06-18 NOTE — PROGRESS NOTES
DATE/TIME OF CALL RECEIVED FROM LAB:  06/18/24 at 2:46 PM   LAB TEST:  Lactic  LAB VALUE:  6.7  PROVIDER NOTIFIED?: Yes  PROVIDER NAME: Dr. Quiroz  DATE/TIME LAB VALUE REPORTED TO PROVIDER: 1428  MECHANISM OF PROVIDER NOTIFICATION: Face-To-Face
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
## 2024-06-19 LAB
ANION GAP SERPL CALCULATED.3IONS-SCNC: 9 MMOL/L (ref 7–15)
BACTERIA UR CULT: NORMAL
BUN SERPL-MCNC: 41.8 MG/DL (ref 8–23)
CALCIUM SERPL-MCNC: 9.7 MG/DL (ref 8.2–9.6)
CHLORIDE SERPL-SCNC: 105 MMOL/L (ref 98–107)
CREAT SERPL-MCNC: 1.62 MG/DL (ref 0.51–0.95)
DEPRECATED HCO3 PLAS-SCNC: 20 MMOL/L (ref 22–29)
EGFRCR SERPLBLD CKD-EPI 2021: 30 ML/MIN/1.73M2
ERYTHROCYTE [DISTWIDTH] IN BLOOD BY AUTOMATED COUNT: 22.6 % (ref 10–15)
GLUCOSE SERPL-MCNC: 87 MG/DL (ref 70–99)
HCT VFR BLD AUTO: 36.1 % (ref 35–47)
HGB BLD-MCNC: 11.4 G/DL (ref 11.7–15.7)
HOLD SPECIMEN: NORMAL
HOLD SPECIMEN: NORMAL
MCH RBC QN AUTO: 27.7 PG (ref 26.5–33)
MCHC RBC AUTO-ENTMCNC: 31.6 G/DL (ref 31.5–36.5)
MCV RBC AUTO: 88 FL (ref 78–100)
PLATELET # BLD AUTO: 191 10E3/UL (ref 150–450)
POTASSIUM SERPL-SCNC: 4.2 MMOL/L (ref 3.4–5.3)
RBC # BLD AUTO: 4.11 10E6/UL (ref 3.8–5.2)
SODIUM SERPL-SCNC: 131 MMOL/L (ref 135–145)
SODIUM SERPL-SCNC: 134 MMOL/L (ref 135–145)
SODIUM SERPL-SCNC: 135 MMOL/L (ref 135–145)
WBC # BLD AUTO: 7 10E3/UL (ref 4–11)

## 2024-06-19 PROCEDURE — 258N000003 HC RX IP 258 OP 636: Mod: JZ | Performed by: INTERNAL MEDICINE

## 2024-06-19 PROCEDURE — 84295 ASSAY OF SERUM SODIUM: CPT | Performed by: INTERNAL MEDICINE

## 2024-06-19 PROCEDURE — 250N000013 HC RX MED GY IP 250 OP 250 PS 637: Performed by: INTERNAL MEDICINE

## 2024-06-19 PROCEDURE — 36416 COLLJ CAPILLARY BLOOD SPEC: CPT | Performed by: INTERNAL MEDICINE

## 2024-06-19 PROCEDURE — 120N000001 HC R&B MED SURG/OB

## 2024-06-19 PROCEDURE — 99233 SBSQ HOSP IP/OBS HIGH 50: CPT | Performed by: INTERNAL MEDICINE

## 2024-06-19 PROCEDURE — 36415 COLL VENOUS BLD VENIPUNCTURE: CPT | Performed by: INTERNAL MEDICINE

## 2024-06-19 PROCEDURE — 85027 COMPLETE CBC AUTOMATED: CPT | Performed by: INTERNAL MEDICINE

## 2024-06-19 RX ORDER — FERROUS SULFATE 325(65) MG
325 TABLET, DELAYED RELEASE (ENTERIC COATED) ORAL
Status: DISCONTINUED | OUTPATIENT
Start: 2024-06-19 | End: 2024-06-20 | Stop reason: HOSPADM

## 2024-06-19 RX ORDER — NALOXONE HYDROCHLORIDE 0.4 MG/ML
0.2 INJECTION, SOLUTION INTRAMUSCULAR; INTRAVENOUS; SUBCUTANEOUS
Status: DISCONTINUED | OUTPATIENT
Start: 2024-06-19 | End: 2024-06-20 | Stop reason: HOSPADM

## 2024-06-19 RX ORDER — PANTOPRAZOLE SODIUM 40 MG/1
40 TABLET, DELAYED RELEASE ORAL DAILY
Status: DISCONTINUED | OUTPATIENT
Start: 2024-06-19 | End: 2024-06-20 | Stop reason: HOSPADM

## 2024-06-19 RX ORDER — METOPROLOL SUCCINATE 100 MG/1
100 TABLET, EXTENDED RELEASE ORAL DAILY
Status: DISCONTINUED | OUTPATIENT
Start: 2024-06-19 | End: 2024-06-19

## 2024-06-19 RX ORDER — VITAMIN B COMPLEX
50 TABLET ORAL DAILY
Status: DISCONTINUED | OUTPATIENT
Start: 2024-06-19 | End: 2024-06-20 | Stop reason: HOSPADM

## 2024-06-19 RX ORDER — DIGOXIN 125 MCG
125 TABLET ORAL DAILY
Status: DISCONTINUED | OUTPATIENT
Start: 2024-06-19 | End: 2024-06-20 | Stop reason: HOSPADM

## 2024-06-19 RX ORDER — NALOXONE HYDROCHLORIDE 0.4 MG/ML
0.4 INJECTION, SOLUTION INTRAMUSCULAR; INTRAVENOUS; SUBCUTANEOUS
Status: DISCONTINUED | OUTPATIENT
Start: 2024-06-19 | End: 2024-06-20 | Stop reason: HOSPADM

## 2024-06-19 RX ORDER — LANOLIN ALCOHOL/MO/W.PET/CERES
3 CREAM (GRAM) TOPICAL
Status: DISCONTINUED | OUTPATIENT
Start: 2024-06-19 | End: 2024-06-20 | Stop reason: HOSPADM

## 2024-06-19 RX ADMIN — FERROUS SULFATE TAB EC 325 MG (65 MG FE EQUIVALENT) 325 MG: 325 (65 FE) TABLET DELAYED RESPONSE at 07:37

## 2024-06-19 RX ADMIN — Medication 50 MCG: at 09:07

## 2024-06-19 RX ADMIN — METOPROLOL SUCCINATE 125 MG: 25 TABLET, EXTENDED RELEASE ORAL at 09:07

## 2024-06-19 RX ADMIN — RIVAROXABAN 15 MG: 15 TABLET, FILM COATED ORAL at 16:25

## 2024-06-19 RX ADMIN — SODIUM CHLORIDE, SODIUM LACTATE, POTASSIUM CHLORIDE, AND CALCIUM CHLORIDE: 600; 310; 30; 20 INJECTION, SOLUTION INTRAVENOUS at 09:04

## 2024-06-19 RX ADMIN — Medication 3 MG: at 20:30

## 2024-06-19 RX ADMIN — PANTOPRAZOLE SODIUM 40 MG: 40 TABLET, DELAYED RELEASE ORAL at 09:07

## 2024-06-19 ASSESSMENT — ACTIVITIES OF DAILY LIVING (ADL)
ADLS_ACUITY_SCORE: 36
ADLS_ACUITY_SCORE: 36
ADLS_ACUITY_SCORE: 37
ADLS_ACUITY_SCORE: 36
ADLS_ACUITY_SCORE: 37
ADLS_ACUITY_SCORE: 37
ADLS_ACUITY_SCORE: 36
ADLS_ACUITY_SCORE: 37
ADLS_ACUITY_SCORE: 37
ADLS_ACUITY_SCORE: 36
ADLS_ACUITY_SCORE: 37
ADLS_ACUITY_SCORE: 36
ADLS_ACUITY_SCORE: 37
ADLS_ACUITY_SCORE: 36
ADLS_ACUITY_SCORE: 37
ADLS_ACUITY_SCORE: 37
ADLS_ACUITY_SCORE: 36

## 2024-06-19 NOTE — PHARMACY-ADMISSION MEDICATION HISTORY
"Pharmacy Intern Admission Medication History    Admission medication history is complete. The information provided in this note is only as accurate as the sources available at the time of the update.    Information Source(s): Patient, Family member, and CareEverywhere/SureScripts via in-person    Pertinent Information:   - Pt's daughter was in the room at the time of interview and helped answer questions regarding medications and last doses  - Pt confirmed she is not currently taking any chemotherapy or supportive medications (dexamethasone and prochlorperazine)    Changes made to PTA medication list:  Added: None  Deleted:   \"ACE/ARB/ARNI not prescribed (intentional)\" x2 - duplicate and pt on ACE  Changed:  Metoprolol succinate x2 (100 and 25 mg tabs) - added to directions to clarify per SureScripts directions    Allergies reviewed with patient and updates made in EHR: yes  - Pt confirmed allergies to morphine (muscle weakness) and phenylbutazone (reaction unknown)    Medication History Completed By: Julia Welsh 6/19/2024 9:00 AM  "

## 2024-06-19 NOTE — H&P
[facility]    History and Physical - Hospitalist Service       Date of Admission:  6/18/2024    Assessment & Plan      Merlyn Escamilla is a 89 year old female admitted on 6/18/2024. She presented to the ED with lightheadedness and was found to be hypotensive, have acute renal failure, and electrolyte dysfunctions.     Hyperkalemia  Hypermagnesemia  Hyponatremia  He presented with elevated K and Mg. She was recently told to decrease her furosemide and was put on K and Mg replacement. This is probably iatrogenic in the setting of worsening renal function. She was given calcium gluconate, Albuterol, Insulin and D50 and sodium zirconium.   - admit to hospitalist service  - hold K and Mg  - trend BMP and Mg  - c/w telemetry  - give sodium zirconium if K is still elevated    JUNIE  Her creatinine is elevated from her baseline of 0.7 to 1.73. Most likely she is hypovolemic from overdiuresis and requires IVF.   - c/w IVF  - trend creatinine  - avoid nephrotoxic medications    HFrEF  She presented with hypotension and likely is overdiuresed. She has no evidence of volume overload and has no symptoms either.   - hold furosemide due to JUNIE  - c/w metoprolol  - keep K >4 and Mg >2  - hold lisinopril  - I/o, daily weights    Multiple Myeloma  She did not tolerate chemotherapy on her previous admission, developing renal failure. She is currently off all her medications for this. Follows with Dr. Briseno.    Atrial Fibrillation  - c/w rivaroxaban  - c/w metoprolol 125mg PO daily  - c/w digoxin    Anemia  - c/w ferrous sulfate         Diet: Combination Diet Low Saturated Fat Na <2400mg Diet, No Caffeine Diet  DVT Prophylaxis: DOAC  Lim Catheter: Not present  Lines: None     Code Status: No CPR- Do NOT Intubate    Clinically Significant Risk Factors Present on Admission        # Hyperkalemia: Highest K = 6.7 mmol/L in last 2 days, will monitor as appropriate  # Hyponatremia: Lowest Na = 128 mmol/L in last 2 days,  will monitor as appropriate   # Hypercalcemia: Highest Ca = 10.2 mg/dL in last 2 days, will monitor as appropriate       # Drug Induced Coagulation Defect: home medication list includes an anticoagulant medication    # Hypertension: Home medication list includes antihypertensive(s)  # Chronic heart failure with preserved ejection fraction: heart failure noted on problem list and last echo with EF >50%                       Disposition Plan      Expected Discharge Date: 06/20/2024                The patient's care was discussed with the Bedside Nurse and Patient.        Shaun Mitchell MD  [facility]  Securely message with the Vocera Web Console (learn more here)  Text page via [facility] Paging/Directory      Visit/Communication Style   Virtual (Video) communication was used to evaluate Merlyn.  Merlyn consented to the use of video communication: yes  Video START time: 2330, 6/18/2024  Video STOP time: 2350, 6/18/2024   Patient's location: [facility]   Provider's location during the visit: [facility] Tele-medicine site        ______________________________________________________________________    Chief Complaint   Hot flash, weakness    History is obtained from the patient    History of Present Illness   Merlyn Escamilla is a 89 year old female who presented ot the ED with. She states that she woke up in the morning and had a hot flash that was very severe. She had diaphoresis, some chills. She sat down in the kitchen and a home health aide came to see her and recommended that she come into the hospital. She denies any dizziness, but she was a little lightheaded when she stood up. She denies any chest pain or palpitations. She did have some shortness of breath. Her nephrologist changed her furosemide to 20mg daily and added potassium and magnesium on 6/5/2024 and she has been compliant with her medications.     Review of Systems    General: Positive for hot flashes, chills,  weakness and diaphoresis.   Eyes: negative for blurred vision, loss of vision  Ear Nose and Throat: negative for pharyngitis, speech or swallowing difficulties  Respiratory:  negative for sputum production, wheezing, WEST, pleuritic pain, sob or cough  Cardiology:  negative for chest pain, palpitations, orthopnea, PND, edema, syncope   Gastrointestinal: negative for abdominal pain, nausea, vomiting, diarrhea, constipation, hematemesis, melena or hematochezia  Genitourinary: negative for frequency, urgency, dysuria, hematuria   Neurological: negative for focal weakness, paresthesia    Past Medical History    I have reviewed this patient's medical history and updated it with pertinent information if needed.   Past Medical History:   Diagnosis Date    Carpal tunnel syndrome of left wrist     10/4/2017    Carpal tunnel syndrome of right wrist     10/4/2017    Chronic atrial fibrillation (H)     Chronic systolic heart failure (H)     Deep phlebothrombosis during pregnancy     No Comments Provided    Multiple myeloma (H)     Osteoarthritis     Knee    Other specified postprocedural states     10/4/2017    Other specified postprocedural states     1/31/2018    Presence of artificial knee joint     10/3/2014       Past Surgical History   I have reviewed this patient's surgical history and updated it with pertinent information if needed.  Past Surgical History:   Procedure Laterality Date    APPENDECTOMY OPEN      No Comments Provided    BONE MARROW BIOPSY, BONE SPECIMEN, NEEDLE/TROCAR Left 4/11/2024    Procedure: Bone marrow biopsy, bone specimen, needle/trocar;  Surgeon: Carlos Salcido MD;  Location: [facility] OR    CARPAL TUNNEL RELEASE RT/LT Left 01/31/2018 01/31/2018,[id number],OTHER,Left    CARPAL TUNNEL RELEASE RT/LT Right 10/04/2017    COLONOSCOPY  09/2014    Arteriovenous malformations of right, transverse and splenic flexure    ENDOSCOPIC STRIPPING VEIN(S)      No Comments Provided    HYSTERECTOMY TOTAL ABDOMINAL   06/1987    with squamous metaplasia and venkata    [facility] TOTAL KNEE ARTHROPLASTY Right 10/01/2014    Dr Domingo       Social History   I have reviewed this patient's social history and updated it with pertinent information if needed.  Social History     Tobacco Use    Smoking status: Never     Passive exposure: Never    Smokeless tobacco: Never   Vaping Use    Vaping status: Never Used   Substance Use Topics    Alcohol use: Yes     Alcohol/week: 5.0 standard drinks of alcohol     Comment: slick daily    Drug use: No     Comment: Drug use: No       Family History   I have reviewed this patient's family history and updated it with pertinent information if needed.  Family History   Problem Relation Age of Onset    Heart Disease Mother         Heart Disease    Heart Disease Father         Heart Disease    Heart Disease Brother     Heart Disease Brother     Heart Disease Brother     Heart Disease Brother     Brain Cancer Sister     Heart Disease Sister        Prior to Admission Medications   Prior to Admission Medications   Prescriptions Last Dose Informant Patient Reported? Taking?   ACE/ARB/ARNI NOT PRESCRIBED (INTENTIONAL)   Yes No   Sig: Please choose reason not prescribed from choices below.   ACE/ARB/ARNI NOT PRESCRIBED (INTENTIONAL)   Yes No   Sig: Please choose reason not prescribed from choices below.   cholecalciferol 50 MCG (2000 UT) CAPS 6/17/2024  Yes Yes   Sig: Take 50 mcg by mouth daily   digoxin (LANOXIN) 125 MCG tablet 6/17/2024  Yes Yes   Sig: Take 1 tablet by mouth daily   ferrous sulfate (FEROSUL) 325 (65 Fe) MG tablet 6/17/2024  Yes Yes   Sig: Take 325 mg by mouth daily (with breakfast)   furosemide (LASIX) 20 MG tablet 6/17/2024  Yes Yes   Sig: Take 20 mg by mouth daily Restart 40 mg daily if fluid retention occurs.   lisinopril (ZESTRIL) 5 MG tablet 6/17/2024  Yes Yes   Sig: Take 1 tablet by mouth daily   magnesium oxide 400 MG CAPS 6/17/2024  Yes Yes   Sig: Take 1 capsule by mouth daily    metoprolol succinate ER (TOPROL XL) 100 MG 24 hr tablet 6/17/2024  Yes Yes   Sig: Take 1 tablet by mouth daily   metoprolol succinate ER (TOPROL XL) 25 MG 24 hr tablet 6/17/2024  Yes Yes   Sig: Take 1 tablet by mouth daily   omeprazole (PRILOSEC) 20 MG DR capsule 6/17/2024  Yes Yes   Sig: Take 20 mg by mouth daily   potassium chloride ER (K-TAB) 20 MEQ CR tablet 6/17/2024  Yes Yes   Sig: Take 20 mEq by mouth 2 times daily   rivaroxaban ANTICOAGULANT (XARELTO) 15 MG TABS tablet 6/17/2024  Yes Yes   Sig: Take by mouth daily (with dinner)   senna-docusate (SENOKOT-S/PERICOLACE) 8.6-50 MG tablet Past Week  Yes Yes   Sig: Take 1-2 tablets by mouth 2 times daily as needed for constipation   traMADol (ULTRAM) 50 MG tablet 6/17/2024  Yes Yes   Sig: Take 25-50 mg by mouth every 6 hours as needed for severe pain      Facility-Administered Medications: None     Allergies   Allergies   Allergen Reactions    Morphine      Other reaction(s): Muscle Weakness    Phenylbutazone Unknown       Physical Exam   Vital Signs: Temp: 97.9  F (36.6  C) Temp src: Tympanic BP: 94/64 Pulse: 89   Resp: 14 SpO2: 97 % O2 Device: None (Room air)    Weight: 97 lbs 4.8 oz    Gen:  Well-developed, well-nourished, in no acute distress, lying semi-supine in hospital stretcher  HEENT:  Anicteric sclera, PER, hearing intact to voice  Resp:  No accessory muscle use, breath sounds clear; no wheezes no rales no rhonchi  Card:  No murmur, normal S1, S2   Abd:  Soft per RN exam, no TTP, non-distended, normoactive bowel sounds are present  Musc:  Normal strength and movement of the major muscle groups without obvious deformity  Psych:  Good insight, oriented to person, place and time, not anxious, not agitated    Data     Recent Labs   Lab 06/18/24  1921 06/18/24  1804 06/18/24  1736 06/18/24  1643 06/18/24  1615 06/18/24  1531 06/18/24  1359   WBC  --   --   --   --   --   --  7.9   HGB  --   --   --   --   --   --  12.5   MCV  --   --   --   --   --   --   88   PLT  --   --   --   --   --   --  221   NA  --   --   --   --   --   --  128*   POTASSIUM  --  5.1  --   --  4.9  --  6.7*   CHLORIDE  --   --   --   --   --   --  98   CO2  --   --   --   --   --   --  21*   BUN  --   --   --   --   --   --  54.9*   CR  --  1.65*  --   --   --   --  1.73*   ANIONGAP  --   --   --   --   --   --  9   ALEXSANDER  --   --   --   --   --   --  10.2*   *  --  189* 205*  --    < > 116*    < > = values in this interval not displayed.         No results found for this or any previous visit (from the past 24 hour(s)).

## 2024-06-19 NOTE — PROGRESS NOTES
Interdisciplinary Discharge Planning Note    Anticipated Discharge Date: 6/20-6/21    Anticipated Discharge Location: Home    Clinical Needs Before Discharge:  stable functional status    Treatment Needs After Discharge:   Home Care VS Hospice     Potential Barriers to Discharge: None identified at this time    TARSHA Reyes  6/19/2024,  12:28 PM

## 2024-06-19 NOTE — UTILIZATION REVIEW
Admission Status; Secondary Review Determination     Admission Date: 6/18/2024 12:58 PM       Under the authority of the Utilization Management Committee, the utilization review process indicated a secondary review on the above patient.  The review outcome is based on review of the medical records, discussions with staff, and applying clinical experience noted on the date of the review.        (x)      Inpatient Status Appropriate - This patient's medical care is consistent with medical management for inpatient care and reasonable inpatient medical practice.       RATIONALE FOR DETERMINATION      Brief clinical presentation, information copied from the chart, abbreviated and edited for relevant content:     A 89-year-old female was admitted on 6/18/2024 after presenting to the ED with lightheadedness and hypotension. She was found to have acute renal failure and significant electrolyte imbalances, including hyperkalemia, hypermagnesemia, and hyponatremia. Her creatinine increased from a baseline of 0.7 to 1.73, likely due to hypovolemia from overdiuresis. Treatment included calcium gluconate, albuterol, insulin and D50, sodium zirconium, and IV fluids. Medications such as furosemide and lisinopril were held, and her electrolytes and renal function are being closely monitored. She also has a history of HFrEF, with no current evidence of volume overload.      Inpatient admission is warranted due to the significant risks associated with her acute renal failure and severe electrolyte disturbances. These conditions require continuous monitoring and prompt intervention, which cannot be adequately managed in an outpatient setting. Failure to address these issues promptly could lead to life-threatening complications such as cardiac arrhythmias, worsening renal function, and potential hemodynamic instability.          At the time of admission with the information available to the attending physician, more than 2 nights  hospital complex care was anticipated. A. Inpatient admission appropriate based on Medicare guidelines.       The information on this document is developed by the utilization review team in order for the business office to ensure compliance.  This only denotes the appropriateness of proper admission status and does not reflect the quality of care rendered.         The definitions of Inpatient Status and Observation Status used in making the determination above are those provided in the CMS Coverage Manual, Chapter 1 and Chapter 6, section 70.4.      Sincerely,      Trish Sr MD   Utilization Review/ Case Management  [facility].

## 2024-06-19 NOTE — PLAN OF CARE
Goal Outcome Evaluation:    VSS, A&O, and pleasant. Comes from home, Admitted through the ED for Low BP, hyperkalemia, and hyponatremia. Pt is dehydrated. Fluids running, on RA, Denies pain. Has a history of cancer. Pt was scheduled, pre admission, to meet with hospice tomorrow. Plans for tonight is rest and fluids. All questions and concerns addressed.       Plan of Care Reviewed With: patient    Overall Patient Progress: improving    Outcome Evaluation: VSS, generalized weakness    Elizabeth Green RN on 6/19/2024 at 1:03 AM

## 2024-06-19 NOTE — PLAN OF CARE
Goal Outcome Evaluation:       Patient vitally stable. Able to ambulate in room with standby assistance. Possible discharge tomorrow.

## 2024-06-19 NOTE — PROGRESS NOTES
06/18/24 2212   Valuables   Patient Belongings remains with patient   Patient Belongings Remaining with Patient clothing;vision aids   Did you bring any home meds/supplements to the hospital?  No     A               Admission:  I am responsible for any personal items that are not sent to the safe or pharmacy.  [facility] is not responsible for loss, theft or damage of any property in my possession.    Signature:  _________________________________ Date: _______  Time: _____                                              Staff Signature:  ____________________________ Date: ________  Time: _____      2nd Staff person, if patient is unable/unwilling to sign:    Signature: ________________________________ Date: ________  Time: _____     Discharge:  [facility] [facility] has returned all of my personal belongings:    Signature: _________________________________ Date: ________  Time: _____                                          Staff Signature:  ____________________________ Date: ________  Time: _____

## 2024-06-19 NOTE — PROGRESS NOTES
Hospitalist Medicine Progress Note   [facility]       Merlyn Escamilla is a 89 year old lady with past medical history of heart failure with reduced LVEF 50 to 55%, multiple myeloma, atrial fibrillation, anemia who came to [facility] 6/18/2024 with symptoms of dizziness, hot flashes, low blood pressure with starting of new medications including magnesium, potassium and decreasing diuretics from 40 to 20 mg with history of JUNIE from cancer medications requiring dialysis in [place] earlier with stopped taking chemotherapy.  Her sodium was 128 potassium 6.7 chloride 98 bicarb 21 BUN 54.9 creatinine 1.73.  Calcium 10.2 magnesium 2.5 glucose 116 hemoglobin 12.5.  Potassium replacement, calcium gluconate given along with insulin and D50 and sodium zirconium and the following day the patient's potassium was normalized to 4.2 her sodium had improved to 135 and her creatinine decreased to 1.62 and calcium 9.7       Date of Admission:  6/18/2024  Assessment & Plan     Acute hyperkalemia  Probably due to iatrogenic cause with acute kidney injury  Patient received D50 50 along with insulin and sodium zirconium  Potassium has normalized to 4 point  Hold lisinopril    Hypermagnesemia  Magnesium replacements have been stopped patient's initial magnesium was 2.5 and this will be followed closely    Acute kidney injury  Baseline creatinine is around 0.7 but patient did receive chemotherapy with which she did have acute kidney injury including dialysis  Creatinine is now 1.6    HFrEF  She presented with hypotension and likely is overdiuresed. She has no evidence of volume overload and has no symptoms either.   - hold furosemide due to JUNIE  - c/w metoprolol  - keep K >4 and Mg >2  - hold lisinopril  - I/o, daily weights     Multiple Myeloma  She did not tolerate chemotherapy on her previous admission, developing renal failure. She is currently off all her medications for this. Follows with   [facility].     Atrial Fibrillation  - c/w rivaroxaban  - c/w metoprolol 125mg PO daily  -Hold digoxin     Anemia  - c/w ferrous sulfate        Plan:   Check BMP in a.m.  Check magnesium in a.m.  Restart digoxin if the kidney function improves  Continue with beta-blockade with metoprolol        Diet: Snacks/Supplements Adult: Ensure Enlive; With Meals  Regular Diet Adult    DVT Prophylaxis: DOAC  Lim Catheter: Not present  Code Status: No CPR- Do NOT Intubate               The patient's care was discussed with the Patient and daughter who accompanies the patient .    Torey Yun MD  Hospitalist Service  [facility]    ______________________________________________________________________    Interval History     Symptoms   Patient states that she feels much stronger without any symptoms of dizziness    Review of Systems:   Denies any chest pain or shortness of breath    Data reviewed today: I reviewed all medications, new labs and imaging results over the last 24 hours.     Physical Exam   Vital Signs: Temp: 98  F (36.7  C) Temp src: Tympanic BP: 93/62 Pulse: 89   Resp: 16 SpO2: 98 % O2 Device: None (Room air)    Weight: 97 lbs 4.8 oz      GENERAL: Patient is not in acute distress  HEENT: EOM+,Conjunctiva is clear   NECK:  no Jugular Venous distention  HEART: S1 S2 regular Rate and Rhythm, there is  no murmur,   LUNGS: Respirations are not laboured, Lungs are  clear to auscultation without Crepitations or Wheezing   ABDOMEN: Soft , there is no tenderness , Bowel Sounds are  Positive   LOWER LIMBS: no  Pedal Edema  Bilaterally   CNS:  Alert,  Oriented x 3, Moving all the Four Limbs     Data   Recent Labs   Lab 06/19/24  1252 06/19/24  0854 06/19/24  0552 06/19/24  0122 06/18/24  1921 06/18/24  1804 06/18/24  1736 06/18/24  1643 06/18/24  1615 06/18/24  1531 06/18/24  1359   WBC  --   --  7.0  --   --   --   --   --   --   --  7.9   HGB  --   --  11.4*  --   --   --   --   --   --   --  12.5   MCV   --   --  88  --   --   --   --   --   --   --  88   PLT  --   --  191  --   --   --   --   --   --   --  221   * 135 134*   < >  --   --   --   --   --   --  128*   POTASSIUM  --   --  4.2  --   --  5.1  --   --  4.9  --  6.7*   CHLORIDE  --   --  105  --   --   --   --   --   --   --  98   CO2  --   --  20*  --   --   --   --   --   --   --  21*   BUN  --   --  41.8*  --   --   --   --   --   --   --  54.9*   CR  --   --  1.62*  --   --  1.65*  --   --   --   --  1.73*   ANIONGAP  --   --  9  --   --   --   --   --   --   --  9   ALEXSANDER  --   --  9.7*  --   --   --   --   --   --   --  10.2*   GLC  --   --  87  --  238*  --  189*   < >  --    < > 116*    < > = values in this interval not displayed.         No results found for this or any previous visit (from the past 24 hour(s)).

## 2024-06-19 NOTE — PROGRESS NOTES
:    Met with patient and her daughter to discuss discharge planning needs. They had questions about hospice and palliative care options. I provided them with brochures for [facility], [facility], and [facility]. They report they have been in contact with someone from [facility]. Patients daughter reported that they are considering hospice care after patient is discharged from the hospital.     TARSHA Reyes on 6/19/2024 at 11:22 AM    Met with patient and daughter who informed me that patients MD stated that patient doesn't qualify for hospice care at this time and they would like to resume Home Care services for Skilled Nursing. Patient was educated about [facility] and was agreeable to having a referral sent. Patients daughter reports that she will plan on transporting patient home tomorrow if patient is discharged.     Referral sent to [facility].     Updated [facility] that patient would like to continue having home care services and is anticipated to discharge home tomorrow.      will continue to follow.     TARSHA Reyes on 6/19/2024 at 2:35 PM

## 2024-06-19 NOTE — PROGRESS NOTES
"ADMISSION NOTE    Patient admitted to room 314 at approximately 2133 via wheel chair from emergency room. Patient was accompanied by transport tech.     Verbal SBAR report received from Nathalia GAXIOLA prior to patient arrival.     Patient ambulated to bed with stand-by assist. Patient alert and oriented X 3. The patient is not having any pain.  . Admission vital signs: Blood pressure 94/64, pulse 89, temperature 97.9  F (36.6  C), temperature source Tympanic, resp. rate 14, height 1.537 m (5' 0.5\"), weight 44.1 kg (97 lb 4.8 oz), last menstrual period 06/01/1987, SpO2 97%, not currently breastfeeding. Patient was oriented to plan of care, call light, bed controls, tv, telephone, bathroom, and visiting hours.     Risk Assessment    The following safety risks were identified during admission: skin. Yellow risk band applied: NO.       Education    Patient has a Ada to Observation order: No  Observation education completed and documented: No      Elizabeth Green RN      "

## 2024-06-19 NOTE — PROGRESS NOTES
SAFETY CHECKLIST  ID Bands and Risk clasps correct and in place (DNR, Fall risk, Allergy, Latex, Limb):  Yes  All Lines Reconciled and labeled correctly: Yes  Whiteboard updated:Yes  Environmental interventions: Yes  Verify Tele #: MARIJA Green RN on 6/18/2024 at 10:10 PM

## 2024-06-20 VITALS
RESPIRATION RATE: 18 BRPM | DIASTOLIC BLOOD PRESSURE: 66 MMHG | OXYGEN SATURATION: 98 % | HEART RATE: 104 BPM | WEIGHT: 93.8 LBS | TEMPERATURE: 98.5 F | SYSTOLIC BLOOD PRESSURE: 97 MMHG | BODY MASS INDEX: 17.71 KG/M2 | HEIGHT: 61 IN

## 2024-06-20 DIAGNOSIS — C90.00 MULTIPLE MYELOMA NOT HAVING ACHIEVED REMISSION (H): Primary | ICD-10-CM

## 2024-06-20 DIAGNOSIS — M16.11 UNILATERAL PRIMARY OSTEOARTHRITIS, RIGHT HIP: ICD-10-CM

## 2024-06-20 LAB
ANION GAP SERPL CALCULATED.3IONS-SCNC: 9 MMOL/L (ref 7–15)
BUN SERPL-MCNC: 35 MG/DL (ref 8–23)
CALCIUM SERPL-MCNC: 9.9 MG/DL (ref 8.2–9.6)
CHLORIDE SERPL-SCNC: 106 MMOL/L (ref 98–107)
CREAT SERPL-MCNC: 1.42 MG/DL (ref 0.51–0.95)
DEPRECATED HCO3 PLAS-SCNC: 22 MMOL/L (ref 22–29)
EGFRCR SERPLBLD CKD-EPI 2021: 35 ML/MIN/1.73M2
GLUCOSE SERPL-MCNC: 98 MG/DL (ref 70–99)
HOLD SPECIMEN: NORMAL
MAGNESIUM SERPL-MCNC: 2.1 MG/DL (ref 1.7–2.3)
POTASSIUM SERPL-SCNC: 4 MMOL/L (ref 3.4–5.3)
SODIUM SERPL-SCNC: 134 MMOL/L (ref 135–145)
SODIUM SERPL-SCNC: 137 MMOL/L (ref 135–145)
SODIUM SERPL-SCNC: 137 MMOL/L (ref 135–145)

## 2024-06-20 PROCEDURE — 80048 BASIC METABOLIC PNL TOTAL CA: CPT | Performed by: INTERNAL MEDICINE

## 2024-06-20 PROCEDURE — 36415 COLL VENOUS BLD VENIPUNCTURE: CPT | Performed by: INTERNAL MEDICINE

## 2024-06-20 PROCEDURE — 258N000003 HC RX IP 258 OP 636: Performed by: INTERNAL MEDICINE

## 2024-06-20 PROCEDURE — 84295 ASSAY OF SERUM SODIUM: CPT | Performed by: INTERNAL MEDICINE

## 2024-06-20 PROCEDURE — 99239 HOSP IP/OBS DSCHRG MGMT >30: CPT | Performed by: INTERNAL MEDICINE

## 2024-06-20 PROCEDURE — 83735 ASSAY OF MAGNESIUM: CPT | Performed by: INTERNAL MEDICINE

## 2024-06-20 PROCEDURE — 250N000013 HC RX MED GY IP 250 OP 250 PS 637: Performed by: INTERNAL MEDICINE

## 2024-06-20 RX ORDER — TRAMADOL HYDROCHLORIDE 50 MG/1
25-50 TABLET ORAL EVERY 6 HOURS PRN
Qty: 60 TABLET | Refills: 5 | Status: SHIPPED | OUTPATIENT
Start: 2024-06-20 | End: 2024-07-11

## 2024-06-20 RX ADMIN — SODIUM CHLORIDE, SODIUM LACTATE, POTASSIUM CHLORIDE, AND CALCIUM CHLORIDE: 600; 310; 30; 20 INJECTION, SOLUTION INTRAVENOUS at 04:14

## 2024-06-20 RX ADMIN — PANTOPRAZOLE SODIUM 40 MG: 40 TABLET, DELAYED RELEASE ORAL at 09:51

## 2024-06-20 RX ADMIN — FERROUS SULFATE TAB EC 325 MG (65 MG FE EQUIVALENT) 325 MG: 325 (65 FE) TABLET DELAYED RESPONSE at 09:51

## 2024-06-20 RX ADMIN — Medication 50 MCG: at 09:51

## 2024-06-20 ASSESSMENT — ACTIVITIES OF DAILY LIVING (ADL)
ADLS_ACUITY_SCORE: 37
ADLS_ACUITY_SCORE: 40
ADLS_ACUITY_SCORE: 37
ADLS_ACUITY_SCORE: 37
ADLS_ACUITY_SCORE: 40
ADLS_ACUITY_SCORE: 40

## 2024-06-20 NOTE — PROGRESS NOTES
[facility] DISCHARGE NOTE    Patient discharged to home at 11:54 AM via wheel chair. Accompanied by daughter and staff. Discharge instructions reviewed with patient and daughter, opportunity offered to ask questions. Prescriptions - None ordered for discharge. All belongings sent with patient.    Beau Ramsey RN

## 2024-06-20 NOTE — PROVIDER NOTIFICATION
Pt requesting to stop labs, refusing IV fluids and heart monitor. MD Yun updated [facility] to discontinue fluids,tele and serial NA labs.

## 2024-06-20 NOTE — TELEPHONE ENCOUNTER
Reason for call: Medication or medication refill    Have you contacted your pharmacy regarding this refill? N/a    If No, direct the patient to contact the Pharmacy and discontinue telephone note using Erroneous Encounter.  If Yes, continue.    Name of medication requested: Tramadol    How many days of medication do you have left? 4 left    What pharmacy do you use? [facility]    Preferred method for responding to this message: Telephone Call    Phone number patient can be reached at: Other phone number:  [phone]    If we cannot reach you directly, may we leave a detailed response at the number you provided? Yes    Alcira Aguilera on 6/20/2024 at 2:38 PM

## 2024-06-20 NOTE — TELEPHONE ENCOUNTER
Opioids for chronic pain require an in office visit.  Her exception is malignancy.  Regardless, has not been prescribed in the past.  Best to schedule OV for establish care and med management too.     Signed, Mumtaz Dixon MD, FAAP, FACP  Internal Medicine & Pediatrics

## 2024-06-20 NOTE — TELEPHONE ENCOUNTER
Called Emergency Contact to update on Tramdol fill.     Read them Dr. Dixon's message.  They have an appointment with him next week.      Darby Camp RN on 6/20/2024 at 4:25 PM

## 2024-06-20 NOTE — PHARMACY - DISCHARGE MEDICATION RECONCILIATION AND EDUCATION
Pharmacy:  Discharge Counseling and Medication Reconciliation    Merlyn Escamilla  [address] [place] [id number] [phone] (home)   89 year old female  PCP: Mumtaz Dixon    Allergies: Morphine and Phenylbutazone    Discharge Counseling:    Pharmacist met with patient (and/or family) today to review the medication portion of the After Visit Summary (with an emphasis on NEW medications) and to address patient's questions/concerns.    Summary of Education:   - Counseled pt to stop taking furosemide, lisinopril, magnesium, and potassium. Pt and daughter agreeable to discontinue these medications after discussing reason for stopping. Daughter voiced concern for leg swelling moving forward since furosemide is being stopped. Writer recommended monitoring for swelling in the next week and letting follow-up provider know of changes.    Materials Provided:  MedCounselor sheets printed from Clinical Pharmacology on: N/A    Discharge Medication Reconciliation:    It has been determined that the patient has an adequate supply of medications available or which can be obtained from the patient's preferred pharmacy, which HE/SHE has confirmed as: [facility]    Thank you for the consult.    Julia Welsh........June 20, 2024 11:58 AM

## 2024-06-20 NOTE — PLAN OF CARE
"Afebrile, b/p soft but stable, pt asymptomatic, MD aware, metoprolol held, pt will take at home later if needed, eager to discharge.    BP 97/66   Pulse 104   Temp 98.5  F (36.9  C) (Tympanic)   Resp 18   Ht 1.537 m (5' 0.5\")   Wt 42.5 kg (93 lb 12.8 oz)   LMP 06/01/1987 (Approximate)   SpO2 98%   BMI 18.02 kg/m        Goal Outcome Evaluation:      Plan of Care Reviewed With: patient    Overall Patient Progress: improving        Problem: Adult Inpatient Plan of Care  Goal: Absence of Hospital-Acquired Illness or Injury  Intervention: Identify and Manage Fall Risk  Recent Flowsheet Documentation  Taken 6/20/2024 0800 by Beau Ramsey RN  Safety Promotion/Fall Prevention:   clutter free environment maintained   nonskid shoes/slippers when out of bed   safety round/check completed     Problem: Adult Inpatient Plan of Care  Goal: Absence of Hospital-Acquired Illness or Injury  Intervention: Prevent Skin Injury  Recent Flowsheet Documentation  Taken 6/20/2024 0800 by Beau Ramsey RN  Body Position:   position changed independently   neutral head position   neutral body alignment   supine, head elevated   weight shifting  Skin Protection:   adhesive use limited   incontinence pads utilized  Device Skin Pressure Protection:   absorbent pad utilized/changed   adhesive use limited     Problem: Adult Inpatient Plan of Care  Goal: Absence of Hospital-Acquired Illness or Injury  Intervention: Prevent and Manage VTE (Venous Thromboembolism) Risk  Recent Flowsheet Documentation  Taken 6/20/2024 0800 by Beau Ramsey RN  VTE Prevention/Management: (no order) other (see comments)     Problem: Adult Inpatient Plan of Care  Goal: Absence of Hospital-Acquired Illness or Injury  Intervention: Prevent Infection  Recent Flowsheet Documentation  Taken 6/20/2024 0800 by Beau Ramsey RN  Infection Prevention:   hand hygiene promoted   single patient room provided     Problem: Risk for Delirium  Goal: Improved " Behavioral Control  Intervention: Minimize Safety Risk  Recent Flowsheet Documentation  Taken 6/20/2024 0800 by Beau Ramsey, RN  Communication Enhancement Strategies: call light answered in person  Enhanced Safety Measures: review medications for side effects with activity

## 2024-06-20 NOTE — DISCHARGE SUMMARY
[facility]  Hospitalist Discharge Summary      Date of Admission:  6/18/2024  Date of Discharge:  6/20/2024  Discharging Provider: Torey Yun MD  Discharge Service: Hospitalist Service    Discharge Diagnoses   Acute hyperkalemia  Acute hypermagnesemia  Acute kidney injury on ?  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  The following labs/tests are recommended: BMP,   magnesium,   Monitor patient for fluid overload off Lasix, and monitor patient's blood   pressure of Lasix and lisinopril.    Patient's family refused hospice for now but would like to keep this   option open at a later time      Follow up with nephrology in 1 to 2 weeks with acute kidney injury,   hyperkalemia and hypermagnesemia and the patient on treatment with   lisinopril, furosemide, potassium supplements and magnesium.  Lasix,   potassium supplements and magnesium were stopped.  Lisinopril is withheld   pending further evaluation by nephrology            Discharge Disposition   Discharged to home  Condition at discharge: [facility] Course     Merlyn Escamilla is a 89 year old lady with past medical history of heart failure with reduced LVEF 50 to 55%, multiple myeloma, atrial fibrillation, anemia who came to [facility] 6/18/2024 with symptoms of dizziness, hot flashes, low blood pressure with starting of new medications including magnesium, potassium and decreasing diuretics from 40 to 20 mg with history of JUNIE from cancer medications requiring dialysis in [place] earlier with stopped taking chemotherapy.   Her sodium was 128 potassium 6.7 chloride 98 bicarb 21 BUN 54.9 creatinine 1.73.  Calcium 10.2 magnesium 2.5 glucose 116 hemoglobin 12.5.  Potassium replacement, calcium gluconate given along with insulin and D50 and sodium zirconium and the following day the patient's potassium was normalized to 4.2 her sodium had improved to 135 and her creatinine decreased to 1.62 and calcium 9.7     I stopped the patient's potassium supplements, magnesium supplements and Lasix because of hypovolemia, hyperkalemia and hypermagnesemia.    I withheld the patient's ACE inhibitor lisinopril because of acute kidney injury    At the present time patient's family and patient's have refused hospice care but would like to keep in touch with hospice as she might require it later    Consultations This Hospital Stay   SOCIAL WORK IP CONSULT  SOCIAL WORK IP CONSULT    Code Status   No CPR- Do NOT Intubate    Time Spent on this Encounter   I, Torey Yun MD, personally saw the patient today and spent greater than 30 minutes discharging this patient.       Torey Yun MD  [facility]  [address]  [place] [address]  Phone: [phone]  Fax: [phone]  ______________________________________________________________________    Physical Exam   Vital Signs: Temp: 98.5  F (36.9  C) Temp src: Tympanic BP: 100/56 Pulse: 99   Resp: 18 SpO2: 98 % O2 Device: None (Room air)    Weight: 93 lbs 12.8 oz  GENERAL: Patient is not in acute distress  HEENT: EOM+,Conjunctiva is clear   NECK:  no Jugular Venous distention  HEART: S1 S2 regular Rate and Rhythm, there is  no murmur,   LUNGS: Respirations are not laboured, Lungs are  clear to auscultation without Crepitations or Wheezing   ABDOMEN: Soft , there is no tenderness , Bowel Sounds are  Positive   LOWER LIMBS: no  Pedal Edema  Bilaterally   CNS:  Alert,  Oriented x 3, Moving all the Four Limbs           Primary Care Physician   Mumtaz Dixon    Discharge  Orders      Home Care Referral      Reason for your hospital stay    came to [facility] 6/18/2024 with symptoms of dizziness, hot flashes, low blood pressure with starting of new medications including magnesium, potassium and decreasing diuretics from 40 to 20 mg with history of JUNIE     Follow-up and recommended labs and tests     Follow up with primary care provider, Mumtaz Dixon, within 7 days for hospital follow- up.  The following labs/tests are recommended: BMP, magnesium,   Monitor patient for fluid overload off Lasix, and monitor patient's blood pressure of Lasix and lisinopril.    Patient's family refused hospice for now but would like to keep this option open at a later time      Follow up with nephrology in 1 to 2 weeks with acute kidney injury, hyperkalemia and hypermagnesemia and the patient on treatment with lisinopril, furosemide, potassium supplements and magnesium.  Lasix, potassium supplements and magnesium were stopped.  Lisinopril is withheld pending further evaluation by nephrology     Activity    Your activity upon discharge: activity as tolerated     Diet    Follow this diet upon discharge: Orders Placed This Encounter      Snacks/Supplements Adult: Ensure Enlive; With Meals      Regular Diet Adult       Significant Results and Procedures   Most Recent 3 CBC's:  Recent Labs   Lab Test 06/19/24  0552 06/18/24  1359 05/14/24  0531   WBC 7.0 7.9 2.8*   HGB 11.4* 12.5 8.7*   MCV 88 88 77*    221 123*     Most Recent 3 BMP's:  Recent Labs   Lab Test 06/20/24  0528 06/20/24  0053 06/19/24  2119 06/19/24  0854 06/19/24  0552 06/19/24  0122 06/18/24  1921 06/18/24  1804 06/18/24  1531 06/18/24  1359     137 134* 134*   < > 134*   < >  --   --   --  128*   POTASSIUM 4.0  --   --   --  4.2  --   --  5.1   < > 6.7*   CHLORIDE 106  --   --   --  105  --   --   --   --  98   CO2 22  --   --   --  20*  --   --   --   --  21*   BUN 35.0*  --   --   --  41.8*  --   --    --   --  54.9*   CR 1.42*  --   --   --  1.62*  --   --  1.65*  --  1.73*   ANIONGAP 9  --   --   --  9  --   --   --   --  9   ALEXSANDER 9.9*  --   --   --  9.7*  --   --   --   --  10.2*   GLC 98  --   --   --  87  --  238*  --    < > 116*    < > = values in this interval not displayed.     Most Recent 2 LFT's:  Recent Labs   Lab Test 05/13/24  1200 05/06/24  1105   AST 14 15   ALT 10 9   ALKPHOS 105 108   BILITOTAL 0.5 0.6   ,   Results for orders placed or performed during the hospital encounter of 05/22/24   PET Oncology Whole Body    Narrative    Procedure: PET ONCOLOGY WHOLE BODY    Subtype: Initial     Radiopharmaceutical: F-18 FDG     Dose: 11.82 mCi IV    Serum Glucose: 125 mg/dl    History: Multiple myeloma; Multiple myeloma, remission status  unspecified (H)    Comparison: 12/19/2023    Technique:  A low dose CT examination was performed for the purpose of  attenuation correction and localization. Attenuation corrected PET  images from the vertex to the feet were acquired approximately one  hour following intravenous administration of the dose, and displayed  in transaxial, sagittal, and coronal projections.    Uptake time: 59 minutes.    Findings:     Background uptake: Blood pool 1.8, liver 3.0.    Neck: No sites of suspicious hypermetabolism.    Thorax: Chronic aneurysmal dilatation of the mid ascending aorta  estimated at 5 cm. Small bilateral pleural effusions.    Abdomen/Pelvis: No sites of suspicious hypermetabolism.    Lower extremities: No sites of suspicious hypermetabolism.    Bony structures: Severe right hip osteoarthritis. No hypermetabolic  marrow lesions are identified.      Impression    IMPRESSION:  Aneurysmal dilatation of the thoracic aorta to 5 cm.  Cardiomegaly. Small pleural effusions.    No evidence of focal hypermetabolic myelomatous disease.    CARISSA CARLTON MD         SYSTEM ID:  [id number]       Discharge Medications   Current Discharge Medication List        CONTINUE these  medications which have NOT CHANGED    Details   ACE/ARB/ARNI NOT PRESCRIBED (INTENTIONAL) Please choose reason not prescribed from choices below.      cholecalciferol 50 MCG (2000 UT) CAPS Take 50 mcg by mouth daily      digoxin (LANOXIN) 125 MCG tablet Take 1 tablet by mouth daily      ferrous sulfate (FEROSUL) 325 (65 Fe) MG tablet Take 325 mg by mouth daily (with breakfast)      !! metoprolol succinate ER (TOPROL XL) 100 MG 24 hr tablet Take 1 tablet by mouth daily at bedtime with 25 mg tab, total daily dose = 125 mg      !! metoprolol succinate ER (TOPROL XL) 25 MG 24 hr tablet Take 1 tablet by mouth daily at bedtime with 100 mg tab, total daily dose = 125 mg      omeprazole (PRILOSEC) 20 MG DR capsule Take 20 mg by mouth daily      rivaroxaban ANTICOAGULANT (XARELTO) 15 MG TABS tablet Take 1 tablet by mouth daily (with dinner)      senna-docusate (SENOKOT-S/PERICOLACE) 8.6-50 MG tablet Take 1-2 tablets by mouth 2 times daily as needed for constipation      traMADol (ULTRAM) 50 MG tablet Take 25-50 mg by mouth every 6 hours as needed for severe pain       !! - Potential duplicate medications found. Please discuss with provider.        STOP taking these medications       furosemide (LASIX) 20 MG tablet Comments:   Reason for Stopping:         lisinopril (ZESTRIL) 5 MG tablet Comments:   Reason for Stopping:         magnesium oxide 400 MG CAPS Comments:   Reason for Stopping:         potassium chloride ER (K-TAB) 20 MEQ CR tablet Comments:   Reason for Stopping:             Allergies   Allergies   Allergen Reactions    Morphine      Other reaction(s): Muscle Weakness    Phenylbutazone Unknown

## 2024-06-20 NOTE — PROGRESS NOTES
:    Met with patient and her daughter who report that patient will discharge home today. Patient will continue to have Home Care SN and they would like patient to have OT as well.     Updated [facility] that patient would like to have Home Care OT services in addition to the SN.    MD was updated about patient wanting OT and he will do an order for Home OT eval and treat.     Sent message to [facility] informing them that patients daughter would like to be present during their initial encounter to discuss all of the services they have to offer.     Sent referral to clinic care coordination to follow-up with patient. I updated them that hospice was discussed but patient does not currently qualify.     Patients daughter Flor reports that she will transport patient home today.     TARSHA Reyes on 6/20/2024 at 11:25 AM    Faxed paper orders for OT signed by MD to [facility].     Spoke with  at [facility] and she confirmed that they received the orders for OT eval and treat.    No further needs from .     TARSHA Reyes on 6/20/2024 at 12:04 PM

## 2024-06-20 NOTE — PROGRESS NOTES
SAFETY CHECKLIST  ID Bands and Risk clasps correct and in place (DNR, Fall risk, Allergy, Latex, Limb):  Yes  All Lines Reconciled and labeled correctly: Yes  Whiteboard updated:Yes  Environmental interventions: Yes  Verify Tele #:  2

## 2024-06-20 NOTE — TELEPHONE ENCOUNTER
Requested Prescriptions   Pending Prescriptions Disp Refills    traMADol (ULTRAM) 50 MG tablet       Sig: Take 0.5-1 tablets (25-50 mg) by mouth every 6 hours as needed for severe pain       There is no refill protocol information for this order   Historical    Last Office Visit:              5/28/24 (hospital follow up)   Future Office visit:             Next 5 appointments (look out 90 days)      Jul 11, 2024 10:40 AM  (Arrive by 10:25 AM)  Provider Visit with Mumtaz Dixon MD  [facility] [address]  [place] [address]  [phone]     Jul 19, 2024 1:00 PM  Return Visit with Pretty Briseno MD  [facility] [address]  [place] [address]  [phone]     Unable to complete prescription refill per RN Medication Refill Policy. Saumya Urbina RN .............. 6/20/2024  2:58 PM

## 2024-06-20 NOTE — PLAN OF CARE
Goal Outcome Evaluation:      Plan of Care Reviewed With: patient    Overall Patient Progress: improvingOverall Patient Progress: improving         Restless, having trouble sleeping, PRN melatonin was ineffective. IV dressing changed. SBA-IND in room.

## 2024-06-21 ENCOUNTER — PATIENT OUTREACH (OUTPATIENT)
Dept: PEDIATRICS | Facility: OTHER | Age: 89
End: 2024-06-21
Payer: COMMERCIAL

## 2024-06-21 ENCOUNTER — PATIENT OUTREACH (OUTPATIENT)
Dept: ONCOLOGY | Facility: OTHER | Age: 89
End: 2024-06-21
Payer: COMMERCIAL

## 2024-06-21 NOTE — TELEPHONE ENCOUNTER
Transitions of Care Outreach  Chief Complaint   Patient presents with    Hospital F/U       Most Recent Admission Date: 6/18/2024   Most Recent Admission Diagnosis: Dehydration - E86.0  Hyperkalemia - E87.5  Hyponatremia - E87.1     Most Recent Discharge Date: 6/20/2024   Most Recent Discharge Diagnosis: Hyperkalemia - E87.5  Hyponatremia - E87.1  Dehydration - E86.0  Acute on chronic heart failure with preserved ejection fraction (H) - I50.33     Transitions of Care Assessment    Discharge Assessment  How are you doing now that you are home?: Pretty good  How are your symptoms? (Red Flag symptoms escalate to triage hotline per guidelines): Improved  Do you know how to contact your clinic care team if you have future questions or changes to your health status? : Yes  Does the patient have their discharge instructions? : Yes  Does the patient have questions regarding their discharge instructions? : No  Were you started on any new medications or were there changes to any of your previous medications? : Yes  Does the patient have all of their medications?: Yes  Do you have questions regarding any of your medications? : No  Do you have all of your needed medical supplies or equipment (DME)?  (i.e. oxygen tank, CPAP, cane, etc.): Yes    Follow up Plan     Discharge Follow-Up  Discharge follow up appointment scheduled in alignment with recommended follow up timeframe or Transitions of Risk Category? (Low = within 30 days; Moderate= within 14 days; High= within 7 days): Yes  Discharge Follow Up Appointment Date: 06/27/24  Discharge Follow Up Appointment Scheduled with?: Primary Care Provider    Future Appointments   Date Time Provider Department Center   6/27/2024  1:20 PM Mumtaz Dixon MD [facility]   7/11/2024 10:40 AM Mumtaz Dixon MD [facility] [place]   7/19/2024  1:00 PM Pretty Briseno MD [facility] [place]       Outpatient Plan as outlined on AVS reviewed with patient.    For any urgent  concerns, please contact our 24 hour nurse triage line: [phone] ([phone])       Gisella Coker RN

## 2024-06-21 NOTE — PROGRESS NOTES
[facility]: Transitions of Care Note  Chief Complaint   Patient presents with    Clinic Care Coordination - Post Hospital       Most Recent Admission Date: 6/18/2024   Most Recent Admission Diagnosis: Dehydration - E86.0  Hyperkalemia - E87.5  Hyponatremia - E87.1     Most Recent Discharge Date: 6/20/2024   Most Recent Discharge Diagnosis: Hyperkalemia - E87.5  Hyponatremia - E87.1  Dehydration - E86.0  Acute on chronic heart failure with preserved ejection fraction (H) - I50.33     Transitions of Care Assessment              Cancer Care  RNCC Short Symptom Review:     Assessment completed with:: Patient    General/Short Assessment  Does the patient have all their medications?: Yes  Is the patient experiencing any new or worsening symptoms?: No  Discussion with patient: Answered patient's questions and addressed concerns;Reviewed how and when to contact clinic;Reviewed patient's future appointments    Pain  Patient Reported Pain?: No    Patient Coping  Accepting    Clinic Utilization  Patient Aware of Next Appointment?: Yes    Other Patient Concerns  Other Patient Reported Concerns: No    Follow up Plan          Future Appointments   Date Time Provider Department Center   6/27/2024  1:20 PM Mumtaz Dixon MD [facility]   7/11/2024 10:40 AM Mumtaz Dixon MD [facility] [place]   7/19/2024  1:00 PM Pretty Briseno MD [facility] [place]     Merlyn states that she feels good and is taking all her medications  Outpatient Plan as outlined on AVS reviewed with patient.    For any urgent concerns, please contact our 24 hour clinic line:          Clarissa Hester RN

## 2024-06-22 LAB
ATRIAL RATE - MUSE: 234 BPM
DIASTOLIC BLOOD PRESSURE - MUSE: NORMAL MMHG
INTERPRETATION ECG - MUSE: NORMAL
P AXIS - MUSE: NORMAL DEGREES
PR INTERVAL - MUSE: NORMAL MS
QRS DURATION - MUSE: 104 MS
QT - MUSE: 394 MS
QTC - MUSE: 443 MS
R AXIS - MUSE: -26 DEGREES
SYSTOLIC BLOOD PRESSURE - MUSE: NORMAL MMHG
T AXIS - MUSE: -4 DEGREES
VENTRICULAR RATE- MUSE: 76 BPM

## 2024-06-24 ENCOUNTER — TELEPHONE (OUTPATIENT)
Dept: PEDIATRICS | Facility: OTHER | Age: 89
End: 2024-06-24
Payer: COMMERCIAL

## 2024-06-24 NOTE — TELEPHONE ENCOUNTER
"FYI     Dr. Dixon,     Blood pressures have been in 90s/50-60's over the weekend. Denies being symptomatic. Voiding \"well\". States that she's feeling great.     Weight today was 100.8lbs, down 3 lbs from last week. Reports \"eating like a horse\".     She does have a visit with you this week, 6/27/2024.     Just wondering if you wanted home care to make any changes or questions before you see her Thursday?     Thank you,     Lila Martinez RN on 6/24/2024 at 3:29 PM    "

## 2024-06-25 ENCOUNTER — TELEPHONE (OUTPATIENT)
Dept: PEDIATRICS | Facility: OTHER | Age: 89
End: 2024-06-25
Payer: COMMERCIAL

## 2024-06-25 ENCOUNTER — MEDICAL CORRESPONDENCE (OUTPATIENT)
Dept: HEALTH INFORMATION MANAGEMENT | Facility: OTHER | Age: 89
End: 2024-06-25
Payer: COMMERCIAL

## 2024-06-25 RX ORDER — TRAMADOL HYDROCHLORIDE 50 MG/1
TABLET ORAL
Qty: 60 TABLET | Refills: 5 | OUTPATIENT
Start: 2024-06-25

## 2024-06-25 NOTE — TELEPHONE ENCOUNTER
[facility] requesting:     traMADol (ULTRAM) 50 MG tablet 60 tablet 5 6/20/2024 -- No   Sig - Route: Take 0.5-1 tablets (25-50 mg) by mouth every 6 hours as needed for severe pain - Oral   Sent to pharmacy as: traMADol HCl 50 MG Oral Tablet (ULTRAM)   Class: E-Prescribe   Order: [id number]   E-Prescribing Status: Receipt confirmed by pharmacy (6/20/2024  3:32 PM CDT)     Duplicate request    Julia Perez RN on 6/25/2024 at 3:02 PM

## 2024-06-25 NOTE — TELEPHONE ENCOUNTER
I agree with the Home Care order requests below.  Mumtaz Dixon MD on 6/25/2024 at 5:19 PM    Signed, Mumtaz Dixon MD, FAAP, FACP  Internal Medicine & Pediatrics

## 2024-06-25 NOTE — TELEPHONE ENCOUNTER
Request for additional Home Care Physical Therapy orders as follows:      Continuation frequency =   ___1___  x week x  ___2___ week(s)    Effective date = 7/1/24        Interventions include:    Gait Training  Muscle strengthening exercises  Balance training  Transfer Training  Education - home safety  Instruction - home exercise program  Therapeutic exercise  Pain assessment & management  Home safety assessment            Therapist: Sonam Briseno DPT    Request for additional Home Care Occupational Therapy orders as follows:        Continuation frequency =   ___1___  x week x  ___1___ week(s)    Effective date = 7/1/24      Continuation frequency =   ___2___  x week x  ___3___ week(s)    Effective date = 7/8/24    Intervention include:    ADL skills training  Education - home safety  Therapeutic exercise  Cognitive neuro rehab screen  Adaptive equipment           For therapist: Arpita Connor, OTR/L  Please respond with .HOMECAREAGREE, if you are in agreement. Please sign with your comments and signature, if you are not in agreement.

## 2024-06-26 NOTE — TELEPHONE ENCOUNTER
Gave VO to  Home Care as directed by Mumtaz Dixon MD.    Josee Carbajal LPN on 6/26/2024 at 9:47 AM

## 2024-06-27 ENCOUNTER — PATIENT OUTREACH (OUTPATIENT)
Dept: CARE COORDINATION | Facility: CLINIC | Age: 89
End: 2024-06-27

## 2024-06-27 ENCOUNTER — OFFICE VISIT (OUTPATIENT)
Dept: PEDIATRICS | Facility: OTHER | Age: 89
End: 2024-06-27
Attending: INTERNAL MEDICINE
Payer: COMMERCIAL

## 2024-06-27 VITALS
HEIGHT: 61 IN | SYSTOLIC BLOOD PRESSURE: 110 MMHG | RESPIRATION RATE: 16 BRPM | TEMPERATURE: 98.2 F | WEIGHT: 105.6 LBS | DIASTOLIC BLOOD PRESSURE: 64 MMHG | OXYGEN SATURATION: 98 % | HEART RATE: 66 BPM | BODY MASS INDEX: 19.94 KG/M2

## 2024-06-27 DIAGNOSIS — C90.00 MULTIPLE MYELOMA NOT HAVING ACHIEVED REMISSION (H): Chronic | ICD-10-CM

## 2024-06-27 DIAGNOSIS — N18.4 ACUTE RENAL FAILURE SUPERIMPOSED ON STAGE 4 CHRONIC KIDNEY DISEASE, UNSPECIFIED ACUTE RENAL FAILURE TYPE (H): ICD-10-CM

## 2024-06-27 DIAGNOSIS — E87.5 HYPERKALEMIA: ICD-10-CM

## 2024-06-27 DIAGNOSIS — I50.20 HFREF (HEART FAILURE WITH REDUCED EJECTION FRACTION) (H): ICD-10-CM

## 2024-06-27 DIAGNOSIS — Z09 HOSPITAL DISCHARGE FOLLOW-UP: Primary | ICD-10-CM

## 2024-06-27 DIAGNOSIS — N17.9 ACUTE RENAL FAILURE SUPERIMPOSED ON STAGE 4 CHRONIC KIDNEY DISEASE, UNSPECIFIED ACUTE RENAL FAILURE TYPE (H): ICD-10-CM

## 2024-06-27 DIAGNOSIS — E87.1 HYPONATREMIA: ICD-10-CM

## 2024-06-27 LAB
ANION GAP SERPL CALCULATED.3IONS-SCNC: 8 MMOL/L (ref 7–15)
BUN SERPL-MCNC: 29.8 MG/DL (ref 8–23)
CALCIUM SERPL-MCNC: 9.4 MG/DL (ref 8.2–9.6)
CHLORIDE SERPL-SCNC: 109 MMOL/L (ref 98–107)
CREAT SERPL-MCNC: 1.23 MG/DL (ref 0.51–0.95)
DEPRECATED HCO3 PLAS-SCNC: 18 MMOL/L (ref 22–29)
EGFRCR SERPLBLD CKD-EPI 2021: 42 ML/MIN/1.73M2
GLUCOSE SERPL-MCNC: 113 MG/DL (ref 70–99)
POTASSIUM SERPL-SCNC: 4.8 MMOL/L (ref 3.4–5.3)
SODIUM SERPL-SCNC: 135 MMOL/L (ref 135–145)

## 2024-06-27 PROCEDURE — G0463 HOSPITAL OUTPT CLINIC VISIT: HCPCS

## 2024-06-27 PROCEDURE — 82374 ASSAY BLOOD CARBON DIOXIDE: CPT | Mod: ZL | Performed by: INTERNAL MEDICINE

## 2024-06-27 PROCEDURE — 36600 WITHDRAWAL OF ARTERIAL BLOOD: CPT | Mod: ZL | Performed by: INTERNAL MEDICINE

## 2024-06-27 PROCEDURE — 99496 TRANSJ CARE MGMT HIGH F2F 7D: CPT | Performed by: INTERNAL MEDICINE

## 2024-06-27 PROCEDURE — 82565 ASSAY OF CREATININE: CPT | Mod: ZL | Performed by: INTERNAL MEDICINE

## 2024-06-27 ASSESSMENT — PAIN SCALES - GENERAL: PAINLEVEL: NO PAIN (0)

## 2024-06-27 NOTE — PROGRESS NOTES
"Clinic Care Coordination Contact    Patient recently hospitalized at [facility] med surg from 6/18/24 -6/20/24 for multiple health issues.     Writer initiated face to face with patient and dtr, Flor, at MD visit  today to offer Care Coordination services. They report an upcoming meeting  with Claudette \"person\" next Monday to discuss patient needs and community resources and possible referrals available.     Dtr, Flor, comes every weekend from her home in [place] to spend time with patient. Patient and dtr report things are  \"going really well\" since hospitalization. They expressed that they would like to have patient have more interaction with others---\"would be nice if someone could come in and play cards with her.\"  Writer encouraged them to discuss this with [employer] representative, Ирина/In ---and that she can probably readily assist with this.     Also encouraged patient/dtr to consider a PALS alert unit in the home to help notify others incase of a medical emergency in the home.  They said they would discuss with Ирина/Justice Burrows at upcoming meeting/phone call.     Initiated phone call  with  Ирина/Justice Burrows this afternoon and she verified that she is talking with them next week.  Will continue to follow up and collaborate with Ирина as needed.     Patient also receives Care Coordination from Clarissa Hester/RN Oncology re: her medical status.     Plan:  Enroll in Care Coordination to assess and offer assist with community resources/referrals as needed. Collaborate with community referrals as needed. Will follow up with patient in next 1-2 weeks re: Claudette referral.     TREY Levi on 6/27/2024 at 4:22 PM                      "

## 2024-06-27 NOTE — PATIENT INSTRUCTIONS
-- Low salt diet, under 2000 mg/day   -- Fluid restrict, under 2000 mL/day aka 8.5 cups/day   -- Eat healthy protein   -- Learn about DASH Diet for dietary ways to reduce blood pressure  Google: [facility] DASH diet  [facility] site: https://www.nhlbi.nih.gov/health-topics/dash-eating-plan  PDF from [facility]: https://www.nhlbi.nih.gov/files/docs/public/heart/new_dash.pdf   -- Elevate feet above your hips for 20-30 minutes, 4 times per day   -- Compression stockings from morning to night     -- Don't start lisinopril for now   -- Daily weights   -- Goal to maintain a consistent dry weight, suspect near today's weight of 105 lbs 9.6 oz   -- Consider return to Nephrology   -- Consider return to Cardiology for CardioMEMS

## 2024-06-27 NOTE — NURSING NOTE
"Chief Complaint   Patient presents with    Hospital F/U     Heart failure    Recheck Medication     Medication mixed up her potassium and magnesium       Initial /64   Pulse 66   Temp 98.2  F (36.8  C) (Tympanic)   Resp 16   Ht 1.537 m (5' 0.5\")   Wt 47.9 kg (105 lb 9.6 oz)   LMP 06/01/1987 (Approximate)   SpO2 98%   Breastfeeding No   BMI 20.28 kg/m   Estimated body mass index is 20.28 kg/m  as calculated from the following:    Height as of this encounter: 1.537 m (5' 0.5\").    Weight as of this encounter: 47.9 kg (105 lb 9.6 oz).  Medication Review: complete    The next two questions are to help us understand your food security.  If you are feeling you need any assistance in this area, we have resources available to support you today.          5/28/2024   SDOH- Food Insecurity   Within the past 12 months, did you worry that your food would run out before you got money to buy more? N   Within the past 12 months, did the food you bought just not last and you didn t have money to get more? N            Health Care Directive:  Patient has a Health Care Directive on file      Norma J. Gosselin, LPN      "

## 2024-06-27 NOTE — PROGRESS NOTES
Assessment & Plan       ICD-10-CM    1. Hospital discharge follow-up  Z09 Primary Care - Care Coordination Referral      2. Acute renal failure superimposed on stage 4 chronic kidney disease, unspecified acute renal failure type (H)  N17.9 Basic metabolic panel    N18.4 Basic metabolic panel     Primary Care - Care Coordination Referral      3. Hyponatremia  E87.1 Basic metabolic panel     Basic metabolic panel     Primary Care - Care Coordination Referral      4. Hyperkalemia  E87.5 Basic metabolic panel     Basic metabolic panel     Primary Care - Care Coordination Referral      5. HFrEF (heart failure with reduced ejection fraction) (H)  I50.20 Basic metabolic panel     Miscellaneous Order for DME - (Use only if a more specific DME order does not already exist     Basic metabolic panel     Primary Care - Care Coordination Referral      6. Multiple myeloma not having achieved remission (H)  C90.00 Primary Care - Care Coordination Referral        Kidney function at the initial part of her hospitalization was very concerning for acute kidney failure with hyperkalemia and hyponatremia.  With holding her loop diuretic and potassium supplementation kidney function has returned toward normal.  I suspect that she is having a delicate balance between volume overloading and dehydration and the balance between heart failure with reduced ejection fraction and chronic kidney disease.  Certainly multiple myeloma is playing a significant role given the increase in circulating protein that would be produced (antibodies).  Recommend repeat testing today.  Low threshold for return to nephrology.      Ms. Escamilla was recently hospitalized for acute kidney failure, appears to be doing well since discharge.  Discharge summary and recommendations for outpatient provider are reviewed. Based on what occurred in the visit today:  Previous medication(s) were discontinued or altered? No  Previous medication(s) were suspended pending  "consultation? No  New medication(s) started? No     -- Medication reconciliation and management was performed today    Patient Instructions    -- Low salt diet, under 2000 mg/day   -- Fluid restrict, under 2000 mL/day aka 8.5 cups/day   -- Eat healthy protein   -- Learn about DASH Diet for dietary ways to reduce blood pressure  Google: [facility] DASH diet  [facility] site: https://www.nhlbi.nih.gov/health-topics/dash-eating-plan  PDF from [facility]: https://www.nhlbi.nih.gov/files/docs/public/heart/new_dash.pdf   -- Elevate feet above your hips for 20-30 minutes, 4 times per day   -- Compression stockings from morning to night     -- Don't start lisinopril for now   -- Daily weights   -- Goal to maintain a consistent dry weight, suspect near today's weight of 105 lbs 9.6 oz   -- Consider return to Nephrology   -- Consider return to Cardiology for CardioMEMS      Return in about 2 weeks (around 7/11/2024), or if symptoms worsen or fail to improve, for kidney.    Signed, Mumtaz Dixon MD, FAAP, FACP  Internal Medicine & Pediatrics    Subjective   Merlyn Escamilla is a 89 year old female who presents for hospital discharge follow-up.    Hospital: [facility]  Date of discharge: 6/20/2024  Date of post discharge contact: 6/21/24    Objective   Vitals: /64   Pulse 66   Temp 98.2  F (36.8  C) (Tympanic)   Resp 16   Ht 1.537 m (5' 0.5\")   Wt 47.9 kg (105 lb 9.6 oz)   LMP 06/01/1987 (Approximate)   SpO2 98%   Breastfeeding No   BMI 20.28 kg/m      MSK: no LE edema    Review and Analysis of Data   I personally reviewed the following:  External notes: No  Results: Yes local lab  Use of an independent historian: Yes daughter  Independent review of a test performed by another physician: No  Discussion of management with another physician: No  Moderate risk of morbidity from additional diagnostic testing and/or treatment.    Billing:   Type of Medical Decision Making Face-to-Face Visit within 7 Days Face-to-Face Visit within 14 days "   Moderate Complexity [id number]   High Complexity [id number]

## 2024-06-28 PROCEDURE — G0179 MD RECERTIFICATION HHA PT: HCPCS | Performed by: INTERNAL MEDICINE

## 2024-07-02 NOTE — INTERIM SUMMARY
Learning About When to Call Your Doctor During Pregnancy (After 20 Weeks)  Overview  It's common to have concerns about what might be a problem when you're pregnant. Most pregnancies don't have any serious problems. But it's still important to know when to call your doctor if you have certain symptoms or signs of labor.  These are general suggestions. Your doctor may give you some more information about when to call.  When to call your doctor (after 20 weeks)  Call 911  anytime you think you may need emergency care. For example, call if:  You have severe vaginal bleeding. This means you are soaking through a pad each hour for 2 or more hours.  You have sudden, severe pain in your belly.  You have chest pain, are short of breath, or cough up blood.  You passed out (lost consciousness).  You have a seizure.  You see or feel the umbilical cord.  You think you are about to deliver your baby and can't make it safely to the hospital or birthing center.  Call your doctor now or seek immediate medical care if:  You have vaginal bleeding.  You have belly pain.  You have a fever.  You are dizzy or lightheaded, or you feel like you may faint.  You have signs of a blood clot in your leg (called a deep vein thrombosis), such as:  Pain in the calf, back of the knee, thigh, or groin.  Swelling in your leg or groin.  A color change on the leg or groin. The skin may be reddish or purplish, depending on your usual skin color.  You have symptoms of preeclampsia, such as:  Sudden swelling of your face, hands, or feet.  New vision problems (such as dimness, blurring, or seeing spots).  A severe headache.  You have a sudden release of fluid from your vagina. (You think your water broke.)  You've been having regular contractions for an hour. This means that you've had at least 6 contractions within 1 hour, even after you change your position and drink fluids.  You notice that your baby has stopped moving or is moving less than  Pathology Interim Summary Note    I have reviewed the patient's prior H&P/clinic note. There have been no changes in the interim.    Carlos Salcido  Staff Pathologist  [phone]    "normal.  You have signs of heart failure, such as:  New or increased shortness of breath.  New or worse swelling in your legs, ankles, or feet.  Sudden weight gain, such as more than 2 to 3 pounds in a day or 5 pounds in a week.  Feeling so tired or weak that you cannot do your usual activities.  You have symptoms of a urinary tract infection. These may include:  Pain or burning when you urinate.  A frequent need to urinate without being able to pass much urine.  Pain in the flank, which is just below the rib cage and above the waist on either side of the back.  Blood in your urine.  Watch closely for changes in your health, and be sure to contact your doctor if:  You have vaginal discharge that smells bad.  You feel sad, anxious, or hopeless for more than a few days.  You have skin changes, such as a rash, itching, or a yellow color to your skin.  You have other concerns about your pregnancy.  If you have labor signs at 37 weeks or more  If you have signs of labor at 37 weeks or more, your doctor may tell you to call when your labor becomes more active. Symptoms of active labor include:  Contractions that are regular.  Contractions that are less than 5 minutes apart.  Contractions that are hard to talk through.  Follow-up care is a key part of your treatment and safety. Be sure to make and go to all appointments, and call your doctor if you are having problems. It's also a good idea to know your test results and keep a list of the medicines you take.  Where can you learn more?  Go to https://www.placespourtous.com.net/patiented  Enter N531 in the search box to learn more about \"Learning About When to Call Your Doctor During Pregnancy (After 20 Weeks).\"  Current as of: July 10, 2023               Content Version: 14.0    0217-9280 [employer].   Care instructions adapted under license by your healthcare professional. If you have questions about a medical condition or this instruction, always ask your healthcare " professional. [employer] disclaims any warranty or liability for your use of this information.

## 2024-07-03 NOTE — PROGRESS NOTES
"Clinic Care Coordination Contact    Initiated phone contact with patient and her dtr, Flor, to check in on patient's status following last conversation on 6/27/24.     Patient states she is doing well--and has no pertinent concerns at this time.  We discussed that her dtr, Flor, does visit weekly but may not be visiting this week because of the 4th of July holiday.     Patient's dtr, Flor, stated that she appreciated phone call, but felt that they were doing ok at this time.  She had not spoken to [facility] person on Monday as she had planned. Dtr/Flor does feel that she would like to continue to pursue a volunteer to come visit her mother in the home---maybe just to play cards or a board game.  She feels that her mother is just \"really lonely at times\" but won't admit it.     Writer agreed to talk with Ирина at [facility] about her mother's needing some social stimulation.     Flor/dtr agreed to Care Coordination follow up on situation every 30 days for at least 2 more months post her current hospitalization.     Plan:  Ongoing Care Coordination to assess and offer assist with community resources/referrals; ongoing encouragement, support as needed. Talk with [facility] re: Volunteer in the home for games/social stimulation .    TREY Levi on 7/3/2024 at 3:33 PM        "

## 2024-07-04 DIAGNOSIS — I48.11 LONGSTANDING PERSISTENT ATRIAL FIBRILLATION (H): ICD-10-CM

## 2024-07-04 DIAGNOSIS — I48.20 CHRONIC ATRIAL FIBRILLATION, UNSPECIFIED (H): ICD-10-CM

## 2024-07-08 RX ORDER — RIVAROXABAN 15 MG/1
15 TABLET, FILM COATED ORAL
Qty: 90 TABLET | Refills: 3 | Status: SHIPPED | OUTPATIENT
Start: 2024-07-08

## 2024-07-08 NOTE — TELEPHONE ENCOUNTER
Requested Prescriptions   Pending Prescriptions Disp Refills    XARELTO ANTICOAGULANT 15 MG TABS tablet [Pharmacy Med Name: Xarelto 15 mg tablet] 90 tablet 3     Sig: Take 1 tablet (15 mg) by mouth daily (with dinner)       Anticoagulant Agents Failed - 7/4/2024  8:04 AM        Failed - Creatinine Clearance greater than 50 ml/min on file in past 3 mos     No lab results found.        Failed - GFR on file in the past 12 months and most recent GFR is normal        Passed - Normal Platelets on file in past 12 months     Recent Labs   Lab Test 06/19/24  0552              Passed - Medication is active on med list        Passed - Serum creatinine less than or equal to 1.4 on file in past 3 mos     Recent Labs   Lab Test 06/27/24  1405   CR 1.23*           Passed - Recent (6 mo) or future (90 days) visit within the authorizing provider's specialty        Passed - Medication indicated for associated diagnosis     Medication is associated with one or more of the following diagnoses:  Atrial fibrillation  Deep venous thrombosis  Heparin-induced thrombocytopenia  Pulmonary embolism  Venous thromboembolism        Passed - Patient is 18 years of age or older        Passed - No active pregnancy on record        Passed - No positive pregnancy test within past 12 months     Last Written Prescription Date:  7/3/23  Last Fill Quantity: 90,   # refills: 3  Last Office Visit: 11/16/23  Future Office visit:    Next 5 appointments (look out 90 days)      Jul 11, 2024 10:40 AM  (Arrive by 10:25 AM)  Provider Visit with Mumtaz Dixon MD  [facility] ([facility] [address]  [place] [address]  [phone]     Jul 19, 2024 1:00 PM  Return Visit with Pretty Briseno MD  [facility] [address]  [place] [address]  [phone]     Routing refill request to  provider for review/approval because:  Abnormal lab    Unable to complete prescription refill per RN Medication Refill Policy.   Sonam Quiroz RN ....................  7/8/2024   8:07 AM

## 2024-07-09 ENCOUNTER — TELEPHONE (OUTPATIENT)
Dept: PEDIATRICS | Facility: OTHER | Age: 89
End: 2024-07-09
Payer: COMMERCIAL

## 2024-07-09 DIAGNOSIS — C90.00 MULTIPLE MYELOMA NOT HAVING ACHIEVED REMISSION (H): Primary | Chronic | ICD-10-CM

## 2024-07-09 NOTE — TELEPHONE ENCOUNTER
Mumtaz Dixon was given home care or hospice form(s) for review and signature. Certification period effective 6/28/24 to 8/26/24.  Norma J. Gosselin, LPN on 7/9/2024 at 9:58 AM

## 2024-07-10 ENCOUNTER — MEDICAL CORRESPONDENCE (OUTPATIENT)
Dept: HEALTH INFORMATION MANAGEMENT | Facility: OTHER | Age: 89
End: 2024-07-10
Payer: COMMERCIAL

## 2024-07-11 ENCOUNTER — OFFICE VISIT (OUTPATIENT)
Dept: PEDIATRICS | Facility: OTHER | Age: 89
End: 2024-07-11
Attending: INTERNAL MEDICINE
Payer: COMMERCIAL

## 2024-07-11 VITALS
WEIGHT: 108.2 LBS | SYSTOLIC BLOOD PRESSURE: 122 MMHG | BODY MASS INDEX: 20.43 KG/M2 | TEMPERATURE: 98.6 F | HEART RATE: 88 BPM | OXYGEN SATURATION: 97 % | HEIGHT: 61 IN | DIASTOLIC BLOOD PRESSURE: 62 MMHG | RESPIRATION RATE: 16 BRPM

## 2024-07-11 DIAGNOSIS — E55.9 VITAMIN D INSUFFICIENCY: ICD-10-CM

## 2024-07-11 DIAGNOSIS — N17.9 ACUTE RENAL FAILURE, UNSPECIFIED ACUTE RENAL FAILURE TYPE (H): ICD-10-CM

## 2024-07-11 DIAGNOSIS — M87.9 OSTEONECROSIS OF RIGHT HIP (H): ICD-10-CM

## 2024-07-11 DIAGNOSIS — C90.00 MULTIPLE MYELOMA NOT HAVING ACHIEVED REMISSION (H): ICD-10-CM

## 2024-07-11 DIAGNOSIS — I50.32 CHRONIC HEART FAILURE WITH PRESERVED EJECTION FRACTION (H): Primary | ICD-10-CM

## 2024-07-11 DIAGNOSIS — E87.5 HYPERKALEMIA: ICD-10-CM

## 2024-07-11 DIAGNOSIS — E87.1 HYPONATREMIA: ICD-10-CM

## 2024-07-11 PROBLEM — I50.33 ACUTE ON CHRONIC HEART FAILURE WITH PRESERVED EJECTION FRACTION (H): Status: RESOLVED | Noted: 2024-05-28 | Resolved: 2024-07-11

## 2024-07-11 PROBLEM — Z99.2 ENCOUNTER FOR HEMODIALYSIS (H): Status: RESOLVED | Noted: 2024-05-28 | Resolved: 2024-07-11

## 2024-07-11 PROBLEM — E86.0 DEHYDRATION: Status: RESOLVED | Noted: 2024-06-18 | Resolved: 2024-07-11

## 2024-07-11 PROBLEM — N18.5 CHRONIC KIDNEY DISEASE, STAGE V (H): Status: RESOLVED | Noted: 2024-05-28 | Resolved: 2024-07-11

## 2024-07-11 LAB
ANION GAP SERPL CALCULATED.3IONS-SCNC: 10 MMOL/L (ref 7–15)
BUN SERPL-MCNC: 37.7 MG/DL (ref 8–23)
CALCIUM SERPL-MCNC: 9.6 MG/DL (ref 8.2–9.6)
CHLORIDE SERPL-SCNC: 103 MMOL/L (ref 98–107)
CREAT SERPL-MCNC: 1.49 MG/DL (ref 0.51–0.95)
DEPRECATED HCO3 PLAS-SCNC: 21 MMOL/L (ref 22–29)
EGFRCR SERPLBLD CKD-EPI 2021: 33 ML/MIN/1.73M2
GLUCOSE SERPL-MCNC: 145 MG/DL (ref 70–99)
POTASSIUM SERPL-SCNC: 5.5 MMOL/L (ref 3.4–5.3)
SODIUM SERPL-SCNC: 134 MMOL/L (ref 135–145)

## 2024-07-11 PROCEDURE — G2211 COMPLEX E/M VISIT ADD ON: HCPCS | Performed by: INTERNAL MEDICINE

## 2024-07-11 PROCEDURE — 36415 COLL VENOUS BLD VENIPUNCTURE: CPT | Mod: ZL | Performed by: INTERNAL MEDICINE

## 2024-07-11 PROCEDURE — 80048 BASIC METABOLIC PNL TOTAL CA: CPT | Mod: ZL | Performed by: INTERNAL MEDICINE

## 2024-07-11 PROCEDURE — 99215 OFFICE O/P EST HI 40 MIN: CPT | Performed by: INTERNAL MEDICINE

## 2024-07-11 PROCEDURE — G0463 HOSPITAL OUTPT CLINIC VISIT: HCPCS

## 2024-07-11 RX ORDER — TRAMADOL HYDROCHLORIDE 50 MG/1
TABLET ORAL
Qty: 90 TABLET | Refills: 5 | Status: SHIPPED | OUTPATIENT
Start: 2024-07-11

## 2024-07-11 ASSESSMENT — PAIN SCALES - GENERAL: PAINLEVEL: NO PAIN (0)

## 2024-07-11 NOTE — NURSING NOTE
"Chief Complaint   Patient presents with    End Of Life Discussion       Initial /62   Pulse 88   Temp 98.6  F (37  C)   Resp 16   Ht 1.537 m (5' 0.5\")   Wt 49.1 kg (108 lb 3.2 oz)   LMP 06/01/1987 (Approximate)   SpO2 97%   Breastfeeding No   BMI 20.78 kg/m   Estimated body mass index is 20.78 kg/m  as calculated from the following:    Height as of this encounter: 1.537 m (5' 0.5\").    Weight as of this encounter: 49.1 kg (108 lb 3.2 oz).  Medication Review: complete    The next two questions are to help us understand your food security.  If you are feeling you need any assistance in this area, we have resources available to support you today.          5/28/2024   SDOH- Food Insecurity   Within the past 12 months, did you worry that your food would run out before you got money to buy more? N   Within the past 12 months, did the food you bought just not last and you didn t have money to get more? N            Health Care Directive:  Patient has a Health Care Directive on file      Moira Page LPN      "

## 2024-07-11 NOTE — PROGRESS NOTES
Assessment & Plan   1. Chronic heart failure with preserved ejection fraction (H)  2. Acute renal failure, unspecified acute renal failure type (H24)  She continues to have a tenuous balance between volume up and volume down regarding heart failure as well as kidney failure.  Her prior kidney function had been within normal limits.  Suspect secondary to myeloma as the cause.  Her last kidney panels have been relatively stable recommend repeat today for trend.  We did have a lengthy discussion today with regards to the progression of her multiorgan syndromes.  We discussed the possibility of transitioning to hospice today which the patient declined.  Close follow-up is recommended.  - Basic metabolic panel; Future  - Basic metabolic panel  - Basic metabolic panel; Future    3. Hyperkalemia  Prior suspected secondary to supplementation.  Recheck today.  - Basic metabolic panel; Future  - Basic metabolic panel  - Basic metabolic panel; Future    4. Hyponatremia  Trend recheck today.  - Basic metabolic panel; Future  - Basic metabolic panel  - Basic metabolic panel; Future    5. Multiple myeloma not having achieved remission (H)  Recommend follow-up with oncology.  Having pain recommend use of tramadol.  Adverse effects were discussed  - traMADol (ULTRAM) 50 MG tablet; Take 2 tablets (100 mg) by mouth at bedtime. May also take 1 tablet (50 mg) daily as needed for severe pain.  Dispense: 90 tablet; Refill: 5    6. Vitamin D insufficiency  At goal, no change.  - cholecalciferol 50 MCG (2000 UT) CAPS; Take 50 mcg by mouth daily  Dispense: 90 capsule; Refill: 4    7. Osteonecrosis of right hip (H)  She would be a poor surgical candidate at this point in time.          Patient Instructions        -- Low salt diet, under 2000 mg/day   -- Fluid restrict, under 2000 mL/day aka 8.5 cups/day   -- Eat healthy protein   -- Learn about DASH Diet for dietary ways to reduce blood pressure  Google: [facility] DASH diet  [facility] site:  "https://www.nhlbi.nih.gov/health-topics/dash-eating-plan  PDF from [facility]: https://www.nhlbi.nih.gov/files/docs/public/heart/new_dash.pdf   -- Elevate feet above your hips for 20-30 minutes, 4 times per day   -- Compression stockings from morning to night     -- Dry weight may be closer to 108 lbs 3.2 oz          Return in about 3 months (around 10/11/2024), or if symptoms worsen or fail to improve, for heart, kidney.    Signed, Mumtaz Dixon MD, FAAP, FACP  Internal Medicine & Pediatrics    Subjective   Merlyn Escamilla is a 89 year old female who presents for End Of Life Discussion.  She does not know how that chief complaint got listed.  She thinks her other daughter made the appointment.  She really wants to talk about her fluid.  She has been feeling good.  She is trying to eat healthy.  She did not know she was supposed to be restricting her fluids.    Objective   Vitals: /62   Pulse 88   Temp 98.6  F (37  C)   Resp 16   Ht 1.537 m (5' 0.5\")   Wt 49.1 kg (108 lb 3.2 oz)   LMP 06/01/1987 (Approximate)   SpO2 97%   Breastfeeding No   BMI 20.78 kg/m        Review and Analysis of Data   I personally reviewed the following:  External notes: Yes, I reviewed the outside nephrology note from Dr. Graham  Results: Yes kidney panels were reviewed  Use of an independent historian: Yes I spoke with her daughter  Independent review of a test performed by another physician: No  Discussion of management with another physician: No  High risk of morbidity from additional diagnostic testing and/or treatment.    "

## 2024-07-11 NOTE — PATIENT INSTRUCTIONS
-- Low salt diet, under 2000 mg/day   -- Fluid restrict, under 2000 mL/day aka 8.5 cups/day   -- Eat healthy protein   -- Learn about DASH Diet for dietary ways to reduce blood pressure  Google: [facility] DASH diet  [facility] site: https://www.nhlbi.nih.gov/health-topics/dash-eating-plan  PDF from [facility]: https://www.nhlbi.nih.gov/files/docs/public/heart/new_dash.pdf   -- Elevate feet above your hips for 20-30 minutes, 4 times per day   -- Compression stockings from morning to night     -- Dry weight may be closer to 108 lbs 3.2 oz

## 2024-07-12 ENCOUNTER — MYC MEDICAL ADVICE (OUTPATIENT)
Dept: PEDIATRICS | Facility: OTHER | Age: 89
End: 2024-07-12
Payer: COMMERCIAL

## 2024-07-12 NOTE — TELEPHONE ENCOUNTER
It's up to her.  Once she's signed up for hospice she should cancel.    Signed, Mumtaz Dixon MD, FAAP, FACP  Internal Medicine & Pediatrics

## 2024-07-12 NOTE — TELEPHONE ENCOUNTER
Per OV from 7/11/24:    2. Acute renal failure, unspecified acute renal failure type (H24)  She continues to have a tenuous balance between volume up and volume down regarding heart failure as well as kidney failure.  Her prior kidney function had been within normal limits.  Suspect secondary to myeloma as the cause.  Her last kidney panels have been relatively stable recommend repeat today for trend.  We did have a lengthy discussion today with regards to the progression of her multiorgan syndromes.  We discussed the possibility of transitioning to hospice today which the patient declined.  Close follow-up is recommended    Erwin Mishra RN on 7/12/2024 at 10:32 AM

## 2024-07-15 ENCOUNTER — MYC MEDICAL ADVICE (OUTPATIENT)
Dept: ONCOLOGY | Facility: OTHER | Age: 89
End: 2024-07-15
Payer: COMMERCIAL

## 2024-07-15 ENCOUNTER — MEDICAL CORRESPONDENCE (OUTPATIENT)
Dept: HEALTH INFORMATION MANAGEMENT | Facility: OTHER | Age: 89
End: 2024-07-15
Payer: COMMERCIAL

## 2024-07-17 ENCOUNTER — PATIENT OUTREACH (OUTPATIENT)
Dept: CARE COORDINATION | Facility: CLINIC | Age: 89
End: 2024-07-17
Payer: COMMERCIAL

## 2024-07-17 NOTE — PROGRESS NOTES
Clinic Care Coordination Contact    Received call back from [facility] Inhome Visitor, Ирина, today and completed referral to have her visit patient in community.      Ирина/Justice Burrows will initiate a phone call to patient, offer an inhome visit and assess and assist with any needs/resources and community resources she may need.    Ирина did state that she had spoken to dtr, Flor, in recent past and will follow up with patient by an initial phone call today or tomorrow.    Plan:  Ongoing Care Coordination to assess and offer assist with community resources/referrals; ongoing encouragement, support as needed. Will collaborate with Chicho Burrows in re: to any referrals needed.     TREY Levi on 7/17/2024 at 11:00 AM

## 2024-07-18 ENCOUNTER — MEDICAL CORRESPONDENCE (OUTPATIENT)
Dept: HEALTH INFORMATION MANAGEMENT | Facility: OTHER | Age: 89
End: 2024-07-18
Payer: COMMERCIAL

## 2024-07-19 ENCOUNTER — TELEPHONE (OUTPATIENT)
Dept: ONCOLOGY | Facility: OTHER | Age: 89
End: 2024-07-19
Payer: COMMERCIAL

## 2024-07-19 NOTE — ORAL ONC MGMT
Thank you for the opportunity to be a part in the care of this patient's oral chemotherapy. The oncology pharmacy will no longer be following this patient for oral chemotherapy. If there are any questions or the plan changes, feel free to contact us.    Hamlet Oliveira, PharmD  Oral Chemotherapy Pharmacist  [phone]

## 2024-07-25 NOTE — PROGRESS NOTES
Clinic Care Coordination Contact    Received phone call from Ирина/Alondra Colbertor with update on referral.  Ирина met with patient in her home recently.  Ирина reports that patient appears to be doing quite well with current supports in place.    Ирина's report confirms that patient has supportive neighbors (bringing meals/food regularly); supportive dtr who visits every weekend; and occasional friends/[employer] family involvement.     No specific concerns or needs noted at this time.  Ирина will remain available to assist with changes/issues.     Plan:  Will graduate patient from  Care Coordination at this time due to resources given and no specific needs at this time.   Remain available to assist with concerns as they arise.     TREY Leiv on 7/25/2024 at 12:31 PM

## 2024-08-15 ENCOUNTER — MEDICAL CORRESPONDENCE (OUTPATIENT)
Dept: HEALTH INFORMATION MANAGEMENT | Facility: OTHER | Age: 89
End: 2024-08-15
Payer: COMMERCIAL

## 2024-09-12 ENCOUNTER — MEDICAL CORRESPONDENCE (OUTPATIENT)
Dept: HEALTH INFORMATION MANAGEMENT | Facility: OTHER | Age: 89
End: 2024-09-12
Payer: COMMERCIAL

## 2024-09-23 ENCOUNTER — MEDICAL CORRESPONDENCE (OUTPATIENT)
Dept: HEALTH INFORMATION MANAGEMENT | Facility: OTHER | Age: 89
End: 2024-09-23
Payer: COMMERCIAL

## 2024-10-09 ENCOUNTER — PATIENT OUTREACH (OUTPATIENT)
Dept: ONCOLOGY | Facility: OTHER | Age: 89
End: 2024-10-09
Payer: COMMERCIAL

## 2024-10-09 NOTE — PROGRESS NOTES
Patient has been admitted to hospice.  Will no longer be followed by Oncology Care Coordinator   Spoke with patient. Patient will call back to schedule.

## 2024-12-06 NOTE — TELEPHONE ENCOUNTER
Lasix in AM would not affect urination at night. Likely UTI. Recommend evaluation.    This is something that requires a visit.  If she has new concerns she should be seen sooner.    she may need to see one of my partners.  If no one available, she may need to present to [facility].    Mumtaz Dalton MD, FAAP, FACP  Internal Medicine & Pediatrics     metoprolol tartrate 25 mg twice daily-if blood pressure poorly controlled may consider adding ACE inhibitor or ARB

## 2025-01-02 ENCOUNTER — MEDICAL CORRESPONDENCE (OUTPATIENT)
Dept: HEALTH INFORMATION MANAGEMENT | Facility: OTHER | Age: OVER 89
End: 2025-01-02
Payer: COMMERCIAL

## 2025-03-14 ASSESSMENT — PATIENT HEALTH QUESTIONNAIRE - PHQ9
SUM OF ALL RESPONSES TO PHQ QUESTIONS 1-9: 3
10. IF YOU CHECKED OFF ANY PROBLEMS, HOW DIFFICULT HAVE THESE PROBLEMS MADE IT FOR YOU TO DO YOUR WORK, TAKE CARE OF THINGS AT HOME, OR GET ALONG WITH OTHER PEOPLE: NOT DIFFICULT AT ALL
SUM OF ALL RESPONSES TO PHQ QUESTIONS 1-9: 3

## 2025-03-14 ASSESSMENT — ANXIETY QUESTIONNAIRES
3. WORRYING TOO MUCH ABOUT DIFFERENT THINGS: NOT AT ALL
7. FEELING AFRAID AS IF SOMETHING AWFUL MIGHT HAPPEN: NOT AT ALL
6. BECOMING EASILY ANNOYED OR IRRITABLE: NOT AT ALL
GAD7 TOTAL SCORE: 0
1. FEELING NERVOUS, ANXIOUS, OR ON EDGE: NOT AT ALL
7. FEELING AFRAID AS IF SOMETHING AWFUL MIGHT HAPPEN: NOT AT ALL
IF YOU CHECKED OFF ANY PROBLEMS ON THIS QUESTIONNAIRE, HOW DIFFICULT HAVE THESE PROBLEMS MADE IT FOR YOU TO DO YOUR WORK, TAKE CARE OF THINGS AT HOME, OR GET ALONG WITH OTHER PEOPLE: NOT DIFFICULT AT ALL
GAD7 TOTAL SCORE: 0
5. BEING SO RESTLESS THAT IT IS HARD TO SIT STILL: NOT AT ALL
8. IF YOU CHECKED OFF ANY PROBLEMS, HOW DIFFICULT HAVE THESE MADE IT FOR YOU TO DO YOUR WORK, TAKE CARE OF THINGS AT HOME, OR GET ALONG WITH OTHER PEOPLE?: NOT DIFFICULT AT ALL
2. NOT BEING ABLE TO STOP OR CONTROL WORRYING: NOT AT ALL
GAD7 TOTAL SCORE: 0
4. TROUBLE RELAXING: NOT AT ALL

## 2025-03-14 ASSESSMENT — PAIN SCALES - PAIN ENJOYMENT GENERAL ACTIVITY SCALE (PEG)
INTERFERED_ENJOYMENT_LIFE: 7
AVG_PAIN_PASTWEEK: 7
INTERFERED_GENERAL_ACTIVITY: 7
INTERFERED_GENERAL_ACTIVITY: 7
PEG_TOTALSCORE: 7
AVG_PAIN_PASTWEEK: 7
INTERFERED_ENJOYMENT_LIFE: 7
PEG_TOTALSCORE: 7

## 2025-03-19 ENCOUNTER — OFFICE VISIT (OUTPATIENT)
Dept: PEDIATRICS | Facility: OTHER | Age: OVER 89
End: 2025-03-19
Attending: INTERNAL MEDICINE
Payer: MEDICARE

## 2025-03-19 VITALS
HEART RATE: 62 BPM | BODY MASS INDEX: 20.75 KG/M2 | DIASTOLIC BLOOD PRESSURE: 60 MMHG | RESPIRATION RATE: 16 BRPM | TEMPERATURE: 98.4 F | SYSTOLIC BLOOD PRESSURE: 102 MMHG | OXYGEN SATURATION: 99 % | WEIGHT: 108 LBS

## 2025-03-19 DIAGNOSIS — Z87.448 HISTORY OF RENAL FAILURE: ICD-10-CM

## 2025-03-19 DIAGNOSIS — Z71.89 GOALS OF CARE, COUNSELING/DISCUSSION: Primary | ICD-10-CM

## 2025-03-19 DIAGNOSIS — I50.42 CHRONIC COMBINED SYSTOLIC (CONGESTIVE) AND DIASTOLIC (CONGESTIVE) HEART FAILURE (H): ICD-10-CM

## 2025-03-19 DIAGNOSIS — E55.9 VITAMIN D INSUFFICIENCY: ICD-10-CM

## 2025-03-19 DIAGNOSIS — C90.00 MULTIPLE MYELOMA NOT HAVING ACHIEVED REMISSION (H): ICD-10-CM

## 2025-03-19 DIAGNOSIS — M87.9 OSTEONECROSIS OF RIGHT HIP (H): ICD-10-CM

## 2025-03-19 DIAGNOSIS — K21.9 GASTROESOPHAGEAL REFLUX DISEASE WITHOUT ESOPHAGITIS: ICD-10-CM

## 2025-03-19 DIAGNOSIS — N18.4 CHRONIC KIDNEY DISEASE, STAGE IV (SEVERE) (H): ICD-10-CM

## 2025-03-19 DIAGNOSIS — I48.11 LONGSTANDING PERSISTENT ATRIAL FIBRILLATION (H): ICD-10-CM

## 2025-03-19 DIAGNOSIS — I48.20 CHRONIC ATRIAL FIBRILLATION, UNSPECIFIED (H): ICD-10-CM

## 2025-03-19 DIAGNOSIS — Z92.89 HISTORY OF HEMODIALYSIS: ICD-10-CM

## 2025-03-19 PROCEDURE — G0463 HOSPITAL OUTPT CLINIC VISIT: HCPCS

## 2025-03-19 RX ORDER — BUPRENORPHINE 5 UG/H
1 PATCH TRANSDERMAL
Qty: 4 PATCH | Refills: 5 | Status: SHIPPED | OUTPATIENT
Start: 2025-03-19

## 2025-03-19 RX ORDER — TRAMADOL HYDROCHLORIDE 50 MG/1
50 TABLET ORAL
Qty: 30 TABLET | Refills: 5 | Status: SHIPPED | OUTPATIENT
Start: 2025-03-19

## 2025-03-19 RX ORDER — FERROUS SULFATE 325(65) MG
325 TABLET ORAL
Qty: 90 TABLET | Refills: 4 | Status: SHIPPED | OUTPATIENT
Start: 2025-03-19

## 2025-03-19 RX ORDER — TRAMADOL HYDROCHLORIDE 50 MG/1
TABLET ORAL
Qty: 90 TABLET | Refills: 5 | Status: CANCELLED | OUTPATIENT
Start: 2025-03-19

## 2025-03-19 RX ORDER — MULTIVIT WITH MINERALS/LUTEIN
250 TABLET ORAL
COMMUNITY

## 2025-03-19 RX ORDER — DIGOXIN 125 MCG
125 TABLET ORAL DAILY
Qty: 90 TABLET | Refills: 4 | Status: SHIPPED | OUTPATIENT
Start: 2025-03-19

## 2025-03-19 RX ORDER — OMEPRAZOLE 20 MG/1
20 CAPSULE, DELAYED RELEASE ORAL DAILY
Qty: 90 CAPSULE | Refills: 4 | Status: SHIPPED | OUTPATIENT
Start: 2025-03-19

## 2025-03-19 RX ORDER — MELOXICAM 7.5 MG/1
7.5 TABLET ORAL DAILY
Qty: 90 TABLET | Refills: 4 | Status: SHIPPED | OUTPATIENT
Start: 2025-03-19

## 2025-03-19 ASSESSMENT — PAIN SCALES - GENERAL: PAINLEVEL_OUTOF10: SEVERE PAIN (7)

## 2025-03-19 NOTE — PROGRESS NOTES
Assessment & Plan     Assessment & Plan  Osteonecrosis of right hip  Chronic hip pain due to osteonecrosis, causing significant discomfort. She is not a candidate for hip replacement surgery due to age, overall health status, and high risk of complications such as stroke or failure to heal post-surgery. The focus is on pain management rather than surgical intervention.  - Discontinue consideration of hip replacement surgery  - Initiate pain management with a weekly patch to replace tramadol  - Consider meloxicam for additional pain relief, with the understanding that it may affect kidney function    Multiple myeloma  Multiple myeloma previously treated with Revlimid and dexamethasone. She experienced acute kidney failure following chemotherapy, leading to hospitalization. Currently, there is no active treatment, and she is not pursuing further oncological interventions. Despite typical rapid progression over six to nine months, she has responded well to initial treatment.  - Re-enroll in hospice care with a focus on comfort and symptom management    Acute renal failure  Acute renal failure secondary to chemotherapy for multiple myeloma. Kidney function has partially recovered but remains compromised. She is aware of the potential risks of NSAIDs on kidney function, which could lead to further kidney damage and potentially hasten mortality.  - Monitor kidney function if NSAIDs are used for pain management    Goals of Care  She prefers comfort-focused care over life-prolonging treatments, prioritizing quality of life. She understands her prognosis and wishes to avoid extensive testing and aggressive interventions, guided by her desire to remain comfortable.  - Re-enroll in hospice care to focus on comfort and symptom management  - Avoid life-prolonging treatments and focus on quality of life    Patient Instructions    -- Start buprenorphine patches   -- Okay to use tramadol too if it helps   -- Referral back to  "Hospice as you don't desire further life prolonging measures or interventional procedures      Return if symptoms worsen or fail to improve.    Signed, Mumtaz Dixon MD, FAAP, FACP  Internal Medicine & Pediatrics    Subjective   Merlyn Escamilla is a 89 year old female who presents for Recheck Medication (Medication management, needs refills/) and Referral (Looking for Ortho referral on Right hip pain, and wondering Home health referral. ).    History of Present Illness  The patient, a 89-year-old with a history of multiple myeloma, hip pain, and kidney failure, presents with ongoing hip pain. The hip pain has been present for about a year and is described as severe, with the patient expressing a strong desire for relief. The patient's hip pain is due to osteoarthritis and osteonecrosis, with the hip joint described as \"bone on bone.\" The pain is most severe at night, and the patient is currently taking tramadol for pain management, which provides some relief.    The patient was previously on hospice care due to her multiple myeloma diagnosis. However, she was discharged from hospice after her condition improved following chemotherapy treatment. The patient's kidney function also improved significantly after a period of acute kidney failure. The patient is now considering whether to pursue further treatment for her multiple myeloma and hip pain or to focus on comfort measures and possibly return to hospice care.    Objective   Vitals: /60 (BP Location: Right arm, Patient Position: Sitting, Cuff Size: Adult Regular)   Pulse 62   Temp 98.4  F (36.9  C) (Tympanic)   Resp 16   Wt 49 kg (108 lb)   LMP 06/01/1987 (Approximate)   SpO2 99%   BMI 20.75 kg/m      Physical Exam        Results  LABS  Plasma cells: 10% (April 2024)  Hemoglobin: improved (03/05/2025)  Sodium: stable (03/05/2025)  Platelets: stable (03/05/2025)    DIAGNOSTIC  Dialysis: performed (May 2024)    PATHOLOGY  Bone marrow biopsy: " performed    Review and Analysis of Data   I personally reviewed the following:  External notes: Yes [facility] discharge, Nephrology  Results: Yes local lab, imaging  Use of an independent historian: Yes daughter  Independent review of a test performed by another physician: No  Discussion of management with another physician: No  High risk of morbidity from additional diagnostic testing and/or treatment.    Consent was obtained from the patient/parent to use an AI documentation tool in the creation of this note.

## 2025-03-19 NOTE — NURSING NOTE
"Chief Complaint   Patient presents with    Recheck Medication     Medication management, needs refills      Referral     Looking for Ortho referral on Right hip pain, and wondering Home health referral.        Initial /60 (BP Location: Right arm, Patient Position: Sitting, Cuff Size: Adult Regular)   Pulse 62   Temp 98.4  F (36.9  C) (Tympanic)   Resp 16   Wt 49 kg (108 lb)   LMP 06/01/1987 (Approximate)   SpO2 99%   BMI 20.75 kg/m   Estimated body mass index is 20.75 kg/m  as calculated from the following:    Height as of 7/11/24: 1.537 m (5' 0.5\").    Weight as of this encounter: 49 kg (108 lb).  Medication Reconciliation: complete    Ayana Mitchell LPN    Advance Care Directive reviewed    "

## 2025-03-19 NOTE — PATIENT INSTRUCTIONS
-- Start buprenorphine patches   -- Okay to use tramadol too if it helps   -- Referral back to Hospice as you don't desire further life prolonging measures or interventional procedures

## 2025-03-24 ENCOUNTER — MEDICAL CORRESPONDENCE (OUTPATIENT)
Dept: HEALTH INFORMATION MANAGEMENT | Facility: OTHER | Age: OVER 89
End: 2025-03-24
Payer: COMMERCIAL

## 2025-03-25 ENCOUNTER — TELEPHONE (OUTPATIENT)
Dept: PEDIATRICS | Facility: OTHER | Age: OVER 89
End: 2025-03-25
Payer: COMMERCIAL

## 2025-03-25 NOTE — TELEPHONE ENCOUNTER
Mumatz Dixon MD  was given hospice form(s) for review and signature. Certification period effective 3/21/2025 to 5/19/2025 from [facility].  Josee Carbajal LPN on 3/25/2025 at 1:49 PM

## 2025-03-31 ENCOUNTER — MEDICAL CORRESPONDENCE (OUTPATIENT)
Dept: HEALTH INFORMATION MANAGEMENT | Facility: OTHER | Age: OVER 89
End: 2025-03-31
Payer: COMMERCIAL

## 2025-05-17 ENCOUNTER — HEALTH MAINTENANCE LETTER (OUTPATIENT)
Age: OVER 89
End: 2025-05-17

## 2025-06-05 ENCOUNTER — MEDICAL CORRESPONDENCE (OUTPATIENT)
Dept: HEALTH INFORMATION MANAGEMENT | Facility: OTHER | Age: OVER 89
End: 2025-06-05
Payer: COMMERCIAL

## 2025-07-03 ENCOUNTER — MEDICAL CORRESPONDENCE (OUTPATIENT)
Dept: HEALTH INFORMATION MANAGEMENT | Facility: OTHER | Age: OVER 89
End: 2025-07-03
Payer: COMMERCIAL

## 2025-07-17 ENCOUNTER — MEDICAL CORRESPONDENCE (OUTPATIENT)
Dept: HEALTH INFORMATION MANAGEMENT | Facility: OTHER | Age: OVER 89
End: 2025-07-17
Payer: COMMERCIAL

## 2025-08-09 ENCOUNTER — HOSPITAL ENCOUNTER (EMERGENCY)
Facility: OTHER | Age: OVER 89
Discharge: HOME | End: 2025-08-09
Attending: EMERGENCY MEDICINE
Payer: MEDICARE

## 2025-08-09 ENCOUNTER — APPOINTMENT (OUTPATIENT)
Dept: GENERAL RADIOLOGY | Facility: OTHER | Age: OVER 89
End: 2025-08-09
Attending: EMERGENCY MEDICINE
Payer: MEDICARE

## 2025-08-09 VITALS
OXYGEN SATURATION: 100 % | RESPIRATION RATE: 22 BRPM | TEMPERATURE: 97.5 F | BODY MASS INDEX: 20.75 KG/M2 | SYSTOLIC BLOOD PRESSURE: 162 MMHG | DIASTOLIC BLOOD PRESSURE: 90 MMHG | HEART RATE: 80 BPM | WEIGHT: 108 LBS

## 2025-08-09 DIAGNOSIS — R10.84 GENERALIZED ABDOMINAL PAIN: Primary | ICD-10-CM

## 2025-08-09 LAB
ALBUMIN SERPL BCG-MCNC: 3.2 G/DL (ref 3.5–5.2)
ALP SERPL-CCNC: 116 U/L (ref 40–150)
ALT SERPL W P-5'-P-CCNC: 12 U/L (ref 0–50)
ANION GAP SERPL CALCULATED.3IONS-SCNC: 14 MMOL/L (ref 7–15)
AST SERPL W P-5'-P-CCNC: 22 U/L (ref 0–45)
BASOPHILS # BLD AUTO: 0.1 10E3/UL (ref 0–0.2)
BASOPHILS NFR BLD AUTO: 1 %
BILIRUB SERPL-MCNC: 0.6 MG/DL
BUN SERPL-MCNC: 27.1 MG/DL (ref 8–23)
CALCIUM SERPL-MCNC: 9.1 MG/DL (ref 8.8–10.4)
CHLORIDE SERPL-SCNC: 103 MMOL/L (ref 98–107)
CREAT SERPL-MCNC: 0.98 MG/DL (ref 0.51–0.95)
EGFRCR SERPLBLD CKD-EPI 2021: 54 ML/MIN/1.73M2
EOSINOPHIL # BLD AUTO: 0 10E3/UL (ref 0–0.7)
EOSINOPHIL NFR BLD AUTO: 0 %
ERYTHROCYTE [DISTWIDTH] IN BLOOD BY AUTOMATED COUNT: 17.7 % (ref 10–15)
GLUCOSE SERPL-MCNC: 291 MG/DL (ref 70–99)
HCO3 SERPL-SCNC: 19 MMOL/L (ref 22–29)
HCT VFR BLD AUTO: 28.9 % (ref 35–47)
HGB BLD-MCNC: 8.7 G/DL (ref 11.7–15.7)
HOLD SPECIMEN: NORMAL
IMM GRANULOCYTES # BLD: 0 10E3/UL
IMM GRANULOCYTES NFR BLD: 0 %
LIPASE SERPL-CCNC: 11 U/L (ref 13–60)
LYMPHOCYTES # BLD AUTO: 0.6 10E3/UL (ref 0.8–5.3)
LYMPHOCYTES NFR BLD AUTO: 8 %
MCH RBC QN AUTO: 24.9 PG (ref 26.5–33)
MCHC RBC AUTO-ENTMCNC: 30.1 G/DL (ref 31.5–36.5)
MCV RBC AUTO: 83 FL (ref 78–100)
MONOCYTES # BLD AUTO: 0.5 10E3/UL (ref 0–1.3)
MONOCYTES NFR BLD AUTO: 6 %
NEUTROPHILS # BLD AUTO: 7 10E3/UL (ref 1.6–8.3)
NEUTROPHILS NFR BLD AUTO: 85 %
NRBC # BLD AUTO: 0 10E3/UL
NRBC BLD AUTO-RTO: 0 /100
PLATELET # BLD AUTO: 214 10E3/UL (ref 150–450)
POTASSIUM SERPL-SCNC: 3.9 MMOL/L (ref 3.4–5.3)
PROT SERPL-MCNC: 6.2 G/DL (ref 6.4–8.3)
RBC # BLD AUTO: 3.49 10E6/UL (ref 3.8–5.2)
SODIUM SERPL-SCNC: 136 MMOL/L (ref 135–145)
TROPONIN T SERPL HS-MCNC: 21 NG/L
TROPONIN T SERPL HS-MCNC: 23 NG/L
WBC # BLD AUTO: 8.2 10E3/UL (ref 4–11)

## 2025-08-09 PROCEDURE — 36415 COLL VENOUS BLD VENIPUNCTURE: CPT | Performed by: EMERGENCY MEDICINE

## 2025-08-09 PROCEDURE — 74019 RADEX ABDOMEN 2 VIEWS: CPT

## 2025-08-09 PROCEDURE — 84484 ASSAY OF TROPONIN QUANT: CPT | Performed by: EMERGENCY MEDICINE

## 2025-08-09 PROCEDURE — 99283 EMERGENCY DEPT VISIT LOW MDM: CPT | Performed by: FAMILY MEDICINE

## 2025-08-09 PROCEDURE — 80053 COMPREHEN METABOLIC PANEL: CPT | Performed by: EMERGENCY MEDICINE

## 2025-08-09 PROCEDURE — 93005 ELECTROCARDIOGRAM TRACING: CPT | Performed by: FAMILY MEDICINE

## 2025-08-09 PROCEDURE — 99285 EMERGENCY DEPT VISIT HI MDM: CPT | Performed by: EMERGENCY MEDICINE

## 2025-08-09 PROCEDURE — 74019 RADEX ABDOMEN 2 VIEWS: CPT | Mod: 26 | Performed by: RADIOLOGY

## 2025-08-09 PROCEDURE — 85004 AUTOMATED DIFF WBC COUNT: CPT | Performed by: EMERGENCY MEDICINE

## 2025-08-09 PROCEDURE — 83690 ASSAY OF LIPASE: CPT | Performed by: EMERGENCY MEDICINE

## 2025-08-09 PROCEDURE — 93010 ELECTROCARDIOGRAM REPORT: CPT | Performed by: INTERNAL MEDICINE

## 2025-08-09 ASSESSMENT — ENCOUNTER SYMPTOMS
BACK PAIN: 0
FEVER: 0
DYSURIA: 0
VOMITING: 0
SHORTNESS OF BREATH: 0
ABDOMINAL PAIN: 1
CHEST TIGHTNESS: 0
CONSTIPATION: 1
CHILLS: 0
CONFUSION: 0
NAUSEA: 1

## 2025-08-09 ASSESSMENT — ACTIVITIES OF DAILY LIVING (ADL)
ADLS_ACUITY_SCORE: 56

## 2025-08-10 ENCOUNTER — PATIENT OUTREACH (OUTPATIENT)
Dept: CARE COORDINATION | Facility: CLINIC | Age: OVER 89
End: 2025-08-10
Payer: COMMERCIAL

## 2025-08-11 LAB
ATRIAL RATE - MUSE: 55 BPM
DIASTOLIC BLOOD PRESSURE - MUSE: NORMAL MMHG
INTERPRETATION ECG - MUSE: NORMAL
P AXIS - MUSE: NORMAL DEGREES
PR INTERVAL - MUSE: NORMAL MS
QRS DURATION - MUSE: 102 MS
QT - MUSE: 370 MS
QTC - MUSE: 452 MS
R AXIS - MUSE: -32 DEGREES
SYSTOLIC BLOOD PRESSURE - MUSE: NORMAL MMHG
T AXIS - MUSE: -37 DEGREES
VENTRICULAR RATE- MUSE: 90 BPM

## (undated) DEVICE — GLOVE ESTEEM POWDER FREE SMT 8.0  2D72PT80

## (undated) RX ORDER — LIDOCAINE HYDROCHLORIDE 10 MG/ML
INJECTION, SOLUTION INFILTRATION; PERINEURAL
Status: DISPENSED
Start: 2021-10-01

## (undated) RX ORDER — BUPIVACAINE HYDROCHLORIDE 5 MG/ML
INJECTION, SOLUTION EPIDURAL; INTRACAUDAL
Status: DISPENSED
Start: 2021-10-01

## (undated) RX ORDER — FENTANYL CITRATE 50 UG/ML
INJECTION, SOLUTION INTRAMUSCULAR; INTRAVENOUS
Status: DISPENSED
Start: 2018-07-29

## (undated) RX ORDER — BUPIVACAINE HYDROCHLORIDE 5 MG/ML
INJECTION, SOLUTION EPIDURAL; INTRACAUDAL
Status: DISPENSED
Start: 2021-02-08

## (undated) RX ORDER — SODIUM CHLORIDE 9 MG/ML
INJECTION, SOLUTION INTRAVENOUS
Status: DISPENSED
Start: 2018-07-29

## (undated) RX ORDER — TRIAMCINOLONE ACETONIDE 40 MG/ML
INJECTION, SUSPENSION INTRA-ARTICULAR; INTRAMUSCULAR
Status: DISPENSED
Start: 2021-10-01

## (undated) RX ORDER — DEXTROSE MONOHYDRATE 25 G/50ML
INJECTION, SOLUTION INTRAVENOUS
Status: DISPENSED
Start: 2024-06-18

## (undated) RX ORDER — DEXTROSE MONOHYDRATE 100 MG/ML
INJECTION, SOLUTION INTRAVENOUS
Status: DISPENSED
Start: 2024-06-18

## (undated) RX ORDER — LIDOCAINE HYDROCHLORIDE 10 MG/ML
INJECTION, SOLUTION INFILTRATION; PERINEURAL
Status: DISPENSED
Start: 2021-07-08

## (undated) RX ORDER — DILTIAZEM HYDROCHLORIDE 5 MG/ML
INJECTION INTRAVENOUS
Status: DISPENSED
Start: 2018-07-30

## (undated) RX ORDER — PROPOFOL 10 MG/ML
INJECTION, EMULSION INTRAVENOUS
Status: DISPENSED
Start: 2024-04-11

## (undated) RX ORDER — FENTANYL CITRATE-0.9 % NACL/PF 10 MCG/ML
PLASTIC BAG, INJECTION (ML) INTRAVENOUS
Status: DISPENSED
Start: 2024-04-11

## (undated) RX ORDER — CALCIUM GLUCONATE 94 MG/ML
INJECTION, SOLUTION INTRAVENOUS
Status: DISPENSED
Start: 2024-05-13

## (undated) RX ORDER — BUPIVACAINE HYDROCHLORIDE 5 MG/ML
INJECTION, SOLUTION EPIDURAL; INTRACAUDAL
Status: DISPENSED
Start: 2021-07-08

## (undated) RX ORDER — OXYCODONE HYDROCHLORIDE 5 MG/1
TABLET ORAL
Status: DISPENSED
Start: 2018-08-24

## (undated) RX ORDER — FENTANYL CITRATE 50 UG/ML
INJECTION, SOLUTION INTRAMUSCULAR; INTRAVENOUS
Status: DISPENSED
Start: 2018-07-30

## (undated) RX ORDER — REGADENOSON 0.08 MG/ML
INJECTION, SOLUTION INTRAVENOUS
Status: DISPENSED
Start: 2018-09-14

## (undated) RX ORDER — LIDOCAINE HYDROCHLORIDE 10 MG/ML
INJECTION, SOLUTION INFILTRATION; PERINEURAL
Status: DISPENSED
Start: 2021-02-08

## (undated) RX ORDER — GLYCOPYRROLATE 0.2 MG/ML
INJECTION, SOLUTION INTRAMUSCULAR; INTRAVENOUS
Status: DISPENSED
Start: 2024-04-11

## (undated) RX ORDER — TRIAMCINOLONE ACETONIDE 40 MG/ML
INJECTION, SUSPENSION INTRA-ARTICULAR; INTRAMUSCULAR
Status: DISPENSED
Start: 2021-07-08

## (undated) RX ORDER — ALBUTEROL SULFATE 0.83 MG/ML
SOLUTION RESPIRATORY (INHALATION)
Status: DISPENSED
Start: 2024-05-13

## (undated) RX ORDER — ALBUTEROL SULFATE 0.83 MG/ML
SOLUTION RESPIRATORY (INHALATION)
Status: DISPENSED
Start: 2024-06-18

## (undated) RX ORDER — TRIAMCINOLONE ACETONIDE 40 MG/ML
INJECTION, SUSPENSION INTRA-ARTICULAR; INTRAMUSCULAR
Status: DISPENSED
Start: 2021-02-08